# Patient Record
Sex: MALE
[De-identification: names, ages, dates, MRNs, and addresses within clinical notes are randomized per-mention and may not be internally consistent; named-entity substitution may affect disease eponyms.]

---

## 2017-03-15 ENCOUNTER — RX ONLY (OUTPATIENT)
Age: 77
Setting detail: RX ONLY
End: 2017-03-15

## 2017-12-15 ENCOUNTER — TRANSFERRED RECORDS (OUTPATIENT)
Dept: HEALTH INFORMATION MANAGEMENT | Facility: CLINIC | Age: 77
End: 2017-12-15

## 2018-12-21 ENCOUNTER — TRANSFERRED RECORDS (OUTPATIENT)
Dept: HEALTH INFORMATION MANAGEMENT | Facility: CLINIC | Age: 78
End: 2018-12-21

## 2019-08-08 ENCOUNTER — RECORDS - HEALTHEAST (OUTPATIENT)
Dept: ADMINISTRATIVE | Facility: OTHER | Age: 79
End: 2019-08-08

## 2019-08-19 ENCOUNTER — HOSPITAL ENCOUNTER (OUTPATIENT)
Dept: NUCLEAR MEDICINE | Facility: HOSPITAL | Age: 79
Discharge: HOME | End: 2019-08-19
Attending: INTERNAL MEDICINE

## 2019-08-19 DIAGNOSIS — C61 PROSTATE CA (H): ICD-10-CM

## 2019-08-19 RX ORDER — ASPIRIN 81 MG/1
81 TABLET, CHEWABLE ORAL DAILY
Status: SHIPPED | COMMUNITY
Start: 2019-08-19

## 2019-08-19 ASSESSMENT — MIFFLIN-ST. JEOR: SCORE: 1522.32

## 2019-12-19 ENCOUNTER — TRANSFERRED RECORDS (OUTPATIENT)
Dept: HEALTH INFORMATION MANAGEMENT | Facility: CLINIC | Age: 79
End: 2019-12-19

## 2020-07-15 ENCOUNTER — TRANSFERRED RECORDS (OUTPATIENT)
Dept: HEALTH INFORMATION MANAGEMENT | Facility: CLINIC | Age: 80
End: 2020-07-15

## 2020-08-13 ENCOUNTER — RECORDS - HEALTHEAST (OUTPATIENT)
Dept: ADMINISTRATIVE | Facility: OTHER | Age: 80
End: 2020-08-13

## 2020-08-14 ENCOUNTER — RECORDS - HEALTHEAST (OUTPATIENT)
Dept: ADMINISTRATIVE | Facility: OTHER | Age: 80
End: 2020-08-14

## 2020-08-14 ENCOUNTER — AMBULATORY - HEALTHEAST (OUTPATIENT)
Dept: CT IMAGING | Facility: CLINIC | Age: 80
End: 2020-08-14

## 2020-08-14 DIAGNOSIS — Z11.59 ENCOUNTER FOR SCREENING FOR OTHER VIRAL DISEASES: ICD-10-CM

## 2020-08-25 ENCOUNTER — RECORDS - HEALTHEAST (OUTPATIENT)
Dept: LAB | Facility: CLINIC | Age: 80
End: 2020-08-25

## 2020-08-25 LAB
ALBUMIN SERPL-MCNC: 3.9 G/DL (ref 3.5–5)
ALP SERPL-CCNC: 91 U/L (ref 45–120)
ALT SERPL W P-5'-P-CCNC: 13 U/L (ref 0–45)
ANION GAP SERPL CALCULATED.3IONS-SCNC: 10 MMOL/L (ref 5–18)
AST SERPL W P-5'-P-CCNC: 19 U/L (ref 0–40)
BILIRUB SERPL-MCNC: 0.8 MG/DL (ref 0–1)
BUN SERPL-MCNC: 13 MG/DL (ref 8–28)
CALCIUM SERPL-MCNC: 9.8 MG/DL (ref 8.5–10.5)
CHLORIDE BLD-SCNC: 106 MMOL/L (ref 98–107)
CHOLEST SERPL-MCNC: 221 MG/DL
CO2 SERPL-SCNC: 25 MMOL/L (ref 22–31)
CREAT SERPL-MCNC: 0.86 MG/DL (ref 0.7–1.3)
FASTING STATUS PATIENT QL REPORTED: ABNORMAL
GFR SERPL CREATININE-BSD FRML MDRD: >60 ML/MIN/1.73M2
GLUCOSE BLD-MCNC: 96 MG/DL (ref 70–125)
HDLC SERPL-MCNC: 51 MG/DL
LDLC SERPL CALC-MCNC: 141 MG/DL
POTASSIUM BLD-SCNC: 4.4 MMOL/L (ref 3.5–5)
PROT SERPL-MCNC: 6.8 G/DL (ref 6–8)
SODIUM SERPL-SCNC: 141 MMOL/L (ref 136–145)
TRIGL SERPL-MCNC: 147 MG/DL

## 2020-08-28 ENCOUNTER — HOSPITAL ENCOUNTER (OUTPATIENT)
Dept: ULTRASOUND IMAGING | Facility: CLINIC | Age: 80
Discharge: HOME | End: 2020-08-28
Attending: INTERNAL MEDICINE

## 2020-08-28 ENCOUNTER — HOSPITAL ENCOUNTER (OUTPATIENT)
Dept: CT IMAGING | Facility: CLINIC | Age: 80
Discharge: HOME | End: 2020-08-28
Attending: INTERNAL MEDICINE

## 2020-08-28 DIAGNOSIS — C61 PRIMARY MALIGNANT NEOPLASM OF PROSTATE (H): ICD-10-CM

## 2020-08-28 LAB
HGB BLD-MCNC: 13.2 G/DL (ref 14–18)
INR PPP: 0.99 (ref 0.9–1.1)
PLATELET # BLD AUTO: 275 THOU/UL (ref 140–440)

## 2020-08-28 ASSESSMENT — MIFFLIN-ST. JEOR: SCORE: 1522.32

## 2020-09-01 LAB
CAP COMMENT: ABNORMAL
LAB AP CHARGES (HE HISTORICAL CONVERSION): ABNORMAL
LAB AP INITIAL CYTO EVAL (HE HISTORICAL CONVERSION): ABNORMAL
LAB MED GENERAL PATH INTERP (HE HISTORICAL CONVERSION): ABNORMAL
PATH REPORT.COMMENTS IMP SPEC: ABNORMAL
PATH REPORT.COMMENTS IMP SPEC: ABNORMAL
PATH REPORT.FINAL DX SPEC: ABNORMAL
PATH REPORT.MICROSCOPIC SPEC OTHER STN: ABNORMAL
PATH REPORT.RELEVANT HX SPEC: ABNORMAL
SPECIMEN DESCRIPTION: ABNORMAL

## 2021-05-30 ENCOUNTER — RECORDS - HEALTHEAST (OUTPATIENT)
Dept: ADMINISTRATIVE | Facility: CLINIC | Age: 81
End: 2021-05-30

## 2021-06-01 ENCOUNTER — RECORDS - HEALTHEAST (OUTPATIENT)
Dept: ADMINISTRATIVE | Facility: CLINIC | Age: 81
End: 2021-06-01

## 2021-06-03 ENCOUNTER — RECORDS - HEALTHEAST (OUTPATIENT)
Dept: ADMINISTRATIVE | Facility: CLINIC | Age: 81
End: 2021-06-03

## 2021-06-03 VITALS — BODY MASS INDEX: 24.08 KG/M2 | HEIGHT: 71 IN | WEIGHT: 172 LBS

## 2021-06-04 VITALS — HEIGHT: 71 IN | WEIGHT: 172 LBS | BODY MASS INDEX: 24.08 KG/M2

## 2021-06-27 ENCOUNTER — HEALTH MAINTENANCE LETTER (OUTPATIENT)
Age: 81
End: 2021-06-27

## 2021-10-17 ENCOUNTER — HEALTH MAINTENANCE LETTER (OUTPATIENT)
Age: 81
End: 2021-10-17

## 2022-04-15 ENCOUNTER — TRANSFERRED RECORDS (OUTPATIENT)
Dept: HEALTH INFORMATION MANAGEMENT | Facility: CLINIC | Age: 82
End: 2022-04-15

## 2022-07-23 ENCOUNTER — HEALTH MAINTENANCE LETTER (OUTPATIENT)
Age: 82
End: 2022-07-23

## 2022-10-01 ENCOUNTER — HEALTH MAINTENANCE LETTER (OUTPATIENT)
Age: 82
End: 2022-10-01

## 2022-12-22 ENCOUNTER — TRANSFERRED RECORDS (OUTPATIENT)
Dept: HEALTH INFORMATION MANAGEMENT | Facility: CLINIC | Age: 82
End: 2022-12-22

## 2023-04-20 ENCOUNTER — TRANSFERRED RECORDS (OUTPATIENT)
Dept: HEALTH INFORMATION MANAGEMENT | Facility: CLINIC | Age: 83
End: 2023-04-20

## 2023-08-12 ENCOUNTER — HEALTH MAINTENANCE LETTER (OUTPATIENT)
Age: 83
End: 2023-08-12

## 2023-08-23 ENCOUNTER — TRANSFERRED RECORDS (OUTPATIENT)
Dept: HEALTH INFORMATION MANAGEMENT | Facility: CLINIC | Age: 83
End: 2023-08-23

## 2023-08-30 ENCOUNTER — MEDICAL CORRESPONDENCE (OUTPATIENT)
Dept: HEALTH INFORMATION MANAGEMENT | Facility: CLINIC | Age: 83
End: 2023-08-30
Payer: MEDICARE

## 2023-08-30 ENCOUNTER — TRANSFERRED RECORDS (OUTPATIENT)
Dept: HEALTH INFORMATION MANAGEMENT | Facility: CLINIC | Age: 83
End: 2023-08-30
Payer: MEDICARE

## 2023-09-27 ENCOUNTER — TRANSCRIBE ORDERS (OUTPATIENT)
Dept: OTHER | Age: 83
End: 2023-09-27

## 2023-09-27 ENCOUNTER — PATIENT OUTREACH (OUTPATIENT)
Dept: ONCOLOGY | Facility: CLINIC | Age: 83
End: 2023-09-27
Payer: MEDICARE

## 2023-09-27 DIAGNOSIS — C61 PRIMARY MALIGNANT NEOPLASM OF PROSTATE (H): Primary | ICD-10-CM

## 2023-09-27 NOTE — PROGRESS NOTES
New Patient Oncology Nurse Navigator Note     Referring provider:   Luis Locke MD    [facility]  ph. [phone]  fax: [phone]     Referred to (specialty): Medical Oncology    Requested provider (if applicable):   [facility]     Date Referral Received:   09/27/23     Evaluation for :   primary malignant neoplasm of castrate resistant prostate, discuss Pluvicto eligibility     Clinical History (per Nurse review of records provided):    Patient of Dr. Locke, [facility], being referred for discussion of potential lutetium tx or clinical trials due to progression on recent PSMA PET (8/23/23).    Initial diagnosis of prostate cancer 11/6/2009    Records Location (Care Everywhere, Media, etc.):   [facility]      I called patient to discuss referral to oncology.  I reached his wife, Macey, who does his scheduling.  She was expecting this call and knew what it was regarding.  I introduced my role and reviewed what this consult visit will entail/what to expect.  I reviewed the location and gave contact numbers including new patient scheduling and clinic phone numbers.   Macey, has no other questions at this time.    I forwarded on referral with scheduling instructions for the following     PLAN: SCHEDULE: First available @ [facility], with med onc for prostate cancer, NEW, in person.    I transferred call to our new patient scheduling to finalize appointment.    Che Hassan RN, BSN  Oncology New Patient Nurse Navigator   [facility]  [phone]

## 2023-10-04 ENCOUNTER — TRANSFERRED RECORDS (OUTPATIENT)
Dept: HEALTH INFORMATION MANAGEMENT | Facility: CLINIC | Age: 83
End: 2023-10-04
Payer: MEDICARE

## 2023-10-12 NOTE — TELEPHONE ENCOUNTER
RECORDS STATUS - ALL OTHER DIAGNOSIS      Action October 12, 2023 4:07 PM    Action Taken Request is faxed to [facility] for records.    October 17, 2023 2:05 PM:  - Received records from [facility]. Faxed to HIM and emailed to NN. Called [facility] to push images to  PACS.  - Req path slides from [facility].   [facility] Tracking #: [id number]    October 18, 2023 10:01 AM:   Images was not push to PACS. Called and spoke to Melanie at [facility], she'll get those images push.      Imaging Received  October 18, 2023 11:57 AM    Action: Images from [facility] received and resolved to PACS.     RECORDS RECEIVED FROM: [facility]   DATE RECEIVED:    NOTES STATUS DETAILS   OFFICE NOTE from referring provider External: [facility] 10/4/23: Dr. Luis Locke   OFFICE NOTE from radiation oncologist External: [facility] 12/8/20: Dr. Bob Shaver   OPERATIVE REPORT CE- [facility] 12/9/09: Prostatectomy   MEDICATION LIST External: [facility]    LABS  [facility] Tracking #: [id number]   PATHOLOGY REPORTS CE- [facility] (slide req) 12/9/09: [id number]    ANYTHING RELATED TO DIAGNOSIS External: [facility]    GENONOMIC TESTING     TYPE: External: Guardant 360 4/20/23: [id number]  8/9/19: [id number]   IMAGING (NEED IMAGES & REPORT)     CT SCANS (Req img from [facility]) 8/23/23, 12/22/22: PET CT Prostate   PET (Req img from [facility]) 4/15/22, 7/16/20: PET Tumor

## 2023-10-17 NOTE — PROGRESS NOTES
NEW PATIENT SECOND OPINION CONSULTATION    Reason for Visit:  Metastatic CRPC s/p enzalutamide and abiraterone; consideration of 177Lu-PSMA-617.    History of Present Illness:  Dear Dr Luis Locke,    Thank you for referring your patient for a Second Opinion consultation at the [facility].  A brief overview of his oncological history as well as my recommendations follow below.    Mr. Pettit is an 82-year-old man who was diagnosed with prostate cancer in 2009, at the age of 69.  He underwent a radical prostatectomy on 12/9/2009.  Pathology revealed [place] 4+5=9 disease, stage pT3a N0 R1.  He then completed salvage external-beam radiotherapy, from 3/2010 until 5/2010.    Unfortunately, he developed a PSA recurrence in 4/2013, and started ADT at that time.  By 1/2016, he had developed non-metastatic CRPC, and enzalutamide was added.  He subsequently developed enzalutamide resistance in 7/2020.  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  He underwent SBRT targeting the left supraclavicular node, ending on 9/30/2020.    He then continued with systemic therapy using ADT plus enzalutamide, but his PSA continued to rise over the next eighteen months.  In 5/2022, he switched from enzalutamide to abiraterone/prednisone.  His disease progressed further by 8/10/2023, at which time he stopped the abiraterone and prednisone.    He underwent a PSA test on 8/23/2023, showing a value of 77.5 ng/mL.  He also recently underwent a PSMA-PET scan on 8/23/2023.  This showed multiple radiotracer-avid osseous and brisa metastatic lesions.    The patient is being referred for consideration of 177Lu-PSMA-617 radioligand treatment.  He is also potentially open to clinical trial participation, if available.  The patient has not yet received systemic chemotherapy using a taxane agent.    Genomics:  The patient  underwent a [id number] ctDNA test on 2023.  This revealed only a TP53 mutation, without microsatellite instability and without a high tumor mutation burden.    Review of Systems:  The patient reports feeling generally well.  He does not have any cancer-related symptoms at this time.  He has not lost or gained any weight recently.  He does not report any significant urological symptoms.  He does not have any bone pain.  A comprehensive 14-system review of symptoms is otherwise generally within normal limits.  His ECOG score is 1.  His pain score is 1/10.    Past Medical History:  Superficial bladder cancer ()  Hearing impairment  Chronic lower back pain  Diverticulosis  Umbilical hernia  Penile implant  Vasectomy    Medications:  Leuprolide 30 mg q4mo  Zometa 4 mg IV  Aspirin 81 mg  Bicalutamide 50 mg  Gabapentin 300 mg BID  Calcium plus vitamin D  Multivitamin    Allergies:  No known drug allergies.    Social History:  The patient lives in [place], [place] (which is 30 minutes from the [place]).  He is  to his wife, Macey.  He is retired from [employer].  He has never been a smoker.  He does not drink regular alcohol, but does enjoy an occasional beer.    Family History:  His father  of metastatic prostate cancer.  His maternal aunt had breast cancer.    Physical Examination:  Mr. Pettit is an 82-year-old man who appears comfortable at rest.  His vital signs are generally within normal limits.  There is no palpable supraclavicular, cervical, axillary or inguinal lymphadenopathy.  His HEENT examination is unremarkable.  The cardiovascular, respiratory, and gastrointestinal examinations are generally within normal limits.  The neuromuscular examination is grossly intact.  The extremities do not demonstrate pitting edema.  The skin evaluation is unremarkable.      ASSESSMENT AND PLAN:  Mr. Pettit is an 82-year-old man with metastatic CRPC who has previously received enzalutamide and abiraterone (but is  chemotherapy-naïve).  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  His ECOG score is 1.  His pain score is 1/10.    We have spent the first part of our consultation discussing additional systemic treatment options that he would be eligible for.  Such treatments include docetaxel chemotherapy, or the bone-targeting radiopharmaceutical agents radium-223.  Unfortunately, he would not be eligible for 177Lu-PSMA-617 radioligand treatment at this time, since he has not previously received a taxane chemotherapy agent.  In addition, we do not currently have any clinical trials utilizing 177Lu-PSMA-617 in the pre-chemotherapy setting that he would be eligible for.    We then turned our attention to discuss our clinical trial portfolio.  The patient would be potentially eligible for two of our clinical studies.  The first is the MARLO trial, which is a randomized Phase 3 study of docetaxel used alone or combined with radium-223.  I believe that this patient would be a good fit for this particular clinical trial.  The second study is the Phase 1 [id number] trial using a PSMA x CD3 bispecific antibody therapy.  After reviewing both of these clinical trials with the patient, my recommendation would be to pursue the MARLO study.  We will provide him with the consent forms for both trials today.    A total of 80 minutes was spent on this patient on the day of the consultation, of which more than 50% of this time was used for counseling and coordination of care.  The patient was given the opportunity to ask multiple questions today, all of which were answered to his satisfaction.    Reed Johns M.D.

## 2023-10-18 ENCOUNTER — PRE VISIT (OUTPATIENT)
Dept: ONCOLOGY | Facility: CLINIC | Age: 83
End: 2023-10-18
Payer: MEDICARE

## 2023-10-18 ENCOUNTER — ONCOLOGY VISIT (OUTPATIENT)
Dept: ONCOLOGY | Facility: CLINIC | Age: 83
End: 2023-10-18
Attending: INTERNAL MEDICINE
Payer: MEDICARE

## 2023-10-18 VITALS
WEIGHT: 164.9 LBS | SYSTOLIC BLOOD PRESSURE: 146 MMHG | HEART RATE: 66 BPM | RESPIRATION RATE: 20 BRPM | TEMPERATURE: 97.6 F | OXYGEN SATURATION: 99 % | HEIGHT: 71 IN | DIASTOLIC BLOOD PRESSURE: 85 MMHG | BODY MASS INDEX: 23.09 KG/M2

## 2023-10-18 DIAGNOSIS — C61 MALIGNANT NEOPLASM OF PROSTATE (H): Primary | ICD-10-CM

## 2023-10-18 PROCEDURE — G0463 HOSPITAL OUTPT CLINIC VISIT: HCPCS | Performed by: INTERNAL MEDICINE

## 2023-10-18 PROCEDURE — 99205 OFFICE O/P NEW HI 60 MIN: CPT | Performed by: INTERNAL MEDICINE

## 2023-10-18 RX ORDER — GABAPENTIN 300 MG
1 CAPSULE ORAL
COMMUNITY
Start: 2023-09-27 | End: 2023-10-24

## 2023-10-18 RX ORDER — CYCLOBENZAPRINE HCL 10 MG
TABLET ORAL
COMMUNITY
Start: 2023-10-06

## 2023-10-18 RX ORDER — BICALUTAMIDE 50 MG/1
TABLET, FILM COATED ORAL
COMMUNITY
Start: 2023-08-30 | End: 2023-10-24

## 2023-10-18 ASSESSMENT — PAIN SCALES - GENERAL: PAINLEVEL: NO PAIN (0)

## 2023-10-18 NOTE — LETTER
10/18/2023         RE: Alonso Pettit  [address] [facility] [address]        Dear Colleague,    Thank you for referring your patient, Alonso Pettit, to the [facility]. Please see a copy of my visit note below.      NEW PATIENT SECOND OPINION CONSULTATION    Reason for Visit:  Metastatic CRPC s/p enzalutamide and abiraterone; consideration of 177Lu-PSMA-617.    History of Present Illness:  Dear Dr Luis Locke,    Thank you for referring your patient for a Second Opinion consultation at the [facility].  A brief overview of his oncological history as well as my recommendations follow below.    Mr. Pettit is an 82-year-old man who was diagnosed with prostate cancer in 2009, at the age of 69.  He underwent a radical prostatectomy on 12/9/2009.  Pathology revealed [place] 4+5=9 disease, stage pT3a N0 R1.  He then completed salvage external-beam radiotherapy, from 3/2010 until 5/2010.    Unfortunately, he developed a PSA recurrence in 4/2013, and started ADT at that time.  By 1/2016, he had developed non-metastatic CRPC, and enzalutamide was added.  He subsequently developed enzalutamide resistance in 7/2020.  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  He underwent SBRT targeting the left supraclavicular node, ending on 9/30/2020.    He then continued with systemic therapy using ADT plus enzalutamide, but his PSA continued to rise over the next eighteen months.  In 5/2022, he switched from enzalutamide to abiraterone/prednisone.  His disease progressed further by 8/10/2023, at which time he stopped the abiraterone and prednisone.    He underwent a PSA test on 8/23/2023, showing a value of 77.5 ng/mL.  He also recently underwent a PSMA-PET scan on 8/23/2023.  This showed multiple radiotracer-avid osseous and brisa metastatic lesions.    The patient is being  referred for consideration of 177Lu-PSMA-617 radioligand treatment.  He is also potentially open to clinical trial participation, if available.  The patient has not yet received systemic chemotherapy using a taxane agent.    Genomics:  The patient underwent a [id number] ctDNA test on 2023.  This revealed only a TP53 mutation, without microsatellite instability and without a high tumor mutation burden.    Review of Systems:  The patient reports feeling generally well.  He does not have any cancer-related symptoms at this time.  He has not lost or gained any weight recently.  He does not report any significant urological symptoms.  He does not have any bone pain.  A comprehensive 14-system review of symptoms is otherwise generally within normal limits.  His ECOG score is 1.  His pain score is 1/10.    Past Medical History:  Superficial bladder cancer ()  Hearing impairment  Chronic lower back pain  Diverticulosis  Umbilical hernia  Penile implant  Vasectomy    Medications:  Leuprolide 30 mg q4mo  Zometa 4 mg IV  Aspirin 81 mg  Bicalutamide 50 mg  Gabapentin 300 mg BID  Calcium plus vitamin D  Multivitamin    Allergies:  No known drug allergies.    Social History:  The patient lives in [place], [place] (which is 30 minutes from the [place]).  He is  to his wife, Macey.  He is retired from [employer].  He has never been a smoker.  He does not drink regular alcohol, but does enjoy an occasional beer.    Family History:  His father  of metastatic prostate cancer.  His maternal aunt had breast cancer.    Physical Examination:  Mr. Pettit is an 82-year-old man who appears comfortable at rest.  His vital signs are generally within normal limits.  There is no palpable supraclavicular, cervical, axillary or inguinal lymphadenopathy.  His HEENT examination is unremarkable.  The cardiovascular, respiratory, and gastrointestinal examinations are generally within normal limits.  The neuromuscular examination is grossly  intact.  The extremities do not demonstrate pitting edema.  The skin evaluation is unremarkable.      ASSESSMENT AND PLAN:  Mr. Pettit is an 82-year-old man with metastatic CRPC who has previously received enzalutamide and abiraterone (but is chemotherapy-naïve).  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  His ECOG score is 1.  His pain score is 1/10.    We have spent the first part of our consultation discussing additional systemic treatment options that he would be eligible for.  Such treatments include docetaxel chemotherapy, or the bone-targeting radiopharmaceutical agents radium-223.  Unfortunately, he would not be eligible for 177Lu-PSMA-617 radioligand treatment at this time, since he has not previously received a taxane chemotherapy agent.  In addition, we do not currently have any clinical trials utilizing 177Lu-PSMA-617 in the pre-chemotherapy setting that he would be eligible for.    We then turned our attention to discuss our clinical trial portfolio.  The patient would be potentially eligible for two of our clinical studies.  The first is the MARLO trial, which is a randomized Phase 3 study of docetaxel used alone or combined with radium-223.  I believe that this patient would be a good fit for this particular clinical trial.  The second study is the Phase 1 [id number] trial using a PSMA x CD3 bispecific antibody therapy.  After reviewing both of these clinical trials with the patient, my recommendation would be to pursue the MARLO study.  We will provide him with the consent forms for both trials today.    A total of 80 minutes was spent on this patient on the day of the consultation, of which more than 50% of this time was used for counseling and coordination of care.  The patient was given the opportunity to ask multiple questions today, all of which were answered to his satisfaction.    Reed Johns M.D.

## 2023-10-18 NOTE — NURSING NOTE
"Oncology Rooming Note    October 18, 2023 1:32 PM   Alonso Pettit is a 82 year old adult who presents for:    Chief Complaint   Patient presents with    Oncology Clinic Visit     Primary malignant neoplasm of prostate      Initial Vitals: BP (!) 146/85 (BP Location: Right arm, Patient Position: Sitting, Cuff Size: Adult Regular)   Pulse 66   Temp 97.6  F (36.4  C) (Oral)   Resp 20   Ht 1.812 m (5' 11.34\")   Wt 74.8 kg (164 lb 14.4 oz)   SpO2 99%   BMI 22.78 kg/m   Estimated body mass index is 22.78 kg/m  as calculated from the following:    Height as of this encounter: 1.812 m (5' 11.34\").    Weight as of this encounter: 74.8 kg (164 lb 14.4 oz). Body surface area is 1.94 meters squared.  No Pain (0) Comment: Data Unavailable   No LMP recorded.  Allergies reviewed: Yes  Medications reviewed: Yes    Medications: Medication refills not needed today.  Pharmacy name entered into [facility]: [id number] [place] [address]    Clinical concerns: none      Adriana Gallagher              "

## 2023-10-19 ENCOUNTER — PATIENT OUTREACH (OUTPATIENT)
Dept: ONCOLOGY | Facility: CLINIC | Age: 83
End: 2023-10-19
Payer: MEDICARE

## 2023-10-19 ENCOUNTER — DOCUMENTATION ONLY (OUTPATIENT)
Dept: ONCOLOGY | Facility: CLINIC | Age: 83
End: 2023-10-19
Payer: MEDICARE

## 2023-10-19 NOTE — PROGRESS NOTES
[facility]: Cancer Care                                                                                      RNCC received a message from patient's wife inquiring about research and consent forms.  She provided the email they'd like the consent form emailed to.  Forwarded on to the research team.  Attempted to reach out to patient, but left a message for them informing that the information was passed on to the research team.    Genet Hansen, RN, BSN  Oncology RN Care Coordinator  [facility]

## 2023-10-19 NOTE — NURSING NOTE
Emailed patient the consent form for the [id number] Jocelin trial per Dr. Johns' request. Will follow up with patient on Monday, gave contact info ig questions before then. Instructed patient to only review consent and to not sign.     Cammy López RN   Clinical Research Coordinator Nurse-Solid Tumor   Phone: [phone] Pgr: [phone]

## 2023-10-23 ENCOUNTER — DOCUMENTATION ONLY (OUTPATIENT)
Dept: ONCOLOGY | Facility: CLINIC | Age: 83
End: 2023-10-23
Payer: MEDICARE

## 2023-10-24 ENCOUNTER — ALLIED HEALTH/NURSE VISIT (OUTPATIENT)
Dept: ONCOLOGY | Facility: CLINIC | Age: 83
End: 2023-10-24
Payer: MEDICARE

## 2023-10-24 ENCOUNTER — ONCOLOGY VISIT (OUTPATIENT)
Dept: ONCOLOGY | Facility: CLINIC | Age: 83
End: 2023-10-24
Attending: INTERNAL MEDICINE
Payer: MEDICARE

## 2023-10-24 VITALS
RESPIRATION RATE: 16 BRPM | TEMPERATURE: 97.5 F | DIASTOLIC BLOOD PRESSURE: 72 MMHG | BODY MASS INDEX: 22.96 KG/M2 | WEIGHT: 164 LBS | OXYGEN SATURATION: 98 % | SYSTOLIC BLOOD PRESSURE: 132 MMHG | HEIGHT: 71 IN | HEART RATE: 68 BPM

## 2023-10-24 DIAGNOSIS — C61 MALIGNANT NEOPLASM OF PROSTATE (H): Primary | ICD-10-CM

## 2023-10-24 DIAGNOSIS — C79.51 MALIGNANT NEOPLASM METASTATIC TO BONE (H): ICD-10-CM

## 2023-10-24 PROCEDURE — G0463 HOSPITAL OUTPT CLINIC VISIT: HCPCS | Performed by: INTERNAL MEDICINE

## 2023-10-24 PROCEDURE — 99417 PROLNG OP E/M EACH 15 MIN: CPT | Performed by: INTERNAL MEDICINE

## 2023-10-24 PROCEDURE — 99215 OFFICE O/P EST HI 40 MIN: CPT | Performed by: INTERNAL MEDICINE

## 2023-10-24 ASSESSMENT — PAIN SCALES - GENERAL: PAINLEVEL: NO PAIN (0)

## 2023-10-24 NOTE — LETTER
10/24/2023         RE: Alonso Pettit  [address] [facility] [address]        Dear Colleague,    Thank you for referring your patient, Alonso Pettit, to the [facility]. Please see a copy of my visit note below.    [facility]  HEMATOLOGY AND ONCOLOGY    FOLLOW-UP VISIT NOTE    PATIENT NAME: Alonso Pettit MRN # [id number]  DATE OF VISIT: Oct 24, 2023 YOB: 1940    REFERRING PROVIDER: [facility]ita Veras oncology    CANCER TYPE: Prostate adenocarcinoma; Scott 4+5  STAGE: IV (bony metastasis)  MOLECULAR PROFILE: No actionable mutations/MSI or high TMB; TP53 mutation positive    TREATMENT SUMMARY:  -12/9/2009: Radical prostatectomy; pathology revealed Scott 4+5 disease, stage pT3a,N0 positive margins  -Mar-May2010: Salvage external beam radiation therapy  -Apr 2013: Intermittent androgen deprivation therapy with leuprolide for biochemical PSA relapse  -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 Radiation therapy to the left supraclavicular lymph node ending in September 2020.  He was continued on enzalutamide  -May 2022: Switch to abiraterone with prednisone for progressive disease  -Aug 2023: Abiraterone discontinued for progressive disease.  PSMA PET scan with PSMA avid osseous and brisa metastasis    CURRENT INTERVENTIONS:  Leuprolide alone after progressing on abiraterone with prednisone    SUBJECTIVE   Alonso Pettit is being followed for castration resistant metastatic prostate cancer    Alonso is accompanied by his wife at this clinic visit.  He was seen by my colleague-Dr. Cheung last week and recommended participation in clinical trial. He is here to review more with myself.       PAST MEDICAL HISTORY     Past Medical History:   Diagnosis Date     "Prostate cancer (H)          CURRENT OUTPATIENT MEDICATIONS     Current Outpatient Medications   Medication Sig    calcium carbonate (CALCIUM 500 ORAL) [CALCIUM CARBONATE (CALCIUM 500 ORAL)] Take by mouth.    cyclobenzaprine (FLEXERIL) 10 MG tablet 1/2-1 TAB ORALLY UP TO 3 TIMES A DAY AS NEEDED FOR MUSCLE SPASM    aspirin 81 mg chewable tablet [ASPIRIN 81 MG CHEWABLE TABLET] Chew 81 mg daily. (Patient not taking: Reported on 10/18/2023)    leuprolide (LUPRON) 11.25 mg injection [LEUPROLIDE (LUPRON) 11.25 MG INJECTION] Inject 11.25 mg into the shoulder, thigh, or buttocks every 3 (three) months. (Patient not taking: Reported on 10/18/2023)     No current facility-administered medications for this visit.        ALLERGIES    No Known Allergies     REVIEW OF SYSTEMS   As above in the HPI, o/w complete 12-point ROS was negative.     PHYSICAL EXAM   /72   Pulse 68   Temp 97.5  F (36.4  C) (Oral)   Resp 16   Ht 1.812 m (5' 11.34\")   Wt 74.4 kg (164 lb)   SpO2 98%   BMI 22.66 kg/m    GENERAL/CONSTITUTIONAL: No acute distress.  EYES: Pupils are equal, round, and react to light and accommodation. Extraocular movements intact.  No scleral icterus.  ENT/MOUTH: Neck supple. Oropharynx clear, no mucositis.  LYMPH: No anterior cervical, posterior cervical, supraclavicular, axillary or inguinal adenopathy.   RESPIRATORY: Clear to auscultation bilaterally. No crackles or wheezing.   CARDIOVASCULAR: Regular rate and rhythm without murmurs, gallops, or rubs.  GASTROINTESTINAL: No hepatosplenomegaly, masses, or tenderness. The patient has normal bowel sounds. No guarding.  No distention.  : Deferred  MUSCULOSKELETAL: Warm and well-perfused, no cyanosis, clubbing, or edema.  NEUROLOGIC: Cranial nerves II-XII are intact. Alert, oriented, answers questions appropriately. No focal neurologic deficit  INTEGUMENTARY: No rashes or jaundice.  GAIT: Normal       LABORATORY AND IMAGING STUDIES     Recent Labs   Lab Test " "08/25/20  1443      POTASSIUM 4.4   CHLORIDE 106   CO2 25   ANIONGAP 10   BUN 13   CR 0.86   GLC 96   ZABRINA 9.8     No results for input(s): \"MAG\", \"PHOS\" in the last 56926 hours.  Recent Labs   Lab Test 08/28/20  0706   HGB 13.2*        Recent Labs   Lab Test 08/25/20  1443   BILITOTAL 0.8   ALKPHOS 91   ALT 13   AST 19   ALBUMIN 3.9     No results found for: \"TSH\"  No results for input(s): \"CEA\" in the last 01899 hours.  Results for orders placed or performed in visit on 06/03/21   US Penile Duplex Complete    Narrative    See Historical Hospital Medical Record for documentation         ASSESSMENT AND PLAN   -Castration resistant metastatic prostate cancer   Post radical prostatectomy on 12/9/2009; salvage radiation ending May 2010; androgen deprivation therapy since 2013   Post progression on enzalutamide and abiraterone with prednisone  -Nonmuscle invasive bladder cancer diagnosed in 2011 without any recent recurrence  -No significant medical comorbidity  -ECOG performance status of 0    I had a lengthy discussion with Alonso who is accompanied by his wife at this clinic visit.  He has castration resistant metastatic prostate cancer which has progressed on novel antiandrogen therapies including abiraterone with prednisone and enzalutamide.  He had a PSMA PET CT scan which did show extensive metastasis to the lymph nodes and bones.  We reviewed his subsequent treatment options including lutetium 177 (Pluvicto), chemotherapy with docetaxel and MARLO clinical trial.    We primarily focused on docetaxel and radium as Pluvicto is approved only after progression on 1 taxane-based regimen.  He has not received any chemotherapy either in the hormone sensitive for castration resistant setting and is not eligible for Pluvicto as yet.    I reviewed chemotherapy with docetaxel which was studied in the  trial which led to its approval in 2004 for castration resistant metastatic prostate cancer. N Engl J Med " 2004; 351:4611-1486.  In this study docetaxel with prednisone was compared to mitoxantrone with prednisone and showed a significant improvement in median survival from 16.5 months in the control mitoxantrone group to 18.9 months in the docetaxel arm.  Treatment with docetaxel was associated with improvement in quality of life.  When given with prednisone treatment with docetaxel every 3 weeks left distal.  Survival and improved response rates in terms of pain, PSA levels and quality of life as compared to mitoxantrone with prednisone.    I extensively reviewed the side effects from this chemotherapy.  We reviewed the peripheral neuropathy, alopecia, neutropenic fevers, myelosuppression, mucositis, diarrhea, allergies, pedal edema and rash.  Of these, alopecia and nail changes are more of nuisance.  However, cytopenias and in particular neutropenia can be associated with neutropenic fevers which can be serious and life-threatening. He should take every fever seriously because if his counts are low, then he would not have the defenses and he should give us a call immediately.  He should not even wait for the next morning.  He should be seen either in the clinic or in the ER the same day.  We would get basic blood tests including the CBC and blood cultures.      I reviewed radium 223 as part of the MARLO trial along with docetaxel.  [id number] is approved and castration resistant metastatic prostate cancer patients having progressed on chemotherapy or dose was not a chemotherapy candidate.  The current clinical trial will study the tolerability and efficacy of radium when administered alongside docetaxel chemotherapy as compared to single agent docetaxel.  Patient will be randomized to standard of care docetaxel once every 3 weeks or the combination of radium once every 6 weeks along with docetaxel every 3 weeks.  The dose of docetaxel is decreased in the combination arm due to concerns of overlapping toxicity -primarily  cytopenias.       Alpharadin /Ra-223 /Xofigo (Jeferson MADRIGAL., Claudine HENDERSON., [place] SMITH., et al. N Engl J Med 2013; 369:213-223) is a radioisotope that has chemical structure similar to calcium. Administered intravenously it gets deposited at the sites of bony disease. It releases high energy alpha particles that deliver fatal radiation dose over a very short distance. This treatment is similar to previously administered radioactive isotopes like samarium and strontium. In this phase III clinical trial it significantly improved OS in pts with CRPC with bone mets vs placebo (two-sided P = 0.04251; HR = 0.695; 95% CI, 0.552-0.875; median OS 14.0 mo vs 11.2 mo, respectively) and was very well tolerated. This is a significant improvement over previous agents like samarium used for palliation which failed to improve survival. Patients can have nausea, vomiting, fatigue and diarrhea. It is excreted in stools. But since it emits alpha particle which have a pathlength of only 1 mm, it is remarkably safe and cannot cross the skin barrier. Care is needed for management of body fluids, but again any significant exposure to care givers from this route is not expected.     I summarized the above information so that they could understand.  Radium has been shown to decrease pain medication requirement but is unable to control the progression of disease on its own.  It has been shown to improve survival which was the reason for its FDA approval.  Outside of clinical trial it is administered as an injection once every month for up to 6 doses.  However on this study it is administered every 6 weeks to synchronize with the docetaxel chemotherapy infusions.  Overall radium 223 is very well-tolerated and I have not seen any significant side effects in any of my patients other than cytopenias and an occasional patient who has received extensive radiation or chemotherapy.  He will be closely monitored while on clinical trial and will have CBC  drawn and await measured a week prior to the planned injection.  Radium is prepared for an individual patient based on his body weight.      He would be assessed immediately prior to his treatments including radium injection or docetaxel chemotherapy to assess how well he is tolerating therapy and the status of his disease.    We will discontinue his bicalutamide in anticipation of clinical trial participation.  He is not benefiting from the current bicalutamide therapy after progressing on enzalutamide and abiraterone with prednisone.  I have simplified his medication list and removed the medications that he has not been taking currently.    He did consent to clinical trial participation.  I reminded him that it is not a binding contract but there will be an ongoing consent with each administration and every visit.  He would have the option of dropping from the clinical trial if he chooses.  However, there is a chance that he should benefit from the combination of docetaxel and radium 223 if he is randomized to the treatment.  Treatment on the standard of care were docetaxel arm will not have any direct benefit for him but would not have any downside other than the need to travel to the [place] for the infusions.  He will be monitored closely while on clinical trial.  Patients treated on clinical trials tend to do better than those in clinical practice likely due to this close monitoring.    Over 90 min spent on day of visit including review of tests, obtaining/reviewing separately obtained history/physical exam, counseling patient, ordering medications/tests/procedures, communicating with PCP/consultants, and documenting in electronic medical record.    Kg Gamez  Hematologist and Medical Oncologist  [facility]

## 2023-10-24 NOTE — NURSING NOTE
"Oncology Rooming Note    October 24, 2023 4:30 PM   Alonso Pettit is a 82 year old adult who presents for:    No chief complaint on file.    Initial Vitals: /72   Pulse 68   Temp 97.5  F (36.4  C) (Oral)   Resp 16   Ht 1.812 m (5' 11.34\")   Wt 74.4 kg (164 lb)   SpO2 98%   BMI 22.66 kg/m   Estimated body mass index is 22.66 kg/m  as calculated from the following:    Height as of this encounter: 1.812 m (5' 11.34\").    Weight as of this encounter: 74.4 kg (164 lb). Body surface area is 1.94 meters squared.  No Pain (0) Comment: Data Unavailable   No LMP recorded.  Allergies reviewed: Yes  Medications reviewed: Yes    Medications: Medication refills not needed today.  Pharmacy name entered into [facility]: [facility] [id number] [place] [address]    Clinical concerns:        Macey Soliman CMA              "

## 2023-10-24 NOTE — NURSING NOTE
Call patient and spouse, answered questions and explained study in detail. Wanting to come in for consent. Will work with providers scheduled and patient schedule to have patient seen. Notified them that they have to transition care to Dr. Gamez or Dr. Camara due to conflicting on the [id number] study from Dr. Johns, they are agreeable to this.     Cammy López RN   Clinical Research Coordinator Nurse-Solid Tumor   Phone: [phone] Pgr: [phone]

## 2023-10-25 NOTE — PROGRESS NOTES
[facility]  HEMATOLOGY AND ONCOLOGY    FOLLOW-UP VISIT NOTE    PATIENT NAME: Alonso Pettit MRN # [id number]  DATE OF VISIT: Oct 24, 2023 YOB: 1940    REFERRING PROVIDER: [facility]    CANCER TYPE: Prostate adenocarcinoma; [place] 4+5  STAGE: IV (bony metastasis)  MOLECULAR PROFILE: No actionable mutations/MSI or high TMB; TP53 mutation positive    TREATMENT SUMMARY:  -12/9/2009: Radical prostatectomy; pathology revealed [place] 4+5 disease, stage pT3a,N0 positive margins  -Mar-May2010: Salvage external beam radiation therapy  -Apr 2013: Intermittent androgen deprivation therapy with leuprolide for biochemical PSA relapse  -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 Radiation therapy to the left supraclavicular lymph node ending in September 2020.  He was continued on enzalutamide  -May 2022: Switch to abiraterone with prednisone for progressive disease  -Aug 2023: Abiraterone discontinued for progressive disease.  PSMA PET scan with PSMA avid osseous and brisa metastasis    CURRENT INTERVENTIONS:  Leuprolide alone after progressing on abiraterone with prednisone    SUBJECTIVE   Alonso Pettit is being followed for castration resistant metastatic prostate cancer    Alonso is accompanied by his wife at this clinic visit.  He was seen by my colleague-Dr. Cheung last week and recommended participation in clinical trial. He is here to review more with myself.       PAST MEDICAL HISTORY     Past Medical History:   Diagnosis Date    Prostate cancer (H)          CURRENT OUTPATIENT MEDICATIONS     Current Outpatient Medications   Medication Sig    calcium carbonate (CALCIUM 500 ORAL) [CALCIUM CARBONATE (CALCIUM 500 ORAL)] Take by mouth.    cyclobenzaprine (FLEXERIL) 10 MG tablet 1/2-1  "TAB ORALLY UP TO 3 TIMES A DAY AS NEEDED FOR MUSCLE SPASM    aspirin 81 mg chewable tablet [ASPIRIN 81 MG CHEWABLE TABLET] Chew 81 mg daily. (Patient not taking: Reported on 10/18/2023)    leuprolide (LUPRON) 11.25 mg injection [LEUPROLIDE (LUPRON) 11.25 MG INJECTION] Inject 11.25 mg into the shoulder, thigh, or buttocks every 3 (three) months. (Patient not taking: Reported on 10/18/2023)     No current facility-administered medications for this visit.        ALLERGIES    No Known Allergies     REVIEW OF SYSTEMS   As above in the HPI, o/w complete 12-point ROS was negative.     PHYSICAL EXAM   /72   Pulse 68   Temp 97.5  F (36.4  C) (Oral)   Resp 16   Ht 1.812 m (5' 11.34\")   Wt 74.4 kg (164 lb)   SpO2 98%   BMI 22.66 kg/m    GENERAL/CONSTITUTIONAL: No acute distress.  EYES: Pupils are equal, round, and react to light and accommodation. Extraocular movements intact.  No scleral icterus.  ENT/MOUTH: Neck supple. Oropharynx clear, no mucositis.  LYMPH: No anterior cervical, posterior cervical, supraclavicular, axillary or inguinal adenopathy.   RESPIRATORY: Clear to auscultation bilaterally. No crackles or wheezing.   CARDIOVASCULAR: Regular rate and rhythm without murmurs, gallops, or rubs.  GASTROINTESTINAL: No hepatosplenomegaly, masses, or tenderness. The patient has normal bowel sounds. No guarding.  No distention.  : Deferred  MUSCULOSKELETAL: Warm and well-perfused, no cyanosis, clubbing, or edema.  NEUROLOGIC: Cranial nerves II-XII are intact. Alert, oriented, answers questions appropriately. No focal neurologic deficit  INTEGUMENTARY: No rashes or jaundice.  GAIT: Normal       LABORATORY AND IMAGING STUDIES     Recent Labs   Lab Test 08/25/20  1443      POTASSIUM 4.4   CHLORIDE 106   CO2 25   ANIONGAP 10   BUN 13   CR 0.86   GLC 96   ZABRINA 9.8     No results for input(s): \"MAG\", \"PHOS\" in the last 11636 hours.  Recent Labs   Lab Test 08/28/20  0706   HGB 13.2*        Recent Labs " "  Lab Test 08/25/20  1443   BILITOTAL 0.8   ALKPHOS 91   ALT 13   AST 19   ALBUMIN 3.9     No results found for: \"TSH\"  No results for input(s): \"CEA\" in the last 11890 hours.  Results for orders placed or performed in visit on 06/03/21   US Penile Duplex Complete    Narrative    See Historical Hospital Medical Record for documentation         ASSESSMENT AND PLAN   -Castration resistant metastatic prostate cancer   Post radical prostatectomy on 12/9/2009; salvage radiation ending May 2010; androgen deprivation therapy since 2013   Post progression on enzalutamide and abiraterone with prednisone  -Nonmuscle invasive bladder cancer diagnosed in 2011 without any recent recurrence  -No significant medical comorbidity  -ECOG performance status of 0    I had a lengthy discussion with Alonso who is accompanied by his wife at this clinic visit.  He has castration resistant metastatic prostate cancer which has progressed on novel antiandrogen therapies including abiraterone with prednisone and enzalutamide.  He had a PSMA PET CT scan which did show extensive metastasis to the lymph nodes and bones.  We reviewed his subsequent treatment options including lutetium 177 (Pluvicto), chemotherapy with docetaxel and MARLO clinical trial.    We primarily focused on docetaxel and radium as Pluvicto is approved only after progression on 1 taxane-based regimen.  He has not received any chemotherapy either in the hormone sensitive for castration resistant setting and is not eligible for Pluvicto as yet.    I reviewed chemotherapy with docetaxel which was studied in the  trial which led to its approval in 2004 for castration resistant metastatic prostate cancer. N Engl J Med 2004; 351:3439-2386.  In this study docetaxel with prednisone was compared to mitoxantrone with prednisone and showed a significant improvement in median survival from 16.5 months in the control mitoxantrone group to 18.9 months in the docetaxel arm.  Treatment " with docetaxel was associated with improvement in quality of life.  When given with prednisone treatment with docetaxel every 3 weeks left distal.  Survival and improved response rates in terms of pain, PSA levels and quality of life as compared to mitoxantrone with prednisone.    I extensively reviewed the side effects from this chemotherapy.  We reviewed the peripheral neuropathy, alopecia, neutropenic fevers, myelosuppression, mucositis, diarrhea, allergies, pedal edema and rash.  Of these, alopecia and nail changes are more of nuisance.  However, cytopenias and in particular neutropenia can be associated with neutropenic fevers which can be serious and life-threatening. He should take every fever seriously because if his counts are low, then he would not have the defenses and he should give us a call immediately.  He should not even wait for the next morning.  He should be seen either in the clinic or in the ER the same day.  We would get basic blood tests including the CBC and blood cultures.      I reviewed radium 223 as part of the MARLO trial along with docetaxel.  Bvypvl199 is approved and castration resistant metastatic prostate cancer patients having progressed on chemotherapy or dose was not a chemotherapy candidate.  The current clinical trial will study the tolerability and efficacy of radium when administered alongside docetaxel chemotherapy as compared to single agent docetaxel.  Patient will be randomized to standard of care docetaxel once every 3 weeks or the combination of radium once every 6 weeks along with docetaxel every 3 weeks.  The dose of docetaxel is decreased in the combination arm due to concerns of overlapping toxicity -primarily cytopenias.       Alpharadin /Ra-223 /Xofigo (Jeferson MADRIGAL., Claudine HENDERSON., [place] SMITH., et al. N Engl J Med 2013; 369:213-223) is a radioisotope that has chemical structure similar to calcium. Administered intravenously it gets deposited at the sites of bony disease.  It releases high energy alpha particles that deliver fatal radiation dose over a very short distance. This treatment is similar to previously administered radioactive isotopes like samarium and strontium. In this phase III clinical trial it significantly improved OS in pts with CRPC with bone mets vs placebo (two-sided P = 0.43168; HR = 0.695; 95% CI, 0.552-0.875; median OS 14.0 mo vs 11.2 mo, respectively) and was very well tolerated. This is a significant improvement over previous agents like samarium used for palliation which failed to improve survival. Patients can have nausea, vomiting, fatigue and diarrhea. It is excreted in stools. But since it emits alpha particle which have a pathlength of only 1 mm, it is remarkably safe and cannot cross the skin barrier. Care is needed for management of body fluids, but again any significant exposure to care givers from this route is not expected.     I summarized the above information so that they could understand.  Radium has been shown to decrease pain medication requirement but is unable to control the progression of disease on its own.  It has been shown to improve survival which was the reason for its FDA approval.  Outside of clinical trial it is administered as an injection once every month for up to 6 doses.  However on this study it is administered every 6 weeks to synchronize with the docetaxel chemotherapy infusions.  Overall radium 223 is very well-tolerated and I have not seen any significant side effects in any of my patients other than cytopenias and an occasional patient who has received extensive radiation or chemotherapy.  He will be closely monitored while on clinical trial and will have CBC drawn and await measured a week prior to the planned injection.  Radium is prepared for an individual patient based on his body weight.      He would be assessed immediately prior to his treatments including radium injection or docetaxel chemotherapy to assess how  well he is tolerating therapy and the status of his disease.    We will discontinue his bicalutamide in anticipation of clinical trial participation.  He is not benefiting from the current bicalutamide therapy after progressing on enzalutamide and abiraterone with prednisone.  I have simplified his medication list and removed the medications that he has not been taking currently.    He did consent to clinical trial participation.  I reminded him that it is not a binding contract but there will be an ongoing consent with each administration and every visit.  He would have the option of dropping from the clinical trial if he chooses.  However, there is a chance that he should benefit from the combination of docetaxel and radium 223 if he is randomized to the treatment.  Treatment on the standard of care were docetaxel arm will not have any direct benefit for him but would not have any downside other than the need to travel to the [place] for the infusions.  He will be monitored closely while on clinical trial.  Patients treated on clinical trials tend to do better than those in clinical practice likely due to this close monitoring.      Over 90 min spent on day of visit including review of tests, obtaining/reviewing separately obtained history/physical exam, counseling patient, ordering medications/tests/procedures, communicating with PCP/consultants, and documenting in electronic medical record.    Kg Gamez  Hematologist and Medical Oncologist  [facility]

## 2023-10-30 ENCOUNTER — HOSPITAL ENCOUNTER (OUTPATIENT)
Dept: NUCLEAR MEDICINE | Facility: CLINIC | Age: 83
Setting detail: NUCLEAR MEDICINE
Discharge: HOME | End: 2023-10-30
Attending: INTERNAL MEDICINE
Payer: MEDICARE

## 2023-10-30 ENCOUNTER — HOSPITAL ENCOUNTER (OUTPATIENT)
Dept: CT IMAGING | Facility: CLINIC | Age: 83
Discharge: HOME | End: 2023-10-30
Attending: INTERNAL MEDICINE
Payer: MEDICARE

## 2023-10-30 ENCOUNTER — ANCILLARY PROCEDURE (OUTPATIENT)
Dept: RADIOLOGY | Facility: CLINIC | Age: 83
End: 2023-10-30
Attending: INTERNAL MEDICINE
Payer: MEDICARE

## 2023-10-30 ENCOUNTER — LAB (OUTPATIENT)
Dept: LAB | Facility: CLINIC | Age: 83
End: 2023-10-30
Attending: INTERNAL MEDICINE
Payer: MEDICARE

## 2023-10-30 DIAGNOSIS — C61 MALIGNANT NEOPLASM OF PROSTATE (H): ICD-10-CM

## 2023-10-30 LAB
ALBUMIN SERPL BCG-MCNC: 4.3 G/DL (ref 3.5–5.2)
ALP SERPL-CCNC: 72 U/L (ref 35–129)
ALT SERPL W P-5'-P-CCNC: 6 U/L (ref 0–70)
ANION GAP SERPL CALCULATED.3IONS-SCNC: 8 MMOL/L (ref 7–15)
APTT PPP: 30 SECONDS (ref 22–38)
AST SERPL W P-5'-P-CCNC: 15 U/L (ref 0–45)
BASOPHILS # BLD AUTO: 0 10E3/UL (ref 0–0.2)
BASOPHILS NFR BLD AUTO: 1 %
BILIRUB SERPL-MCNC: 0.6 MG/DL
BUN SERPL-MCNC: 18.6 MG/DL (ref 8–23)
CALCIUM SERPL-MCNC: 9.5 MG/DL (ref 8.8–10.2)
CHLORIDE SERPL-SCNC: 107 MMOL/L (ref 98–107)
CREAT SERPL-MCNC: 0.99 MG/DL (ref 0.51–1.17)
DEPRECATED HCO3 PLAS-SCNC: 27 MMOL/L (ref 22–29)
EGFRCR SERPLBLD CKD-EPI 2021: 77 ML/MIN/1.73M2
EOSINOPHIL # BLD AUTO: 0.1 10E3/UL (ref 0–0.7)
EOSINOPHIL NFR BLD AUTO: 2 %
ERYTHROCYTE [DISTWIDTH] IN BLOOD BY AUTOMATED COUNT: 11.6 % (ref 10–15)
GLUCOSE SERPL-MCNC: 94 MG/DL (ref 70–99)
HCT VFR BLD AUTO: 38.1 % (ref 35–53)
HGB BLD-MCNC: 12.6 G/DL (ref 11.7–17.7)
IMM GRANULOCYTES # BLD: 0 10E3/UL
IMM GRANULOCYTES NFR BLD: 0 %
INR PPP: 1.16 (ref 0.85–1.15)
LDH SERPL L TO P-CCNC: 150 U/L (ref 0–250)
LYMPHOCYTES # BLD AUTO: 1.2 10E3/UL (ref 0.8–5.3)
LYMPHOCYTES NFR BLD AUTO: 25 %
MAGNESIUM SERPL-MCNC: 2 MG/DL (ref 1.7–2.3)
MCH RBC QN AUTO: 30.4 PG (ref 26.5–33)
MCHC RBC AUTO-ENTMCNC: 33.1 G/DL (ref 31.5–36.5)
MCV RBC AUTO: 92 FL (ref 78–100)
MONOCYTES # BLD AUTO: 0.4 10E3/UL (ref 0–1.3)
MONOCYTES NFR BLD AUTO: 8 %
NEUTROPHILS # BLD AUTO: 3.1 10E3/UL (ref 1.6–8.3)
NEUTROPHILS NFR BLD AUTO: 64 %
NRBC # BLD AUTO: 0 10E3/UL
NRBC BLD AUTO-RTO: 0 /100
PHOSPHATE SERPL-MCNC: 3.8 MG/DL (ref 2.5–4.5)
PLATELET # BLD AUTO: 213 10E3/UL (ref 150–450)
POTASSIUM SERPL-SCNC: 4.2 MMOL/L (ref 3.4–5.3)
PROT SERPL-MCNC: 6.9 G/DL (ref 6.4–8.3)
PSA SERPL DL<=0.01 NG/ML-MCNC: 153.3 NG/ML
RBC # BLD AUTO: 4.14 10E6/UL (ref 3.8–5.9)
SODIUM SERPL-SCNC: 142 MMOL/L (ref 135–145)
WBC # BLD AUTO: 4.8 10E3/UL (ref 4–11)

## 2023-10-30 PROCEDURE — G1010 CDSM STANSON: HCPCS | Performed by: RADIOLOGY

## 2023-10-30 PROCEDURE — 83615 LACTATE (LD) (LDH) ENZYME: CPT

## 2023-10-30 PROCEDURE — 74177 CT ABD & PELVIS W/CONTRAST: CPT | Mod: MG

## 2023-10-30 PROCEDURE — 71260 CT THORAX DX C+: CPT | Mod: 26 | Performed by: RADIOLOGY

## 2023-10-30 PROCEDURE — 78306 BONE IMAGING WHOLE BODY: CPT | Mod: 26 | Performed by: RADIOLOGY

## 2023-10-30 PROCEDURE — 85025 COMPLETE CBC W/AUTO DIFF WBC: CPT

## 2023-10-30 PROCEDURE — 84403 ASSAY OF TOTAL TESTOSTERONE: CPT

## 2023-10-30 PROCEDURE — 84100 ASSAY OF PHOSPHORUS: CPT

## 2023-10-30 PROCEDURE — 80053 COMPREHEN METABOLIC PANEL: CPT

## 2023-10-30 PROCEDURE — 250N000011 HC RX IP 250 OP 636: Performed by: INTERNAL MEDICINE

## 2023-10-30 PROCEDURE — 343N000001 HC RX 343: Performed by: INTERNAL MEDICINE

## 2023-10-30 PROCEDURE — 85730 THROMBOPLASTIN TIME PARTIAL: CPT

## 2023-10-30 PROCEDURE — 84153 ASSAY OF PSA TOTAL: CPT

## 2023-10-30 PROCEDURE — A9503 TC99M MEDRONATE: HCPCS | Performed by: INTERNAL MEDICINE

## 2023-10-30 PROCEDURE — 78306 BONE IMAGING WHOLE BODY: CPT | Mod: MG

## 2023-10-30 PROCEDURE — 36415 COLL VENOUS BLD VENIPUNCTURE: CPT

## 2023-10-30 PROCEDURE — G1010 CDSM STANSON: HCPCS

## 2023-10-30 PROCEDURE — 74177 CT ABD & PELVIS W/CONTRAST: CPT | Mod: 26 | Performed by: RADIOLOGY

## 2023-10-30 PROCEDURE — 85610 PROTHROMBIN TIME: CPT

## 2023-10-30 PROCEDURE — 83735 ASSAY OF MAGNESIUM: CPT

## 2023-10-30 RX ORDER — TC 99M MEDRONATE 20 MG/10ML
20-30 INJECTION, POWDER, LYOPHILIZED, FOR SOLUTION INTRAVENOUS ONCE
Status: COMPLETED | OUTPATIENT
Start: 2023-10-30 | End: 2023-10-30

## 2023-10-30 RX ORDER — IOPAMIDOL 755 MG/ML
100 INJECTION, SOLUTION INTRAVASCULAR ONCE
Status: COMPLETED | OUTPATIENT
Start: 2023-10-30 | End: 2023-10-30

## 2023-10-30 RX ADMIN — TC 99M MEDRONATE 26.4 MILLICURIE: 20 INJECTION, POWDER, LYOPHILIZED, FOR SOLUTION INTRAVENOUS at 09:01

## 2023-10-30 RX ADMIN — IOPAMIDOL 100 ML: 755 INJECTION, SOLUTION INTRAVENOUS at 08:28

## 2023-10-30 NOTE — NURSING NOTE
Chief Complaint   Patient presents with    Labs Only     Blood drawn off PIV that was already in place.      Patient wanted to keep PIV in place just in case for next appts. Research kit drawn and sent to 1st floor lab.     SAUL Thompson LPN

## 2023-11-01 LAB
ATRIAL RATE - MUSE: 69 BPM
DIASTOLIC BLOOD PRESSURE - MUSE: NORMAL MMHG
INTERPRETATION ECG - MUSE: NORMAL
P AXIS - MUSE: 17 DEGREES
PR INTERVAL - MUSE: 170 MS
QRS DURATION - MUSE: 102 MS
QT - MUSE: 440 MS
QTC - MUSE: 471 MS
R AXIS - MUSE: -1 DEGREES
SYSTOLIC BLOOD PRESSURE - MUSE: NORMAL MMHG
T AXIS - MUSE: 41 DEGREES
TESTOST SERPL-MCNC: 7 NG/DL (ref 8–950)
VENTRICULAR RATE- MUSE: 69 BPM

## 2023-11-02 ENCOUNTER — DOCUMENTATION ONLY (OUTPATIENT)
Dept: ONCOLOGY | Facility: CLINIC | Age: 83
End: 2023-11-02
Payer: MEDICARE

## 2023-11-02 NOTE — NURSING NOTE
Reproductive Evaluation     Subject name: Alonso Pettit I discussed with the patient that in order to participate in this study he must agree to use effective contraception during therapy and continuing 6 months after the last dose of the study drug. Examples of effective contraception were provided, including hormonal contraception, intrauterine devices, and double barrier contraception. He agreed to use effective contraception per the study's requirements.  Cammy López RN  Form 503.03.03 (Version 1)     Effective date: 01JUL2018     Next Review Date: 01JUL2020

## 2023-11-06 DIAGNOSIS — C79.51 MALIGNANT NEOPLASM METASTATIC TO BONE (H): ICD-10-CM

## 2023-11-06 DIAGNOSIS — C61 PROSTATE CANCER (H): Primary | ICD-10-CM

## 2023-11-06 RX ORDER — ALBUTEROL SULFATE 90 UG/1
1-2 AEROSOL, METERED RESPIRATORY (INHALATION)
Status: CANCELLED
Start: 2023-12-05

## 2023-11-06 RX ORDER — DIPHENHYDRAMINE HYDROCHLORIDE 50 MG/ML
50 INJECTION INTRAMUSCULAR; INTRAVENOUS
Status: CANCELLED
Start: 2023-11-14

## 2023-11-06 RX ORDER — ALBUTEROL SULFATE 90 UG/1
1-2 AEROSOL, METERED RESPIRATORY (INHALATION)
Status: CANCELLED
Start: 2023-11-14

## 2023-11-06 RX ORDER — DIPHENHYDRAMINE HYDROCHLORIDE 50 MG/ML
50 INJECTION INTRAMUSCULAR; INTRAVENOUS
Status: CANCELLED
Start: 2023-12-05

## 2023-11-06 RX ORDER — HEPARIN SODIUM (PORCINE) LOCK FLUSH IV SOLN 100 UNIT/ML 100 UNIT/ML
5 SOLUTION INTRAVENOUS
Status: CANCELLED | OUTPATIENT
Start: 2023-11-14

## 2023-11-06 RX ORDER — HEPARIN SODIUM (PORCINE) LOCK FLUSH IV SOLN 100 UNIT/ML 100 UNIT/ML
5 SOLUTION INTRAVENOUS
Status: CANCELLED | OUTPATIENT
Start: 2023-12-05

## 2023-11-06 RX ORDER — MEPERIDINE HYDROCHLORIDE 25 MG/ML
25 INJECTION INTRAMUSCULAR; INTRAVENOUS; SUBCUTANEOUS EVERY 30 MIN PRN
Status: CANCELLED | OUTPATIENT
Start: 2023-11-14

## 2023-11-06 RX ORDER — HEPARIN SODIUM,PORCINE 10 UNIT/ML
5-20 VIAL (ML) INTRAVENOUS DAILY PRN
Status: CANCELLED | OUTPATIENT
Start: 2023-12-05

## 2023-11-06 RX ORDER — ALBUTEROL SULFATE 0.83 MG/ML
2.5 SOLUTION RESPIRATORY (INHALATION)
Status: CANCELLED | OUTPATIENT
Start: 2023-12-05

## 2023-11-06 RX ORDER — ALBUTEROL SULFATE 0.83 MG/ML
2.5 SOLUTION RESPIRATORY (INHALATION)
Status: CANCELLED | OUTPATIENT
Start: 2023-11-14

## 2023-11-06 RX ORDER — MEPERIDINE HYDROCHLORIDE 25 MG/ML
25 INJECTION INTRAMUSCULAR; INTRAVENOUS; SUBCUTANEOUS EVERY 30 MIN PRN
Status: CANCELLED | OUTPATIENT
Start: 2023-12-05

## 2023-11-06 RX ORDER — EPINEPHRINE 1 MG/ML
0.3 INJECTION, SOLUTION, CONCENTRATE INTRAVENOUS EVERY 5 MIN PRN
Status: CANCELLED | OUTPATIENT
Start: 2023-11-14

## 2023-11-06 RX ORDER — LORAZEPAM 2 MG/ML
0.5 INJECTION INTRAMUSCULAR EVERY 4 HOURS PRN
Status: CANCELLED | OUTPATIENT
Start: 2023-12-05

## 2023-11-06 RX ORDER — HEPARIN SODIUM,PORCINE 10 UNIT/ML
5-20 VIAL (ML) INTRAVENOUS DAILY PRN
Status: CANCELLED | OUTPATIENT
Start: 2023-11-14

## 2023-11-06 RX ORDER — LORAZEPAM 2 MG/ML
0.5 INJECTION INTRAMUSCULAR EVERY 4 HOURS PRN
Status: CANCELLED | OUTPATIENT
Start: 2023-11-14

## 2023-11-06 RX ORDER — EPINEPHRINE 1 MG/ML
0.3 INJECTION, SOLUTION, CONCENTRATE INTRAVENOUS EVERY 5 MIN PRN
Status: CANCELLED | OUTPATIENT
Start: 2023-12-05

## 2023-11-08 DIAGNOSIS — C79.51 MALIGNANT NEOPLASM METASTATIC TO BONE (H): ICD-10-CM

## 2023-11-08 DIAGNOSIS — C61 PROSTATE CANCER (H): Primary | ICD-10-CM

## 2023-11-08 RX ORDER — HEPARIN SODIUM,PORCINE 10 UNIT/ML
5-20 VIAL (ML) INTRAVENOUS DAILY PRN
Status: CANCELLED | OUTPATIENT
Start: 2023-11-08

## 2023-11-08 RX ORDER — HEPARIN SODIUM (PORCINE) LOCK FLUSH IV SOLN 100 UNIT/ML 100 UNIT/ML
5 SOLUTION INTRAVENOUS
Status: CANCELLED | OUTPATIENT
Start: 2023-11-08

## 2023-11-08 RX ORDER — EPINEPHRINE 1 MG/ML
0.3 INJECTION, SOLUTION INTRAMUSCULAR; SUBCUTANEOUS EVERY 5 MIN PRN
Status: CANCELLED | OUTPATIENT
Start: 2023-11-08

## 2023-11-08 RX ORDER — ALBUTEROL SULFATE 90 UG/1
1-2 AEROSOL, METERED RESPIRATORY (INHALATION)
Status: CANCELLED
Start: 2023-11-08

## 2023-11-08 RX ORDER — DIPHENHYDRAMINE HYDROCHLORIDE 50 MG/ML
50 INJECTION INTRAMUSCULAR; INTRAVENOUS
Status: CANCELLED
Start: 2023-11-08

## 2023-11-08 RX ORDER — LORAZEPAM 2 MG/ML
0.5 INJECTION INTRAMUSCULAR EVERY 4 HOURS PRN
Status: CANCELLED | OUTPATIENT
Start: 2023-11-08

## 2023-11-08 RX ORDER — METHYLPREDNISOLONE SODIUM SUCCINATE 125 MG/2ML
125 INJECTION, POWDER, LYOPHILIZED, FOR SOLUTION INTRAMUSCULAR; INTRAVENOUS
Status: CANCELLED
Start: 2023-11-08

## 2023-11-08 RX ORDER — ALBUTEROL SULFATE 0.83 MG/ML
2.5 SOLUTION RESPIRATORY (INHALATION)
Status: CANCELLED | OUTPATIENT
Start: 2023-11-08

## 2023-11-08 RX ORDER — MEPERIDINE HYDROCHLORIDE 25 MG/ML
25 INJECTION INTRAMUSCULAR; INTRAVENOUS; SUBCUTANEOUS EVERY 30 MIN PRN
Status: CANCELLED | OUTPATIENT
Start: 2023-11-08

## 2023-11-13 NOTE — PROGRESS NOTES
[facility]  HEMATOLOGY AND ONCOLOGY    FOLLOW-UP VISIT NOTE    PATIENT NAME: Alonso Pettit MRN # [id number]  DATE OF VISIT: Nov 14, 2023 YOB: 1940    REFERRING PROVIDER: [facility]    CANCER TYPE: Prostate adenocarcinoma; [place] 4+5  STAGE: IV (bony metastasis)  MOLECULAR PROFILE: No actionable mutations/MSI or high TMB; TP53 mutation positive    TREATMENT SUMMARY:  -12/9/2009: Radical prostatectomy; pathology revealed [place] 4+5 disease, stage pT3a,N0 positive margins  -Mar-May2010: Salvage external beam radiation therapy  -Apr 2013: Intermittent androgen deprivation therapy with leuprolide for biochemical PSA relapse  -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 Radiation therapy to the left supraclavicular lymph node ending in September 2020.  He was continued on enzalutamide  -May 2022: Switch to abiraterone with prednisone for progressive disease  -Aug 2023: Abiraterone discontinued for progressive disease.  PSMA PET scan with PSMA avid osseous and brisa metastasis    CURRENT INTERVENTIONS:  MARLO Trial randomized to Arm B with Docetaxel/prednisone/Radium-233. Leuprolide every 4 months.     SUBJECTIVE   Alonso Pettit is being followed for castration resistant metastatic prostate cancer. He returns to clinic accompanied by his wife. He is seen today prior to cycle 1 docetaxel/radium-233.  Feeling well today. Has a long history of back spasms which can make it difficult for him to get out of bed that he manages with flexeril prn. He otherwise does not have bone pains.  He endorses a dry cough x 2 years, unchanged. No chest pain or shortness of breath.  No fevers or chills. Endorses occasional hot flashes  Has a history of mild headaches, for example he had a headache this am  that he attributes to frequent awakening throughout the night preparing for our visit today.   Appetite is good. He reports he does not do well with regular water intake. Drinks less than 20 oz of water per day.   He was active this past summer playing softball and pickleball that both ended in September. He is less active now due to the ending of planned sports season. He will take one nap in the afternoon for one hour at a time as needed especially if he exercised earlier in the morning.   Normal bowel movements. No urinary concerns today.   Confirms he has been getting eligard every 4 months with [facility], last in August. Began Zometa in September every 6 months.     ROS: 14 point ROS neg other than the symptoms noted above in the HPI.      PAST MEDICAL HISTORY     Past Medical History:   Diagnosis Date    Prostate cancer (H)          CURRENT OUTPATIENT MEDICATIONS     Current Outpatient Medications   Medication Sig    aspirin 81 mg chewable tablet [ASPIRIN 81 MG CHEWABLE TABLET] Chew 81 mg daily. (Patient not taking: Reported on 10/18/2023)    calcium carbonate (CALCIUM 500 ORAL) [CALCIUM CARBONATE (CALCIUM 500 ORAL)] Take by mouth.    cyclobenzaprine (FLEXERIL) 10 MG tablet 1/2-1 TAB ORALLY UP TO 3 TIMES A DAY AS NEEDED FOR MUSCLE SPASM    leuprolide (LUPRON) 11.25 mg injection [LEUPROLIDE (LUPRON) 11.25 MG INJECTION] Inject 11.25 mg into the shoulder, thigh, or buttocks every 3 (three) months. (Patient not taking: Reported on 10/18/2023)     No current facility-administered medications for this visit.        ALLERGIES    No Known Allergies     REVIEW OF SYSTEMS   As above in the HPI, o/w complete 12-point ROS was negative.     PHYSICAL EXAM   BP (!) 141/72 (BP Location: Right arm, Patient Position: Sitting, Cuff Size: Adult Regular)   Pulse 66   Temp 97.4  F (36.3  C) (Oral)   Resp 16   Wt 74.3 kg (163 lb 12.8 oz)   SpO2 99%   BMI 22.63 kg/m    GENERAL/CONSTITUTIONAL: No acute distress.  EYES: Pupils  are equal, round, and react to light and accommodation. Extraocular movements intact.  No scleral icterus.  ENT/MOUTH: Neck supple. Oropharynx clear, no mucositis.  LYMPH: No anterior cervical, posterior cervical, supraclavicular, axillary or inguinal adenopathy.   RESPIRATORY: Clear to auscultation bilaterally. No crackles or wheezing.   CARDIOVASCULAR: Regular rate and rhythm without murmurs, gallops, or rubs.  GASTROINTESTINAL: No hepatosplenomegaly, masses, or tenderness. The patient has normal bowel sounds. No guarding.  No distention.  : Deferred  MUSCULOSKELETAL: Warm and well-perfused, no cyanosis, clubbing, or edema.  NEUROLOGIC: Cranial nerves II-XII are intact. Alert, oriented, answers questions appropriately. No focal neurologic deficit  INTEGUMENTARY: No rashes or jaundice.  GAIT: Normal    Trace peripheral edema      LABORATORY AND IMAGING STUDIES   Most Recent 3 CBC's:  Recent Labs   Lab Test 11/14/23  0752 10/30/23  1044 08/28/20  0706   WBC 4.6 4.8  --    HGB 13.2* 12.6 13.2*   MCV 88 92  --     213 275   ANEUTAUTO 2.4 3.1  --      Most Recent 3 BMP's:  Recent Labs   Lab Test 11/14/23  0752 10/30/23  1044 08/25/20  1443    142 141   POTASSIUM 3.6 4.2 4.4   CHLORIDE 105 107 106   CO2 26 27 25   BUN 12.3 18.6 13   CR 0.89 0.99 0.86   ANIONGAP 10 8 10   ZABRINA 9.4 9.5 9.8   * 94 96   PROTTOTAL 6.7 6.9 6.8   ALBUMIN 4.2 4.3 3.9    Most Recent 3 LFT's:  Recent Labs   Lab Test 11/14/23  0752 10/30/23  1044 08/25/20  1443   AST 18 15 19   ALT 7 6 13   ALKPHOS 71 72 91   BILITOTAL 0.9 0.6 0.8    Most Recent 2 TSH and T4:No lab results found.  I reviewed the above labs today.       ASSESSMENT AND PLAN   -Castration resistant metastatic prostate cancer   Post radical prostatectomy on 12/9/2009; salvage radiation ending May 2010; androgen deprivation therapy since 2013   Post progression on enzalutamide and abiraterone with prednisone  -Nonmuscle invasive bladder cancer diagnosed in 2011  without any recent recurrence  -No significant medical comorbidity  -ECOG performance status of 0    Docetaxel 6-10 doses. Radium 6 doses.   #Castration resistant metastatic prostate cancer.   - Has progressed on novel antiandrogen therapies including abiraterone with prednisone and enzalutamide.  He had a PSMA PET CT scan which did show extensive metastasis to the lymph nodes and bones. Referred to [facility] for the MARLO trial, randomized to Arm B with docetaxel and Radium. Docetaxel is administered every 3 weeks for 6-10 doses and Radium every 6 weeks for 6 doses.   - Please see Dr. Gamez note from 10/24/23 for further discussion of treatment and side effects.   - Labs reviewed today and overall are WNL with the exception of very mild anemia. PSA is pending today. .30 on 10/30/23. Will proceed with cycle 1 docetaxel and cycle 1 Radium as scheduled today and tomorrow respectfully.   - continue Eligard every 4 months with Dr. Philip at [facility].     #Bone Metastasis   - Zometa every 6 months with local oncologist. Consider moving up frequency to every 6 weeks. Could move to [facility] during the time of trial participation and administer while he is here for ease of appointments.   - rare risk of fractures associated with docetaxel and radium therefore continuing Zometa is recommended.     #Speech difficulties   - reported to research RN after our visit difficulty getting his words out when he went to talk for a few minutes ~2 weeks ago. Suspicious for a TIA. Will perform a brain MRI and CTA.     44 minutes spent on the date of the encounter doing chart review, review of test results, patient visit, documentation, and discussion with family    Patient was seen and evaluated with study nurse, Cammy López.       Valentine Ball PA-C    Addendum   Brain MRI with areas of microhemorrhages and amyloid disposition and likely prior subarachnoid hemorrhage without acute pathology. CT neck with calcifications. Called patient  and wife to discuss there results. They have seen a neurologist in the past at [facility] who they plan to follow up with along with their PCP.     Valentine Ball PA-C

## 2023-11-14 ENCOUNTER — ALLIED HEALTH/NURSE VISIT (OUTPATIENT)
Dept: ONCOLOGY | Facility: CLINIC | Age: 83
End: 2023-11-14

## 2023-11-14 ENCOUNTER — APPOINTMENT (OUTPATIENT)
Dept: LAB | Facility: CLINIC | Age: 83
End: 2023-11-14
Attending: INTERNAL MEDICINE
Payer: MEDICARE

## 2023-11-14 ENCOUNTER — ANCILLARY PROCEDURE (OUTPATIENT)
Dept: CT IMAGING | Facility: CLINIC | Age: 83
End: 2023-11-14
Payer: MEDICARE

## 2023-11-14 ENCOUNTER — ONCOLOGY VISIT (OUTPATIENT)
Dept: ONCOLOGY | Facility: CLINIC | Age: 83
End: 2023-11-14
Attending: INTERNAL MEDICINE
Payer: MEDICARE

## 2023-11-14 VITALS
WEIGHT: 163.8 LBS | BODY MASS INDEX: 22.63 KG/M2 | OXYGEN SATURATION: 99 % | DIASTOLIC BLOOD PRESSURE: 72 MMHG | RESPIRATION RATE: 16 BRPM | HEART RATE: 66 BPM | SYSTOLIC BLOOD PRESSURE: 141 MMHG | TEMPERATURE: 97.4 F

## 2023-11-14 DIAGNOSIS — C61 PROSTATE CANCER (H): ICD-10-CM

## 2023-11-14 DIAGNOSIS — C79.51 MALIGNANT NEOPLASM METASTATIC TO BONE (H): Primary | ICD-10-CM

## 2023-11-14 DIAGNOSIS — R47.89 WORD FINDING DIFFICULTY: ICD-10-CM

## 2023-11-14 LAB
ALBUMIN SERPL BCG-MCNC: 4.2 G/DL (ref 3.5–5.2)
ALP SERPL-CCNC: 71 U/L (ref 40–129)
ALT SERPL W P-5'-P-CCNC: 7 U/L (ref 0–70)
ANION GAP SERPL CALCULATED.3IONS-SCNC: 10 MMOL/L (ref 7–15)
AST SERPL W P-5'-P-CCNC: 18 U/L (ref 0–45)
BASOPHILS # BLD AUTO: 0 10E3/UL (ref 0–0.2)
BASOPHILS NFR BLD AUTO: 1 %
BILIRUB SERPL-MCNC: 0.9 MG/DL
BUN SERPL-MCNC: 12.3 MG/DL (ref 8–23)
CALCIUM SERPL-MCNC: 9.4 MG/DL (ref 8.8–10.2)
CHLORIDE SERPL-SCNC: 105 MMOL/L (ref 98–107)
CREAT SERPL-MCNC: 0.89 MG/DL (ref 0.67–1.17)
DEPRECATED HCO3 PLAS-SCNC: 26 MMOL/L (ref 22–29)
EGFRCR SERPLBLD CKD-EPI 2021: 87 ML/MIN/1.73M2
EOSINOPHIL # BLD AUTO: 0.1 10E3/UL (ref 0–0.7)
EOSINOPHIL NFR BLD AUTO: 2 %
ERYTHROCYTE [DISTWIDTH] IN BLOOD BY AUTOMATED COUNT: 11.8 % (ref 10–15)
GLUCOSE SERPL-MCNC: 134 MG/DL (ref 70–99)
HCT VFR BLD AUTO: 38.1 % (ref 40–53)
HGB BLD-MCNC: 13.2 G/DL (ref 13.3–17.7)
IMM GRANULOCYTES # BLD: 0 10E3/UL
IMM GRANULOCYTES NFR BLD: 0 %
LDH SERPL L TO P-CCNC: 155 U/L (ref 0–250)
LYMPHOCYTES # BLD AUTO: 1.7 10E3/UL (ref 0.8–5.3)
LYMPHOCYTES NFR BLD AUTO: 37 %
MAGNESIUM SERPL-MCNC: 1.8 MG/DL (ref 1.7–2.3)
MCH RBC QN AUTO: 30.6 PG (ref 26.5–33)
MCHC RBC AUTO-ENTMCNC: 34.6 G/DL (ref 31.5–36.5)
MCV RBC AUTO: 88 FL (ref 78–100)
MONOCYTES # BLD AUTO: 0.3 10E3/UL (ref 0–1.3)
MONOCYTES NFR BLD AUTO: 7 %
NEUTROPHILS # BLD AUTO: 2.4 10E3/UL (ref 1.6–8.3)
NEUTROPHILS NFR BLD AUTO: 53 %
NRBC # BLD AUTO: 0 10E3/UL
NRBC BLD AUTO-RTO: 0 /100
PHOSPHATE SERPL-MCNC: 3.1 MG/DL (ref 2.5–4.5)
PLATELET # BLD AUTO: 237 10E3/UL (ref 150–450)
POTASSIUM SERPL-SCNC: 3.6 MMOL/L (ref 3.4–5.3)
PROT SERPL-MCNC: 6.7 G/DL (ref 6.4–8.3)
PSA SERPL DL<=0.01 NG/ML-MCNC: 160.2 NG/ML
RBC # BLD AUTO: 4.31 10E6/UL (ref 4.4–5.9)
SODIUM SERPL-SCNC: 141 MMOL/L (ref 135–145)
WBC # BLD AUTO: 4.6 10E3/UL (ref 4–11)

## 2023-11-14 PROCEDURE — 84153 ASSAY OF PSA TOTAL: CPT | Performed by: INTERNAL MEDICINE

## 2023-11-14 PROCEDURE — 96375 TX/PRO/DX INJ NEW DRUG ADDON: CPT

## 2023-11-14 PROCEDURE — 96413 CHEMO IV INFUSION 1 HR: CPT

## 2023-11-14 PROCEDURE — 250N000011 HC RX IP 250 OP 636: Performed by: INTERNAL MEDICINE

## 2023-11-14 PROCEDURE — 258N000003 HC RX IP 258 OP 636: Performed by: INTERNAL MEDICINE

## 2023-11-14 PROCEDURE — 83735 ASSAY OF MAGNESIUM: CPT | Performed by: INTERNAL MEDICINE

## 2023-11-14 PROCEDURE — 83615 LACTATE (LD) (LDH) ENZYME: CPT | Performed by: INTERNAL MEDICINE

## 2023-11-14 PROCEDURE — G1010 CDSM STANSON: HCPCS | Mod: GC | Performed by: RADIOLOGY

## 2023-11-14 PROCEDURE — 36415 COLL VENOUS BLD VENIPUNCTURE: CPT | Performed by: INTERNAL MEDICINE

## 2023-11-14 PROCEDURE — 85025 COMPLETE CBC W/AUTO DIFF WBC: CPT | Performed by: INTERNAL MEDICINE

## 2023-11-14 PROCEDURE — 99215 OFFICE O/P EST HI 40 MIN: CPT

## 2023-11-14 PROCEDURE — G0463 HOSPITAL OUTPT CLINIC VISIT: HCPCS

## 2023-11-14 PROCEDURE — 99001 SPECIMEN HANDLING PT-LAB: CPT | Performed by: INTERNAL MEDICINE

## 2023-11-14 PROCEDURE — 70496 CT ANGIOGRAPHY HEAD: CPT | Mod: MF | Performed by: RADIOLOGY

## 2023-11-14 PROCEDURE — 70498 CT ANGIOGRAPHY NECK: CPT | Mod: MF | Performed by: RADIOLOGY

## 2023-11-14 PROCEDURE — 80053 COMPREHEN METABOLIC PANEL: CPT | Performed by: INTERNAL MEDICINE

## 2023-11-14 PROCEDURE — 84100 ASSAY OF PHOSPHORUS: CPT | Performed by: INTERNAL MEDICINE

## 2023-11-14 PROCEDURE — 96367 TX/PROPH/DG ADDL SEQ IV INF: CPT

## 2023-11-14 RX ORDER — PROCHLORPERAZINE MALEATE 10 MG
5 TABLET ORAL EVERY 6 HOURS PRN
Qty: 30 TABLET | Refills: 2 | Status: SHIPPED | OUTPATIENT
Start: 2023-11-14

## 2023-11-14 RX ORDER — IOPAMIDOL 755 MG/ML
70 INJECTION, SOLUTION INTRAVASCULAR ONCE
Status: COMPLETED | OUTPATIENT
Start: 2023-11-14 | End: 2023-11-14

## 2023-11-14 RX ORDER — PREDNISONE 5 MG/1
5 TABLET ORAL 2 TIMES DAILY
Qty: 42 TABLET | Refills: 0 | Status: SHIPPED | OUTPATIENT
Start: 2023-11-14 | End: 2023-12-06

## 2023-11-14 RX ORDER — HEPARIN SODIUM,PORCINE 10 UNIT/ML
5-20 VIAL (ML) INTRAVENOUS DAILY PRN
Status: DISCONTINUED | OUTPATIENT
Start: 2023-11-14 | End: 2023-11-14 | Stop reason: HOSPADM

## 2023-11-14 RX ORDER — HEPARIN SODIUM (PORCINE) LOCK FLUSH IV SOLN 100 UNIT/ML 100 UNIT/ML
5 SOLUTION INTRAVENOUS
Status: DISCONTINUED | OUTPATIENT
Start: 2023-11-14 | End: 2023-11-14 | Stop reason: HOSPADM

## 2023-11-14 RX ORDER — DEXAMETHASONE 4 MG/1
8 TABLET ORAL 2 TIMES DAILY WITH MEALS
Qty: 6 TABLET | Refills: 9 | Status: SHIPPED | OUTPATIENT
Start: 2023-11-14 | End: 2024-04-30

## 2023-11-14 RX ADMIN — IOPAMIDOL 70 ML: 755 INJECTION, SOLUTION INTRAVASCULAR at 12:05

## 2023-11-14 RX ADMIN — SODIUM CHLORIDE 250 ML: 9 INJECTION, SOLUTION INTRAVENOUS at 09:36

## 2023-11-14 RX ADMIN — DEXAMETHASONE SODIUM PHOSPHATE: 10 INJECTION, SOLUTION INTRAMUSCULAR; INTRAVENOUS at 09:30

## 2023-11-14 RX ADMIN — DOCETAXEL 116 MG: 20 INJECTION, SOLUTION, CONCENTRATE INTRAVENOUS at 10:05

## 2023-11-14 RX ADMIN — FAMOTIDINE 20 MG: 10 INJECTION, SOLUTION INTRAVENOUS at 09:49

## 2023-11-14 ASSESSMENT — PAIN SCALES - GENERAL: PAINLEVEL: NO PAIN (0)

## 2023-11-14 NOTE — NURSING NOTE
"[id number]: Study Visit Note   Subject name: Alonso Pettit     Visit: C1D1    Did the study visit occur within the appropriate window allowed by the protocol? yes    Since the last study visit, He has been doing well. No changes in medications or symptoms. Patient reports to RN however, that about 2 weeks ago while he was working in [place].  He states that this episode lasted a couple of minutes where he was having a conversation and couldn't get the words he was meaning to say out. He states that he knew it was happening and \"the person's face\" appeared confused as to what he trying to say. Patients wife states that he is slow to respond but nothing like this has ever happened before. Notified Valentine Galvan. Patient to get a brain MRI and follow up with PCP. Instructed patient that if this happens again no matter how long it lasts or if it goes away he must go the emergency room immediately and call 911. Patient and spouse agreeable.     Patient was given Rose-223 instructions for participants- all questions answered to their satisfaction.     Went over the Risk ICF excerpt with patient version [id number]. All questions were answered.     Verbal handover provided to infusion RN; reminded RN to document actual start time of infusion and actual stop time of the infusion. If infusion deviates from protocol directed infusion time, please document rationale in your infusion note.    I have personally interviewed Alonso Pettit and reviewed his medical record for adverse events and concomitant medications and these have been recorded on the corresponding logs in Alonso Pettit's research file.     Alonso Pettit was given the opportunity to ask any trial related questions.  Please see provider progress note for physical exam and other clinical information. Labs were reviewed - any significant lab values were addressed and reviewed.    Cammy López, RN    Nurse teaching given on chemotherapy, side effects " precautions, home instructions, medications and following/reaching out to provider and the patient expresses understanding and acceptance of instructions. Cammy López RN 11/14/2023 10:06 AM  .

## 2023-11-14 NOTE — DISCHARGE INSTRUCTIONS

## 2023-11-14 NOTE — NURSING NOTE
Chief Complaint   Patient presents with    Blood Draw     Labs drawn with PIV start by RN. Pt tolerated well. Vitals taken. Patient checked into next appointment.     Amy Camejo RN

## 2023-11-14 NOTE — PATIENT INSTRUCTIONS
November 2023 Sunday Monday Tuesday Wednesday Thursday Friday Saturday                  1     2     3     4       5     6     7     8     9     10     11       12     13     14    LAB PERIPHERAL   7:15 AM   (15 min.)   [facility]RAW   [facility]    RETURN ACTIVE TREATMENT   7:45 AM   (45 min.)   Valentine Ball PA-C   [facility]    ONC INFUSION 3 HR (180 MIN)   8:30 AM   (180 min.)    ONC INFUSION NURSE   [facility]    CTA  HEAD NECK W   3:10 PM   (20 min.)   [id number]   [facility] [place] 15    NM RESEARCH RADIOTHERAPY   1:00 PM   (60 min.)   UU NM RADIOTHERAPY 5   [facility] 16     17    MR BRAIN WWO   5:00 PM   (45 min.)   [id number]   [facility] [place] 18       19     20     21     22     23     24     25       26     27     28     29     30                           December 2023 Sunday Monday Tuesday Wednesday Thursday Friday Saturday                            1     2       3     4     5     6     7     8     9       10     11     12     13     14     15     16       17     18     19     20     21  Happy Birthday!     22     23       24     25     26     27     28    NM RESEARCH RADIOTHERAPY   1:30 PM   (60 min.)   UU NM RADIOTHERAPY 5   [facility] 29     30       31                                                  Recent Results (from the past 24 hour(s))   Comprehensive metabolic panel    Collection Time: 11/14/23  7:52 AM   Result Value Ref Range    Sodium 141 135 - 145 mmol/L    Potassium 3.6 3.4 - 5.3 mmol/L    Carbon Dioxide (CO2) 26 22 - 29 mmol/L    Anion Gap 10 7 - 15 mmol/L    Urea Nitrogen 12.3 8.0 - 23.0 mg/dL    Creatinine 0.89 0.67 - 1.17 mg/dL    GFR Estimate 87 >60 mL/min/1.73m2    Calcium 9.4 8.8 - 10.2 mg/dL    Chloride 105 98 - 107 mmol/L    Glucose 134 (H) 70 - 99 mg/dL     Alkaline Phosphatase 71 40 - 129 U/L    AST 18 0 - 45 U/L    ALT 7 0 - 70 U/L    Protein Total 6.7 6.4 - 8.3 g/dL    Albumin 4.2 3.5 - 5.2 g/dL    Bilirubin Total 0.9 <=1.2 mg/dL   Magnesium    Collection Time: 11/14/23  7:52 AM   Result Value Ref Range    Magnesium 1.8 1.7 - 2.3 mg/dL   Phosphorus    Collection Time: 11/14/23  7:52 AM   Result Value Ref Range    Phosphorus 3.1 2.5 - 4.5 mg/dL   Lactate Dehydrogenase    Collection Time: 11/14/23  7:52 AM   Result Value Ref Range    Lactate Dehydrogenase 155 0 - 250 U/L   PSA tumor marker    Collection Time: 11/14/23  7:52 AM   Result Value Ref Range    PSA Tumor Marker 160.20 ng/mL   CBC with platelets and differential    Collection Time: 11/14/23  7:52 AM   Result Value Ref Range    WBC Count 4.6 4.0 - 11.0 10e3/uL    RBC Count 4.31 (L) 4.40 - 5.90 10e6/uL    Hemoglobin 13.2 (L) 13.3 - 17.7 g/dL    Hematocrit 38.1 (L) 40.0 - 53.0 %    MCV 88 78 - 100 fL    MCH 30.6 26.5 - 33.0 pg    MCHC 34.6 31.5 - 36.5 g/dL    RDW 11.8 10.0 - 15.0 %    Platelet Count 237 150 - 450 10e3/uL    % Neutrophils 53 %    % Lymphocytes 37 %    % Monocytes 7 %    % Eosinophils 2 %    % Basophils 1 %    % Immature Granulocytes 0 %    NRBCs per 100 WBC 0 <1 /100    Absolute Neutrophils 2.4 1.6 - 8.3 10e3/uL    Absolute Lymphocytes 1.7 0.8 - 5.3 10e3/uL    Absolute Monocytes 0.3 0.0 - 1.3 10e3/uL    Absolute Eosinophils 0.1 0.0 - 0.7 10e3/uL    Absolute Basophils 0.0 0.0 - 0.2 10e3/uL    Absolute Immature Granulocytes 0.0 <=0.4 10e3/uL    Absolute NRBCs 0.0 10e3/uL

## 2023-11-14 NOTE — PROGRESS NOTES
Infusion Nursing Note:  Alonso Pettit presents today for C1D1 Taxotere.  MARLO trial, randomized to Arm B with docetaxel and Radium.   Patient seen by provider today: Yes: JOSE L Rhodes   present during visit today: Not Applicable.    Note: Pt saw provider prior to infusion, ok for treatment.    -ok for treatment today  -CT Head w/Contrast for episode about two weeks ago when he had trouble finding words for about two minutes, concern for TIA  -Brain MRI also to be scheduled   -Morgan scheduled for tomorrow    PT/spouse aware to go to ED if  neurologic symptoms arise again.    PRN Pepcid IV prior to Taxotere    Intravenous Access:  Peripheral IV placed.  LEFT intact for CT.    Treatment Conditions:   Latest Reference Range & Units 11/14/23 07:52   Sodium 135 - 145 mmol/L 141   Potassium 3.4 - 5.3 mmol/L 3.6   Chloride 98 - 107 mmol/L 105   Carbon Dioxide (CO2) 22 - 29 mmol/L 26   Urea Nitrogen 8.0 - 23.0 mg/dL 12.3   Creatinine 0.67 - 1.17 mg/dL 0.89   GFR Estimate >60 mL/min/1.73m2 87   Calcium 8.8 - 10.2 mg/dL 9.4   Anion Gap 7 - 15 mmol/L 10   Magnesium 1.7 - 2.3 mg/dL 1.8   Phosphorus 2.5 - 4.5 mg/dL 3.1   Albumin 3.5 - 5.2 g/dL 4.2   Protein Total 6.4 - 8.3 g/dL 6.7   Alkaline Phosphatase 40 - 129 U/L 71   ALT 0 - 70 U/L 7   AST 0 - 45 U/L 18   Bilirubin Total <=1.2 mg/dL 0.9   Glucose 70 - 99 mg/dL 134 (H)   Lactate Dehydrogenase 0 - 250 U/L 155   WBC 4.0 - 11.0 10e3/uL 4.6   Hemoglobin 13.3 - 17.7 g/dL 13.2 (L)   Hematocrit 40.0 - 53.0 % 38.1 (L)   Platelet Count 150 - 450 10e3/uL 237   RBC Count 4.40 - 5.90 10e6/uL 4.31 (L)   MCV 78 - 100 fL 88   MCH 26.5 - 33.0 pg 30.6   MCHC 31.5 - 36.5 g/dL 34.6   RDW 10.0 - 15.0 % 11.8   % Neutrophils % 53   % Lymphocytes % 37   % Monocytes % 7   % Eosinophils % 2   % Basophils % 1   Absolute Basophils 0.0 - 0.2 10e3/uL 0.0   Absolute Eosinophils 0.0 - 0.7 10e3/uL 0.1   Absolute Immature Granulocytes <=0.4 10e3/uL 0.0   Absolute Lymphocytes 0.8 - 5.3  10e3/uL 1.7   Absolute Monocytes 0.0 - 1.3 10e3/uL 0.3   % Immature Granulocytes % 0   Absolute Neutrophils 1.6 - 8.3 10e3/uL 2.4   Absolute NRBCs 10e3/uL 0.0   NRBCs per 100 WBC <1 /100 0         Post Infusion Assessment:  Patient tolerated infusion without incident.  Blood return noted pre and post infusion.  Site patent and intact, free from redness, edema or discomfort.  No evidence of extravasations.       Discharge Plan:   Prescription refills given for DEX, Prednisone, Compazine.  Discharge instructions reviewed with: Patient and Family.  Patient and/or family verbalized understanding of discharge instructions and all questions answered.  AVS to patient via [facility].   not completed prior to discharge, pt knows to go to CT after infusion, Radium in [facility] 11/15,  and a Brain MRI scheduled for 11/17.  Patient discharged in stable condition accompanied by: self and wife.  Departure Mode: Ambulatory.    Chelsea Go RN

## 2023-11-14 NOTE — Clinical Note
11/14/2023         RE: Alonso Pettit  [address] [facility] [address]        Dear Colleague,    Thank you for referring your patient, Alonso Pettit, to the [facility]. Please see a copy of my visit note below.    [facility]  HEMATOLOGY AND ONCOLOGY    FOLLOW-UP VISIT NOTE    PATIENT NAME: Alonso Pettit MRN # [id number]  DATE OF VISIT: Nov 14, 2023 YOB: 1940    REFERRING PROVIDER: [facility]    CANCER TYPE: Prostate adenocarcinoma; [place] 4+5  STAGE: IV (bony metastasis)  MOLECULAR PROFILE: No actionable mutations/MSI or high TMB; TP53 mutation positive    TREATMENT SUMMARY:  -12/9/2009: Radical prostatectomy; pathology revealed [place] 4+5 disease, stage pT3a,N0 positive margins  -Mar-May2010: Salvage external beam radiation therapy  -Apr 2013: Intermittent androgen deprivation therapy with leuprolide for biochemical PSA relapse  -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 Radiation therapy to the left supraclavicular lymph node ending in September 2020.  He was continued on enzalutamide  -May 2022: Switch to abiraterone with prednisone for progressive disease  -Aug 2023: Abiraterone discontinued for progressive disease.  PSMA PET scan with PSMA avid osseous and brisa metastasis    CURRENT INTERVENTIONS:  Leuprolide alone after progressing on abiraterone with prednisone    SUBJECTIVE   Alonso Pettit is being followed for castration resistant metastatic prostate cancer  - back pains  - dry cough x 2 years.   - occasional mild headaches, awakening frequently   - Appetite   - does not do well drinking  - played softball and pickleball that ended in September. Reading and watching TV.  - on days of increased activity he will take a one nap in the  afternoon x one hour.   - bowel movements normal.  - no urinary changes.     - get eligard x 4 months last in august. Zometa in September x 6 months.  Dr. Gamez recommended every 6 weeks.               Docetaxel:  -fluid retention  - peripheral neuropathy   - alopecia  - nausea, diarrhea, or constipation  - mouth sores  - fatigue    *** risk of fracture at the site of disease.     Radium-223:  Cardiovascular: Peripheral edema (13%)  Gastrointestinal: Diarrhea (25%; grades 3/4: 2%), nausea (36%; grades 3/4: 2%), vomiting (19%; grades 3/4: 2%)  Hematologic & oncologic: Anemia (93%; grades 3/4: 6%), leukopenia (35%; grades 3/4: 3%), lymphocytopenia (72%; grades 3/4: 20%), neutropenia (18%; grades 3/4: 1% to 3%), thrombocytopenia (31%; grades 3/4: 1% to 6%)      Alonso is accompanied by his wife at this clinic visit.  He was seen by my colleague-Dr. Cheung last week and recommended participation in clinical trial. He is here to review more with myself.       PAST MEDICAL HISTORY     Past Medical History:   Diagnosis Date    Prostate cancer (H)          CURRENT OUTPATIENT MEDICATIONS     Current Outpatient Medications   Medication Sig    aspirin 81 mg chewable tablet [ASPIRIN 81 MG CHEWABLE TABLET] Chew 81 mg daily. (Patient not taking: Reported on 10/18/2023)    calcium carbonate (CALCIUM 500 ORAL) [CALCIUM CARBONATE (CALCIUM 500 ORAL)] Take by mouth.    cyclobenzaprine (FLEXERIL) 10 MG tablet 1/2-1 TAB ORALLY UP TO 3 TIMES A DAY AS NEEDED FOR MUSCLE SPASM    leuprolide (LUPRON) 11.25 mg injection [LEUPROLIDE (LUPRON) 11.25 MG INJECTION] Inject 11.25 mg into the shoulder, thigh, or buttocks every 3 (three) months. (Patient not taking: Reported on 10/18/2023)     No current facility-administered medications for this visit.        ALLERGIES    No Known Allergies     REVIEW OF SYSTEMS   As above in the HPI, o/w complete 12-point ROS was negative.     PHYSICAL EXAM   BP (!) 141/72 (BP Location: Right arm, Patient Position:  "Sitting, Cuff Size: Adult Regular)   Pulse 66   Temp 97.4  F (36.3  C) (Oral)   Resp 16   Wt 74.3 kg (163 lb 12.8 oz)   SpO2 99%   BMI 22.63 kg/m    GENERAL/CONSTITUTIONAL: No acute distress.  EYES: Pupils are equal, round, and react to light and accommodation. Extraocular movements intact.  No scleral icterus.  ENT/MOUTH: Neck supple. Oropharynx clear, no mucositis.  LYMPH: No anterior cervical, posterior cervical, supraclavicular, axillary or inguinal adenopathy.   RESPIRATORY: Clear to auscultation bilaterally. No crackles or wheezing.   CARDIOVASCULAR: Regular rate and rhythm without murmurs, gallops, or rubs.  GASTROINTESTINAL: No hepatosplenomegaly, masses, or tenderness. The patient has normal bowel sounds. No guarding.  No distention.  : Deferred  MUSCULOSKELETAL: Warm and well-perfused, no cyanosis, clubbing, or edema.  NEUROLOGIC: Cranial nerves II-XII are intact. Alert, oriented, answers questions appropriately. No focal neurologic deficit  INTEGUMENTARY: No rashes or jaundice.  GAIT: Normal    Trace peripheral edema      LABORATORY AND IMAGING STUDIES     Recent Labs   Lab Test 08/25/20  1443      POTASSIUM 4.4   CHLORIDE 106   CO2 25   ANIONGAP 10   BUN 13   CR 0.86   GLC 96   ZABRINA 9.8     No results for input(s): \"MAG\", \"PHOS\" in the last 42686 hours.  Recent Labs   Lab Test 08/28/20  0706   HGB 13.2*        Recent Labs   Lab Test 08/25/20  1443   BILITOTAL 0.8   ALKPHOS 91   ALT 13   AST 19   ALBUMIN 3.9     No results found for: \"TSH\"  No results for input(s): \"CEA\" in the last 93405 hours.  Results for orders placed or performed in visit on 06/03/21    Penile Duplex Complete    Narrative    See Historical Hospital Medical Record for documentation         ASSESSMENT AND PLAN   -Castration resistant metastatic prostate cancer   Post radical prostatectomy on 12/9/2009; salvage radiation ending May 2010; androgen deprivation therapy since 2013   Post progression on enzalutamide and " abiraterone with prednisone  -Nonmuscle invasive bladder cancer diagnosed in 2011 without any recent recurrence  -No significant medical comorbidity  -ECOG performance status of 0    Docetaxel 6-10 doses. Radium 6 doses.     I had a lengthy discussion with Alonso who is accompanied by his wife at this clinic visit.  He has castration resistant metastatic prostate cancer which has progressed on novel antiandrogen therapies including abiraterone with prednisone and enzalutamide.  He had a PSMA PET CT scan which did show extensive metastasis to the lymph nodes and bones.  We reviewed his subsequent treatment options including lutetium 177 (Pluvicto), chemotherapy with docetaxel and MARLO clinical trial.    We primarily focused on docetaxel and radium as Pluvicto is approved only after progression on 1 taxane-based regimen.  He has not received any chemotherapy either in the hormone sensitive for castration resistant setting and is not eligible for Pluvicto as yet.    I reviewed chemotherapy with docetaxel which was studied in the  trial which led to its approval in 2004 for castration resistant metastatic prostate cancer. N Engl J Med 2004; 351:3916-2865.  In this study docetaxel with prednisone was compared to mitoxantrone with prednisone and showed a significant improvement in median survival from 16.5 months in the control mitoxantrone group to 18.9 months in the docetaxel arm.  Treatment with docetaxel was associated with improvement in quality of life.  When given with prednisone treatment with docetaxel every 3 weeks left distal.  Survival and improved response rates in terms of pain, PSA levels and quality of life as compared to mitoxantrone with prednisone.    I extensively reviewed the side effects from this chemotherapy.  We reviewed the peripheral neuropathy, alopecia, neutropenic fevers, myelosuppression, mucositis, diarrhea, allergies, pedal edema and rash.  Of these, alopecia and nail changes are  more of nuisance.  However, cytopenias and in particular neutropenia can be associated with neutropenic fevers which can be serious and life-threatening. He should take every fever seriously because if his counts are low, then he would not have the defenses and he should give us a call immediately.  He should not even wait for the next morning.  He should be seen either in the clinic or in the ER the same day.  We would get basic blood tests including the CBC and blood cultures.      I reviewed radium 223 as part of the MARLO trial along with docetaxel.  Chetyk512 is approved and castration resistant metastatic prostate cancer patients having progressed on chemotherapy or dose was not a chemotherapy candidate.  The current clinical trial will study the tolerability and efficacy of radium when administered alongside docetaxel chemotherapy as compared to single agent docetaxel.  Patient will be randomized to standard of care docetaxel once every 3 weeks or the combination of radium once every 6 weeks along with docetaxel every 3 weeks.  The dose of docetaxel is decreased in the combination arm due to concerns of overlapping toxicity -primarily cytopenias.       Alpharadin /Ra-223 /Xofigo (Jeferson ADORNO, Claudine COATES, [place] CROUCH, et al. N Engl J Med 2013; 369:213-223) is a radioisotope that has chemical structure similar to calcium. Administered intravenously it gets deposited at the sites of bony disease. It releases high energy alpha particles that deliver fatal radiation dose over a very short distance. This treatment is similar to previously administered radioactive isotopes like samarium and strontium. In this phase III clinical trial it significantly improved OS in pts with CRPC with bone mets vs placebo (two-sided P = 0.74880; HR = 0.695; 95% CI, 0.552-0.875; median OS 14.0 mo vs 11.2 mo, respectively) and was very well tolerated. This is a significant improvement over previous agents like samarium used for palliation  which failed to improve survival. Patients can have nausea, vomiting, fatigue and diarrhea. It is excreted in stools. But since it emits alpha particle which have a pathlength of only 1 mm, it is remarkably safe and cannot cross the skin barrier. Care is needed for management of body fluids, but again any significant exposure to care givers from this route is not expected.     I summarized the above information so that they could understand.  Radium has been shown to decrease pain medication requirement but is unable to control the progression of disease on its own.  It has been shown to improve survival which was the reason for its FDA approval.  Outside of clinical trial it is administered as an injection once every month for up to 6 doses.  However on this study it is administered every 6 weeks to synchronize with the docetaxel chemotherapy infusions.  Overall radium 223 is very well-tolerated and I have not seen any significant side effects in any of my patients other than cytopenias and an occasional patient who has received extensive radiation or chemotherapy.  He will be closely monitored while on clinical trial and will have CBC drawn and await measured a week prior to the planned injection.  Radium is prepared for an individual patient based on his body weight.      He would be assessed immediately prior to his treatments including radium injection or docetaxel chemotherapy to assess how well he is tolerating therapy and the status of his disease.    We will discontinue his bicalutamide in anticipation of clinical trial participation.  He is not benefiting from the current bicalutamide therapy after progressing on enzalutamide and abiraterone with prednisone.  I have simplified his medication list and removed the medications that he has not been taking currently.    He did consent to clinical trial participation.  I reminded him that it is not a binding contract but there will be an ongoing consent with  each administration and every visit.  He would have the option of dropping from the clinical trial if he chooses.  However, there is a chance that he should benefit from the combination of docetaxel and radium 223 if he is randomized to the treatment.  Treatment on the standard of care were docetaxel arm will not have any direct benefit for him but would not have any downside other than the need to travel to the [place] for the infusions.  He will be monitored closely while on clinical trial.  Patients treated on clinical trials tend to do better than those in clinical practice likely due to this close monitoring.      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  STAGE: IV (bony metastasis)  MOLECULAR PROFILE: No actionable mutations/MSI or high TMB; TP53 mutation positive    TREATMENT SUMMARY:  -12/9/2009: Radical prostatectomy; pathology revealed [place] 4+5 disease, stage pT3a,N0 positive margins  -Mar-May2010: Salvage external beam radiation therapy  -Apr 2013: Intermittent androgen deprivation therapy with leuprolide for biochemical PSA relapse  -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 Radiation therapy to the left supraclavicular lymph node ending in September 2020.  He was continued on enzalutamide  -May 2022: Switch to abiraterone with prednisone for progressive disease  -Aug 2023: Abiraterone discontinued for progressive disease.  PSMA PET scan with PSMA avid osseous and brisa metastasis    CURRENT INTERVENTIONS:  MARLO Trial randomized to Arm B with  Docetaxel/prednisone/Radium-233. Leuprolide every 4 months.     SUBJECTIVE   Alonso Pettit is being followed for castration resistant metastatic prostate cancer. He returns to clinic accompanied by his wife. He is seen today prior to cycle 1 docetaxel/radium-233.  Feeling well today. Has a long history of back spasms which can make it difficult for him to get out of bed that he manages with flexeril prn. He otherwise does not have bone pains.  He endorses a dry cough x 2 years, unchanged. No chest pain or shortness of breath.  No fevers or chills. Endorses occasional hot flashes  Has a history of mild headaches, for example he had a headache this am that he attributes to frequent awakening throughout the night preparing for our visit today.   Appetite is good. He reports he does not do well with regular water intake. Drinks less than 20 oz of water per day.   He was active this past summer playing softball and pickleball that both ended in September. He is less active now due to the ending of planned sports season. He will take one nap in the afternoon for one hour at a time as needed especially if he exercised earlier in the morning.   Normal bowel movements. No urinary concerns today.   Confirms he has been getting eligard every 4 months with [facility], last in August. Began Zometa in September every 6 months.     ROS: 14 point ROS neg other than the symptoms noted above in the HPI.      PAST MEDICAL HISTORY     Past Medical History:   Diagnosis Date     Prostate cancer (H)          CURRENT OUTPATIENT MEDICATIONS     Current Outpatient Medications   Medication Sig     aspirin 81 mg chewable tablet [ASPIRIN 81 MG CHEWABLE TABLET] Chew 81 mg daily. (Patient not taking: Reported on 10/18/2023)     calcium carbonate (CALCIUM 500 ORAL) [CALCIUM CARBONATE (CALCIUM 500 ORAL)] Take by mouth.     cyclobenzaprine (FLEXERIL) 10 MG tablet 1/2-1 TAB ORALLY UP TO 3 TIMES A DAY AS NEEDED FOR MUSCLE SPASM     leuprolide  (LUPRON) 11.25 mg injection [LEUPROLIDE (LUPRON) 11.25 MG INJECTION] Inject 11.25 mg into the shoulder, thigh, or buttocks every 3 (three) months. (Patient not taking: Reported on 10/18/2023)     No current facility-administered medications for this visit.        ALLERGIES    No Known Allergies     REVIEW OF SYSTEMS   As above in the HPI, o/w complete 12-point ROS was negative.     PHYSICAL EXAM   BP (!) 141/72 (BP Location: Right arm, Patient Position: Sitting, Cuff Size: Adult Regular)   Pulse 66   Temp 97.4  F (36.3  C) (Oral)   Resp 16   Wt 74.3 kg (163 lb 12.8 oz)   SpO2 99%   BMI 22.63 kg/m    GENERAL/CONSTITUTIONAL: No acute distress.  EYES: Pupils are equal, round, and react to light and accommodation. Extraocular movements intact.  No scleral icterus.  ENT/MOUTH: Neck supple. Oropharynx clear, no mucositis.  LYMPH: No anterior cervical, posterior cervical, supraclavicular, axillary or inguinal adenopathy.   RESPIRATORY: Clear to auscultation bilaterally. No crackles or wheezing.   CARDIOVASCULAR: Regular rate and rhythm without murmurs, gallops, or rubs.  GASTROINTESTINAL: No hepatosplenomegaly, masses, or tenderness. The patient has normal bowel sounds. No guarding.  No distention.  : Deferred  MUSCULOSKELETAL: Warm and well-perfused, no cyanosis, clubbing, or edema.  NEUROLOGIC: Cranial nerves II-XII are intact. Alert, oriented, answers questions appropriately. No focal neurologic deficit  INTEGUMENTARY: No rashes or jaundice.  GAIT: Normal    Trace peripheral edema      LABORATORY AND IMAGING STUDIES   Most Recent 3 CBC's:  Recent Labs   Lab Test 11/14/23  0752 10/30/23  1044 08/28/20  0706   WBC 4.6 4.8  --    HGB 13.2* 12.6 13.2*   MCV 88 92  --     213 275   ANEUTAUTO 2.4 3.1  --      Most Recent 3 BMP's:  Recent Labs   Lab Test 11/14/23  0752 10/30/23  1044 08/25/20  1443    142 141   POTASSIUM 3.6 4.2 4.4   CHLORIDE 105 107 106   CO2 26 27 25   BUN 12.3 18.6 13   CR 0.89 0.99  0.86   ANIONGAP 10 8 10   ZABRINA 9.4 9.5 9.8   * 94 96   PROTTOTAL 6.7 6.9 6.8   ALBUMIN 4.2 4.3 3.9    Most Recent 3 LFT's:  Recent Labs   Lab Test 11/14/23  0752 10/30/23  1044 08/25/20  1443   AST 18 15 19   ALT 7 6 13   ALKPHOS 71 72 91   BILITOTAL 0.9 0.6 0.8    Most Recent 2 TSH and T4:No lab results found.  I reviewed the above labs today.       ASSESSMENT AND PLAN   -Castration resistant metastatic prostate cancer   Post radical prostatectomy on 12/9/2009; salvage radiation ending May 2010; androgen deprivation therapy since 2013   Post progression on enzalutamide and abiraterone with prednisone  -Nonmuscle invasive bladder cancer diagnosed in 2011 without any recent recurrence  -No significant medical comorbidity  -ECOG performance status of 0    Docetaxel 6-10 doses. Radium 6 doses.   #Castration resistant metastatic prostate cancer.   - Has progressed on novel antiandrogen therapies including abiraterone with prednisone and enzalutamide.  He had a PSMA PET CT scan which did show extensive metastasis to the lymph nodes and bones. Referred to [facility] for the MARLO trial, randomized to Arm B with docetaxel and Radium. Docetaxel is administered every 3 weeks for 6-10 doses and Radium every 6 weeks for 6 doses.   - Please see Dr. Johns note from 10/18/23 for further discussion of treatment and side effects.   - Labs reviewed today and overall are WNL with the exception of very mild anemia. PSA is pending today. .30 on 10/30/23.  - continue Eligard every 4 months with Dr. Philip at [facility].     #Bone Metastasis   - Zometa every 6 months with local oncologist. Consider moving up frequency to every 6 weeks. Could move to [facility] during the time of trial participation and administer while he is here for ease of appointments.   - rare risk of fractures associated with docetaxel and radium therefore continuing Zometa is recommended.     44 minutes spent on the date of the encounter doing chart review,  review of test results, patient visit, documentation, and discussion with family    Patient was seen and evaluated with study nurse, Cammy López.       Valentine Ball PA-C          Again, thank you for allowing me to participate in the care of your patient.        Sincerely,        Valentine Ball PA-C

## 2023-11-14 NOTE — NURSING NOTE
"Oncology Rooming Note    November 14, 2023 8:06 AM   Alonso Pettit is a 82 year old male who presents for:    Chief Complaint   Patient presents with    Blood Draw     Labs drawn with PIV start by RN. Pt tolerated well. Vitals taken. Patient checked into next appointment.      Oncology Clinic Visit     Malignant neoplasm metastatic to bone     Initial Vitals: BP (!) 141/72 (BP Location: Right arm, Patient Position: Sitting, Cuff Size: Adult Regular)   Pulse 66   Temp 97.4  F (36.3  C) (Oral)   Resp 16   Wt 74.3 kg (163 lb 12.8 oz)   SpO2 99%   BMI 22.63 kg/m   Estimated body mass index is 22.63 kg/m  as calculated from the following:    Height as of 10/24/23: 1.812 m (5' 11.34\").    Weight as of this encounter: 74.3 kg (163 lb 12.8 oz). Body surface area is 1.93 meters squared.  No Pain (0) Comment: Data Unavailable   No LMP for male patient.  Allergies reviewed: Yes  Medications reviewed: Yes    Medications: Medication refills not needed today.  Pharmacy name entered into [employer]: [id number] [place] [address]    Clinical concerns: none       Fatemeh Pacheco              "

## 2023-11-15 ENCOUNTER — HOSPITAL ENCOUNTER (OUTPATIENT)
Dept: NUCLEAR MEDICINE | Facility: CLINIC | Age: 83
Setting detail: NUCLEAR MEDICINE
Discharge: HOME | End: 2023-11-15
Attending: INTERNAL MEDICINE
Payer: MEDICARE

## 2023-11-15 ENCOUNTER — PATIENT OUTREACH (OUTPATIENT)
Dept: ONCOLOGY | Facility: CLINIC | Age: 83
End: 2023-11-15
Payer: MEDICARE

## 2023-11-15 VITALS
RESPIRATION RATE: 16 BRPM | DIASTOLIC BLOOD PRESSURE: 64 MMHG | SYSTOLIC BLOOD PRESSURE: 142 MMHG | TEMPERATURE: 97 F | OXYGEN SATURATION: 97 % | HEART RATE: 62 BPM

## 2023-11-15 DIAGNOSIS — C61 PROSTATE CANCER (H): ICD-10-CM

## 2023-11-15 DIAGNOSIS — C79.51 MALIGNANT NEOPLASM METASTATIC TO BONE (H): Primary | ICD-10-CM

## 2023-11-15 PROCEDURE — 999N000248 HC STATISTIC IV INSERT WITH US BY RN

## 2023-11-15 RX ORDER — DIPHENHYDRAMINE HYDROCHLORIDE 50 MG/ML
50 INJECTION INTRAMUSCULAR; INTRAVENOUS
Status: CANCELLED
Start: 2023-11-15

## 2023-11-15 RX ORDER — ALBUTEROL SULFATE 0.83 MG/ML
2.5 SOLUTION RESPIRATORY (INHALATION)
Status: CANCELLED | OUTPATIENT
Start: 2023-12-05

## 2023-11-15 RX ORDER — ALBUTEROL SULFATE 0.83 MG/ML
2.5 SOLUTION RESPIRATORY (INHALATION)
Status: DISCONTINUED | OUTPATIENT
Start: 2023-11-15 | End: 2023-11-16 | Stop reason: HOSPADM

## 2023-11-15 RX ORDER — EPINEPHRINE 1 MG/ML
0.3 INJECTION, SOLUTION, CONCENTRATE INTRAVENOUS EVERY 5 MIN PRN
Status: CANCELLED | OUTPATIENT
Start: 2023-12-05

## 2023-11-15 RX ORDER — EPINEPHRINE 1 MG/ML
0.3 INJECTION, SOLUTION, CONCENTRATE INTRAVENOUS EVERY 5 MIN PRN
Status: DISCONTINUED | OUTPATIENT
Start: 2023-11-15 | End: 2023-11-16 | Stop reason: HOSPADM

## 2023-11-15 RX ORDER — HEPARIN SODIUM,PORCINE 10 UNIT/ML
5-20 VIAL (ML) INTRAVENOUS DAILY PRN
Status: CANCELLED | OUTPATIENT
Start: 2023-11-15

## 2023-11-15 RX ORDER — ALBUTEROL SULFATE 0.83 MG/ML
2.5 SOLUTION RESPIRATORY (INHALATION)
Status: CANCELLED | OUTPATIENT
Start: 2023-11-15

## 2023-11-15 RX ORDER — HEPARIN SODIUM,PORCINE 10 UNIT/ML
5-20 VIAL (ML) INTRAVENOUS DAILY PRN
Status: DISCONTINUED | OUTPATIENT
Start: 2023-11-15 | End: 2023-11-16 | Stop reason: HOSPADM

## 2023-11-15 RX ORDER — ALBUTEROL SULFATE 90 UG/1
1-2 AEROSOL, METERED RESPIRATORY (INHALATION)
Status: CANCELLED
Start: 2023-12-05

## 2023-11-15 RX ORDER — HEPARIN SODIUM (PORCINE) LOCK FLUSH IV SOLN 100 UNIT/ML 100 UNIT/ML
5 SOLUTION INTRAVENOUS
Status: DISCONTINUED | OUTPATIENT
Start: 2023-11-15 | End: 2023-11-16 | Stop reason: HOSPADM

## 2023-11-15 RX ORDER — MEPERIDINE HYDROCHLORIDE 25 MG/ML
25 INJECTION INTRAMUSCULAR; INTRAVENOUS; SUBCUTANEOUS EVERY 30 MIN PRN
Status: CANCELLED | OUTPATIENT
Start: 2023-12-05

## 2023-11-15 RX ORDER — DIPHENHYDRAMINE HYDROCHLORIDE 50 MG/ML
50 INJECTION INTRAMUSCULAR; INTRAVENOUS
Status: CANCELLED
Start: 2023-12-05

## 2023-11-15 RX ORDER — ALBUTEROL SULFATE 90 UG/1
1-2 AEROSOL, METERED RESPIRATORY (INHALATION)
Status: DISCONTINUED | OUTPATIENT
Start: 2023-11-15 | End: 2023-11-16 | Stop reason: HOSPADM

## 2023-11-15 RX ORDER — LORAZEPAM 2 MG/ML
0.5 INJECTION INTRAMUSCULAR EVERY 4 HOURS PRN
Status: CANCELLED | OUTPATIENT
Start: 2023-12-05

## 2023-11-15 RX ORDER — ALBUTEROL SULFATE 90 UG/1
1-2 AEROSOL, METERED RESPIRATORY (INHALATION)
Status: CANCELLED
Start: 2023-11-15

## 2023-11-15 RX ORDER — HEPARIN SODIUM (PORCINE) LOCK FLUSH IV SOLN 100 UNIT/ML 100 UNIT/ML
5 SOLUTION INTRAVENOUS
Status: CANCELLED | OUTPATIENT
Start: 2023-12-05

## 2023-11-15 RX ORDER — HEPARIN SODIUM,PORCINE 10 UNIT/ML
5-20 VIAL (ML) INTRAVENOUS DAILY PRN
Status: CANCELLED | OUTPATIENT
Start: 2023-12-05

## 2023-11-15 RX ORDER — HEPARIN SODIUM (PORCINE) LOCK FLUSH IV SOLN 100 UNIT/ML 100 UNIT/ML
5 SOLUTION INTRAVENOUS
Status: CANCELLED | OUTPATIENT
Start: 2023-11-15

## 2023-11-15 RX ORDER — MEPERIDINE HYDROCHLORIDE 25 MG/ML
25 INJECTION INTRAMUSCULAR; INTRAVENOUS; SUBCUTANEOUS EVERY 30 MIN PRN
Status: DISCONTINUED | OUTPATIENT
Start: 2023-11-15 | End: 2023-11-16 | Stop reason: HOSPADM

## 2023-11-15 RX ORDER — METHYLPREDNISOLONE SODIUM SUCCINATE 125 MG/2ML
125 INJECTION, POWDER, LYOPHILIZED, FOR SOLUTION INTRAMUSCULAR; INTRAVENOUS
Status: DISCONTINUED | OUTPATIENT
Start: 2023-11-15 | End: 2023-11-16 | Stop reason: HOSPADM

## 2023-11-15 RX ORDER — MEPERIDINE HYDROCHLORIDE 25 MG/ML
25 INJECTION INTRAMUSCULAR; INTRAVENOUS; SUBCUTANEOUS EVERY 30 MIN PRN
Status: CANCELLED | OUTPATIENT
Start: 2023-11-15

## 2023-11-15 RX ORDER — EPINEPHRINE 1 MG/ML
0.3 INJECTION, SOLUTION, CONCENTRATE INTRAVENOUS EVERY 5 MIN PRN
Status: CANCELLED | OUTPATIENT
Start: 2023-11-15

## 2023-11-15 RX ORDER — DIPHENHYDRAMINE HYDROCHLORIDE 50 MG/ML
50 INJECTION INTRAMUSCULAR; INTRAVENOUS
Status: DISCONTINUED | OUTPATIENT
Start: 2023-11-15 | End: 2023-11-16 | Stop reason: HOSPADM

## 2023-11-15 NOTE — PROGRESS NOTES
[facility] Nursing Note:    Patient presents today for XOFIGO (Ra-223) therapy, dose 1 of  6  Patient seen by provider today: Yes: Dr Brewer   present during visit today: NOT APPLICABLE      Intravenous Access:  Peripheral IV placed     Treatment Conditions:  Labs done on  11/14/2023  Results reviewed, labs Met treatment parameters, ok to proceed with treatment.           Post Infusion Assessment:  Patient tolerated infusion without incident.  PIVs - No evidence of extravasations, access discontinued per protocol.         Discharge Plan:   Patient will return as scheduled for next appointment.   Patient discharged at 2 pm in stable condition accompanied by: His family  Departure Mode: Ambulatory

## 2023-11-15 NOTE — PROGRESS NOTES
[facility]: Cancer Care                                                                                      RNCC LVM for patient regarding CT results.  No details were left regarding results, but requested a return call to discuss.    Spoke with patient's wife Macey regarding the CT results.  Advised her per Valentine Ball's recommendation that the patient should follow up with his PCP for the plaques that were found (cholesterol management).  Also advised that if symptoms persist to be seen in the ED.    Genet Hansen, RN, BSN  Oncology RN Care Coordinator  [facility]

## 2023-11-17 ENCOUNTER — TRANSFERRED RECORDS (OUTPATIENT)
Dept: HEALTH INFORMATION MANAGEMENT | Facility: CLINIC | Age: 83
End: 2023-11-17

## 2023-11-17 ENCOUNTER — MYC MEDICAL ADVICE (OUTPATIENT)
Dept: ONCOLOGY | Facility: CLINIC | Age: 83
End: 2023-11-17

## 2023-11-17 ENCOUNTER — ANCILLARY PROCEDURE (OUTPATIENT)
Dept: MRI IMAGING | Facility: CLINIC | Age: 83
End: 2023-11-17
Payer: MEDICARE

## 2023-11-17 DIAGNOSIS — R47.89 WORD FINDING DIFFICULTY: ICD-10-CM

## 2023-11-17 PROCEDURE — G1010 CDSM STANSON: HCPCS | Performed by: RADIOLOGY

## 2023-11-17 PROCEDURE — 70553 MRI BRAIN STEM W/O & W/DYE: CPT | Mod: ME | Performed by: RADIOLOGY

## 2023-11-17 PROCEDURE — A9585 GADOBUTROL INJECTION: HCPCS | Mod: JZ | Performed by: RADIOLOGY

## 2023-11-17 RX ORDER — GADOBUTROL 604.72 MG/ML
7.5 INJECTION INTRAVENOUS ONCE
Status: COMPLETED | OUTPATIENT
Start: 2023-11-17 | End: 2023-11-17

## 2023-11-17 RX ADMIN — GADOBUTROL 7.5 ML: 604.72 INJECTION INTRAVENOUS at 18:31

## 2023-11-20 DIAGNOSIS — I61.8 SILENT MICRO-HEMORRHAGE OF BRAIN (H): Primary | ICD-10-CM

## 2023-11-20 DIAGNOSIS — R47.89 WORD FINDING DIFFICULTY: ICD-10-CM

## 2023-11-21 ENCOUNTER — PATIENT OUTREACH (OUTPATIENT)
Dept: ONCOLOGY | Facility: CLINIC | Age: 83
End: 2023-11-21
Payer: MEDICARE

## 2023-11-21 NOTE — PROGRESS NOTES
[facility]: Cancer Care                                                                                      Faxed MRI and CTA image reports to [facility] location per Valentine Ball's request.  Confirmed fax was successful.    Genet Hansen, RN, BSN  Oncology RN Care Coordinator  [facility]

## 2023-11-24 ENCOUNTER — PATIENT OUTREACH (OUTPATIENT)
Dept: ONCOLOGY | Facility: CLINIC | Age: 83
End: 2023-11-24
Payer: MEDICARE

## 2023-11-24 NOTE — PROGRESS NOTES
[facility]: Cancer Care                                                                                      Patient's wife called research nurse this morning with complaints of constipation.  Has tried colace, enema, and Miralax with little results.  Patient has been constipated for about 1.5 weeks now.      Per  patient can try Miralax or MOM and plenty of water.      Advised wife Macey of Dr. Gamez's recommendations and to call the triage line over the weekend if still having issues.  Provided her the triage number.    Genet Hansen, RN, BSN  Oncology RN Care Coordinator  [facility]

## 2023-12-04 ENCOUNTER — ONCOLOGY VISIT (OUTPATIENT)
Dept: ONCOLOGY | Facility: CLINIC | Age: 83
End: 2023-12-04
Payer: MEDICARE

## 2023-12-04 ENCOUNTER — ALLIED HEALTH/NURSE VISIT (OUTPATIENT)
Dept: ONCOLOGY | Facility: CLINIC | Age: 83
End: 2023-12-04

## 2023-12-04 ENCOUNTER — APPOINTMENT (OUTPATIENT)
Dept: LAB | Facility: CLINIC | Age: 83
End: 2023-12-04
Attending: INTERNAL MEDICINE
Payer: MEDICARE

## 2023-12-04 VITALS
OXYGEN SATURATION: 98 % | TEMPERATURE: 97.3 F | SYSTOLIC BLOOD PRESSURE: 171 MMHG | RESPIRATION RATE: 16 BRPM | BODY MASS INDEX: 22.52 KG/M2 | HEART RATE: 64 BPM | DIASTOLIC BLOOD PRESSURE: 90 MMHG | WEIGHT: 163 LBS

## 2023-12-04 DIAGNOSIS — Z00.6 EXAMINATION OF PARTICIPANT IN CLINICAL TRIAL: ICD-10-CM

## 2023-12-04 DIAGNOSIS — C61 PROSTATE CANCER (H): Primary | ICD-10-CM

## 2023-12-04 DIAGNOSIS — C79.51 MALIGNANT NEOPLASM METASTATIC TO BONE (H): Primary | ICD-10-CM

## 2023-12-04 DIAGNOSIS — C61 MALIGNANT NEOPLASM OF PROSTATE (H): ICD-10-CM

## 2023-12-04 PROCEDURE — G0463 HOSPITAL OUTPT CLINIC VISIT: HCPCS

## 2023-12-04 PROCEDURE — 99214 OFFICE O/P EST MOD 30 MIN: CPT

## 2023-12-04 RX ORDER — GUAIFENESIN, PSEUDOEPHEDRINE HYDROCHLORIDE 600; 60 MG/1; MG/1
TABLET, EXTENDED RELEASE ORAL
COMMUNITY
Start: 2023-12-02 | End: 2024-07-08

## 2023-12-04 ASSESSMENT — PAIN SCALES - GENERAL: PAINLEVEL: NO PAIN (0)

## 2023-12-04 NOTE — PROGRESS NOTES
[facility]  HEMATOLOGY AND ONCOLOGY    FOLLOW-UP VISIT NOTE    PATIENT NAME: Alonso Pettit MRN # [id number]  DATE OF VISIT: Dec 4, 2023 YOB: 1940    REFERRING PROVIDER: [facility]    CANCER TYPE: Prostate adenocarcinoma; [place] 4+5  STAGE: IV (bony metastasis)  MOLECULAR PROFILE: No actionable mutations/MSI or high TMB; TP53 mutation positive    TREATMENT SUMMARY:  -12/9/2009: Radical prostatectomy; pathology revealed [place] 4+5 disease, stage pT3a,N0 positive margins  -Mar-May2010: Salvage external beam radiation therapy  -Apr 2013: Intermittent androgen deprivation therapy with leuprolide for biochemical PSA relapse  -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 Radiation therapy to the left supraclavicular lymph node ending in September 2020.  He was continued on enzalutamide  -May 2022: Switch to abiraterone with prednisone for progressive disease  -Aug 2023: Abiraterone discontinued for progressive disease.  PSMA PET scan with PSMA avid osseous and brisa metastasis.  -11/14/2023: MARLO trial: cycle 1 docetaxel. 11/15/23 cycle 1 radium per the MARLO trial.        PSA trend:   11/14/23: 160.20- prior to cycle 1 docetaxel and cycle 1 radium per the MARLO trial.     CURRENT INTERVENTIONS:  MARLO Trial randomized to Arm B with Docetaxel/prednisone/Radium-233. Leuprolide every 4 months.     SUBJECTIVE   Alonso Pettit is being followed for castration resistant metastatic prostate cancer. He returns to clinic accompanied by his wife. He is seen today prior to cycle 2 docetaxel.   He reports 2 weeks worth of constipation.   He tried colace, metamucil, fleet enema with a small amount of output, and ducolax with improvement.   He took one tablet of ducolax at bedtime and then had a bowel  movement a few hours later.   His bowels are back to normal now.   Last week he had watery eyes with yellow sinus congestion 5 days ago and taking mucinex D daily since Saturday 12/02/23. His symptoms are resolving. He took mucinex D this AM.   He is scheduled for Eligard on 12/11/2023 at [facility].   Eating well. No nausea or vomiting.   No fevers.   He endorses a dry cough x 2 years, unchanged. No chest pain or shortness of breath.  No changes in headaches.  No urinary changes.     ROS: 14 point ROS neg other than the symptoms noted above in the HPI.      PAST MEDICAL HISTORY     Past Medical History:   Diagnosis Date    Prostate cancer (H)          CURRENT OUTPATIENT MEDICATIONS     Current Outpatient Medications   Medication Sig    aspirin 81 mg chewable tablet [ASPIRIN 81 MG CHEWABLE TABLET] Chew 81 mg daily. (Patient not taking: Reported on 10/18/2023)    calcium carbonate (CALCIUM 500 ORAL) [CALCIUM CARBONATE (CALCIUM 500 ORAL)] Take by mouth.    cyclobenzaprine (FLEXERIL) 10 MG tablet 1/2-1 TAB ORALLY UP TO 3 TIMES A DAY AS NEEDED FOR MUSCLE SPASM    dexAMETHasone (DECADRON) 4 MG tablet Take 2 tablets (8 mg) by mouth 2 times daily (with meals) Start evening of Docetaxel infusion and continue for a total of 3 doses.    leuprolide (LUPRON) 11.25 mg injection [LEUPROLIDE (LUPRON) 11.25 MG INJECTION] Inject 11.25 mg into the shoulder, thigh, or buttocks every 3 (three) months. (Patient not taking: Reported on 10/18/2023)    predniSONE (DELTASONE) 5 MG tablet Take 1 tablet (5 mg) by mouth 2 times daily for 21 days    prochlorperazine (COMPAZINE) 10 MG tablet Take 0.5 tablets (5 mg) by mouth every 6 hours as needed for nausea or vomiting     No current facility-administered medications for this visit.        ALLERGIES    No Known Allergies     REVIEW OF SYSTEMS   As above in the HPI, o/w complete 12-point ROS was negative.     PHYSICAL EXAM   BP (!) 171/90 (BP Location: Right arm, Patient Position: Sitting, Cuff  Size: Adult Regular)   Pulse 64   Temp 97.3  F (36.3  C) (Oral)   Resp 16   Wt 73.9 kg (163 lb)   SpO2 98%   BMI 22.52 kg/m    General: No acute distress  HEENT: Sclera anicteric. Oral mucosa pink and moist.  No mucositis or thrush  Lymph: No lymphadenopathy in neck  Heart: Regular, rate, and rhythm  Lungs: Clear to ascultation bilaterally  Abdomen: Positive bowel sounds. Soft, non-distended, non-tender. No organomegaly or mass.   Extremities: trace extremity edema of the bilateral ankles.   Neuro: Cranial nerves grossly intact  Rash: none  Vascular access: port       LABORATORY AND IMAGING STUDIES   Most Recent 3 CBC's:  Recent Labs   Lab Test 12/04/23  1432 11/14/23  0752 10/30/23  1044   WBC 6.0 4.6 4.8   HGB 12.9* 13.2* 12.6   MCV 93 88 92    237 213   ANEUTAUTO 4.3 2.4 3.1     Most Recent 3 BMP's:  Recent Labs   Lab Test 12/04/23  1432 11/14/23  0752 10/30/23  1044    141 142   POTASSIUM 4.5 3.6 4.2   CHLORIDE 105 105 107   CO2 25 26 27   BUN 14.7 12.3 18.6   CR 0.82 0.89 0.99   ANIONGAP 10 10 8   ZABRINA 9.5 9.4 9.5   * 134* 94   PROTTOTAL 6.8 6.7 6.9   ALBUMIN 4.0 4.2 4.3    Most Recent 3 LFT's:  Recent Labs   Lab Test 12/04/23  1432 11/14/23  0752 10/30/23  1044   AST 17 18 15   ALT <5 7 6   ALKPHOS 64 71 72   BILITOTAL 0.5 0.9 0.6    Most Recent 2 TSH and T4:No lab results found.    Component      Latest Ref Rng 10/30/2023  10:44 AM 11/14/2023  7:52 AM   PSA Tumor Marker      ng/mL 153.30  160.20      I reviewed the above labs today.       ASSESSMENT AND PLAN   -Castration resistant metastatic prostate cancer   Post radical prostatectomy on 12/9/2009; salvage radiation ending May 2010; androgen deprivation therapy since 2013   Post progression on enzalutamide and abiraterone with prednisone  -Nonmuscle invasive bladder cancer diagnosed in 2011 without any recent recurrence  -No significant medical comorbidity  -ECOG performance status of 0     #Castration resistant metastatic prostate  cancer.   - Has progressed on novel antiandrogen therapies including abiraterone with prednisone and enzalutamide.  He had a PSMA PET CT scan which did show extensive metastasis to the lymph nodes and bones. Referred to [facility] for the MARLO trial, randomized to Arm B with docetaxel and Radium. Docetaxel is administered every 3 weeks for 6-10 doses and Radium every 6 weeks for 6 doses. He tolerated docetaxel and radium reasonably well with the exception of significant constipation for nearly 1.5-2 weeks.   - Labs reviewed and are generally stable. He has continued mild anemia. PSA is trending today.   - PSA 11/14/23 prior to cycle 1 docetaxel and cycle 1 radium 160.20. PSA is pending today. Clinically appropriate to proceed with cycle 2 docetaxel and radium as scheduled on 12/06/2023.   - continue Eligard every 4 months with Dr. Philip at [facility]. Next on 12/11/23. He asks if he needs repeat labs prior to this. Encouraged him to call [facility] to confirm but we would be happy to fax our results to them.      #Bone Metastasis   - Zometa every 6 months with local oncologist. Consider moving up frequency to every 6 weeks. Could move to [facility] during the time of trial participation and administer while he is here for ease of appointments. Not specifically reviewed today.   - rare risk of fractures associated with docetaxel and radium therefore continuing Zometa is recommended.     #Constipation   - Discussed scheduling ducolax up to three times day for the next 1-2 weeks. Trial daily miralax as well.   - If no bowel movement have 2-3 days, recommend adding in Milk of Mag. If no bowel movement after 6 hours, repeat.     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 CT neck with calcifications. Called patient and wife to discuss there results. They have seen a neurologist in the past at [facility] who they  plan to follow up with along with their PCP.   - No recurrent episodes reported today, 12/04/2023.     30 minutes spent on the date of the encounter doing chart review, review of test results, patient visit, documentation, and discussion with family     Valentine Ball PA-C

## 2023-12-04 NOTE — NURSING NOTE
Chief Complaint   Patient presents with    Labs Only     Labs drawn via VPT by RN, VS done     Labs (including research labs) collected from venipuncture by RN. Vitals taken. Checked in for appointment(s).

## 2023-12-04 NOTE — NURSING NOTE
[id number]: Study Visit Note   Subject name: Alonso Pettit     Visit: C2D22    Did the study visit occur within the appropriate window allowed by the protocol? yes    Since the last study visit, He has been doing well. Constipation was his biggest struggle after this last cycle of Docetaxel.Tried several medications and was finally able to have a bowel movement after Doculax sodium. Started to lose his hair last Friday. Also has been having rhinorrhea with some yellow like discharge went to Urgent Care and was started on Mucinex D, has been taking since Saturday.     Ongoing Aes:   Constipation (Grade 2)   Hypertension (Grade 3)    Alopcia (grade 1)   Rhinorrhea (grade 2)     I have personally interviewed Alonso Pettit and reviewed his medical record for adverse events and concomitant medications and these have been recorded on the corresponding logs in Alonso Pettit's research file.     Special considerations for today's visit were reviewed with staff administering the study intervention including: None    Alonso Pettit was given the opportunity to ask any trial related questions.  Please see provider progress note for physical exam and other clinical information. Labs were reviewed - any significant lab values were addressed and reviewed.    Cammy López RN

## 2023-12-04 NOTE — NURSING NOTE
"Oncology Rooming Note    December 4, 2023 3:00 PM   Alonso Pettit is a 82 year old male who presents for:    Chief Complaint   Patient presents with    Labs Only     Labs drawn via VPT by RN, JAKY done    Oncology Clinic Visit     Research Prostate CA     Initial Vitals: BP (!) 171/90 (BP Location: Right arm, Patient Position: Sitting, Cuff Size: Adult Regular)   Pulse 64   Temp 97.3  F (36.3  C) (Oral)   Resp 16   Wt 73.9 kg (163 lb)   SpO2 98%   BMI 22.52 kg/m   Estimated body mass index is 22.52 kg/m  as calculated from the following:    Height as of 10/24/23: 1.812 m (5' 11.34\").    Weight as of this encounter: 73.9 kg (163 lb). Body surface area is 1.93 meters squared.  No Pain (0) Comment: Data Unavailable   No LMP for male patient.  Allergies reviewed: Yes  Medications reviewed: Yes    Medications: Medication refills not needed today.  Pharmacy name entered into [facility]: [facility] [id number] [place] [address]    Clinical concerns: none.      Lemuel Luz"

## 2023-12-04 NOTE — Clinical Note
12/4/2023         RE: Alonso Pettit  [address] [facility] [address]        Dear Colleague,    Thank you for referring your patient, Alonso Pettit, to the [facility]. Please see a copy of my visit note below.    [facility]  HEMATOLOGY AND ONCOLOGY    FOLLOW-UP VISIT NOTE    PATIENT NAME: Alonso Pettit MRN # [id number]  DATE OF VISIT: Dec 4, 2023 YOB: 1940    REFERRING PROVIDER: [facility]    CANCER TYPE: Prostate adenocarcinoma; [place] 4+5  STAGE: IV (bony metastasis)  MOLECULAR PROFILE: No actionable mutations/MSI or high TMB; TP53 mutation positive    TREATMENT SUMMARY:  -12/9/2009: Radical prostatectomy; pathology revealed [place] 4+5 disease, stage pT3a,N0 positive margins  -Mar-May2010: Salvage external beam radiation therapy  -Apr 2013: Intermittent androgen deprivation therapy with leuprolide for biochemical PSA relapse  -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 Radiation therapy to the left supraclavicular lymph node ending in September 2020.  He was continued on enzalutamide  -May 2022: Switch to abiraterone with prednisone for progressive disease  -Aug 2023: Abiraterone discontinued for progressive disease.  PSMA PET scan with PSMA avid osseous and brisa metastasis    CURRENT INTERVENTIONS:  MARLO Trial randomized to Arm B with Docetaxel/prednisone/Radium-233. Leuprolide every 4 months.     SUBJECTIVE   Alonso Pettit is being followed for castration resistant metastatic prostate cancer. He returns to clinic accompanied by his wife. He is seen today prior to cycle 1 docetaxel/radium-233.  Feeling well today. Has a long history of back spasms which can make it difficult for him to get out of bed that he manages with flexeril prn. He  otherwise does not have bone pains.  He endorses a dry cough x 2 years, unchanged. No chest pain or shortness of breath.  No fevers or chills. Endorses occasional hot flashes  Has a history of mild headaches, for example he had a headache this am that he attributes to frequent awakening throughout the night preparing for our visit today.   Appetite is good. He reports he does not do well with regular water intake. Drinks less than 20 oz of water per day.   He was active this past summer playing softball and pickleball that both ended in September. He is less active now due to the ending of planned sports season. He will take one nap in the afternoon for one hour at a time as needed especially if he exercised earlier in the morning.   Normal bowel movements. No urinary concerns today.   Confirms he has been getting eligard every 4 months with [facility], last in August. Began Zometa in September every 6 months.     ROS: 14 point ROS neg other than the symptoms noted above in the HPI.      PAST MEDICAL HISTORY     Past Medical History:   Diagnosis Date    Prostate cancer (H)          CURRENT OUTPATIENT MEDICATIONS     Current Outpatient Medications   Medication Sig    aspirin 81 mg chewable tablet [ASPIRIN 81 MG CHEWABLE TABLET] Chew 81 mg daily. (Patient not taking: Reported on 10/18/2023)    calcium carbonate (CALCIUM 500 ORAL) [CALCIUM CARBONATE (CALCIUM 500 ORAL)] Take by mouth.    cyclobenzaprine (FLEXERIL) 10 MG tablet 1/2-1 TAB ORALLY UP TO 3 TIMES A DAY AS NEEDED FOR MUSCLE SPASM    dexAMETHasone (DECADRON) 4 MG tablet Take 2 tablets (8 mg) by mouth 2 times daily (with meals) Start evening of Docetaxel infusion and continue for a total of 3 doses.    leuprolide (LUPRON) 11.25 mg injection [LEUPROLIDE (LUPRON) 11.25 MG INJECTION] Inject 11.25 mg into the shoulder, thigh, or buttocks every 3 (three) months. (Patient not taking: Reported on 10/18/2023)    predniSONE (DELTASONE) 5 MG tablet Take 1 tablet (5 mg)  by mouth 2 times daily for 21 days    prochlorperazine (COMPAZINE) 10 MG tablet Take 0.5 tablets (5 mg) by mouth every 6 hours as needed for nausea or vomiting     No current facility-administered medications for this visit.        ALLERGIES    No Known Allergies     REVIEW OF SYSTEMS   As above in the HPI, o/w complete 12-point ROS was negative.     PHYSICAL EXAM   BP (!) 171/90 (BP Location: Right arm, Patient Position: Sitting, Cuff Size: Adult Regular)   Pulse 64   Temp 97.3  F (36.3  C) (Oral)   Resp 16   Wt 73.9 kg (163 lb)   SpO2 98%   BMI 22.52 kg/m    GENERAL/CONSTITUTIONAL: No acute distress.  EYES: Pupils are equal, round, and react to light and accommodation. Extraocular movements intact.  No scleral icterus.  ENT/MOUTH: Neck supple. Oropharynx clear, no mucositis.  LYMPH: No anterior cervical, posterior cervical, supraclavicular, axillary or inguinal adenopathy.   RESPIRATORY: Clear to auscultation bilaterally. No crackles or wheezing.   CARDIOVASCULAR: Regular rate and rhythm without murmurs, gallops, or rubs.  GASTROINTESTINAL: No hepatosplenomegaly, masses, or tenderness. The patient has normal bowel sounds. No guarding.  No distention.  : Deferred  MUSCULOSKELETAL: Warm and well-perfused, no cyanosis, clubbing, or edema.  NEUROLOGIC: Cranial nerves II-XII are intact. Alert, oriented, answers questions appropriately. No focal neurologic deficit  INTEGUMENTARY: No rashes or jaundice.  GAIT: Normal    Trace peripheral edema      LABORATORY AND IMAGING STUDIES   Most Recent 3 CBC's:  Recent Labs   Lab Test 12/04/23  1432 11/14/23  0752 10/30/23  1044   WBC 6.0 4.6 4.8   HGB 12.9* 13.2* 12.6   MCV 93 88 92    237 213   ANEUTAUTO 4.3 2.4 3.1     Most Recent 3 BMP's:  Recent Labs   Lab Test 11/14/23  0752 10/30/23  1044 08/25/20  1443    142 141   POTASSIUM 3.6 4.2 4.4   CHLORIDE 105 107 106   CO2 26 27 25   BUN 12.3 18.6 13   CR 0.89 0.99 0.86   ANIONGAP 10 8 10   ZABRINA 9.4 9.5 9.8    * 94 96   PROTTOTAL 6.7 6.9 6.8   ALBUMIN 4.2 4.3 3.9    Most Recent 3 LFT's:  Recent Labs   Lab Test 11/14/23  0752 10/30/23  1044 08/25/20  1443   AST 18 15 19   ALT 7 6 13   ALKPHOS 71 72 91   BILITOTAL 0.9 0.6 0.8    Most Recent 2 TSH and T4:No lab results found.  I reviewed the above labs today.       ASSESSMENT AND PLAN   -Castration resistant metastatic prostate cancer   Post radical prostatectomy on 12/9/2009; salvage radiation ending May 2010; androgen deprivation therapy since 2013   Post progression on enzalutamide and abiraterone with prednisone  -Nonmuscle invasive bladder cancer diagnosed in 2011 without any recent recurrence  -No significant medical comorbidity  -ECOG performance status of 0    Docetaxel 6-10 doses. Radium 6 doses.   #Castration resistant metastatic prostate cancer.   - Has progressed on novel antiandrogen therapies including abiraterone with prednisone and enzalutamide.  He had a PSMA PET CT scan which did show extensive metastasis to the lymph nodes and bones. Referred to [facility] for the AMRLO trial, randomized to Arm B with docetaxel and Radium. Docetaxel is administered every 3 weeks for 6-10 doses and Radium every 6 weeks for 6 doses.   - Please see Dr. Gamez note from 10/24/23 for further discussion of treatment and side effects.   - Labs reviewed today and overall are WNL with the exception of very mild anemia. PSA is pending today. .30 on 10/30/23. Will proceed with cycle 1 docetaxel and cycle 1 Radium as scheduled today and tomorrow respectfully.   - continue Eligard every 4 months with Dr. Philip at [facility].     #Bone Metastasis   - Zometa every 6 months with local oncologist. Consider moving up frequency to every 6 weeks. Could move to [facility] during the time of trial participation and administer while he is here for ease of appointments.   - rare risk of fractures associated with docetaxel and radium therefore continuing Zometa is recommended.     #Speech  difficulties   - reported to research RN after our visit difficulty getting his words out when he went to talk for a few minutes ~2 weeks ago. Suspicious for a TIA. Will perform a brain MRI and CTA.     44 minutes spent on the date of the encounter doing chart review, review of test results, patient visit, documentation, and discussion with family    Patient was seen and evaluated with study nurse, Cammy López.       Valentine Ball PA-C    Addendum  Brain MRI with areas of microhemorrhages and amyloid disposition and likely prior subarachnoid hemorrhage without acute pathology. CT neck with calcifications. Called patient and wife to discuss there results. They have seen a neurologist in the past at [facility] who they plan to follow up with along with their PCP.     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  STAGE: IV (bony metastasis)  MOLECULAR PROFILE: No actionable mutations/MSI or high TMB; TP53 mutation positive    TREATMENT SUMMARY:  -12/9/2009: Radical prostatectomy; pathology revealed [place] 4+5 disease, stage pT3a,N0 positive margins  -Mar-May2010: Salvage external beam radiation therapy  -Apr 2013: Intermittent androgen deprivation therapy with leuprolide for biochemical PSA relapse  -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 Radiation therapy to the left supraclavicular lymph node ending in September 2020.  He was continued on enzalutamide  -May 2022: Switch to abiraterone with prednisone for progressive disease  -Aug 2023: Abiraterone  discontinued for progressive disease.  PSMA PET scan with PSMA avid osseous and brisa metastasis    CURRENT INTERVENTIONS:  MARLO Trial randomized to Arm B with Docetaxel/prednisone/Radium-233. Leuprolide every 4 months.     SUBJECTIVE   Alonso Pettit is being followed for castration resistant metastatic prostate cancer. He returns to clinic accompanied by his wife. He is seen today prior to cycle 1 docetaxel/radium-233.  He reports 2 weeks worth of constipation.   He tried colace, metamucil, fleet enema with a small amount of output, and ducolax with improvement.   He took one tablet of ducolax at bedtime and then had a bowel movement a few hours later.   Last week he had watery eyes with yellow sinus congestion 5 days ago and taking mucinex D daily since Saturday 12/02/23.        Feeling well today. Has a long history of back spasms which can make it difficult for him to get out of bed that he manages with flexeril prn. He otherwise does not have bone pains.  He endorses a dry cough x 2 years, unchanged. No chest pain or shortness of breath.  No fevers or chills. Endorses occasional hot flashes  Has a history of mild headaches, for example he had a headache this am that he attributes to frequent awakening throughout the night preparing for our visit today.   Appetite is good. He reports he does not do well with regular water intake. Drinks less than 20 oz of water per day.   He was active this past summer playing softball and pickleball that both ended in September. He is less active now due to the ending of planned sports season. He will take one nap in the afternoon for one hour at a time as needed especially if he exercised earlier in the morning.   Normal bowel movements. No urinary concerns today.   Confirms he has been getting eligard every 4 months with [facility], last in August. Began Zometa in September every 6 months.     ROS: 14 point ROS neg other than the symptoms noted above in the HPI.      PAST  MEDICAL HISTORY     Past Medical History:   Diagnosis Date     Prostate cancer (H)          CURRENT OUTPATIENT MEDICATIONS     Current Outpatient Medications   Medication Sig     aspirin 81 mg chewable tablet [ASPIRIN 81 MG CHEWABLE TABLET] Chew 81 mg daily. (Patient not taking: Reported on 10/18/2023)     calcium carbonate (CALCIUM 500 ORAL) [CALCIUM CARBONATE (CALCIUM 500 ORAL)] Take by mouth.     cyclobenzaprine (FLEXERIL) 10 MG tablet 1/2-1 TAB ORALLY UP TO 3 TIMES A DAY AS NEEDED FOR MUSCLE SPASM     dexAMETHasone (DECADRON) 4 MG tablet Take 2 tablets (8 mg) by mouth 2 times daily (with meals) Start evening of Docetaxel infusion and continue for a total of 3 doses.     leuprolide (LUPRON) 11.25 mg injection [LEUPROLIDE (LUPRON) 11.25 MG INJECTION] Inject 11.25 mg into the shoulder, thigh, or buttocks every 3 (three) months. (Patient not taking: Reported on 10/18/2023)     predniSONE (DELTASONE) 5 MG tablet Take 1 tablet (5 mg) by mouth 2 times daily for 21 days     prochlorperazine (COMPAZINE) 10 MG tablet Take 0.5 tablets (5 mg) by mouth every 6 hours as needed for nausea or vomiting     No current facility-administered medications for this visit.        ALLERGIES    No Known Allergies     REVIEW OF SYSTEMS   As above in the HPI, o/w complete 12-point ROS was negative.     PHYSICAL EXAM   BP (!) 171/90 (BP Location: Right arm, Patient Position: Sitting, Cuff Size: Adult Regular)   Pulse 64   Temp 97.3  F (36.3  C) (Oral)   Resp 16   Wt 73.9 kg (163 lb)   SpO2 98%   BMI 22.52 kg/m    GENERAL/CONSTITUTIONAL: No acute distress.  EYES: Pupils are equal, round, and react to light and accommodation. Extraocular movements intact.  No scleral icterus.  ENT/MOUTH: Neck supple. Oropharynx clear, no mucositis.  LYMPH: No anterior cervical, posterior cervical, supraclavicular, axillary or inguinal adenopathy.   RESPIRATORY: Clear to auscultation bilaterally. No crackles or wheezing.   CARDIOVASCULAR: Regular rate  and rhythm without murmurs, gallops, or rubs.  GASTROINTESTINAL: No hepatosplenomegaly, masses, or tenderness. The patient has normal bowel sounds. No guarding.  No distention.  : Deferred  MUSCULOSKELETAL: Warm and well-perfused, no cyanosis, clubbing, or edema.  NEUROLOGIC: Cranial nerves II-XII are intact. Alert, oriented, answers questions appropriately. No focal neurologic deficit  INTEGUMENTARY: No rashes or jaundice.  GAIT: Normal    Trace peripheral edema      LABORATORY AND IMAGING STUDIES   Most Recent 3 CBC's:  Recent Labs   Lab Test 12/04/23  1432 11/14/23  0752 10/30/23  1044   WBC 6.0 4.6 4.8   HGB 12.9* 13.2* 12.6   MCV 93 88 92    237 213   ANEUTAUTO 4.3 2.4 3.1     Most Recent 3 BMP's:  Recent Labs   Lab Test 11/14/23  0752 10/30/23  1044 08/25/20  1443    142 141   POTASSIUM 3.6 4.2 4.4   CHLORIDE 105 107 106   CO2 26 27 25   BUN 12.3 18.6 13   CR 0.89 0.99 0.86   ANIONGAP 10 8 10   ZABRINA 9.4 9.5 9.8   * 94 96   PROTTOTAL 6.7 6.9 6.8   ALBUMIN 4.2 4.3 3.9    Most Recent 3 LFT's:  Recent Labs   Lab Test 11/14/23  0752 10/30/23  1044 08/25/20  1443   AST 18 15 19   ALT 7 6 13   ALKPHOS 71 72 91   BILITOTAL 0.9 0.6 0.8    Most Recent 2 TSH and T4:No lab results found.  I reviewed the above labs today.       ASSESSMENT AND PLAN   -Castration resistant metastatic prostate cancer   Post radical prostatectomy on 12/9/2009; salvage radiation ending May 2010; androgen deprivation therapy since 2013   Post progression on enzalutamide and abiraterone with prednisone  -Nonmuscle invasive bladder cancer diagnosed in 2011 without any recent recurrence  -No significant medical comorbidity  -ECOG performance status of 0    Docetaxel 6-10 doses. Radium 6 doses.   #Castration resistant metastatic prostate cancer.   - Has progressed on novel antiandrogen therapies including abiraterone with prednisone and enzalutamide.  He had a PSMA PET CT scan which did show extensive metastasis to the lymph  nodes and bones. Referred to [facility] for the MARLO trial, randomized to Arm B with docetaxel and Radium. Docetaxel is administered every 3 weeks for 6-10 doses and Radium every 6 weeks for 6 doses.   - Please see Dr. Gamez note from 10/24/23 for further discussion of treatment and side effects.   - Labs reviewed today and overall are WNL with the exception of very mild anemia. PSA is pending today. .30 on 10/30/23. Will proceed with cycle 1 docetaxel and cycle 1 Radium as scheduled today and tomorrow respectfully.   - continue Eligard every 4 months with Dr. Philip at [facility].     #Bone Metastasis   - Zometa every 6 months with local oncologist. Consider moving up frequency to every 6 weeks. Could move to [facility] during the time of trial participation and administer while he is here for ease of appointments.   - rare risk of fractures associated with docetaxel and radium therefore continuing Zometa is recommended.     #Speech difficulties   - reported to research RN after our visit difficulty getting his words out when he went to talk for a few minutes ~2 weeks ago. Suspicious for a TIA. Will perform a brain MRI and CTA.     44 minutes spent on the date of the encounter doing chart review, review of test results, patient visit, documentation, and discussion with family    Patient was seen and evaluated with study nurse, Cammy López.       Valentine Ball PA-C    Addendum  Brain MRI with areas of microhemorrhages and amyloid disposition and likely prior subarachnoid hemorrhage without acute pathology. CT neck with calcifications. Called patient and wife to discuss there results. They have seen a neurologist in the past at [facility] who they plan to follow up with along with their PCP.     Valentine Ball PA-C          Again, thank you for allowing me to participate in the care of your patient.        Sincerely,        Valentine Ball PA-C

## 2023-12-06 ENCOUNTER — INFUSION THERAPY VISIT (OUTPATIENT)
Dept: ONCOLOGY | Facility: CLINIC | Age: 83
End: 2023-12-06
Attending: INTERNAL MEDICINE
Payer: MEDICARE

## 2023-12-06 VITALS
RESPIRATION RATE: 16 BRPM | TEMPERATURE: 97.7 F | DIASTOLIC BLOOD PRESSURE: 76 MMHG | SYSTOLIC BLOOD PRESSURE: 131 MMHG | OXYGEN SATURATION: 98 % | HEART RATE: 68 BPM

## 2023-12-06 DIAGNOSIS — C61 PROSTATE CANCER (H): ICD-10-CM

## 2023-12-06 DIAGNOSIS — C79.51 MALIGNANT NEOPLASM METASTATIC TO BONE (H): Primary | ICD-10-CM

## 2023-12-06 PROCEDURE — 250N000011 HC RX IP 250 OP 636: Performed by: INTERNAL MEDICINE

## 2023-12-06 PROCEDURE — 258N000003 HC RX IP 258 OP 636: Performed by: INTERNAL MEDICINE

## 2023-12-06 PROCEDURE — 96413 CHEMO IV INFUSION 1 HR: CPT

## 2023-12-06 PROCEDURE — 96375 TX/PRO/DX INJ NEW DRUG ADDON: CPT

## 2023-12-06 RX ORDER — BISACODYL 5 MG/1
5 TABLET, DELAYED RELEASE ORAL 2 TIMES DAILY
COMMUNITY

## 2023-12-06 RX ADMIN — DEXAMETHASONE SODIUM PHOSPHATE: 10 INJECTION, SOLUTION INTRAMUSCULAR; INTRAVENOUS at 08:51

## 2023-12-06 RX ADMIN — SODIUM CHLORIDE 250 ML: 9 INJECTION, SOLUTION INTRAVENOUS at 08:51

## 2023-12-06 RX ADMIN — DOCETAXEL 116 MG: 20 INJECTION, SOLUTION, CONCENTRATE INTRAVENOUS at 09:15

## 2023-12-06 NOTE — PATIENT INSTRUCTIONS
[facility] Triage and after hours / weekends / holidays:  [phone]    Please call the triage or after hours line if you experience a temperature greater than or equal to 100.4, shaking chills, have uncontrolled nausea, vomiting and/or diarrhea, dizziness, shortness of breath, chest pain, bleeding, unexplained bruising, or if you have any other new/concerning symptoms, questions or concerns.      If you are having any concerning symptoms or wish to speak to a provider before your next infusion visit, please call triage to notify them so we can adequately serve you.     If you need a refill on a narcotic prescription or other medication, please call before your infusion appointment.           December 2023 Sunday Monday Tuesday Wednesday Thursday Friday Saturday                            1     2       3     4    LAB PERIPHERAL   2:15 PM   (15 min.)   [facility] LAB DRAW   [facility]    RETURN ACTIVE TREATMENT   2:45 PM   (45 min.)   Valentine Ball PA-C   [facility] 5     6    ONC INFUSION 3 HR (180 MIN)   8:30 AM   (180 min.)   JOSE ALFREDO ONC INFUSION NURSE   [facility] 7     8     9       10     11     12     13     14     15     16       17     18     19     20     21  Happy Birthday!     22     23       24     25     26     27     28    LAB PERIPHERAL   6:45 AM   (15 min.)   [facility] DRAW   [facility]    RETURN ACTIVE TREATMENT   7:00 AM   (45 min.)   Valentine Ball PA-C   [facility]    ONC INFUSION 3 HR (180 MIN)   8:00 AM   (180 min.)   JOSE ALFREDO ONC INFUSION NURSE   [facility]    NM RESEARCH RADIOTHERAPY   1:30 PM   (60 min.)   UU NM RADIOTHERAPY 5   [facility] 29 30 31 January 2024 Sunday Monday Tuesday Wednesday Thursday Friday Saturday        1     2      3     4     5     6       7     8     9     10    NM INJ   9:45 AM   (15 min.)   [id number]   [facility]    CT CHEST/ABDOMEN/PELVIS W  10:30 AM   (20 min.)   [id number]   [facility] 11     12     13       14     15     16    LAB PERIPHERAL  11:00 AM   (15 min.)   [facility] LAB DRAW   [facility]    RETURN CCSL  11:15 AM   (30 min.)   Kg Gamez MD   [facility]    ONC INFUSION 3 HR (180 MIN)   1:00 PM   (180 min.)    ONC INFUSION NURSE   [facility] 17     18     19     20       21     22     23     24     25     26     27       28     29     30     31                                 No results found for this or any previous visit (from the past 24 hour(s)).

## 2023-12-06 NOTE — PROGRESS NOTES
Infusion Nursing Note:  Alonso BYRON Hodan presents today for cycle 2, day 1 docetaxel.    Patient seen by provider today: No   present during visit today: Not Applicable.    Note: Patient had a visit with Valentine Ball on 12/4. Denies any fevers, chills, or other new concerns since that visit.    Per research staff Cammy López, OK to proceed with study treatment today.   Patient taking prednisone and dexamethasone as prescribed  Docetaxel  Start Time: 0915 Stop Time: 1013      Intravenous Access:  Peripheral IV placed.    Treatment Conditions:  Lab Results   Component Value Date    HGB 12.9 (L) 12/04/2023    WBC 6.0 12/04/2023    ANEUTAUTO 4.3 12/04/2023     12/04/2023        Lab Results   Component Value Date     12/04/2023    POTASSIUM 4.5 12/04/2023    MAG 1.9 12/04/2023    CR 0.82 12/04/2023    ZABRINA 9.5 12/04/2023    BILITOTAL 0.5 12/04/2023    ALBUMIN 4.0 12/04/2023    ALT <5 12/04/2023    AST 17 12/04/2023       Results reviewed, labs MET treatment parameters, ok to proceed with treatment.      Post Infusion Assessment:  Patient tolerated infusion without incident.  Blood return noted pre and post infusion.  Site patent and intact, free from redness, edema or discomfort.  No evidence of extravasations.  Access discontinued per protocol.       Discharge Plan:   Prescription refills given for dexamethasone. Patient confirms he has enough prednisone at home and does not need refill today.   Discharge instructions reviewed with: Patient.  Patient and/or family verbalized understanding of discharge instructions and all questions answered.  AVS to patient via [employer].  Patient will return 12/28/23 for next appointment.   Patient discharged in stable condition accompanied by: self and wife.  Departure Mode: Ambulatory.      Shanna Beverly RN

## 2023-12-06 NOTE — NURSING NOTE
Were any SSEs noted since last study visit? No  -the use of EBRT to relieve skeletal symptoms  -the occurrence of new symptomatic pathological bone fractures  (vertebral or non-vertebral)  -the occurrence of spinal cord compression  -a tumor-related orthopedic surgical intervention

## 2023-12-20 ENCOUNTER — ALLIED HEALTH/NURSE VISIT (OUTPATIENT)
Dept: ONCOLOGY | Facility: CLINIC | Age: 83
End: 2023-12-20
Payer: MEDICARE

## 2023-12-20 VITALS — BODY MASS INDEX: 22.51 KG/M2 | WEIGHT: 162.92 LBS

## 2023-12-20 DIAGNOSIS — C61 PROSTATE CANCER (H): Primary | ICD-10-CM

## 2023-12-27 NOTE — NURSING NOTE
[id number]: Study Visit Note   Subject name: Alonso Pettit     Visit: C3D1    Did the study visit occur within the appropriate window allowed by the protocol? yes    Since the last study visit, He has been doing well. Constipation much improved when taking the dulcolax PRN (2-3 tabs/day for 4 days post-chemo and as needed after) after chemotherapy. His sinus congestion has resolved. Back pain the other day where he went to the chiropractor and got an adjustment with some improvement (he has had this pain for 60 + years). Patient also reports the occasional headache, unsure if this started prior to chemotherapy or after.     Patient has not made an appointment with neuro yet, reminded them of the importance of this.     Were any SSEs noted since last study visit? No  -the use of EBRT to relieve skeletal symptoms  -the occurrence of new symptomatic pathological bone fractures  (vertebral or non-vertebral)  -the occurrence of spinal cord compression  -a tumor-related orthopedic surgical intervention    Verbal handover provided to infusion RN; reminded RN to document actual start time of infusion and actual stop time of the infusion. If infusion deviates from protocol directed infusion time, please document rationale in your infusion note.    Verbal handover provided to nuclear medicine RN and nuclear medicine tech. Reminded team to document actual start time of infusion and actual stop time of the infusion. If infusion deviates from protocol directed infusion time, please document rationale in your infusion note.     I have personally interviewed Alonso Pettit and reviewed his medical record for adverse events and concomitant medications and these have been recorded on the corresponding logs in Alonso Pettit's research file.     Alonso Pettit was given the opportunity to ask any trial related questions.  Please see provider progress note for physical exam and other clinical information. Labs were reviewed - any  significant lab values were addressed and reviewed.    Cammy López RN       [id number]: Re-Consent Note     New information has been added to the consent form. This was explained to the patient, and all his questions were answered. The patient verbalized understanding and would like to continue participation in the trial. The patient was provided with a signed copy of the IRB-approved consent form.    Consent Version Date: [email or web address]  Consent obtained by: Cammy López     Date: 28DEC2023    Cammy López RN      Form 503.00.02 (Version 1)     Effective date: 01JUL2018     Next Review Date: 01JUL2020

## 2023-12-27 NOTE — PROGRESS NOTES
[facility]  HEMATOLOGY AND ONCOLOGY    FOLLOW-UP VISIT NOTE    PATIENT NAME: Alonso Pettit MRN # [id number]  DATE OF VISIT: Dec 28, 2023 YOB: 1940    REFERRING PROVIDER: [facility]    CANCER TYPE: Prostate adenocarcinoma; [place] 4+5  STAGE: IV (bony metastasis)  MOLECULAR PROFILE: No actionable mutations/MSI or high TMB; TP53 mutation positive    TREATMENT SUMMARY:  -12/9/2009: Radical prostatectomy; pathology revealed [place] 4+5 disease, stage pT3a,N0 positive margins  -Mar-May2010: Salvage external beam radiation therapy  -Apr 2013: Intermittent androgen deprivation therapy with leuprolide for biochemical PSA relapse  -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 Radiation therapy to the left supraclavicular lymph node ending in September 2020.  He was continued on enzalutamide  -May 2022: Switch to abiraterone with prednisone for progressive disease  -Aug 2023: Abiraterone discontinued for progressive disease.  PSMA PET scan with PSMA avid osseous and brisa metastasis.  -11/14/2023: MARLO trial: cycle 1 docetaxel. 11/15/23 cycle 1 radium per the MARLO trial.        PSA trend:   11/14/23: 160.20- prior to cycle 1 docetaxel and cycle 1 radium per the MARLO trial.   12/07/23: 203.80    CURRENT INTERVENTIONS:  MARLO Trial randomized to Arm B with Docetaxel/prednisone/Radium-233. Leuprolide every 4 months.     SUBJECTIVE   Alonso Pettit is being followed for castration resistant metastatic prostate cancer. He returns to clinic accompanied by his wife. He is seen today prior to cycle 3 docetaxel and cycle 2 Radium-233.   Constipation was not an issue this cycle after scheduling ducolax 2-3 tablets daily for the first 4 days and then prn.   Denies new pains. He had low back pain after  driving in a car for a long time. He went to the chiropractor with improvement. This is unchanged from his previous pains.   He has had a chronic dry cough and feels this is less over the past three weeks.   He has no other pain in his body.   No chest pain, shortness of breath, or cough.   No speech changes.   No urinary changes.   Eating well.   Has occasional mild headaches, no changes in duration, frequency, or intensity.     ROS: 14 point ROS neg other than the symptoms noted above in the HPI.      PAST MEDICAL HISTORY     Past Medical History:   Diagnosis Date    Prostate cancer (H)          CURRENT OUTPATIENT MEDICATIONS     Current Outpatient Medications   Medication Sig    aspirin 81 mg chewable tablet [ASPIRIN 81 MG CHEWABLE TABLET] Chew 81 mg daily. (Patient not taking: Reported on 10/18/2023)    bisacodyl (DULCOLAX) 5 MG EC tablet Take 5 mg by mouth 2 times daily    calcium carbonate (CALCIUM 500 ORAL) [CALCIUM CARBONATE (CALCIUM 500 ORAL)] Take by mouth.    cyclobenzaprine (FLEXERIL) 10 MG tablet 1/2-1 TAB ORALLY UP TO 3 TIMES A DAY AS NEEDED FOR MUSCLE SPASM    dexAMETHasone (DECADRON) 4 MG tablet Take 2 tablets (8 mg) by mouth 2 times daily (with meals) Start evening of Docetaxel infusion and continue for a total of 3 doses.    leuprolide (LUPRON) 11.25 mg injection Inject 11.25 mg into the muscle every 3 months    prochlorperazine (COMPAZINE) 10 MG tablet Take 0.5 tablets (5 mg) by mouth every 6 hours as needed for nausea or vomiting    pseudoePHEDrine-guaiFENesin (MUCINEX D)  MG 12 hr tablet  (Patient not taking: Reported on 12/6/2023)    psyllium (METAMUCIL/KONSYL) Packet Take 1 packet by mouth daily     No current facility-administered medications for this visit.        ALLERGIES    No Known Allergies     REVIEW OF SYSTEMS   As above in the HPI, o/w complete 12-point ROS was negative.     PHYSICAL EXAM   BP (!) 150/69 (BP Location: Right arm, Patient Position: Sitting, Cuff Size: Adult  Regular)   Pulse 74   Temp 97.7  F (36.5  C) (Oral)   Resp 16   Wt 73.6 kg (162 lb 3.2 oz)   SpO2 96%   BMI 22.41 kg/m    General: No acute distress  HEENT: Sclera anicteric. Oral mucosa pink and moist.  No mucositis or thrush  Lymph: No lymphadenopathy in neck  Heart: Regular, rate, and rhythm  Lungs: Clear to ascultation bilaterally  Abdomen: Positive bowel sounds. Soft, non-distended, non-tender. No organomegaly or mass.   Extremities: trace extremity edema of the bilateral ankles.   Neuro: Cranial nerves grossly intact  Rash: none       LABORATORY AND IMAGING STUDIES   Most Recent 3 CBC's:  Recent Labs   Lab Test 12/28/23  0706 12/04/23  1432 11/14/23  0752   WBC 7.4 6.0 4.6   HGB 12.8* 12.9* 13.2*   MCV 92 93 88    249 237   ANEUTAUTO 4.8 4.3 2.4     Most Recent 3 BMP's:  Recent Labs   Lab Test 12/28/23  0706 12/04/23  1432 11/14/23  0752    140 141   POTASSIUM 3.7 4.5 3.6   CHLORIDE 107 105 105   CO2 24 25 26   BUN 17.2 14.7 12.3   CR 0.76 0.82 0.89   ANIONGAP 11 10 10   ZABRINA 8.9 9.5 9.4   * 105* 134*   PROTTOTAL 6.4 6.8 6.7   ALBUMIN 3.9 4.0 4.2    Most Recent 3 LFT's:  Recent Labs   Lab Test 12/28/23  0706 12/04/23  1432 11/14/23  0752   AST 13 17 18   ALT 11 <5 7   ALKPHOS 68 64 71   BILITOTAL 0.6 0.5 0.9    Most Recent 2 TSH and T4:No lab results found.    Component      Latest Ref Rng 10/30/2023  10:44 AM 11/14/2023  7:52 AM 12/4/2023  2:32 PM   PSA Tumor Marker      ng/mL 153.30  160.20  203.80      I reviewed the above labs today.       ASSESSMENT AND PLAN   -Castration resistant metastatic prostate cancer   Post radical prostatectomy on 12/9/2009; salvage radiation ending May 2010; androgen deprivation therapy since 2013   Post progression on enzalutamide and abiraterone with prednisone  -Nonmuscle invasive bladder cancer diagnosed in 2011 without any recent recurrence  -No significant medical comorbidity  -ECOG performance status of 0     #Castration resistant metastatic  prostate cancer.   - Has progressed on novel antiandrogen therapies including abiraterone with prednisone and enzalutamide.  He had a PSMA PET CT scan which did show extensive metastasis to the lymph nodes and bones. Referred to [facility] for the MARLO trial, randomized to Arm B with docetaxel and Radium. Docetaxel is administered every 3 weeks for 6-10 doses and Radium every 6 weeks for 6 doses. He tolerated docetaxel and radium reasonably well with the exception of significant constipation for nearly 1.5-2 weeks after his first cycle. Constipation improved with cycle 2 docetaxel.    - Labs reviewed and are generally stable. He has continued mild anemia. PSA is pending today.   - PSA 11/14/23 prior to cycle 1 docetaxel and cycle 1 radium 160.20. PSA up trending to 203 on 12/04/23 with cycle 2. We will continue to monitor his PSA carefully but it may take 3 months to get an adequate response.Clinically appropriate to proceed with cycle 3 docetaxel with prednisone and cycle 2 radium as scheduled today, 12/28/2023.   - continue Eligard every 4 months with Dr. Philip at [facility]. Last on 12/13/23.     #Bone Metastasis   - Zometa every 6 months with local oncologist. Consider moving up frequency to every 6 weeks. Could move to [facility] during the time of trial participation and administer while he is here for ease of appointments. Not specifically reviewed today.   - rare risk of fractures associated with docetaxel and radium therefore continuing Zometa is recommended.     #Constipation   - Continue scheduling ducolax 2-3 tablets per day for at least 4 days and then prn. Can trial daily miralax as well prn.   - If no bowel movement have 2-3 days, recommend adding in Milk of Mag. If no bowel movement after 6 hours, repeat.     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 CT neck with calcifications. Called patient and  wife to discuss there results. They have seen a neurologist in the past at [facility] who they plan to follow up with along with their PCP.   - No recurrent episodes reported today, 12/28/2023.     Patient was seen and evaluated with study RNCC, Cammy López.      25 minutes spent on the date of the encounter doing chart review, review of test results, patient visit, documentation, and discussion with family     Valentine Ball PA-C

## 2023-12-28 ENCOUNTER — ONCOLOGY VISIT (OUTPATIENT)
Dept: ONCOLOGY | Facility: CLINIC | Age: 83
End: 2023-12-28
Payer: MEDICARE

## 2023-12-28 ENCOUNTER — HOSPITAL ENCOUNTER (OUTPATIENT)
Dept: NUCLEAR MEDICINE | Facility: CLINIC | Age: 83
Setting detail: NUCLEAR MEDICINE
Discharge: HOME | End: 2023-12-28
Attending: INTERNAL MEDICINE | Admitting: INTERNAL MEDICINE
Payer: MEDICARE

## 2023-12-28 ENCOUNTER — LAB (OUTPATIENT)
Dept: LAB | Facility: CLINIC | Age: 83
End: 2023-12-28
Attending: INTERNAL MEDICINE
Payer: MEDICARE

## 2023-12-28 ENCOUNTER — ALLIED HEALTH/NURSE VISIT (OUTPATIENT)
Dept: ONCOLOGY | Facility: CLINIC | Age: 83
End: 2023-12-28

## 2023-12-28 ENCOUNTER — INFUSION THERAPY VISIT (OUTPATIENT)
Dept: ONCOLOGY | Facility: CLINIC | Age: 83
End: 2023-12-28
Attending: INTERNAL MEDICINE
Payer: MEDICARE

## 2023-12-28 VITALS
BODY MASS INDEX: 22.41 KG/M2 | TEMPERATURE: 97.7 F | DIASTOLIC BLOOD PRESSURE: 69 MMHG | HEART RATE: 74 BPM | SYSTOLIC BLOOD PRESSURE: 150 MMHG | WEIGHT: 162.2 LBS | RESPIRATION RATE: 16 BRPM | OXYGEN SATURATION: 96 %

## 2023-12-28 VITALS
OXYGEN SATURATION: 96 % | RESPIRATION RATE: 16 BRPM | SYSTOLIC BLOOD PRESSURE: 133 MMHG | DIASTOLIC BLOOD PRESSURE: 71 MMHG | TEMPERATURE: 97.6 F | HEART RATE: 82 BPM

## 2023-12-28 DIAGNOSIS — C61 PROSTATE CANCER (H): ICD-10-CM

## 2023-12-28 DIAGNOSIS — C61 PROSTATE CANCER (H): Primary | ICD-10-CM

## 2023-12-28 DIAGNOSIS — C79.51 MALIGNANT NEOPLASM METASTATIC TO BONE (H): Primary | ICD-10-CM

## 2023-12-28 DIAGNOSIS — C79.51 MALIGNANT NEOPLASM METASTATIC TO BONE (H): ICD-10-CM

## 2023-12-28 LAB
ALBUMIN SERPL BCG-MCNC: 3.9 G/DL (ref 3.5–5.2)
ALP SERPL-CCNC: 68 U/L (ref 40–150)
ALT SERPL W P-5'-P-CCNC: 11 U/L (ref 0–70)
ANION GAP SERPL CALCULATED.3IONS-SCNC: 11 MMOL/L (ref 7–15)
AST SERPL W P-5'-P-CCNC: 13 U/L (ref 0–45)
BASOPHILS # BLD AUTO: 0.1 10E3/UL (ref 0–0.2)
BASOPHILS NFR BLD AUTO: 1 %
BILIRUB SERPL-MCNC: 0.6 MG/DL
BUN SERPL-MCNC: 17.2 MG/DL (ref 8–23)
CALCIUM SERPL-MCNC: 8.9 MG/DL (ref 8.8–10.2)
CHLORIDE SERPL-SCNC: 107 MMOL/L (ref 98–107)
CREAT SERPL-MCNC: 0.76 MG/DL (ref 0.67–1.17)
DEPRECATED HCO3 PLAS-SCNC: 24 MMOL/L (ref 22–29)
EGFRCR SERPLBLD CKD-EPI 2021: 90 ML/MIN/1.73M2
EOSINOPHIL # BLD AUTO: 0 10E3/UL (ref 0–0.7)
EOSINOPHIL NFR BLD AUTO: 0 %
ERYTHROCYTE [DISTWIDTH] IN BLOOD BY AUTOMATED COUNT: 13.6 % (ref 10–15)
GLUCOSE SERPL-MCNC: 157 MG/DL (ref 70–99)
HCT VFR BLD AUTO: 37.6 % (ref 40–53)
HGB BLD-MCNC: 12.8 G/DL (ref 13.3–17.7)
IMM GRANULOCYTES # BLD: 0.1 10E3/UL
IMM GRANULOCYTES NFR BLD: 1 %
LDH SERPL L TO P-CCNC: 176 U/L (ref 0–250)
LYMPHOCYTES # BLD AUTO: 2 10E3/UL (ref 0.8–5.3)
LYMPHOCYTES NFR BLD AUTO: 26 %
MAGNESIUM SERPL-MCNC: 2 MG/DL (ref 1.7–2.3)
MCH RBC QN AUTO: 31.4 PG (ref 26.5–33)
MCHC RBC AUTO-ENTMCNC: 34 G/DL (ref 31.5–36.5)
MCV RBC AUTO: 92 FL (ref 78–100)
MONOCYTES # BLD AUTO: 0.5 10E3/UL (ref 0–1.3)
MONOCYTES NFR BLD AUTO: 7 %
NEUTROPHILS # BLD AUTO: 4.8 10E3/UL (ref 1.6–8.3)
NEUTROPHILS NFR BLD AUTO: 65 %
NRBC # BLD AUTO: 0 10E3/UL
NRBC BLD AUTO-RTO: 0 /100
PHOSPHATE SERPL-MCNC: 3.8 MG/DL (ref 2.5–4.5)
PLATELET # BLD AUTO: 227 10E3/UL (ref 150–450)
POTASSIUM SERPL-SCNC: 3.7 MMOL/L (ref 3.4–5.3)
PROT SERPL-MCNC: 6.4 G/DL (ref 6.4–8.3)
PSA SERPL DL<=0.01 NG/ML-MCNC: 196.3 NG/ML
RBC # BLD AUTO: 4.07 10E6/UL (ref 4.4–5.9)
SODIUM SERPL-SCNC: 142 MMOL/L (ref 135–145)
WBC # BLD AUTO: 7.4 10E3/UL (ref 4–11)

## 2023-12-28 PROCEDURE — 250N000011 HC RX IP 250 OP 636

## 2023-12-28 PROCEDURE — 96413 CHEMO IV INFUSION 1 HR: CPT

## 2023-12-28 PROCEDURE — 36415 COLL VENOUS BLD VENIPUNCTURE: CPT | Performed by: INTERNAL MEDICINE

## 2023-12-28 PROCEDURE — 83615 LACTATE (LD) (LDH) ENZYME: CPT | Performed by: INTERNAL MEDICINE

## 2023-12-28 PROCEDURE — 96375 TX/PRO/DX INJ NEW DRUG ADDON: CPT

## 2023-12-28 PROCEDURE — G0463 HOSPITAL OUTPT CLINIC VISIT: HCPCS | Mod: 25

## 2023-12-28 PROCEDURE — 84100 ASSAY OF PHOSPHORUS: CPT | Performed by: INTERNAL MEDICINE

## 2023-12-28 PROCEDURE — 80053 COMPREHEN METABOLIC PANEL: CPT | Performed by: INTERNAL MEDICINE

## 2023-12-28 PROCEDURE — 258N000003 HC RX IP 258 OP 636

## 2023-12-28 PROCEDURE — 99215 OFFICE O/P EST HI 40 MIN: CPT

## 2023-12-28 PROCEDURE — 85025 COMPLETE CBC W/AUTO DIFF WBC: CPT | Performed by: INTERNAL MEDICINE

## 2023-12-28 PROCEDURE — 84153 ASSAY OF PSA TOTAL: CPT | Performed by: INTERNAL MEDICINE

## 2023-12-28 PROCEDURE — 83735 ASSAY OF MAGNESIUM: CPT | Performed by: INTERNAL MEDICINE

## 2023-12-28 RX ORDER — EPINEPHRINE 1 MG/ML
0.3 INJECTION, SOLUTION, CONCENTRATE INTRAVENOUS EVERY 5 MIN PRN
Status: DISCONTINUED | OUTPATIENT
Start: 2023-12-28 | End: 2023-12-29 | Stop reason: HOSPADM

## 2023-12-28 RX ORDER — HEPARIN SODIUM,PORCINE 10 UNIT/ML
5-20 VIAL (ML) INTRAVENOUS DAILY PRN
Status: CANCELLED | OUTPATIENT
Start: 2023-12-28

## 2023-12-28 RX ORDER — ALBUTEROL SULFATE 90 UG/1
1-2 AEROSOL, METERED RESPIRATORY (INHALATION)
Status: CANCELLED
Start: 2024-01-16

## 2023-12-28 RX ORDER — PREDNISONE 5 MG/1
5 TABLET ORAL 2 TIMES DAILY
Qty: 42 TABLET | Refills: 0 | Status: SHIPPED | OUTPATIENT
Start: 2023-12-28 | End: 2024-01-18

## 2023-12-28 RX ORDER — ALBUTEROL SULFATE 90 UG/1
1-2 AEROSOL, METERED RESPIRATORY (INHALATION)
Status: CANCELLED
Start: 2023-12-28

## 2023-12-28 RX ORDER — DIPHENHYDRAMINE HYDROCHLORIDE 50 MG/ML
50 INJECTION INTRAMUSCULAR; INTRAVENOUS
Status: CANCELLED
Start: 2023-12-28

## 2023-12-28 RX ORDER — DIPHENHYDRAMINE HYDROCHLORIDE 50 MG/ML
50 INJECTION INTRAMUSCULAR; INTRAVENOUS
Status: CANCELLED
Start: 2024-01-16

## 2023-12-28 RX ORDER — HEPARIN SODIUM,PORCINE 10 UNIT/ML
5-20 VIAL (ML) INTRAVENOUS DAILY PRN
Status: CANCELLED | OUTPATIENT
Start: 2024-01-16

## 2023-12-28 RX ORDER — ALBUTEROL SULFATE 0.83 MG/ML
2.5 SOLUTION RESPIRATORY (INHALATION)
Status: CANCELLED | OUTPATIENT
Start: 2023-12-28

## 2023-12-28 RX ORDER — EPINEPHRINE 1 MG/ML
0.3 INJECTION, SOLUTION, CONCENTRATE INTRAVENOUS EVERY 5 MIN PRN
Status: CANCELLED | OUTPATIENT
Start: 2024-01-16

## 2023-12-28 RX ORDER — LORAZEPAM 2 MG/ML
0.5 INJECTION INTRAMUSCULAR EVERY 4 HOURS PRN
Status: CANCELLED | OUTPATIENT
Start: 2024-01-16

## 2023-12-28 RX ORDER — METHYLPREDNISOLONE SODIUM SUCCINATE 125 MG/2ML
125 INJECTION, POWDER, LYOPHILIZED, FOR SOLUTION INTRAMUSCULAR; INTRAVENOUS
Status: DISCONTINUED | OUTPATIENT
Start: 2023-12-28 | End: 2023-12-29 | Stop reason: HOSPADM

## 2023-12-28 RX ORDER — HEPARIN SODIUM (PORCINE) LOCK FLUSH IV SOLN 100 UNIT/ML 100 UNIT/ML
5 SOLUTION INTRAVENOUS
Status: DISCONTINUED | OUTPATIENT
Start: 2023-12-28 | End: 2023-12-29 | Stop reason: HOSPADM

## 2023-12-28 RX ORDER — MEPERIDINE HYDROCHLORIDE 25 MG/ML
25 INJECTION INTRAMUSCULAR; INTRAVENOUS; SUBCUTANEOUS EVERY 30 MIN PRN
Status: CANCELLED | OUTPATIENT
Start: 2024-01-16

## 2023-12-28 RX ORDER — MEPERIDINE HYDROCHLORIDE 25 MG/ML
25 INJECTION INTRAMUSCULAR; INTRAVENOUS; SUBCUTANEOUS EVERY 30 MIN PRN
Status: DISCONTINUED | OUTPATIENT
Start: 2023-12-28 | End: 2023-12-29 | Stop reason: HOSPADM

## 2023-12-28 RX ORDER — ALBUTEROL SULFATE 0.83 MG/ML
2.5 SOLUTION RESPIRATORY (INHALATION)
Status: CANCELLED | OUTPATIENT
Start: 2024-01-16

## 2023-12-28 RX ORDER — HEPARIN SODIUM,PORCINE 10 UNIT/ML
5-20 VIAL (ML) INTRAVENOUS DAILY PRN
Status: DISCONTINUED | OUTPATIENT
Start: 2023-12-28 | End: 2023-12-29 | Stop reason: HOSPADM

## 2023-12-28 RX ORDER — LORAZEPAM 2 MG/ML
0.5 INJECTION INTRAMUSCULAR EVERY 4 HOURS PRN
Status: DISCONTINUED | OUTPATIENT
Start: 2023-12-28 | End: 2023-12-29 | Stop reason: HOSPADM

## 2023-12-28 RX ORDER — ALBUTEROL SULFATE 90 UG/1
1-2 AEROSOL, METERED RESPIRATORY (INHALATION)
Status: DISCONTINUED | OUTPATIENT
Start: 2023-12-28 | End: 2023-12-29 | Stop reason: HOSPADM

## 2023-12-28 RX ORDER — DIPHENHYDRAMINE HYDROCHLORIDE 50 MG/ML
50 INJECTION INTRAMUSCULAR; INTRAVENOUS
Status: DISCONTINUED | OUTPATIENT
Start: 2023-12-28 | End: 2023-12-29 | Stop reason: HOSPADM

## 2023-12-28 RX ORDER — METHYLPREDNISOLONE SODIUM SUCCINATE 125 MG/2ML
125 INJECTION, POWDER, LYOPHILIZED, FOR SOLUTION INTRAMUSCULAR; INTRAVENOUS
Status: CANCELLED
Start: 2023-12-28

## 2023-12-28 RX ORDER — MEPERIDINE HYDROCHLORIDE 25 MG/ML
25 INJECTION INTRAMUSCULAR; INTRAVENOUS; SUBCUTANEOUS EVERY 30 MIN PRN
Status: CANCELLED | OUTPATIENT
Start: 2023-12-28

## 2023-12-28 RX ORDER — ALBUTEROL SULFATE 0.83 MG/ML
2.5 SOLUTION RESPIRATORY (INHALATION)
Status: DISCONTINUED | OUTPATIENT
Start: 2023-12-28 | End: 2023-12-29 | Stop reason: HOSPADM

## 2023-12-28 RX ORDER — HEPARIN SODIUM (PORCINE) LOCK FLUSH IV SOLN 100 UNIT/ML 100 UNIT/ML
5 SOLUTION INTRAVENOUS
Status: CANCELLED | OUTPATIENT
Start: 2023-12-28

## 2023-12-28 RX ORDER — EPINEPHRINE 1 MG/ML
0.3 INJECTION, SOLUTION INTRAMUSCULAR; SUBCUTANEOUS EVERY 5 MIN PRN
Status: CANCELLED | OUTPATIENT
Start: 2023-12-28

## 2023-12-28 RX ORDER — LORAZEPAM 2 MG/ML
0.5 INJECTION INTRAMUSCULAR EVERY 4 HOURS PRN
Status: CANCELLED | OUTPATIENT
Start: 2023-12-28

## 2023-12-28 RX ORDER — HEPARIN SODIUM (PORCINE) LOCK FLUSH IV SOLN 100 UNIT/ML 100 UNIT/ML
5 SOLUTION INTRAVENOUS
Status: CANCELLED | OUTPATIENT
Start: 2024-01-16

## 2023-12-28 RX ADMIN — DOCETAXEL 116 MG: 20 INJECTION, SOLUTION, CONCENTRATE INTRAVENOUS at 09:07

## 2023-12-28 RX ADMIN — SODIUM CHLORIDE 250 ML: 9 INJECTION, SOLUTION INTRAVENOUS at 08:23

## 2023-12-28 RX ADMIN — DEXAMETHASONE SODIUM PHOSPHATE: 10 INJECTION, SOLUTION INTRAMUSCULAR; INTRAVENOUS at 08:26

## 2023-12-28 ASSESSMENT — PAIN SCALES - GENERAL: PAINLEVEL: NO PAIN (0)

## 2023-12-28 NOTE — NURSING NOTE
Chief Complaint   Patient presents with    Oncology Clinic Visit     Malignant neoplasm metastatic to bone; Prostate cancer    Blood Draw     Labs drawn via PIV placed by VAT in lab.     Labs drawn from PIV placed by VAT. No research kit available for collection. Line flushed with saline. Vitals taken. Pt checked in for appointment(s).     Ann Marie Luna RN

## 2023-12-28 NOTE — NURSING NOTE
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     Initial Vitals: BP (!) 150/69 (BP Location: Right arm, Patient Position: Sitting, Cuff Size: Adult Regular)   Pulse 74   Temp 97.7  F (36.5  C) (Oral)   Resp 16   Wt 73.6 kg (162 lb 3.2 oz)   SpO2 96%   BMI 22.41 kg/m   Estimated body mass index is 22.41 kg/m  as calculated from the following:    Height as of 10/24/23: 1.812 m (5' 11.34\").    Weight as of this encounter: 73.6 kg (162 lb 3.2 oz). Body surface area is 1.92 meters squared.  No Pain (0) Comment: Data Unavailable   No LMP for male patient.  Allergies reviewed: Yes  Medications reviewed: Yes    Medications: Medication refills not needed today.  Pharmacy name entered into [facility] [employer]: [id number] [place] [address]    Frailty Screening:   Is the patient here for a new oncology consult visit in cancer care? 2. No      Clinical concerns: none       Fatemeh Pacheco              "

## 2023-12-28 NOTE — PROGRESS NOTES
Infusion Nursing Note:  Alonso Pettit presents today for cycle 3 day 1 docetaxel.    Patient seen by provider today: Yes: Valentine Ball PA-C   present during visit today: Not Applicable.    Note:   Patient has no questions or concerns after his appointment with Valentine Ball.    Per Cammy, research RN, ok to proceed today. Please leave treatment plan day open for radium at [facility].    Docetaxel start time: 0907  Stop time: 1008    Intravenous Access:  Peripheral IV placed in lab.    Treatment Conditions:  Lab Results   Component Value Date    HGB 12.8 (L) 12/28/2023    WBC 7.4 12/28/2023    ANEUTAUTO 4.8 12/28/2023     12/28/2023        Lab Results   Component Value Date     12/28/2023    POTASSIUM 3.7 12/28/2023    MAG 2.0 12/28/2023    CR 0.76 12/28/2023    ZABRINA 8.9 12/28/2023    BILITOTAL 0.6 12/28/2023    ALBUMIN 3.9 12/28/2023    ALT 11 12/28/2023    AST 13 12/28/2023     Results reviewed, labs MET treatment parameters, ok to proceed with treatment.      Post Infusion Assessment:  Patient tolerated infusion without incident.  Blood return noted pre and post infusion.  Site patent and intact, free from redness, edema or discomfort.  No evidence of extravasations.  Access discontinued per protocol.       Discharge Plan:   Prescription refills given for prednisone, dexamethasone.  Discharge instructions reviewed with: Patient and Family.  Patient and/or family verbalized understanding of discharge instructions and all questions answered.  AVS to patient via [employer].  Patient will return 1/16/24 for next appointment.   Patient discharged in stable condition accompanied by: wife.  Departure Mode: Ambulatory.      Joanna Chang RN

## 2023-12-28 NOTE — PROGRESS NOTES
[facility] Nursing Note:    Patient presents today for XOFIGO (Ra-223) therapy, dose 2 of  4  Patient seen by provider today: No   present during visit today: NOT APPLICABLE      Intravenous Access:  Peripheral IV placed     Treatment Conditions:  Labs done on  12/28/23  Results reviewed, labs Met treatment parameters, ok to proceed with treatment.           Post Infusion Assessment:  Patient tolerated infusion without incident.  PIVs - No evidence of extravasations, access discontinued per protocol.         Discharge Plan:   Patient will return as scheduled for next appointment.   Patient discharged at 230 pm in stable condition accompanied by: His wife  Departure Mode: Ambulatory

## 2023-12-28 NOTE — LETTER
12/28/2023         RE: Alonso Pettit  [address] [place] [facility] [address]        Dear Colleague,    Thank you for referring your patient, Alonso Pettit, to the [facility]. Please see a copy of my visit note below.    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: IV (bony metastasis)  MOLECULAR PROFILE: No actionable mutations/MSI or high TMB; TP53 mutation positive    TREATMENT SUMMARY:  -12/9/2009: Radical prostatectomy; pathology revealed [place] 4+5 disease, stage pT3a,N0 positive margins  -Mar-May2010: Salvage external beam radiation therapy  -Apr 2013: Intermittent androgen deprivation therapy with leuprolide for biochemical PSA relapse  -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 Radiation therapy to the left supraclavicular lymph node ending in September 2020.  He was continued on enzalutamide  -May 2022: Switch to abiraterone with prednisone for progressive disease  -Aug 2023: Abiraterone discontinued for progressive disease.  PSMA PET scan with PSMA avid osseous and brisa metastasis.  -11/14/2023: MARLO trial: cycle 1 docetaxel. 11/15/23 cycle 1 radium per the MARLO trial.        PSA trend:   11/14/23: 160.20- prior to cycle 1 docetaxel and cycle 1 radium per the MARLO trial.   12/07/23: 203.80    CURRENT INTERVENTIONS:  MARLO Trial randomized to Arm B with Docetaxel/prednisone/Radium-233. Leuprolide every 4 months.     SUBJECTIVE   Alonso Pettit is being followed for castration resistant metastatic prostate cancer. He returns to clinic accompanied  by his wife. He is seen today prior to cycle 3 docetaxel and cycle 2 Radium-233.   Constipation was not an issue this cycle after scheduling ducolax 2-3 tablets daily for the first 4 days and then prn.   Denies new pains. 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 He went to the chiropractor with improvement. This is unchanged from his previous pains.   He has had a chronic dry cough and feels this is less over the past three weeks.   He has no other pain in his body.   No chest pain, shortness of breath, or cough.   No speech changes.   No urinary changes.   Eating well.   Has occasional mild headaches, no changes in duration, frequency, or intensity.     ROS: 14 point ROS neg other than the symptoms noted above in the HPI.      PAST MEDICAL HISTORY     Past Medical History:   Diagnosis Date    Prostate cancer (H)          CURRENT OUTPATIENT MEDICATIONS     Current Outpatient Medications   Medication Sig    aspirin 81 mg chewable tablet [ASPIRIN 81 MG CHEWABLE TABLET] Chew 81 mg daily. (Patient not taking: Reported on 10/18/2023)    bisacodyl (DULCOLAX) 5 MG EC tablet Take 5 mg by mouth 2 times daily    calcium carbonate (CALCIUM 500 ORAL) [CALCIUM CARBONATE (CALCIUM 500 ORAL)] Take by mouth.    cyclobenzaprine (FLEXERIL) 10 MG tablet 1/2-1 TAB ORALLY UP TO 3 TIMES A DAY AS NEEDED FOR MUSCLE SPASM    dexAMETHasone (DECADRON) 4 MG tablet Take 2 tablets (8 mg) by mouth 2 times daily (with meals) Start evening of Docetaxel infusion and continue for a total of 3 doses.    leuprolide (LUPRON) 11.25 mg injection Inject 11.25 mg into the muscle every 3 months    prochlorperazine (COMPAZINE) 10 MG tablet Take 0.5 tablets (5 mg) by mouth every 6 hours as needed for nausea or vomiting    pseudoePHEDrine-guaiFENesin (MUCINEX D)  MG 12 hr tablet  (Patient not taking: Reported on 12/6/2023)    psyllium (METAMUCIL/KONSYL) Packet Take 1 packet by mouth daily     No current facility-administered medications  for this visit.        ALLERGIES    No Known Allergies     REVIEW OF SYSTEMS   As above in the HPI, o/w complete 12-point ROS was negative.     PHYSICAL EXAM   BP (!) 150/69 (BP Location: Right arm, Patient Position: Sitting, Cuff Size: Adult Regular)   Pulse 74   Temp 97.7  F (36.5  C) (Oral)   Resp 16   Wt 73.6 kg (162 lb 3.2 oz)   SpO2 96%   BMI 22.41 kg/m    General: No acute distress  HEENT: Sclera anicteric. Oral mucosa pink and moist.  No mucositis or thrush  Lymph: No lymphadenopathy in neck  Heart: Regular, rate, and rhythm  Lungs: Clear to ascultation bilaterally  Abdomen: Positive bowel sounds. Soft, non-distended, non-tender. No organomegaly or mass.   Extremities: trace extremity edema of the bilateral ankles.   Neuro: Cranial nerves grossly intact  Rash: none       LABORATORY AND IMAGING STUDIES   Most Recent 3 CBC's:  Recent Labs   Lab Test 12/28/23  0706 12/04/23  1432 11/14/23  0752   WBC 7.4 6.0 4.6   HGB 12.8* 12.9* 13.2*   MCV 92 93 88    249 237   ANEUTAUTO 4.8 4.3 2.4     Most Recent 3 BMP's:  Recent Labs   Lab Test 12/28/23  0706 12/04/23  1432 11/14/23  0752    140 141   POTASSIUM 3.7 4.5 3.6   CHLORIDE 107 105 105   CO2 24 25 26   BUN 17.2 14.7 12.3   CR 0.76 0.82 0.89   ANIONGAP 11 10 10   ZABRINA 8.9 9.5 9.4   * 105* 134*   PROTTOTAL 6.4 6.8 6.7   ALBUMIN 3.9 4.0 4.2    Most Recent 3 LFT's:  Recent Labs   Lab Test 12/28/23  0706 12/04/23  1432 11/14/23  0752   AST 13 17 18   ALT 11 <5 7   ALKPHOS 68 64 71   BILITOTAL 0.6 0.5 0.9    Most Recent 2 TSH and T4:No lab results found.    Component      Latest Ref Rng 10/30/2023  10:44 AM 11/14/2023  7:52 AM 12/4/2023  2:32 PM   PSA Tumor Marker      ng/mL 153.30  160.20  203.80      I reviewed the above labs today.       ASSESSMENT AND PLAN   -Castration resistant metastatic prostate cancer   Post radical prostatectomy on 12/9/2009; salvage radiation ending May 2010; androgen deprivation therapy since 2013   Post progression  on enzalutamide and abiraterone with prednisone  -Nonmuscle invasive bladder cancer diagnosed in 2011 without any recent recurrence  -No significant medical comorbidity  -ECOG performance status of 0     #Castration resistant metastatic prostate cancer.   - Has progressed on novel antiandrogen therapies including abiraterone with prednisone and enzalutamide.  He had a PSMA PET CT scan which did show extensive metastasis to the lymph nodes and bones. Referred to [facility] for the MARLO trial, randomized to Arm B with docetaxel and Radium. Docetaxel is administered every 3 weeks for 6-10 doses and Radium every 6 weeks for 6 doses. He tolerated docetaxel and radium reasonably well with the exception of significant constipation for nearly 1.5-2 weeks after his first cycle. Constipation improved with cycle 2 docetaxel.    - Labs reviewed and are generally stable. He has continued mild anemia. PSA is pending today.   - PSA 11/14/23 prior to cycle 1 docetaxel and cycle 1 radium 160.20. PSA up trending to 203 on 12/04/23 with cycle 2. We will continue to monitor his PSA carefully but it may take 3 months to get an adequate response.Clinically appropriate to proceed with cycle 3 docetaxel with prednisone and cycle 2 radium as scheduled today, 12/28/2023.   - continue Eligard every 4 months with Dr. Philip at [facility]. Last on 12/13/23.     #Bone Metastasis   - Zometa every 6 months with local oncologist. Consider moving up frequency to every 6 weeks. Could move to [facility] during the time of trial participation and administer while he is here for ease of appointments. Not specifically reviewed today.   - rare risk of fractures associated with docetaxel and radium therefore continuing Zometa is recommended.     #Constipation   - Continue scheduling ducolax 2-3 tablets per day for at least 4 days and then prn. Can trial daily miralax as well prn.   - If no bowel movement have 2-3 days, recommend adding in Milk of Mag. If no bowel  movement after 6 hours, repeat.     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 CT neck with calcifications. Called patient and wife to discuss there results. They have seen a neurologist in the past at [facility] who they plan to follow up with along with their PCP.   - No recurrent episodes reported today, 12/28/2023.     25 minutes spent on the date of the encounter doing chart review, review of test results, patient visit, documentation, and discussion with family     Valentine Ball PA-C

## 2023-12-28 NOTE — PATIENT INSTRUCTIONS
[facility] Triage and after hours / weekends / holidays:  [phone]    Please call the triage or after hours line if you experience a temperature greater than or equal to 100.5, shaking chills, have uncontrolled nausea, vomiting and/or diarrhea, dizziness, shortness of breath, chest pain, bleeding, unexplained bruising, or if you have any other new/concerning symptoms, questions or concerns.      If you are having any concerning symptoms or wish to speak to a provider before your next infusion visit, please call your care coordinator or triage to notify them so we can adequately serve you.     If you need a refill on a narcotic prescription or other medication, please call before your infusion appointment.

## 2024-01-10 ENCOUNTER — HOSPITAL ENCOUNTER (OUTPATIENT)
Dept: CT IMAGING | Facility: CLINIC | Age: 84
Discharge: HOME | End: 2024-01-10
Attending: INTERNAL MEDICINE | Admitting: INTERNAL MEDICINE
Payer: MEDICARE

## 2024-01-10 ENCOUNTER — HOSPITAL ENCOUNTER (OUTPATIENT)
Dept: NUCLEAR MEDICINE | Facility: CLINIC | Age: 84
Setting detail: NUCLEAR MEDICINE
Discharge: HOME | End: 2024-01-10
Attending: INTERNAL MEDICINE
Payer: MEDICARE

## 2024-01-10 DIAGNOSIS — C79.51 MALIGNANT NEOPLASM METASTATIC TO BONE (H): ICD-10-CM

## 2024-01-10 PROCEDURE — 71260 CT THORAX DX C+: CPT | Mod: MG

## 2024-01-10 PROCEDURE — 78306 BONE IMAGING WHOLE BODY: CPT | Mod: MG

## 2024-01-10 PROCEDURE — 78306 BONE IMAGING WHOLE BODY: CPT | Mod: 26 | Performed by: RADIOLOGY

## 2024-01-10 PROCEDURE — G1010 CDSM STANSON: HCPCS | Performed by: RADIOLOGY

## 2024-01-10 PROCEDURE — 71260 CT THORAX DX C+: CPT | Mod: 26 | Performed by: RADIOLOGY

## 2024-01-10 PROCEDURE — 343N000001 HC RX 343: Performed by: INTERNAL MEDICINE

## 2024-01-10 PROCEDURE — A9503 TC99M MEDRONATE: HCPCS | Performed by: INTERNAL MEDICINE

## 2024-01-10 PROCEDURE — 74177 CT ABD & PELVIS W/CONTRAST: CPT | Mod: 26 | Performed by: RADIOLOGY

## 2024-01-10 PROCEDURE — 250N000011 HC RX IP 250 OP 636: Performed by: INTERNAL MEDICINE

## 2024-01-10 RX ORDER — TC 99M MEDRONATE 20 MG/10ML
20-30 INJECTION, POWDER, LYOPHILIZED, FOR SOLUTION INTRAVENOUS ONCE
Status: COMPLETED | OUTPATIENT
Start: 2024-01-10 | End: 2024-01-10

## 2024-01-10 RX ORDER — IOPAMIDOL 755 MG/ML
100 INJECTION, SOLUTION INTRAVASCULAR ONCE
Status: COMPLETED | OUTPATIENT
Start: 2024-01-10 | End: 2024-01-10

## 2024-01-10 RX ADMIN — IOPAMIDOL 100 ML: 755 INJECTION, SOLUTION INTRAVENOUS at 10:45

## 2024-01-10 RX ADMIN — TC 99M MEDRONATE 24.7 MILLICURIE: 20 INJECTION, POWDER, LYOPHILIZED, FOR SOLUTION INTRAVENOUS at 10:30

## 2024-01-16 ENCOUNTER — APPOINTMENT (OUTPATIENT)
Dept: LAB | Facility: CLINIC | Age: 84
End: 2024-01-16
Attending: INTERNAL MEDICINE
Payer: MEDICARE

## 2024-01-16 ENCOUNTER — INFUSION THERAPY VISIT (OUTPATIENT)
Dept: ONCOLOGY | Facility: CLINIC | Age: 84
End: 2024-01-16
Attending: INTERNAL MEDICINE
Payer: MEDICARE

## 2024-01-16 ENCOUNTER — ALLIED HEALTH/NURSE VISIT (OUTPATIENT)
Dept: ONCOLOGY | Facility: CLINIC | Age: 84
End: 2024-01-16

## 2024-01-16 VITALS
RESPIRATION RATE: 16 BRPM | WEIGHT: 166 LBS | BODY MASS INDEX: 22.93 KG/M2 | TEMPERATURE: 98 F | HEART RATE: 75 BPM | SYSTOLIC BLOOD PRESSURE: 138 MMHG | DIASTOLIC BLOOD PRESSURE: 75 MMHG | OXYGEN SATURATION: 98 %

## 2024-01-16 DIAGNOSIS — C79.51 MALIGNANT NEOPLASM METASTATIC TO BONE (H): ICD-10-CM

## 2024-01-16 DIAGNOSIS — C61 PROSTATE CANCER (H): ICD-10-CM

## 2024-01-16 DIAGNOSIS — C61 PROSTATE CANCER (H): Primary | ICD-10-CM

## 2024-01-16 DIAGNOSIS — C79.51 MALIGNANT NEOPLASM METASTATIC TO BONE (H): Primary | ICD-10-CM

## 2024-01-16 LAB
ALBUMIN SERPL BCG-MCNC: 3.9 G/DL (ref 3.5–5.2)
ALP SERPL-CCNC: 70 U/L (ref 40–150)
ALT SERPL W P-5'-P-CCNC: 11 U/L (ref 0–70)
ANION GAP SERPL CALCULATED.3IONS-SCNC: 9 MMOL/L (ref 7–15)
AST SERPL W P-5'-P-CCNC: 14 U/L (ref 0–45)
BASOPHILS # BLD AUTO: 0 10E3/UL (ref 0–0.2)
BASOPHILS NFR BLD AUTO: 1 %
BILIRUB SERPL-MCNC: 0.5 MG/DL
BUN SERPL-MCNC: 16.5 MG/DL (ref 8–23)
CALCIUM SERPL-MCNC: 9.3 MG/DL (ref 8.8–10.2)
CHLORIDE SERPL-SCNC: 105 MMOL/L (ref 98–107)
CREAT SERPL-MCNC: 0.74 MG/DL (ref 0.67–1.17)
DEPRECATED HCO3 PLAS-SCNC: 28 MMOL/L (ref 22–29)
EGFRCR SERPLBLD CKD-EPI 2021: >90 ML/MIN/1.73M2
EOSINOPHIL # BLD AUTO: 0 10E3/UL (ref 0–0.7)
EOSINOPHIL NFR BLD AUTO: 0 %
ERYTHROCYTE [DISTWIDTH] IN BLOOD BY AUTOMATED COUNT: 13.8 % (ref 10–15)
GLUCOSE SERPL-MCNC: 174 MG/DL (ref 70–99)
HCT VFR BLD AUTO: 37.3 % (ref 40–53)
HGB BLD-MCNC: 12.2 G/DL (ref 13.3–17.7)
IMM GRANULOCYTES # BLD: 0.1 10E3/UL
IMM GRANULOCYTES NFR BLD: 1 %
LDH SERPL L TO P-CCNC: 185 U/L (ref 0–250)
LYMPHOCYTES # BLD AUTO: 0.7 10E3/UL (ref 0.8–5.3)
LYMPHOCYTES NFR BLD AUTO: 10 %
MAGNESIUM SERPL-MCNC: 1.8 MG/DL (ref 1.7–2.3)
MCH RBC QN AUTO: 30.9 PG (ref 26.5–33)
MCHC RBC AUTO-ENTMCNC: 32.7 G/DL (ref 31.5–36.5)
MCV RBC AUTO: 94 FL (ref 78–100)
MONOCYTES # BLD AUTO: 0.5 10E3/UL (ref 0–1.3)
MONOCYTES NFR BLD AUTO: 7 %
NEUTROPHILS # BLD AUTO: 5.7 10E3/UL (ref 1.6–8.3)
NEUTROPHILS NFR BLD AUTO: 81 %
NRBC # BLD AUTO: 0 10E3/UL
NRBC BLD AUTO-RTO: 0 /100
PHOSPHATE SERPL-MCNC: 3.2 MG/DL (ref 2.5–4.5)
PLATELET # BLD AUTO: 237 10E3/UL (ref 150–450)
POTASSIUM SERPL-SCNC: 4 MMOL/L (ref 3.4–5.3)
PROT SERPL-MCNC: 6.5 G/DL (ref 6.4–8.3)
PSA SERPL DL<=0.01 NG/ML-MCNC: 200.2 NG/ML
RBC # BLD AUTO: 3.95 10E6/UL (ref 4.4–5.9)
SODIUM SERPL-SCNC: 142 MMOL/L (ref 135–145)
WBC # BLD AUTO: 7 10E3/UL (ref 4–11)

## 2024-01-16 PROCEDURE — 83735 ASSAY OF MAGNESIUM: CPT | Performed by: INTERNAL MEDICINE

## 2024-01-16 PROCEDURE — 85025 COMPLETE CBC W/AUTO DIFF WBC: CPT | Performed by: INTERNAL MEDICINE

## 2024-01-16 PROCEDURE — G0463 HOSPITAL OUTPT CLINIC VISIT: HCPCS | Performed by: INTERNAL MEDICINE

## 2024-01-16 PROCEDURE — 96413 CHEMO IV INFUSION 1 HR: CPT

## 2024-01-16 PROCEDURE — 258N000003 HC RX IP 258 OP 636: Performed by: INTERNAL MEDICINE

## 2024-01-16 PROCEDURE — 83615 LACTATE (LD) (LDH) ENZYME: CPT | Performed by: INTERNAL MEDICINE

## 2024-01-16 PROCEDURE — 80053 COMPREHEN METABOLIC PANEL: CPT | Performed by: INTERNAL MEDICINE

## 2024-01-16 PROCEDURE — 99001 SPECIMEN HANDLING PT-LAB: CPT | Performed by: INTERNAL MEDICINE

## 2024-01-16 PROCEDURE — 250N000011 HC RX IP 250 OP 636: Performed by: INTERNAL MEDICINE

## 2024-01-16 PROCEDURE — 84153 ASSAY OF PSA TOTAL: CPT | Performed by: INTERNAL MEDICINE

## 2024-01-16 PROCEDURE — 36415 COLL VENOUS BLD VENIPUNCTURE: CPT | Performed by: INTERNAL MEDICINE

## 2024-01-16 PROCEDURE — 99215 OFFICE O/P EST HI 40 MIN: CPT | Performed by: INTERNAL MEDICINE

## 2024-01-16 PROCEDURE — 84100 ASSAY OF PHOSPHORUS: CPT | Performed by: INTERNAL MEDICINE

## 2024-01-16 PROCEDURE — 96367 TX/PROPH/DG ADDL SEQ IV INF: CPT

## 2024-01-16 RX ORDER — PREDNISONE 5 MG/1
5 TABLET ORAL 2 TIMES DAILY
Qty: 42 TABLET | Refills: 0 | Status: SHIPPED | OUTPATIENT
Start: 2024-01-16 | End: 2024-02-06

## 2024-01-16 RX ADMIN — DOCETAXEL 116 MG: 20 INJECTION, SOLUTION, CONCENTRATE INTRAVENOUS at 13:04

## 2024-01-16 RX ADMIN — SODIUM CHLORIDE 250 ML: 9 INJECTION, SOLUTION INTRAVENOUS at 12:41

## 2024-01-16 RX ADMIN — DEXAMETHASONE SODIUM PHOSPHATE: 10 INJECTION, SOLUTION INTRAMUSCULAR; INTRAVENOUS at 12:43

## 2024-01-16 ASSESSMENT — PAIN SCALES - GENERAL: PAINLEVEL: NO PAIN (0)

## 2024-01-16 NOTE — NURSING NOTE
[id number]: Study Visit Note   Subject name: Alonso Pettit     Visit: C4D64    Did the study visit occur within the appropriate window allowed by the protocol? yes    Since the last study visit, He has been doing well.   New insomnia for the last few weeks, wakes in the middle of the night and has trouble getting back to sleep due to rumination.  Using melatonin and flexeril, which helps.  No pain flare since radium treatment.  New lymphopenia grade 2.   Scans reviewed, stable.      Were any SSEs noted since last study visit? NO  -the use of EBRT to relieve skeletal symptoms  -the occurrence of new symptomatic pathological bone fractures  (vertebral or non-vertebral)  -the occurrence of spinal cord compression  -a tumor-related orthopedic surgical intervention    Verbal handover provided to infusion RN; reminded RN to document actual start time of infusion and actual stop time of the infusion. If infusion deviates from protocol directed infusion time, please document rationale in your infusion note.    I have personally interviewed Alonso Pettit and reviewed his medical record for adverse events and concomitant medications and these have been recorded on the corresponding logs in Alonso Pettit's research file.     Alonos Pettit was given the opportunity to ask any trial related questions.  Please see provider progress note for physical exam and other clinical information. Labs were reviewed - any significant lab values were addressed and reviewed.    Albertina Mckeon RN

## 2024-01-16 NOTE — PROGRESS NOTES
[facility]  HEMATOLOGY AND ONCOLOGY    FOLLOW-UP VISIT NOTE    PATIENT NAME: Alonso Pettit MRN # [id number]  DATE OF VISIT: Jan 16, 2024 YOB: 1940    REFERRING PROVIDER: [facility]    CANCER TYPE: Prostate adenocarcinoma; [place] 4+5  STAGE: IV (bony metastasis)  MOLECULAR PROFILE: No actionable mutations/MSI or high TMB; TP53 mutation positive    TREATMENT SUMMARY:  -12/9/2009: Radical prostatectomy; pathology revealed [place] 4+5 disease, stage pT3a,N0 positive margins  -Mar-May2010: Salvage external beam radiation therapy  -Apr 2013: Intermittent androgen deprivation therapy with leuprolide for biochemical PSA relapse  -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 Radiation therapy to the left supraclavicular lymph node ending in September 2020.  He was continued on enzalutamide  -May 2022: Switch to abiraterone with prednisone for progressive disease  -Aug 2023: Abiraterone discontinued for progressive disease.  PSMA PET scan with PSMA avid osseous and brisa metastasis.  -11/14/2023: MARLO trial: cycle 1 docetaxel. 11/15/23 cycle 1 radium per the MARLO trial.      Chemotherapy 11/14/2023  10:05 AM 11/15/2023  1:39 PM 12/6/2023  9:15 AM 12/28/2023  9:07 AM   Day, Cycle Day 1, Cycle 1  Day 1, Cycle 1  Day 1, Cycle 2  Day 1, Cycle 3    DOCEtaxel (TAXOTERE) IV 60 mg/m2   60 mg/m2  60 mg/m2    radium Ra-223 Dichloride IV  1.473 microcurie/kg      .2 ng/ml  203 ng/ml 196 ng/ml     CURRENT INTERVENTIONS:  MARLO Trial randomized to Arm B with Docetaxel/prednisone/Radium-233. Leuprolide every 4 months.     SUBJECTIVE   Alonso Pettit is being followed for castration resistant metastatic prostate cancer. He returns to clinic accompanied by his wife. He is seen today prior to cycle 3  docetaxel and cycle 2 Radium-233.   Constipation was not an issue this cycle after scheduling ducolax 2-3 tablets daily for the first 4 days and then prn.   He has noticed insomnia. He has been using melatonin 3 mg and flexeril as needed at night.   Denies new pains. 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 He went to the chiropractor with improvement. This is unchanged from his previous pains. He takes flexeril at night as needed.   He has had a chronic dry cough and feels this is less over the past three weeks.   He has no other pain in his body.   No chest pain, shortness of breath, or cough.   No speech changes.   No urinary changes.   Eating well.   Has occasional mild headaches, no changes in duration, frequency, or intensity.     ROS: 14 point ROS neg other than the symptoms noted above in the HPI.      PAST MEDICAL HISTORY     Past Medical History:   Diagnosis Date    Prostate cancer (H)          CURRENT OUTPATIENT MEDICATIONS     Current Outpatient Medications   Medication Sig    bisacodyl (DULCOLAX) 5 MG EC tablet Take 5 mg by mouth 2 times daily    cyclobenzaprine (FLEXERIL) 10 MG tablet 1/2-1 TAB ORALLY UP TO 3 TIMES A DAY AS NEEDED FOR MUSCLE SPASM    dexAMETHasone (DECADRON) 4 MG tablet Take 2 tablets (8 mg) by mouth 2 times daily (with meals) Start evening of Docetaxel infusion and continue for a total of 3 doses.    leuprolide (LUPRON) 11.25 mg injection Inject 11.25 mg into the muscle every 3 months    predniSONE (DELTASONE) 5 MG tablet Take 1 tablet (5 mg) by mouth 2 times daily for 21 days    psyllium (METAMUCIL/KONSYL) Packet Take 1 packet by mouth daily    aspirin 81 mg chewable tablet [ASPIRIN 81 MG CHEWABLE TABLET] Chew 81 mg daily. (Patient not taking: Reported on 10/18/2023)    calcium carbonate (CALCIUM 500 ORAL) [CALCIUM CARBONATE (CALCIUM 500 ORAL)] Take by mouth. (Patient not taking: Reported on 1/16/2024)    prochlorperazine (COMPAZINE) 10 MG tablet Take 0.5 tablets  "(5 mg) by mouth every 6 hours as needed for nausea or vomiting (Patient not taking: Reported on 1/16/2024)    pseudoePHEDrine-guaiFENesin (MUCINEX D)  MG 12 hr tablet  (Patient not taking: Reported on 12/6/2023)     No current facility-administered medications for this visit.        ALLERGIES    No Known Allergies     REVIEW OF SYSTEMS   As above in the HPI, o/w complete 12-point ROS was negative.     PHYSICAL EXAM   /75   Pulse 75   Temp 98  F (36.7  C) (Oral)   Resp 16   Wt 75.3 kg (166 lb)   SpO2 98%   BMI 22.93 kg/m    General: No acute distress  HEENT: Sclera anicteric. Oral mucosa pink and moist.  No mucositis or thrush  Lymph: No lymphadenopathy in neck  Heart: Regular, rate, and rhythm  Lungs: Clear to ascultation bilaterally  Abdomen: Positive bowel sounds. Soft, non-distended, non-tender. No organomegaly or mass.   Extremities: trace extremity edema of the bilateral ankles.   Neuro: Cranial nerves grossly intact  Rash: none       LABORATORY AND IMAGING STUDIES     Recent Labs   Lab Test 12/28/23  0706 12/04/23  1432 11/14/23  0752 10/30/23  1044 08/25/20  1443    140 141 142 141   POTASSIUM 3.7 4.5 3.6 4.2 4.4   CHLORIDE 107 105 105 107 106   CO2 24 25 26 27 25   ANIONGAP 11 10 10 8 10   BUN 17.2 14.7 12.3 18.6 13   CR 0.76 0.82 0.89 0.99 0.86   * 105* 134* 94 96   ZABRINA 8.9 9.5 9.4 9.5 9.8     Recent Labs   Lab Test 12/28/23  0706 12/04/23  1432 11/14/23  0752 10/30/23  1044   MAG 2.0 1.9 1.8 2.0   PHOS 3.8 4.0 3.1 3.8     Recent Labs   Lab Test 01/16/24  1122 12/28/23  0706 12/04/23  1432 11/14/23  0752 10/30/23  1044   WBC 7.0 7.4 6.0 4.6 4.8   HGB 12.2* 12.8* 12.9* 13.2* 12.6    227 249 237 213   MCV 94 92 93 88 92   NEUTROPHIL 81 65 70 53 64     Recent Labs   Lab Test 12/28/23  0706 12/04/23  1432 11/14/23  0752   BILITOTAL 0.6 0.5 0.9   ALKPHOS 68 64 71   ALT 11 <5 7   AST 13 17 18   ALBUMIN 3.9 4.0 4.2    175 155     No results found for: \"TSH\"  No results " "for input(s): \"CEA\" in the last 14182 hours.  Results for orders placed or performed during the hospital encounter of 01/10/24   CT Chest/Abdomen/Pelvis w Contrast    Narrative    EXAM: CT CHEST/ABDOMEN/PELVIS W CONTRAST  LOCATION: [facility]  DATE: 1/10/2024    INDICATION:  Malignant neoplasm metastatic to bone (H)  COMPARISON: CT chest, abdomen and pelvis performed on 10/30/2023  TECHNIQUE: CT scan of the chest, abdomen, and pelvis was performed following injection of IV contrast. Multiplanar reformats were obtained. Dose reduction techniques were used.   CONTRAST: iopamidol (ISOVUE 370) solution 100 mL    FINDINGS:   LUNGS AND PLEURA: No pleural effusion or pneumothorax is seen. Stable appearance of multiple pulmonary nodules measuring up to 4 mm in the right upper lobe (series 6, image 19). Several new patchy groundglass nodular opacities and groundglass nodules are   present in the lungs bilaterally measuring up to 1.7 cm in the left upper lobe (series 6, image 29).    MEDIASTINUM/AXILLAE: No lymphadenopathy. Subcentimeter left supraclavicular and mediastinal lymph nodes appear unchanged. No thoracic aortic aneurysms. Mild vascular calcifications are seen along the thoracic aorta. Bilateral gynecomastia is present.    CORONARY ARTERY CALCIFICATION: None.    HEPATOBILIARY: Stable 9 mm hyperenhancing lesion along the left hepatic lobe (series 3, image 129). Other subcentimeter hypoattenuating lesions in the liver are too small to characterize but appear unchanged. Gallbladder is unremarkable.    PANCREAS: No significant mass, duct dilatation, or inflammatory change.    SPLEEN: Normal size.    ADRENAL GLANDS: No significant nodules.    KIDNEYS/BLADDER: No hydronephrosis. Stable appearance of presumed cysts along the mid pole of the right kidney. Diffuse wall thickening along the urinary bladder appears unchanged.    BOWEL: No obstruction or inflammatory change. Mild " wall thickening is seen along the ascending and proximal transverse colon.    LYMPH NODES: No lymphadenopathy. Stable subcentimeter retroperitoneal lymph nodes measuring up to 6 mm along the left para-aortic space (series 3, image 194).    VASCULATURE: Mild vascular calcifications seen in the abdominal aorta and iliac branches. Stable small fat-containing umbilical hernia.    PELVIC ORGANS: Postsurgical changes are present status post prostatectomy. Penile prosthesis remains in place.    MUSCULOSKELETAL: Multiple sclerotic lesions throughout the axial and appendicular skeleton appear unchanged.      Impression    IMPRESSION:  1.  No significant change in appearance of multifocal osseous metastases.  2.  Stable subcentimeter lymph nodes in the chest and abdomen.  3.  Multiple new groundglass nodular opacities and groundglass nodules are seen in the lungs. Findings may relate to atypical multifocal infection. Metastatic disease would be atypical and is considered less likely. Suggest attention on follow-up exam.     Narrative & Impression   EXAMINATION: NM BONE SCAN WHOLE BODY  Whole-body bone scan, 1/10/2024 1:33 PM      HISTORY: Malignant neoplasm metastatic to bone (H)      ADDITIONAL INFORMATION: none     COMPARISON: Same day CT chest abdomen and pelvis, Nuclear medicine  bone scan 10/30/2023, 8/19/2019, MRI brain 11/17/2023, CT head  11/14/2023     TECHNIQUE: The patient received 24.7 mCi of Tc-99m MDP intravenously.  Whole body bone images were obtained at 3 hours.     FINDINGS:   Whole body planar images demonstrate multifocal abnormal radiotracer  uptake, most prominent along the bilateral posterolateral ribs, the  anterior C7 vertebral body, posterior elements of multilevel thoracic  vertebral bodies, and the left scapular angle.  No new areas of focal abnormal radiotracer uptake compared to prior.     Physiologic uptake is noted throughout the remaining skeletal  structures and kidneys. Radiotracer  accumulation is noted within the  urinary bladder with additional focal contamination in the region of  the groin likely related to urination.                                                                      IMPRESSION:   1.  Redemonstrated multifocal osseous metastatic disease, not  significantly changed compared to 10/30/2023.  2.  No clear scintigraphic evidence of new metastatic disease.     I have personally reviewed the examination and initial interpretation  and I agree with the findings.     ROMÁN GOOD MD         SYSTEM ID:  [id number]            ASSESSMENT AND PLAN   -Castration resistant metastatic prostate cancer   Post radical prostatectomy on 12/9/2009; salvage radiation ending May 2010; androgen deprivation therapy since 2013   Post progression on enzalutamide and abiraterone with prednisone  -Nonmuscle invasive bladder cancer diagnosed in 2011 without any recent recurrence  -No significant medical comorbidity  -ECOG performance status of 0     #Castration resistant metastatic prostate cancer.   - Has progressed on novel antiandrogen therapies including abiraterone with prednisone and enzalutamide.  He had a PSMA PET CT scan which did show extensive metastasis to the lymph nodes and bones. Referred to [facility] for the MARLO trial, randomized to Arm B with docetaxel and Radium. Docetaxel is administered every 3 weeks for 6-10 doses and Radium every 6 weeks for 6 doses. He tolerated docetaxel and radium reasonably well with the exception of significant constipation for nearly 1.5-2 weeks after his first cycle. Constipation improved with cycle 2 docetaxel.    I have reviewed all of the labs done prior to this clinic visit.  Labs are all completely normal including electrolytes, renal function, hepatic panel, complete blood count and differential except for mild normocytic anemia.    I have reviewed actual images from his restaging scans. He has stable disease on CT chest, abdomen and pelvis and bone scans.  His disease primarily limited to the bones. These do not tend to show radiographic response on CT scan. The bone scan over time can show decreased uptake but remains stable for now. His PSA has been relatively stable on therapy. We would have to give it more time for a clear response.   - continue Eligard every 4 months with Dr. Philip at [facility].  He is scheduled for next dose in March.       #Bone Metastasis   - Zometa every 6 months with local oncologist. Consider moving up frequency to every 6 weeks. Could move to [facility] during the time of trial participation and administer while he is here for ease of appointments. Not specifically reviewed today.   - rare risk of fractures associated with docetaxel and radium therefore continuing Zometa is recommended.     #Constipation   - Continue scheduling ducolax 2-3 tablets per day for at least 4 days and then prn. Can trial daily miralax as well prn.   - If no bowel movement have 2-3 days, recommend adding in Milk of Mag. If no bowel movement after 6 hours, repeat.     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 CT neck with calcifications. Called patient and wife to discuss there results. They have seen a neurologist in the past at [facility] who they plan to follow up with along with their PCP.   - No recurrent episodes reported today, 12/28/2023.     # Insomnia:  He has been using melatonin 3 mg at bedtime as needed.   He has been using flexeril for his back pain which helps him with his sleep too.     Patient was seen and evaluated with study Albertina PURI.       40 minutes spent on the date of the encounter doing chart review, history and exam, documentation and further activities as noted above

## 2024-01-16 NOTE — NURSING NOTE
"Oncology Rooming Note    January 16, 2024 11:46 AM   Alonso Pettit is a 83 year old male who presents for:    Chief Complaint   Patient presents with    Oncology Clinic Visit     Prostate Ca     Initial Vitals: /75   Pulse 75   Temp 98  F (36.7  C) (Oral)   Resp 16   Wt 75.3 kg (166 lb)   SpO2 98%   BMI 22.93 kg/m   Estimated body mass index is 22.93 kg/m  as calculated from the following:    Height as of 10/24/23: 1.812 m (5' 11.34\").    Weight as of this encounter: 75.3 kg (166 lb). Body surface area is 1.95 meters squared.  No Pain (0) Comment: Data Unavailable   No LMP for male patient.  Allergies reviewed: Yes  Medications reviewed: Yes    Medications: Medication refills not needed today.  Pharmacy name entered into [facility]: [id number] [place] [address]    Frailty Screening:   Is the patient here for a new oncology consult visit in cancer care? 2. No      Clinical concerns:        Macey Soliman CMA              "

## 2024-01-16 NOTE — PATIENT INSTRUCTIONS
[facility] Triage and after hours / weekends / holidays:  [phone]    Please call the triage or after hours line if you experience a temperature greater than or equal to 100.4, shaking chills, have uncontrolled nausea, vomiting and/or diarrhea, dizziness, shortness of breath, chest pain, bleeding, unexplained bruising, or if you have any other new/concerning symptoms, questions or concerns.      If you are having any concerning symptoms or wish to speak to a provider before your next infusion visit, please call triage to notify them so we can adequately serve you.     If you need a refill on a narcotic prescription or other medication, please call before your infusion appointment.

## 2024-01-16 NOTE — PROGRESS NOTES
Infusion Nursing Note:  Alonso Pettit presents today for Cycle 4 Day 1 Docetaxel (Taxotere).    Patient spoke with provider today: Yes: Dr. Gamez    Treatment Conditions:  Component      Latest Ref Rng 1/16/2024  11:22 AM   WBC      4.0 - 11.0 10e3/uL 7.0    RBC Count      4.40 - 5.90 10e6/uL 3.95 (L)    Hemoglobin      13.3 - 17.7 g/dL 12.2 (L)    Hematocrit      40.0 - 53.0 % 37.3 (L)    MCV      78 - 100 fL 94    MCH      26.5 - 33.0 pg 30.9    MCHC      31.5 - 36.5 g/dL 32.7    RDW      10.0 - 15.0 % 13.8    Platelet Count      150 - 450 10e3/uL 237    % Neutrophils      % 81    % Lymphocytes      % 10    % Monocytes      % 7    % Eosinophils      % 0    % Basophils      % 1    % Immature Granulocytes      % 1    NRBCs per 100 WBC      <1 /100 0    Absolute Neutrophils      1.6 - 8.3 10e3/uL 5.7    Absolute Lymphocytes      0.8 - 5.3 10e3/uL 0.7 (L)    Absolute Monocytes      0.0 - 1.3 10e3/uL 0.5    Absolute Eosinophils      0.0 - 0.7 10e3/uL 0.0    Absolute Basophils      0.0 - 0.2 10e3/uL 0.0    Absolute Immature Granulocytes      <=0.4 10e3/uL 0.1    Absolute NRBCs      10e3/uL 0.0    Sodium      135 - 145 mmol/L 142    Potassium      3.4 - 5.3 mmol/L 4.0    Carbon Dioxide (CO2)      22 - 29 mmol/L 28    Anion Gap      7 - 15 mmol/L 9    Urea Nitrogen      8.0 - 23.0 mg/dL 16.5    Creatinine      0.67 - 1.17 mg/dL 0.74    GFR Estimate      >60 mL/min/1.73m2 >90    Calcium      8.8 - 10.2 mg/dL 9.3    Chloride      98 - 107 mmol/L 105    Glucose      70 - 99 mg/dL 174 (H)    Alkaline Phosphatase      40 - 150 U/L 70    AST      0 - 45 U/L 14    ALT      0 - 70 U/L 11    Protein Total      6.4 - 8.3 g/dL 6.5    Albumin      3.5 - 5.2 g/dL 3.9    Bilirubin Total      <=1.2 mg/dL 0.5    Magnesium      1.7 - 2.3 mg/dL 1.8    Phosphorus      2.5 - 4.5 mg/dL 3.2    Lactate Dehydrogenase      0 - 250 U/L 185       Legend:  (L) Low  (H) High      Note: No concerns during infusion visit. Accompanied by wife.   Met with  Dr. Gamez today.    Intravenous Access:  Peripheral IV placed.    Post Infusion Assessment:  Patient tolerated infusion without incident.  Blood return noted pre and post infusion.  Access discontinued per protocol.    Discharge Plan:   Prescription refills given for Decadron and Prednisone.  Discharge instructions reviewed with: Patient.  Patient and/or family verbalized understanding of discharge instructions and all questions answered.  Copy of AVS reviewed with patient and/or family.  Patient will return 2/6/24 for next appointment.  Patient discharged in stable condition accompanied by: wife.  Departure Mode: Ambulatory.    Manju Jensen RN

## 2024-01-16 NOTE — LETTER
1/16/2024         RE: Alonso Pettit  [address] [facility] [address]        Dear Colleague,    Thank you for referring your patient, Alonso Pettit, to the [facility]. Please see a copy of my visit note below.    [facility]  HEMATOLOGY AND ONCOLOGY    FOLLOW-UP VISIT NOTE    PATIENT NAME: Alonso Pettit MRN # [id number]  DATE OF VISIT: Jan 16, 2024 YOB: 1940    REFERRING PROVIDER: [facility] oncology    CANCER TYPE: Prostate adenocarcinoma; Scott 4+5  STAGE: IV (bony metastasis)  MOLECULAR PROFILE: No actionable mutations/MSI or high TMB; TP53 mutation positive    TREATMENT SUMMARY:  -12/9/2009: Radical prostatectomy; pathology revealed Kelliher 4+5 disease, stage pT3a,N0 positive margins  -Mar-May2010: Salvage external beam radiation therapy  -Apr 2013: Intermittent androgen deprivation therapy with leuprolide for biochemical PSA relapse  -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 Radiation therapy to the left supraclavicular lymph node ending in September 2020.  He was continued on enzalutamide  -May 2022: Switch to abiraterone with prednisone for progressive disease  -Aug 2023: Abiraterone discontinued for progressive disease.  PSMA PET scan with PSMA avid osseous and brisa metastasis.  -11/14/2023: MARLO trial: cycle 1 docetaxel. 11/15/23 cycle 1 radium per the MARLO trial.      Chemotherapy 11/14/2023  10:05 AM 11/15/2023  1:39 PM 12/6/2023  9:15 AM 12/28/2023  9:07 AM   Day, Cycle Day 1, Cycle 1  Day 1, Cycle 1  Day 1, Cycle 2  Day 1, Cycle 3    DOCEtaxel (TAXOTERE) IV 60 mg/m2   60 mg/m2  60 mg/m2    radium Ra-223 Dichloride IV  1.473 microcurie/kg      .2 ng/ml  203 ng/ml 196 ng/ml     CURRENT INTERVENTIONS:  MARLO Trial randomized to Arm B  with Docetaxel/prednisone/Radium-233. Leuprolide every 4 months.     SUBJECTIVE   Alonso Pettit is being followed for castration resistant metastatic prostate cancer. He returns to clinic accompanied by his wife. He is seen today prior to cycle 3 docetaxel and cycle 2 Radium-233.   Constipation was not an issue this cycle after scheduling ducolax 2-3 tablets daily for the first 4 days and then prn.   He has noticed insomnia. He has been using melatonin 3 mg and flexeril as needed at night.   Denies new pains. 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 He went to the chiropractor with improvement. This is unchanged from his previous pains. He takes flexeril at night as needed.   He has had a chronic dry cough and feels this is less over the past three weeks.   He has no other pain in his body.   No chest pain, shortness of breath, or cough.   No speech changes.   No urinary changes.   Eating well.   Has occasional mild headaches, no changes in duration, frequency, or intensity.     ROS: 14 point ROS neg other than the symptoms noted above in the HPI.      PAST MEDICAL HISTORY     Past Medical History:   Diagnosis Date    Prostate cancer (H)          CURRENT OUTPATIENT MEDICATIONS     Current Outpatient Medications   Medication Sig    bisacodyl (DULCOLAX) 5 MG EC tablet Take 5 mg by mouth 2 times daily    cyclobenzaprine (FLEXERIL) 10 MG tablet 1/2-1 TAB ORALLY UP TO 3 TIMES A DAY AS NEEDED FOR MUSCLE SPASM    dexAMETHasone (DECADRON) 4 MG tablet Take 2 tablets (8 mg) by mouth 2 times daily (with meals) Start evening of Docetaxel infusion and continue for a total of 3 doses.    leuprolide (LUPRON) 11.25 mg injection Inject 11.25 mg into the muscle every 3 months    predniSONE (DELTASONE) 5 MG tablet Take 1 tablet (5 mg) by mouth 2 times daily for 21 days    psyllium (METAMUCIL/KONSYL) Packet Take 1 packet by mouth daily    aspirin 81 mg chewable tablet [ASPIRIN 81 MG CHEWABLE TABLET] Chew 81 mg  daily. (Patient not taking: Reported on 10/18/2023)    calcium carbonate (CALCIUM 500 ORAL) [CALCIUM CARBONATE (CALCIUM 500 ORAL)] Take by mouth. (Patient not taking: Reported on 1/16/2024)    prochlorperazine (COMPAZINE) 10 MG tablet Take 0.5 tablets (5 mg) by mouth every 6 hours as needed for nausea or vomiting (Patient not taking: Reported on 1/16/2024)    pseudoePHEDrine-guaiFENesin (MUCINEX D)  MG 12 hr tablet  (Patient not taking: Reported on 12/6/2023)     No current facility-administered medications for this visit.        ALLERGIES    No Known Allergies     REVIEW OF SYSTEMS   As above in the HPI, o/w complete 12-point ROS was negative.     PHYSICAL EXAM   /75   Pulse 75   Temp 98  F (36.7  C) (Oral)   Resp 16   Wt 75.3 kg (166 lb)   SpO2 98%   BMI 22.93 kg/m    General: No acute distress  HEENT: Sclera anicteric. Oral mucosa pink and moist.  No mucositis or thrush  Lymph: No lymphadenopathy in neck  Heart: Regular, rate, and rhythm  Lungs: Clear to ascultation bilaterally  Abdomen: Positive bowel sounds. Soft, non-distended, non-tender. No organomegaly or mass.   Extremities: trace extremity edema of the bilateral ankles.   Neuro: Cranial nerves grossly intact  Rash: none       LABORATORY AND IMAGING STUDIES     Recent Labs   Lab Test 12/28/23  0706 12/04/23  1432 11/14/23  0752 10/30/23  1044 08/25/20  1443    140 141 142 141   POTASSIUM 3.7 4.5 3.6 4.2 4.4   CHLORIDE 107 105 105 107 106   CO2 24 25 26 27 25   ANIONGAP 11 10 10 8 10   BUN 17.2 14.7 12.3 18.6 13   CR 0.76 0.82 0.89 0.99 0.86   * 105* 134* 94 96   ZABRINA 8.9 9.5 9.4 9.5 9.8     Recent Labs   Lab Test 12/28/23  0706 12/04/23  1432 11/14/23  0752 10/30/23  1044   MAG 2.0 1.9 1.8 2.0   PHOS 3.8 4.0 3.1 3.8     Recent Labs   Lab Test 01/16/24  1122 12/28/23  0706 12/04/23  1432 11/14/23  0752 10/30/23  1044   WBC 7.0 7.4 6.0 4.6 4.8   HGB 12.2* 12.8* 12.9* 13.2* 12.6    227 249 237 213   MCV 94 92 93 88 92  "  NEUTROPHIL 81 65 70 53 64     Recent Labs   Lab Test 12/28/23  0706 12/04/23  1432 11/14/23  0752   BILITOTAL 0.6 0.5 0.9   ALKPHOS 68 64 71   ALT 11 <5 7   AST 13 17 18   ALBUMIN 3.9 4.0 4.2    175 155     No results found for: \"TSH\"  No results for input(s): \"CEA\" in the last 56755 hours.  Results for orders placed or performed during the hospital encounter of 01/10/24   CT Chest/Abdomen/Pelvis w Contrast    Narrative    EXAM: CT CHEST/ABDOMEN/PELVIS W CONTRAST  LOCATION: [facility]  DATE: 1/10/2024    INDICATION:  Malignant neoplasm metastatic to bone (H)  COMPARISON: CT chest, abdomen and pelvis performed on 10/30/2023  TECHNIQUE: CT scan of the chest, abdomen, and pelvis was performed following injection of IV contrast. Multiplanar reformats were obtained. Dose reduction techniques were used.   CONTRAST: iopamidol (ISOVUE 370) solution 100 mL    FINDINGS:   LUNGS AND PLEURA: No pleural effusion or pneumothorax is seen. Stable appearance of multiple pulmonary nodules measuring up to 4 mm in the right upper lobe (series 6, image 19). Several new patchy groundglass nodular opacities and groundglass nodules are   present in the lungs bilaterally measuring up to 1.7 cm in the left upper lobe (series 6, image 29).    MEDIASTINUM/AXILLAE: No lymphadenopathy. Subcentimeter left supraclavicular and mediastinal lymph nodes appear unchanged. No thoracic aortic aneurysms. Mild vascular calcifications are seen along the thoracic aorta. Bilateral gynecomastia is present.    CORONARY ARTERY CALCIFICATION: None.    HEPATOBILIARY: Stable 9 mm hyperenhancing lesion along the left hepatic lobe (series 3, image 129). Other subcentimeter hypoattenuating lesions in the liver are too small to characterize but appear unchanged. Gallbladder is unremarkable.    PANCREAS: No significant mass, duct dilatation, or inflammatory change.    SPLEEN: Normal size.    ADRENAL GLANDS: No " significant nodules.    KIDNEYS/BLADDER: No hydronephrosis. Stable appearance of presumed cysts along the mid pole of the right kidney. Diffuse wall thickening along the urinary bladder appears unchanged.    BOWEL: No obstruction or inflammatory change. Mild wall thickening is seen along the ascending and proximal transverse colon.    LYMPH NODES: No lymphadenopathy. Stable subcentimeter retroperitoneal lymph nodes measuring up to 6 mm along the left para-aortic space (series 3, image 194).    VASCULATURE: Mild vascular calcifications seen in the abdominal aorta and iliac branches. Stable small fat-containing umbilical hernia.    PELVIC ORGANS: Postsurgical changes are present status post prostatectomy. Penile prosthesis remains in place.    MUSCULOSKELETAL: Multiple sclerotic lesions throughout the axial and appendicular skeleton appear unchanged.      Impression    IMPRESSION:  1.  No significant change in appearance of multifocal osseous metastases.  2.  Stable subcentimeter lymph nodes in the chest and abdomen.  3.  Multiple new groundglass nodular opacities and groundglass nodules are seen in the lungs. Findings may relate to atypical multifocal infection. Metastatic disease would be atypical and is considered less likely. Suggest attention on follow-up exam.     Narrative & Impression   EXAMINATION: NM BONE SCAN WHOLE BODY  Whole-body bone scan, 1/10/2024 1:33 PM      HISTORY: Malignant neoplasm metastatic to bone (H)      ADDITIONAL INFORMATION: none     COMPARISON: Same day CT chest abdomen and pelvis, Nuclear medicine  bone scan 10/30/2023, 8/19/2019, MRI brain 11/17/2023, CT head  11/14/2023     TECHNIQUE: The patient received 24.7 mCi of Tc-99m MDP intravenously.  Whole body bone images were obtained at 3 hours.     FINDINGS:   Whole body planar images demonstrate multifocal abnormal radiotracer  uptake, most prominent along the bilateral posterolateral ribs, the  anterior C7 vertebral body, posterior  elements of multilevel thoracic  vertebral bodies, and the left scapular angle.  No new areas of focal abnormal radiotracer uptake compared to prior.     Physiologic uptake is noted throughout the remaining skeletal  structures and kidneys. Radiotracer accumulation is noted within the  urinary bladder with additional focal contamination in the region of  the groin likely related to urination.                                                                      IMPRESSION:   1.  Redemonstrated multifocal osseous metastatic disease, not  significantly changed compared to 10/30/2023.  2.  No clear scintigraphic evidence of new metastatic disease.     I have personally reviewed the examination and initial interpretation  and I agree with the findings.     ROMÁN GOOD MD         SYSTEM ID:  [id number]            ASSESSMENT AND PLAN   -Castration resistant metastatic prostate cancer   Post radical prostatectomy on 12/9/2009; salvage radiation ending May 2010; androgen deprivation therapy since 2013   Post progression on enzalutamide and abiraterone with prednisone  -Nonmuscle invasive bladder cancer diagnosed in 2011 without any recent recurrence  -No significant medical comorbidity  -ECOG performance status of 0     #Castration resistant metastatic prostate cancer.   - Has progressed on novel antiandrogen therapies including abiraterone with prednisone and enzalutamide.  He had a PSMA PET CT scan which did show extensive metastasis to the lymph nodes and bones. Referred to [facility] for the MARLO trial, randomized to Arm B with docetaxel and Radium. Docetaxel is administered every 3 weeks for 6-10 doses and Radium every 6 weeks for 6 doses. He tolerated docetaxel and radium reasonably well with the exception of significant constipation for nearly 1.5-2 weeks after his first cycle. Constipation improved with cycle 2 docetaxel.    I have reviewed all of the labs done prior to this clinic visit.  Labs are all completely normal  including electrolytes, renal function, hepatic panel, complete blood count and differential except for mild normocytic anemia.    I have reviewed actual images from his restaging scans. He has stable disease on CT chest, abdomen and pelvis and bone scans. His disease primarily limited to the bones. These do not tend to show radiographic response on CT scan. The bone scan over time can show decreased uptake but remains stable for now. His PSA has been relatively stable on therapy. We would have to give it more time for a clear response.   - continue Eligard every 4 months with Dr. Philip at [facility].  He is scheduled for next dose in March.       #Bone Metastasis   - Zometa every 6 months with local oncologist. Consider moving up frequency to every 6 weeks. Could move to [facility] during the time of trial participation and administer while he is here for ease of appointments. Not specifically reviewed today.   - rare risk of fractures associated with docetaxel and radium therefore continuing Zometa is recommended.     #Constipation   - Continue scheduling ducolax 2-3 tablets per day for at least 4 days and then prn. Can trial daily miralax as well prn.   - If no bowel movement have 2-3 days, recommend adding in Milk of Mag. If no bowel movement after 6 hours, repeat.     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 CT neck with calcifications. Called patient and wife to discuss there results. They have seen a neurologist in the past at [facility] who they plan to follow up with along with their PCP.   - No recurrent episodes reported today, 12/28/2023.     # Insomnia:  He has been using melatonin 3 mg at bedtime as needed.   He has been using flexeril for his back pain which helps him with his sleep too.     Patient was seen and evaluated with study RNAlbertina ROJAS.       40 minutes spent on the date of  the encounter doing chart review, history and exam, documentation and further activities as noted above         Kg Gamez MD

## 2024-01-16 NOTE — NURSING NOTE
Chief Complaint   Patient presents with    Oncology Clinic Visit     Prostate Ca    Blood Draw     Labs drawn from PIV placed by RN. Line flushed with saline. Vitals taken. Pt checked in for appointment(s).      Labs drawn from PIV placed by RN. Line flushed with saline. Vitals taken. Pt checked in for appointment(s).    Marine Porras RN

## 2024-01-27 ENCOUNTER — NURSE TRIAGE (OUTPATIENT)
Dept: NURSING | Facility: CLINIC | Age: 84
End: 2024-01-27
Payer: MEDICARE

## 2024-01-27 NOTE — TELEPHONE ENCOUNTER
"  Nurse Triage SBAR    Is this a 2nd Level Triage? NO    Situation: Fever, weak immune system.    Background: Received call from patient's wife. Patient gave verbal consent to communicate with wife. She reports that patient began to have a fever yesterday. He had recent chemotherapy treatment on 1/23 for Prostate cancer. She is also concerned about a CT scan that he had done on 1/10, which states \"indings may relate to atypical multifocal infection\" and \"suggest attention on follow-up exam\". She states nobody ever followed with that.     Assessment: Reports temp 100.2. She gave him Ibuprofen and it brought the fever down to 97.7. Reports he had shaking chills during the night and was sweating profusely. He also has been weaker and fell yesterday. They are unsure of he hit his head. Denies any bruising or injuries. Reports he has been SOB when going up stairs. Reports chest congestion and runny nose, and \"minimal cough\". Denies any sore throat, vomiting or diarrhea.     Protocol Recommended Disposition:   See HCP Within 4 Hours (Or PCP Triage), Go to ED Now    Recommendation: Recommendation is to go to ED now. Reviewed care advice and call back instructions. Patient plans to follow advice.          Does the patient meet one of the following criteria for ADS visit consideration? 16+ years old, with an [facility] PCP     TIP  Providers, please consider if this condition is appropriate for management at one of our Acute and Diagnostic Services sites.     If patient is a good candidate, please use dotphrase <dot>triageresponse and select Refer to ADS to document.        Reason for Disposition   [1] Neutropenia known or suspected (e.g., recent cancer chemotherapy) AND [2] signs or symptoms of suspected infection are present   [1] Fever > 100.0 F (37.8 C) AND [2] diabetes mellitus or weak immune system (e.g., HIV positive, cancer chemo, splenectomy, organ transplant, chronic steroids)    Additional Information   Negative: Shock " suspected (e.g., cold/pale/clammy skin, too weak to stand, low BP, rapid pulse)   Negative: Difficult to awaken or acting confused (e.g., disoriented, slurred speech)   Negative: Bluish (or gray) lips or face now   Negative: New-onset rash with many purple (or blood-colored) spots or dots   Negative: Sounds like a life-threatening emergency to the triager   Negative: Other symptom is present, see that guideline (e.g., symptoms of cough, runny nose, sore throat, earache, abdominal pain, diarrhea, vomiting)   Negative: Fever > 103 F (39.4 C)   Negative: [1] Neutropenia known or suspected (e.g., recent cancer chemotherapy) AND [2] fever > 100.4 F (38.0 C)   Negative: SEVERE difficulty breathing (e.g., struggling for each breath, speaks in single words)   Negative: Sounds like a life-threatening emergency to the triager   Negative: [1] Difficulty breathing AND [2] not from stuffy nose (e.g., not relieved by cleaning out the nose)   Negative: Runny nose is caused by pollen or other allergies   Negative: Cough is main symptom   Negative: Severe sore throat   Negative: Fever > 104 F (40 C)   Negative: Patient sounds very sick or weak to the triager   Negative: [1] Fever > 101 F (38.3 C) AND [2] age > 60 years   Negative: [1] Fever > 100.0 F (37.8 C) AND [2] bedridden (e.g., CVA, chronic illness, recovering from surgery)    Protocols used: Cancer - Fever-A-AH, Common Cold-A-AH

## 2024-01-29 NOTE — TELEPHONE ENCOUNTER
Oncology Nurse Triage - Reporting Symptoms  Situation:   Follow up from [facility] on 1/27/24 regarding fever. Pt was advised to go to ED. [facility] for spouse, Macey, requesting call back to triage at [phone] option 5, option 2, for follow up, as there are no records in [facility] that pt went to ED.     Background:   Treating Provider:  Dr. Gamez    Date of last office visit: 1/16/24 w/ Dr. Gamez    Recent treatments: Yes: 1/16/23 Cycle 4 Day 1 Docetaxel (Taxotere).       Assessment  Onset of symptoms: 1/26/24   Temp 1/27 was 100.2- took IBU and resolved.   Fall reported from 1/26, unsure if hit head.   SOB when going upstairs, chest congestion, runny nose, minimal cough.    Recommendations:

## 2024-01-29 NOTE — TELEPHONE ENCOUNTER
Pt's wife Macey is calling back after receiving call from Triage.    Macey reports that they called in this weekend when pt started having a low grade temp on Friday. Had fever of 100.2 on Saturday, chills, lightheadedness and sweating.     They were advised to go to the ED but they did not go to the ED because they knew they would be sitting there for hours.   Pt slept a lot for a couple of days.    Today is back to normal.  No fever, chills, lightheadedness or sweating.  Back to eating normally.  Never takes good amounts of fluid, taking about 16-24 ounces per day.  He has never taken fluids well.  No pain with urination  No bowel problems.  No dizziness, lightheadedness or diaphoresis anymore.  No chest pain, sob, tachycardia.    Next chemo is next week: 2/6/24, docetaxol and apt with JOSE L Dailey    Recommendations:   Pt should continue to push fluids; can try gatorade or pedialyte.  Call back if fever >100.4, chills, lightheadedness, dysuria, n/v, other s/sx infection.  Informed Macey that this information would be sent to the Care Team and if they had further suggestions, then we would call her back with those.  Macey voiced understanding of all of the above information.    Routed to JOSE L Dailey and Care Team.

## 2024-02-02 ENCOUNTER — LAB (OUTPATIENT)
Dept: LAB | Facility: CLINIC | Age: 84
End: 2024-02-02
Attending: INTERNAL MEDICINE
Payer: MEDICARE

## 2024-02-02 VITALS
WEIGHT: 161.9 LBS | DIASTOLIC BLOOD PRESSURE: 69 MMHG | HEART RATE: 71 BPM | SYSTOLIC BLOOD PRESSURE: 123 MMHG | OXYGEN SATURATION: 97 % | BODY MASS INDEX: 22.37 KG/M2

## 2024-02-02 DIAGNOSIS — C79.51 MALIGNANT NEOPLASM METASTATIC TO BONE (H): Primary | ICD-10-CM

## 2024-02-02 DIAGNOSIS — C61 PROSTATE CANCER (H): ICD-10-CM

## 2024-02-02 LAB
ALBUMIN SERPL BCG-MCNC: 3.5 G/DL (ref 3.5–5.2)
ALP SERPL-CCNC: 61 U/L (ref 40–150)
ALT SERPL W P-5'-P-CCNC: 9 U/L (ref 0–70)
ANION GAP SERPL CALCULATED.3IONS-SCNC: 11 MMOL/L (ref 7–15)
AST SERPL W P-5'-P-CCNC: 16 U/L (ref 0–45)
BASOPHILS # BLD AUTO: ABNORMAL 10*3/UL
BASOPHILS # BLD MANUAL: 0 10E3/UL (ref 0–0.2)
BASOPHILS NFR BLD AUTO: ABNORMAL %
BASOPHILS NFR BLD MANUAL: 0 %
BILIRUB SERPL-MCNC: 0.4 MG/DL
BUN SERPL-MCNC: 22 MG/DL (ref 8–23)
CALCIUM SERPL-MCNC: 9.2 MG/DL (ref 8.8–10.2)
CHLORIDE SERPL-SCNC: 104 MMOL/L (ref 98–107)
CREAT SERPL-MCNC: 0.8 MG/DL (ref 0.67–1.17)
DEPRECATED HCO3 PLAS-SCNC: 24 MMOL/L (ref 22–29)
EGFRCR SERPLBLD CKD-EPI 2021: 89 ML/MIN/1.73M2
EOSINOPHIL # BLD AUTO: ABNORMAL 10*3/UL
EOSINOPHIL # BLD MANUAL: 0.1 10E3/UL (ref 0–0.7)
EOSINOPHIL NFR BLD AUTO: ABNORMAL %
EOSINOPHIL NFR BLD MANUAL: 1 %
ERYTHROCYTE [DISTWIDTH] IN BLOOD BY AUTOMATED COUNT: 13.5 % (ref 10–15)
GLUCOSE SERPL-MCNC: 176 MG/DL (ref 70–99)
HCT VFR BLD AUTO: 33.9 % (ref 40–53)
HGB BLD-MCNC: 11.4 G/DL (ref 13.3–17.7)
IMM GRANULOCYTES # BLD: ABNORMAL 10*3/UL
IMM GRANULOCYTES NFR BLD: ABNORMAL %
LDH SERPL L TO P-CCNC: 251 U/L (ref 0–250)
LYMPHOCYTES # BLD AUTO: ABNORMAL 10*3/UL
LYMPHOCYTES # BLD MANUAL: 1 10E3/UL (ref 0.8–5.3)
LYMPHOCYTES NFR BLD AUTO: ABNORMAL %
LYMPHOCYTES NFR BLD MANUAL: 17 %
MAGNESIUM SERPL-MCNC: 1.8 MG/DL (ref 1.7–2.3)
MCH RBC QN AUTO: 31 PG (ref 26.5–33)
MCHC RBC AUTO-ENTMCNC: 33.6 G/DL (ref 31.5–36.5)
MCV RBC AUTO: 92 FL (ref 78–100)
METAMYELOCYTES # BLD MANUAL: 0.1 10E3/UL
METAMYELOCYTES NFR BLD MANUAL: 2 %
MONOCYTES # BLD AUTO: ABNORMAL 10*3/UL
MONOCYTES # BLD MANUAL: 0.2 10E3/UL (ref 0–1.3)
MONOCYTES NFR BLD AUTO: ABNORMAL %
MONOCYTES NFR BLD MANUAL: 3 %
MYELOCYTES # BLD MANUAL: 0.1 10E3/UL
MYELOCYTES NFR BLD MANUAL: 1 %
NEUTROPHILS # BLD AUTO: ABNORMAL 10*3/UL
NEUTROPHILS # BLD MANUAL: 4.6 10E3/UL (ref 1.6–8.3)
NEUTROPHILS NFR BLD AUTO: ABNORMAL %
NEUTROPHILS NFR BLD MANUAL: 76 %
NRBC # BLD AUTO: 0 10E3/UL
NRBC # BLD AUTO: 0.1 10E3/UL
NRBC BLD AUTO-RTO: 0 /100
NRBC BLD MANUAL-RTO: 1 %
PHOSPHATE SERPL-MCNC: 3.3 MG/DL (ref 2.5–4.5)
PLAT MORPH BLD: ABNORMAL
PLATELET # BLD AUTO: 340 10E3/UL (ref 150–450)
POTASSIUM SERPL-SCNC: 4.2 MMOL/L (ref 3.4–5.3)
PROT SERPL-MCNC: 6.2 G/DL (ref 6.4–8.3)
PSA SERPL DL<=0.01 NG/ML-MCNC: 203.1 NG/ML
RBC # BLD AUTO: 3.68 10E6/UL (ref 4.4–5.9)
RBC MORPH BLD: ABNORMAL
SODIUM SERPL-SCNC: 139 MMOL/L (ref 135–145)
WBC # BLD AUTO: 6.1 10E3/UL (ref 4–11)

## 2024-02-02 PROCEDURE — 85027 COMPLETE CBC AUTOMATED: CPT | Performed by: INTERNAL MEDICINE

## 2024-02-02 PROCEDURE — 85007 BL SMEAR W/DIFF WBC COUNT: CPT | Performed by: INTERNAL MEDICINE

## 2024-02-02 PROCEDURE — 84100 ASSAY OF PHOSPHORUS: CPT | Performed by: INTERNAL MEDICINE

## 2024-02-02 PROCEDURE — 82040 ASSAY OF SERUM ALBUMIN: CPT | Performed by: INTERNAL MEDICINE

## 2024-02-02 PROCEDURE — 83615 LACTATE (LD) (LDH) ENZYME: CPT | Performed by: INTERNAL MEDICINE

## 2024-02-02 PROCEDURE — 99001 SPECIMEN HANDLING PT-LAB: CPT | Performed by: INTERNAL MEDICINE

## 2024-02-02 PROCEDURE — 36415 COLL VENOUS BLD VENIPUNCTURE: CPT | Performed by: INTERNAL MEDICINE

## 2024-02-02 PROCEDURE — 84153 ASSAY OF PSA TOTAL: CPT | Performed by: INTERNAL MEDICINE

## 2024-02-02 PROCEDURE — 83735 ASSAY OF MAGNESIUM: CPT | Performed by: INTERNAL MEDICINE

## 2024-02-02 RX ORDER — EPINEPHRINE 1 MG/ML
0.3 INJECTION, SOLUTION, CONCENTRATE INTRAVENOUS EVERY 5 MIN PRN
Status: CANCELLED | OUTPATIENT
Start: 2024-02-06

## 2024-02-02 RX ORDER — DIPHENHYDRAMINE HYDROCHLORIDE 50 MG/ML
50 INJECTION INTRAMUSCULAR; INTRAVENOUS
Status: CANCELLED
Start: 2024-02-06

## 2024-02-02 RX ORDER — MEPERIDINE HYDROCHLORIDE 25 MG/ML
25 INJECTION INTRAMUSCULAR; INTRAVENOUS; SUBCUTANEOUS EVERY 30 MIN PRN
Status: CANCELLED | OUTPATIENT
Start: 2024-02-27

## 2024-02-02 RX ORDER — EPINEPHRINE 1 MG/ML
0.3 INJECTION, SOLUTION, CONCENTRATE INTRAVENOUS EVERY 5 MIN PRN
Status: CANCELLED | OUTPATIENT
Start: 2024-02-27

## 2024-02-02 RX ORDER — HEPARIN SODIUM (PORCINE) LOCK FLUSH IV SOLN 100 UNIT/ML 100 UNIT/ML
5 SOLUTION INTRAVENOUS
Status: CANCELLED | OUTPATIENT
Start: 2024-02-27

## 2024-02-02 RX ORDER — ALBUTEROL SULFATE 90 UG/1
1-2 AEROSOL, METERED RESPIRATORY (INHALATION)
Status: CANCELLED
Start: 2024-02-06

## 2024-02-02 RX ORDER — ALBUTEROL SULFATE 0.83 MG/ML
2.5 SOLUTION RESPIRATORY (INHALATION)
Status: CANCELLED | OUTPATIENT
Start: 2024-02-27

## 2024-02-02 RX ORDER — HEPARIN SODIUM,PORCINE 10 UNIT/ML
5-20 VIAL (ML) INTRAVENOUS DAILY PRN
Status: CANCELLED | OUTPATIENT
Start: 2024-02-06

## 2024-02-02 RX ORDER — HEPARIN SODIUM,PORCINE 10 UNIT/ML
5-20 VIAL (ML) INTRAVENOUS DAILY PRN
Status: CANCELLED | OUTPATIENT
Start: 2024-02-27

## 2024-02-02 RX ORDER — HEPARIN SODIUM (PORCINE) LOCK FLUSH IV SOLN 100 UNIT/ML 100 UNIT/ML
5 SOLUTION INTRAVENOUS
Status: CANCELLED | OUTPATIENT
Start: 2024-02-06

## 2024-02-02 RX ORDER — ALBUTEROL SULFATE 0.83 MG/ML
2.5 SOLUTION RESPIRATORY (INHALATION)
Status: CANCELLED | OUTPATIENT
Start: 2024-02-06

## 2024-02-02 RX ORDER — LORAZEPAM 2 MG/ML
0.5 INJECTION INTRAMUSCULAR EVERY 4 HOURS PRN
Status: CANCELLED | OUTPATIENT
Start: 2024-02-06

## 2024-02-02 RX ORDER — MEPERIDINE HYDROCHLORIDE 25 MG/ML
25 INJECTION INTRAMUSCULAR; INTRAVENOUS; SUBCUTANEOUS EVERY 30 MIN PRN
Status: CANCELLED | OUTPATIENT
Start: 2024-02-06

## 2024-02-02 RX ORDER — LORAZEPAM 2 MG/ML
0.5 INJECTION INTRAMUSCULAR EVERY 4 HOURS PRN
Status: CANCELLED | OUTPATIENT
Start: 2024-02-27

## 2024-02-02 RX ORDER — DIPHENHYDRAMINE HYDROCHLORIDE 50 MG/ML
50 INJECTION INTRAMUSCULAR; INTRAVENOUS
Status: CANCELLED
Start: 2024-02-27

## 2024-02-02 RX ORDER — ALBUTEROL SULFATE 90 UG/1
1-2 AEROSOL, METERED RESPIRATORY (INHALATION)
Status: CANCELLED
Start: 2024-02-27

## 2024-02-02 NOTE — NURSING NOTE
Chief Complaint   Patient presents with    Labs Only     Venipuncture, vitals checked     Phoebe Rangel RN on 2/2/2024 at 8:44 AM

## 2024-02-06 ENCOUNTER — APPOINTMENT (OUTPATIENT)
Dept: LAB | Facility: CLINIC | Age: 84
End: 2024-02-06
Attending: INTERNAL MEDICINE
Payer: MEDICARE

## 2024-02-06 ENCOUNTER — INFUSION THERAPY VISIT (OUTPATIENT)
Dept: ONCOLOGY | Facility: CLINIC | Age: 84
End: 2024-02-06
Attending: INTERNAL MEDICINE
Payer: MEDICARE

## 2024-02-06 ENCOUNTER — ANCILLARY PROCEDURE (OUTPATIENT)
Dept: GENERAL RADIOLOGY | Facility: CLINIC | Age: 84
End: 2024-02-06
Payer: MEDICARE

## 2024-02-06 ENCOUNTER — ONCOLOGY VISIT (OUTPATIENT)
Dept: ONCOLOGY | Facility: CLINIC | Age: 84
End: 2024-02-06
Payer: MEDICARE

## 2024-02-06 ENCOUNTER — ALLIED HEALTH/NURSE VISIT (OUTPATIENT)
Dept: ONCOLOGY | Facility: CLINIC | Age: 84
End: 2024-02-06

## 2024-02-06 VITALS
BODY MASS INDEX: 21.97 KG/M2 | SYSTOLIC BLOOD PRESSURE: 106 MMHG | RESPIRATION RATE: 18 BRPM | WEIGHT: 159 LBS | DIASTOLIC BLOOD PRESSURE: 67 MMHG | HEART RATE: 86 BPM | OXYGEN SATURATION: 98 % | TEMPERATURE: 97.1 F

## 2024-02-06 VITALS
TEMPERATURE: 98.1 F | DIASTOLIC BLOOD PRESSURE: 70 MMHG | HEART RATE: 81 BPM | BODY MASS INDEX: 22.14 KG/M2 | SYSTOLIC BLOOD PRESSURE: 104 MMHG | HEIGHT: 71 IN | OXYGEN SATURATION: 98 % | RESPIRATION RATE: 16 BRPM

## 2024-02-06 DIAGNOSIS — C79.51 MALIGNANT NEOPLASM METASTATIC TO BONE (H): Primary | ICD-10-CM

## 2024-02-06 DIAGNOSIS — R05.9 COUGH, UNSPECIFIED TYPE: ICD-10-CM

## 2024-02-06 DIAGNOSIS — C61 PROSTATE CANCER (H): Primary | ICD-10-CM

## 2024-02-06 DIAGNOSIS — C61 PROSTATE CANCER (H): ICD-10-CM

## 2024-02-06 DIAGNOSIS — Z00.6 EXAMINATION OF PARTICIPANT IN CLINICAL TRIAL: ICD-10-CM

## 2024-02-06 LAB
ALBUMIN SERPL BCG-MCNC: 3.8 G/DL (ref 3.5–5.2)
ALP SERPL-CCNC: 58 U/L (ref 40–150)
ALT SERPL W P-5'-P-CCNC: 14 U/L (ref 0–70)
ANION GAP SERPL CALCULATED.3IONS-SCNC: 9 MMOL/L (ref 7–15)
AST SERPL W P-5'-P-CCNC: 18 U/L (ref 0–45)
BASOPHILS # BLD AUTO: 0.1 10E3/UL (ref 0–0.2)
BASOPHILS NFR BLD AUTO: 1 %
BILIRUB SERPL-MCNC: 0.6 MG/DL
BUN SERPL-MCNC: 18.6 MG/DL (ref 8–23)
CALCIUM SERPL-MCNC: 9.5 MG/DL (ref 8.8–10.2)
CHLORIDE SERPL-SCNC: 105 MMOL/L (ref 98–107)
CREAT SERPL-MCNC: 0.87 MG/DL (ref 0.67–1.17)
DEPRECATED HCO3 PLAS-SCNC: 28 MMOL/L (ref 22–29)
EGFRCR SERPLBLD CKD-EPI 2021: 87 ML/MIN/1.73M2
EOSINOPHIL # BLD AUTO: 0 10E3/UL (ref 0–0.7)
EOSINOPHIL NFR BLD AUTO: 0 %
ERYTHROCYTE [DISTWIDTH] IN BLOOD BY AUTOMATED COUNT: 14.3 % (ref 10–15)
GLUCOSE SERPL-MCNC: 120 MG/DL (ref 70–99)
HCT VFR BLD AUTO: 35.8 % (ref 40–53)
HGB BLD-MCNC: 11.9 G/DL (ref 13.3–17.7)
IMM GRANULOCYTES # BLD: 0.2 10E3/UL
IMM GRANULOCYTES NFR BLD: 3 %
LYMPHOCYTES # BLD AUTO: 1.6 10E3/UL (ref 0.8–5.3)
LYMPHOCYTES NFR BLD AUTO: 22 %
MCH RBC QN AUTO: 31.1 PG (ref 26.5–33)
MCHC RBC AUTO-ENTMCNC: 33.2 G/DL (ref 31.5–36.5)
MCV RBC AUTO: 94 FL (ref 78–100)
MONOCYTES # BLD AUTO: 0.7 10E3/UL (ref 0–1.3)
MONOCYTES NFR BLD AUTO: 10 %
NEUTROPHILS # BLD AUTO: 4.4 10E3/UL (ref 1.6–8.3)
NEUTROPHILS NFR BLD AUTO: 64 %
NRBC # BLD AUTO: 0 10E3/UL
NRBC BLD AUTO-RTO: 0 /100
PLATELET # BLD AUTO: 375 10E3/UL (ref 150–450)
POTASSIUM SERPL-SCNC: 3.6 MMOL/L (ref 3.4–5.3)
PROT SERPL-MCNC: 6.6 G/DL (ref 6.4–8.3)
RBC # BLD AUTO: 3.83 10E6/UL (ref 4.4–5.9)
SODIUM SERPL-SCNC: 142 MMOL/L (ref 135–145)
WBC # BLD AUTO: 7 10E3/UL (ref 4–11)

## 2024-02-06 PROCEDURE — 85025 COMPLETE CBC W/AUTO DIFF WBC: CPT

## 2024-02-06 PROCEDURE — 82040 ASSAY OF SERUM ALBUMIN: CPT

## 2024-02-06 PROCEDURE — 96367 TX/PROPH/DG ADDL SEQ IV INF: CPT

## 2024-02-06 PROCEDURE — 96413 CHEMO IV INFUSION 1 HR: CPT

## 2024-02-06 PROCEDURE — 36415 COLL VENOUS BLD VENIPUNCTURE: CPT

## 2024-02-06 PROCEDURE — 250N000011 HC RX IP 250 OP 636: Performed by: INTERNAL MEDICINE

## 2024-02-06 PROCEDURE — G0463 HOSPITAL OUTPT CLINIC VISIT: HCPCS | Mod: 25

## 2024-02-06 PROCEDURE — 71046 X-RAY EXAM CHEST 2 VIEWS: CPT | Mod: GC | Performed by: RADIOLOGY

## 2024-02-06 PROCEDURE — 258N000003 HC RX IP 258 OP 636

## 2024-02-06 PROCEDURE — 250N000011 HC RX IP 250 OP 636

## 2024-02-06 PROCEDURE — 258N000003 HC RX IP 258 OP 636: Performed by: INTERNAL MEDICINE

## 2024-02-06 PROCEDURE — 99215 OFFICE O/P EST HI 40 MIN: CPT

## 2024-02-06 RX ORDER — PREDNISONE 5 MG/1
5 TABLET ORAL 2 TIMES DAILY
Qty: 42 TABLET | Refills: 0 | Status: SHIPPED | OUTPATIENT
Start: 2024-02-06 | End: 2024-02-27

## 2024-02-06 RX ADMIN — DEXAMETHASONE SODIUM PHOSPHATE: 10 INJECTION, SOLUTION INTRAMUSCULAR; INTRAVENOUS at 12:25

## 2024-02-06 RX ADMIN — SODIUM CHLORIDE 250 ML: 9 INJECTION, SOLUTION INTRAVENOUS at 12:25

## 2024-02-06 RX ADMIN — DOCETAXEL 116 MG: 20 INJECTION, SOLUTION, CONCENTRATE INTRAVENOUS at 13:17

## 2024-02-06 ASSESSMENT — PAIN SCALES - GENERAL: PAINLEVEL: MILD PAIN (3)

## 2024-02-06 NOTE — NURSING NOTE
Chief Complaint   Patient presents with    Blood Draw     Labs drawn with PIV start by vascular. Pt tolerated well.          Amy Camejo RN

## 2024-02-06 NOTE — PROGRESS NOTES
Infusion Nursing Note:  Alonso Pettit presents today for Day 1 Cycle 5 Taxotere.    Patient seen by provider today: Yes: Valentine DOMINGO   present during visit today: Not Applicable.    Note: Patient presents to infusion feeling ok. Pt denies new acute discomfort and states no acute complaints or concerns not addressed by SWEETIE today.    TORB. 1151. 2/6/24. Valentine DOMINGO. Leonid Stanton RN. Discussed with Dr. Gamez-proceed with treatment today.    Intravenous Access:  Peripheral IV placed.    Treatment Conditions:  Lab Results   Component Value Date    HGB 11.9 (L) 02/06/2024    WBC 7.0 02/06/2024    ANEU 4.6 02/02/2024    ANEUTAUTO 4.4 02/06/2024     02/06/2024        Lab Results   Component Value Date     02/06/2024    POTASSIUM 3.6 02/06/2024    MAG 1.8 02/02/2024    CR 0.87 02/06/2024    ZABRINA 9.5 02/06/2024    BILITOTAL 0.6 02/06/2024    ALBUMIN 3.8 02/06/2024    ALT 14 02/06/2024    AST 18 02/06/2024     Chest xray (-)  Results reviewed, labs MET treatment parameters, ok to proceed with treatment.      Post Infusion Assessment:  Patient tolerated infusion without incident.  Blood return noted pre and post infusion.  Site patent and intact, free from redness, edema or discomfort.  No evidence of extravasations.  Access discontinued per protocol.       Discharge Plan:   Prescription refills given for Prednisone, Dex.  Discharge instructions reviewed with: Patient.  Patient and/or family verbalized understanding of discharge instructions and all questions answered.  Copy of AVS reviewed with patient and/or family.  Patient will return 2/7 for his study injection and 2/27 for next chemo appointment.  Patient discharged in stable condition accompanied by: Wife  Departure Mode: Ambulatory.      Leonid Stanton RN

## 2024-02-06 NOTE — Clinical Note
2/6/2024         RE: Alonso Pettit  [address] [facility] [address]        Dear Colleague,    Thank you for referring your patient, Alonso Pettit, to the [facility]. Please see a copy of my visit note below.    [facility]  HEMATOLOGY AND ONCOLOGY    FOLLOW-UP VISIT NOTE    PATIENT NAME: Alonso Pettit MRN # [id number]  DATE OF VISIT: Feb 6, 2024 YOB: 1940    REFERRING PROVIDER: [facility]    CANCER TYPE: Prostate adenocarcinoma; [place] 4+5  STAGE: IV (bony metastasis)  MOLECULAR PROFILE: No actionable mutations/MSI or high TMB; TP53 mutation positive    TREATMENT SUMMARY:  -12/9/2009: Radical prostatectomy; pathology revealed [place] 4+5 disease, stage pT3a,N0 positive margins  -Mar-May2010: Salvage external beam radiation therapy  -Apr 2013: Intermittent androgen deprivation therapy with leuprolide for biochemical PSA relapse  -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 Radiation therapy to the left supraclavicular lymph node ending in September 2020.  He was continued on enzalutamide  -May 2022: Switch to abiraterone with prednisone for progressive disease  -Aug 2023: Abiraterone discontinued for progressive disease.  PSMA PET scan with PSMA avid osseous and brisa metastasis.  -11/14/2023: MARLO trial: cycle 1 docetaxel. 11/15/23 cycle 1 radium per the MARLO trial.      Chemotherapy 11/14/2023  10:05 AM 11/15/2023  1:39 PM 12/6/2023  9:15 AM 12/28/2023  9:07 AM   Day, Cycle Day 1, Cycle 1  Day 1, Cycle 1  Day 1, Cycle 2  Day 1, Cycle 3    DOCEtaxel (TAXOTERE) IV 60 mg/m2   60 mg/m2  60 mg/m2    radium Ra-223 Dichloride IV  1.473 microcurie/kg      .2 ng/ml  203 ng/ml 196 ng/ml     CURRENT INTERVENTIONS:  MARLO Trial randomized to Arm B  with Docetaxel/prednisone/Radium-233. Leuprolide every 4 months.     SUBJECTIVE   Alonso Pettit is being followed for castration resistant metastatic prostate cancer. He returns to clinic accompanied by his wife, Macey. He is seen today prior to cycle 5 docetaxel and cycle 3 Radium-233.     He reports that he is doing okay today.  About 2 weeks ago he had to episodes where he ended up on the floor within 48 hours.  The first episode was when he went to get up to go to the bathroom in the middle of the night.  Macey woke up and found him on the floor.  48 hours later on 1/26/24, Macey went to run errands came home and found him back on the floor in the room.  He required a call to the neighbor to help get him up to his bed.  Macey took his temperature and it was 99F. She took his temperature again on 1/27/24 and it was around 100.2 F. They called triage who recommended he go to the ER. They did not go. He has night sweats for 2 days around this same time.   He endorses left sided rib pain, Alonso is unsure if he injured it during his ?falls.   No stroke like symptoms per patient and macey.  He does seem to be slowing down with chemotherapy and walking and sleeping more. He found going [place] at [place] more difficult.   No sinus congestion. He does have headaches.   He is taking tylenol prn for pains.   No urinary changes.   No diarrhea. He has not had as much constipation. ducolax 2-3 tablets daily for the first 2 days.   No nausea or vomiting.   Macey thinks he is dehydrated, he is only drinking ~16-24 oz of water per day. Macey states this is a long standing buchanan to get Alonso to drink more.    No appetite changes. Eating well.   Endorses a cough, that feels different than his waxing and waning chronic dry cough.   Bowels are improved, less constipation with the last cycle. He is taking 2-3 tablets of ducolax x 2 days after his docetaxel infusion and then prn.     ROS: 14 point ROS neg other than the  symptoms noted above in the HPI.      PAST MEDICAL HISTORY     Past Medical History:   Diagnosis Date    Prostate cancer (H)          CURRENT OUTPATIENT MEDICATIONS     Current Outpatient Medications   Medication Sig    aspirin 81 mg chewable tablet [ASPIRIN 81 MG CHEWABLE TABLET] Chew 81 mg daily. (Patient not taking: Reported on 10/18/2023)    bisacodyl (DULCOLAX) 5 MG EC tablet Take 5 mg by mouth 2 times daily    calcium carbonate (CALCIUM 500 ORAL) [CALCIUM CARBONATE (CALCIUM 500 ORAL)] Take by mouth. (Patient not taking: Reported on 1/16/2024)    cyclobenzaprine (FLEXERIL) 10 MG tablet 1/2-1 TAB ORALLY UP TO 3 TIMES A DAY AS NEEDED FOR MUSCLE SPASM    dexAMETHasone (DECADRON) 4 MG tablet Take 2 tablets (8 mg) by mouth 2 times daily (with meals) Start evening of Docetaxel infusion and continue for a total of 3 doses.    leuprolide (LUPRON) 11.25 mg injection Inject 11.25 mg into the muscle every 3 months    predniSONE (DELTASONE) 5 MG tablet Take 1 tablet (5 mg) by mouth 2 times daily for 21 days    prochlorperazine (COMPAZINE) 10 MG tablet Take 0.5 tablets (5 mg) by mouth every 6 hours as needed for nausea or vomiting (Patient not taking: Reported on 1/16/2024)    pseudoePHEDrine-guaiFENesin (MUCINEX D)  MG 12 hr tablet  (Patient not taking: Reported on 12/6/2023)    psyllium (METAMUCIL/KONSYL) Packet Take 1 packet by mouth daily     No current facility-administered medications for this visit.        ALLERGIES    No Known Allergies     REVIEW OF SYSTEMS   As above in the HPI, o/w complete 12-point ROS was negative.     PHYSICAL EXAM   /67   Pulse 86   Temp 97.1  F (36.2  C)   Resp 18   Wt 72.1 kg (159 lb)   SpO2 98%   BMI 21.97 kg/m    General: No acute distress  HEENT: Sclera anicteric. Oral mucosa pink and moist.  No mucositis or thrush  Lymph: No lymphadenopathy in neck  Heart: Regular, rate, and rhythm  Lungs: Clear to ascultation bilaterally  Abdomen: Positive bowel sounds. Soft,  "non-distended, non-tender. No organomegaly or mass.   Extremities: trace extremity edema of the bilateral ankles.   Neuro: Cranial nerves grossly intact  Rash: none       LABORATORY AND IMAGING STUDIES     Recent Labs   Lab Test 12/28/23  0706 12/04/23  1432 11/14/23  0752 10/30/23  1044 08/25/20  1443    140 141 142 141   POTASSIUM 3.7 4.5 3.6 4.2 4.4   CHLORIDE 107 105 105 107 106   CO2 24 25 26 27 25   ANIONGAP 11 10 10 8 10   BUN 17.2 14.7 12.3 18.6 13   CR 0.76 0.82 0.89 0.99 0.86   * 105* 134* 94 96   ZABRINA 8.9 9.5 9.4 9.5 9.8     Recent Labs   Lab Test 12/28/23  0706 12/04/23  1432 11/14/23  0752 10/30/23  1044   MAG 2.0 1.9 1.8 2.0   PHOS 3.8 4.0 3.1 3.8     Recent Labs   Lab Test 01/16/24  1122 12/28/23  0706 12/04/23  1432 11/14/23  0752 10/30/23  1044   WBC 7.0 7.4 6.0 4.6 4.8   HGB 12.2* 12.8* 12.9* 13.2* 12.6    227 249 237 213   MCV 94 92 93 88 92   NEUTROPHIL 81 65 70 53 64     Recent Labs   Lab Test 12/28/23  0706 12/04/23  1432 11/14/23  0752   BILITOTAL 0.6 0.5 0.9   ALKPHOS 68 64 71   ALT 11 <5 7   AST 13 17 18   ALBUMIN 3.9 4.0 4.2    175 155     No results found for: \"TSH\"  No results for input(s): \"CEA\" in the last [id number] hours.  Results for orders placed or performed during the hospital encounter of 01/10/24   CT Chest/Abdomen/Pelvis w Contrast    Narrative    EXAM: CT CHEST/ABDOMEN/PELVIS W CONTRAST  LOCATION: [facility]  DATE: 1/10/2024    INDICATION:  Malignant neoplasm metastatic to bone (H)  COMPARISON: CT chest, abdomen and pelvis performed on 10/30/2023  TECHNIQUE: CT scan of the chest, abdomen, and pelvis was performed following injection of IV contrast. Multiplanar reformats were obtained. Dose reduction techniques were used.   CONTRAST: iopamidol (ISOVUE 370) solution 100 mL    FINDINGS:   LUNGS AND PLEURA: No pleural effusion or pneumothorax is seen. Stable appearance of multiple pulmonary nodules measuring up to " 4 mm in the right upper lobe (series 6, image 19). Several new patchy groundglass nodular opacities and groundglass nodules are   present in the lungs bilaterally measuring up to 1.7 cm in the left upper lobe (series 6, image 29).    MEDIASTINUM/AXILLAE: No lymphadenopathy. Subcentimeter left supraclavicular and mediastinal lymph nodes appear unchanged. No thoracic aortic aneurysms. Mild vascular calcifications are seen along the thoracic aorta. Bilateral gynecomastia is present.    CORONARY ARTERY CALCIFICATION: None.    HEPATOBILIARY: Stable 9 mm hyperenhancing lesion along the left hepatic lobe (series 3, image 129). Other subcentimeter hypoattenuating lesions in the liver are too small to characterize but appear unchanged. Gallbladder is unremarkable.    PANCREAS: No significant mass, duct dilatation, or inflammatory change.    SPLEEN: Normal size.    ADRENAL GLANDS: No significant nodules.    KIDNEYS/BLADDER: No hydronephrosis. Stable appearance of presumed cysts along the mid pole of the right kidney. Diffuse wall thickening along the urinary bladder appears unchanged.    BOWEL: No obstruction or inflammatory change. Mild wall thickening is seen along the ascending and proximal transverse colon.    LYMPH NODES: No lymphadenopathy. Stable subcentimeter retroperitoneal lymph nodes measuring up to 6 mm along the left para-aortic space (series 3, image 194).    VASCULATURE: Mild vascular calcifications seen in the abdominal aorta and iliac branches. Stable small fat-containing umbilical hernia.    PELVIC ORGANS: Postsurgical changes are present status post prostatectomy. Penile prosthesis remains in place.    MUSCULOSKELETAL: Multiple sclerotic lesions throughout the axial and appendicular skeleton appear unchanged.      Impression    IMPRESSION:  1.  No significant change in appearance of multifocal osseous metastases.  2.  Stable subcentimeter lymph nodes in the chest and abdomen.  3.  Multiple new groundglass  nodular opacities and groundglass nodules are seen in the lungs. Findings may relate to atypical multifocal infection. Metastatic disease would be atypical and is considered less likely. Suggest attention on follow-up exam.     Narrative & Impression   EXAMINATION: NM BONE SCAN WHOLE BODY  Whole-body bone scan, 1/10/2024 1:33 PM      HISTORY: Malignant neoplasm metastatic to bone (H)      ADDITIONAL INFORMATION: none     COMPARISON: Same day CT chest abdomen and pelvis, Nuclear medicine  bone scan 10/30/2023, 8/19/2019, MRI brain 11/17/2023, CT head  11/14/2023     TECHNIQUE: The patient received 24.7 mCi of Tc-99m MDP intravenously.  Whole body bone images were obtained at 3 hours.     FINDINGS:   Whole body planar images demonstrate multifocal abnormal radiotracer  uptake, most prominent along the bilateral posterolateral ribs, the  anterior C7 vertebral body, posterior elements of multilevel thoracic  vertebral bodies, and the left scapular angle.  No new areas of focal abnormal radiotracer uptake compared to prior.     Physiologic uptake is noted throughout the remaining skeletal  structures and kidneys. Radiotracer accumulation is noted within the  urinary bladder with additional focal contamination in the region of  the groin likely related to urination.                                                                      IMPRESSION:   1.  Redemonstrated multifocal osseous metastatic disease, not  significantly changed compared to 10/30/2023.  2.  No clear scintigraphic evidence of new metastatic disease.     I have personally reviewed the examination and initial interpretation  and I agree with the findings.     ROMÁN GOOD MD         SYSTEM ID:  [id number]            ASSESSMENT AND PLAN   -Castration resistant metastatic prostate cancer   Post radical prostatectomy on 12/9/2009; salvage radiation ending May 2010; androgen deprivation therapy since 2013   Post progression on enzalutamide and abiraterone with  prednisone  -Nonmuscle invasive bladder cancer diagnosed in 2011 without any recent recurrence  -No significant medical comorbidity  -ECOG performance status of 0     #Castration resistant metastatic prostate cancer.   - Has progressed on novel antiandrogen therapies including abiraterone with prednisone and enzalutamide.  He had a PSMA PET CT scan which did show extensive metastasis to the lymph nodes and bones. Referred to [facility] for the MARLO trial, randomized to Arm B with docetaxel and Radium. Docetaxel is administered every 3 weeks for 6-10 doses and Radium every 6 weeks for 6 doses. He tolerated docetaxel and radium reasonably well with the exception of significant constipation for nearly 1.5-2 weeks after his first cycle. Constipation improved with cycle 2 docetaxel.    I have reviewed all of the labs done prior to this clinic visit.  Labs are all completely normal including electrolytes, renal function, hepatic panel, complete blood count and differential except for mild normocytic anemia.    I have reviewed actual images from his restaging scans. He has stable disease on CT chest, abdomen and pelvis and bone scans. His disease primarily limited to the bones. These do not tend to show radiographic response on CT scan. The bone scan over time can show decreased uptake but remains stable for now. His PSA has been relatively stable on therapy. We would have to give it more time for a clear response.   - continue Eligard every 4 months with Dr. Philip at [facility].  He is scheduled for next dose in March.     #Bone Metastasis   - Zometa every 6 months with local oncologist. Consider moving up frequency to every 6 weeks. Could move to [facility] during the time of trial participation and administer while he is here for ease of appointments. Not specifically reviewed today.   - rare risk of fractures associated with docetaxel and radium therefore continuing Zometa is recommended.     #Constipation   - Continue  scheduling ducolax 2-3 tablets per day for at least 4 days and then prn. Can trial daily miralax as well prn.   - If no bowel movement have 2-3 days, recommend adding in Milk of Mag. If no bowel movement after 6 hours, repeat.     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 CT neck with calcifications. Called patient and wife to discuss there results. They have seen a neurologist in the past at [facility] who they plan to follow up with along with their PCP.   - No recurrent episodes reported today, 12/28/2023.     # Insomnia:  He has been using melatonin 3 mg at bedtime as needed.   He has been using flexeril for his back pain which helps him with his sleep too.         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  STAGE: IV (bony metastasis)  MOLECULAR PROFILE: No actionable mutations/MSI or high TMB; TP53 mutation positive    TREATMENT SUMMARY:  -12/9/2009: Radical prostatectomy; pathology revealed [place] 4+5 disease, stage pT3a,N0 positive margins  -Mar-May2010: Salvage external beam radiation therapy  -Apr 2013: Intermittent androgen deprivation therapy with leuprolide for biochemical PSA relapse  -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 Radiation therapy to the left supraclavicular lymph node ending in September 2020.  He was continued on enzalutamide  -May 2022: Switch to abiraterone with prednisone for progressive disease  -Aug 2023: Abiraterone discontinued for  progressive disease.  PSMA PET scan with PSMA avid osseous and brisa metastasis.  -11/14/2023: MARLO trial: cycle 1 docetaxel. 11/15/23 cycle 1 radium per the MARLO trial.      Chemotherapy 11/14/2023  10:05 AM 11/15/2023  1:39 PM 12/6/2023  9:15 AM 12/28/2023  9:07 AM   Day, Cycle Day 1, Cycle 1  Day 1, Cycle 1  Day 1, Cycle 2  Day 1, Cycle 3    DOCEtaxel (TAXOTERE) IV 60 mg/m2   60 mg/m2  60 mg/m2    radium Ra-223 Dichloride IV  1.473 microcurie/kg      .2 ng/ml  203 ng/ml 196 ng/ml     CURRENT INTERVENTIONS:  MARLO Trial randomized to Arm B with Docetaxel/prednisone/Radium-233. Leuprolide every 4 months.     SUBJECTIVE   Alonso Pettit is being followed for castration resistant metastatic prostate cancer. He returns to clinic accompanied by his wife, Ian. He is seen today prior to cycle 5 docetaxel and cycle 3 Radium-233.     He reports that he is doing okay today.  About 2 weeks ago he had to episodes where he ended up on the floor within 48 hours.  The first episode was when he went to get up to go to the bathroom in the middle of the night.  Ian woke up and found him on the floor.  48 hours later on 1/26/24, Ian went to run errands came home and found him back on the floor in the room.  He required a call to the neighbor to help get him up to his bed.  Ian took his temperature and it was 99F. She took his temperature again on 1/27/24 and it was around 100.2 F. They called triage who recommended he go to the ER. They did not go. He has night sweats for 2 days around this same time.   He endorses left sided rib pain, Alonso is unsure if he injured it during his ?falls. Pain increases with a deep breath.   No stroke like symptoms per patient and ian.  He does seem to be slowing down with chemotherapy and walking and sleeping more. He found going [place] the steps at [place] more difficult.   No sinus congestion. He does have headaches.   He is taking tylenol prn for pains.   No urinary changes.   No  diarrhea. He has not had as much constipation. ducolax 2-3 tablets daily for the first 2 days.   No nausea or vomiting.   Macey thinks he is dehydrated, he is only drinking ~16-24 oz of water per day. Macey states this is a long standing buchanan to get Alonso to drink more.    No appetite changes. Eating well.   Endorses a cough, that feels different than his waxing and waning chronic dry cough.   Bowels are improved, less constipation with the last cycle. He is taking 2-3 tablets of ducolax x 2 days after his docetaxel infusion and then prn.     ROS: 14 point ROS neg other than the symptoms noted above in the HPI.      PAST MEDICAL HISTORY     Past Medical History:   Diagnosis Date     Prostate cancer (H)          CURRENT OUTPATIENT MEDICATIONS     Current Outpatient Medications   Medication Sig     aspirin 81 mg chewable tablet [ASPIRIN 81 MG CHEWABLE TABLET] Chew 81 mg daily. (Patient not taking: Reported on 10/18/2023)     bisacodyl (DULCOLAX) 5 MG EC tablet Take 5 mg by mouth 2 times daily     calcium carbonate (CALCIUM 500 ORAL) [CALCIUM CARBONATE (CALCIUM 500 ORAL)] Take by mouth. (Patient not taking: Reported on 1/16/2024)     cyclobenzaprine (FLEXERIL) 10 MG tablet 1/2-1 TAB ORALLY UP TO 3 TIMES A DAY AS NEEDED FOR MUSCLE SPASM     dexAMETHasone (DECADRON) 4 MG tablet Take 2 tablets (8 mg) by mouth 2 times daily (with meals) Start evening of Docetaxel infusion and continue for a total of 3 doses.     leuprolide (LUPRON) 11.25 mg injection Inject 11.25 mg into the muscle every 3 months     predniSONE (DELTASONE) 5 MG tablet Take 1 tablet (5 mg) by mouth 2 times daily for 21 days     prochlorperazine (COMPAZINE) 10 MG tablet Take 0.5 tablets (5 mg) by mouth every 6 hours as needed for nausea or vomiting (Patient not taking: Reported on 1/16/2024)     pseudoePHEDrine-guaiFENesin (MUCINEX D)  MG 12 hr tablet  (Patient not taking: Reported on 12/6/2023)     psyllium (METAMUCIL/KONSYL) Packet Take 1 packet by  mouth daily     No current facility-administered medications for this visit.        ALLERGIES    No Known Allergies     REVIEW OF SYSTEMS   As above in the HPI, o/w complete 12-point ROS was negative.     PHYSICAL EXAM   /67   Pulse 86   Temp 97.1  F (36.2  C)   Resp 18   Wt 72.1 kg (159 lb)   SpO2 98%   BMI 21.97 kg/m    General: No acute distress  HEENT: Sclera anicteric. Oral mucosa pink and moist.  No mucositis or thrush  Lymph: No lymphadenopathy in neck  Heart: Regular, rate, and rhythm  Lungs: Clear to ascultation bilaterally  Abdomen: Positive bowel sounds. Soft, non-distended, non-tender. No organomegaly or mass.   MSK: tenderness to palpation along the anterior,inferior left sided ribs.   Neuro: Cranial nerves grossly intact  Rash: none       LABORATORY AND IMAGING STUDIES   Most Recent 3 CBC's:  Recent Labs   Lab Test 02/06/24  1019 02/02/24  0843 01/16/24  1122 12/28/23  0706   WBC 7.0 6.1 7.0 7.4   HGB 11.9* 11.4* 12.2* 12.8*   MCV 94 92 94 92    340 237 227   ANEUTAUTO 4.4  --  5.7 4.8     Most Recent 3 BMP's:  Recent Labs   Lab Test 02/06/24  1019 02/02/24  0843 01/16/24  1122    139 142   POTASSIUM 3.6 4.2 4.0   CHLORIDE 105 104 105   CO2 28 24 28   BUN 18.6 22.0 16.5   CR 0.87 0.80 0.74   ANIONGAP 9 11 9   ZABRINA 9.5 9.2 9.3   * 176* 174*   PROTTOTAL 6.6 6.2* 6.5   ALBUMIN 3.8 3.5 3.9    Most Recent 3 LFT's:  Recent Labs   Lab Test 02/06/24  1019 02/02/24  0843 01/16/24  1122   AST 18 16 14   ALT 14 9 11   ALKPHOS 58 61 70   BILITOTAL 0.6 0.4 0.5    Most Recent 2 TSH and T4:No lab results found.  Component      Latest Ref Rng 11/14/2023  7:52 AM 12/4/2023  2:32 PM 12/28/2023  7:06 AM 1/16/2024  11:22 AM 2/2/2024  8:43 AM   PSA Tumor Marker      ng/mL 160.20  203.80  196.30  200.20  203.10      Chest XR 2/6/24:  Findings:  PA and lateral views of the upright chest.. Trachea is midline.  Cardiomediastinal silhouette is within normal limits. No focal  airspace opacity. No  pneumothorax or pleural effusion. The visualized  upper abdomen is unremarkable. No acute osseous abnormalities.                                                                      Impression: No acute airspace disease.    I reviewed the above labs and imaging today.       ASSESSMENT AND PLAN   -Castration resistant metastatic prostate cancer   Post radical prostatectomy on 12/9/2009; salvage radiation ending May 2010; androgen deprivation therapy since 2013   Post progression on enzalutamide and abiraterone with prednisone  -Nonmuscle invasive bladder cancer diagnosed in 2011 without any recent recurrence  -No significant medical comorbidity  -ECOG performance status of 0     #Castration resistant metastatic prostate cancer.   - Has progressed on novel antiandrogen therapies including abiraterone with prednisone and enzalutamide.  He had a PSMA PET CT scan which did show extensive metastasis to the lymph nodes and bones. Referred to [facility] for the MARLO trial, randomized to Arm B with docetaxel and Radium. Docetaxel is administered every 3 weeks for 6-10 doses and Radium every 6 weeks for 6 doses.   - He tolerated docetaxel and radium reasonably well with the exception of significant constipation for nearly 1.5-2 weeks after his first cycle. Constipation has improved since cycle 2 docetaxel. Overall he is feeling more fatigued. Despite this, he is willing to continue.   - Labs reviewed and appropriate to continue, he has stable anemia. PSA is remained stable ~200 since starting the MARLO trial. He has not had a dramatic PSA response but none the less with stable PSA it is reasonable to continue.  - Proceed with cycle 3 Radium 2/7/24.     - continue Eligard every 4 months with Dr. Philip at [facility].  He is scheduled for next dose in March.     #Bone Metastasis   - Zometa every 6 months with local oncologist. Consider moving up frequency to every 6 weeks. Could move to [facility] during the time of trial participation  and administer while he is here for ease of appointments. Not specifically reviewed today.   - rare risk of fractures associated with docetaxel and radium therefore continuing Zometa is recommended.     #Left sided rib pain   - Chest XR 2/6/24 negative. No acute airspace abnormalities or fractures. Suspect pain is musculoskeletal in nature after calls 2 weeks ago.     #Constipation   - Continue scheduling ducolax 2-3 tablets per day for at least 2-4 days and then prn.   - If no bowel movement have 2-3 days, recommend adding in Milk of Mag. If no bowel movement after 6 hours, repeat.     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 CT neck with calcifications. Called patient and wife to discuss there results. They have seen a neurologist in the past at [facility] who they plan to follow up with along with their PCP.   - No recurrent episodes reported today, 12/28/2023.     # Falls  - Two episodes of laying on the ground. Unclear source or fall given poor historian. Patient did have low grade temperature during this period thus illness pay have played a role but unclear. He also has poor water intake thus dehydration may be contributing to weakness vs. Chemotherapy. Continue to monitor closely for recurrence.     Discussed plan with Dr. Gamez who agree's with proceeding with taxotere and docetaxel 60 mg/m2. Monitor closely for recurrent falls.     Patient was seen and evaluated with study RNCC, Cammy López.      57 minutes spent on the date of the encounter doing chart review, review of test results, patient visit, and documentation     Valentine Ball PA-C          Again, thank you for allowing me to participate in the care of your patient.        Sincerely,        Valentine Ball PA-C

## 2024-02-06 NOTE — NURSING NOTE
[id number]: Study Visit Note   Subject name: Alonso Pettit     Visit: C5D85    Did the study visit occur within the appropriate window allowed by the protocol? yes    Since the last study visit, He has been doing OK. Had two falls one on 1/24 and 1/25 (see constantino goldsmith note for more detail) since then his left side of his ribcage has been hurting especially when he takes a deep breath in, patient to go for Chest Xray to assess for infection/fracture. Repeating labs due to symptoms since the last cycle. Constipation has resolved. Thinks that he is getting a headache more often over the past 2-3 weeks. Dehydration also added to AE log (grade 1) patient is not drinking much BP is soft. Had some dyspnea on exertion 2 weeks ago as well.     Were any SSEs noted since last study visit? Patient is going for an xray now to assess for fracture. No Fracture on imaging, no SSE.   -the use of EBRT to relieve skeletal symptoms  -the occurrence of new symptomatic pathological bone fractures  (vertebral or non-vertebral)  -the occurrence of spinal cord compression  -a tumor-related orthopedic surgical intervention    Verbal handover provided to infusion RN; reminded RN to document actual start time of infusion and actual stop time of the infusion. If infusion deviates from protocol directed infusion time, please document rationale in your infusion note.    I have personally interviewed Alonso Pettit and reviewed his medical record for adverse events and concomitant medications and these have been recorded on the corresponding logs in Alonso Pettit's research file.     Alonso Pettit was given the opportunity to ask any trial related questions.  Please see provider progress note for physical exam and other clinical information. Labs were reviewed - any significant lab values were addressed and reviewed.    Cammy López RN

## 2024-02-06 NOTE — NURSING NOTE
"Oncology Rooming Note    February 6, 2024 8:51 AM   Alonso Pettit is a 83 year old male who presents for:    Chief Complaint   Patient presents with    Oncology Clinic Visit     Prostate cancer      Initial Vitals: BP 98/61   Pulse 86   Resp 18   Wt 72.1 kg (159 lb)   SpO2 98%   BMI 21.97 kg/m   Estimated body mass index is 21.97 kg/m  as calculated from the following:    Height as of 10/24/23: 1.812 m (5' 11.34\").    Weight as of this encounter: 72.1 kg (159 lb). Body surface area is 1.91 meters squared.  Mild Pain (3) Comment: Data Unavailable   No LMP for male patient.  Allergies reviewed: Yes  Medications reviewed: No. Pt denied to review medications today.     Medications: Medication refills not needed today.  Pharmacy name entered into [employer]: [id number] [place] [address]    Frailty Screening:   Is the patient here for a new oncology consult visit in cancer care? 2. No      Clinical concerns: no other complaints       Justin Wood"

## 2024-02-06 NOTE — PATIENT INSTRUCTIONS
[facility] Triage and after hours / weekends / holidays:  [phone]    Please call the triage or after hours line if you experience a temperature greater than or equal to 100.4, shaking chills, have uncontrolled nausea, vomiting and/or diarrhea, dizziness, shortness of breath, chest pain, bleeding, unexplained bruising, or if you have any other new/concerning symptoms, questions or concerns.      If you are having any concerning symptoms or wish to speak to a provider before your next infusion visit, please call triage to notify them so we can adequately serve you.     If you need a refill on a narcotic prescription or other medication, please call before your infusion appointment.                 February 2024 Sunday Monday Tuesday Wednesday Thursday Friday Saturday                       1     2    LAB PERIPHERAL   8:30 AM   (15 min.)   [facility] LAB DRAW   [facility] 3       4     5     6    RETURN CCSL   8:45 AM   (45 min.)   Valentine Ball PA-C   [facility]    XR CHEST 2 VIEWS   9:25 AM   (20 min.)   [id number]   [facility] [place]    LAB PERIPHERAL   9:45 AM   (15 min.)   [facility] LAB DRAW   [facility]    ONC INFUSION 3 HR (180 MIN)  10:00 AM   (180 min.)    ONC INFUSION NURSE   [facility] 7    NM RESEARCH RADIOTHERAPY  12:45 PM   (60 min.)   UU NM RADIOTHERAPY 5   [facility] 8     9     10       11     12     13     14     15     16     17       18     19     20     21     22     23     24       25     26     27    LAB PERIPHERAL   8:30 AM   (15 min.)   [facility] DRAW   [facility]    RETURN CCSL   8:45 AM   (45 min.)   Valentine Ball PA-C   [facility]    ONC INFUSION 3 HR (180 MIN)  10:00 AM   (180 min.)    ONC INFUSION NURSE   [facility]  28     29 March 2024 Sunday Monday Tuesday Wednesday Thursday Friday Saturday                            1     2       3     4     5     6     7     8     9       10     11     12     13     14     15    NM INJ   8:45 AM   (15 min.)   [id number]   [facility]    CT CHEST/ABDOMEN/PELVIS W   8:50 AM   (20 min.)   [id number]   [facility]    LAB PERIPHERAL  10:30 AM   (15 min.)    [facility] LAB DRAW   [facility] 16       17     18     19    RETURN CCSL   8:45 AM   (30 min.)   Kg Gamez MD   [facility]    ONC INFUSION 3 HR (180 MIN)  10:00 AM   (180 min.)   UC ONC INFUSION NURSE   [facility] 20    NM RESEARCH RADIOTHERAPY  12:45 PM   (60 min.)   UU NM RADIOTHERAPY 5   [facility] 21     22     23       24     25     26     27     28     29     30       31                                                   Lab Results:  Recent Results (from the past 12 hour(s))   Comprehensive metabolic panel    Collection Time: 02/06/24 10:19 AM   Result Value Ref Range    Sodium 142 135 - 145 mmol/L    Potassium 3.6 3.4 - 5.3 mmol/L    Carbon Dioxide (CO2) 28 22 - 29 mmol/L    Anion Gap 9 7 - 15 mmol/L    Urea Nitrogen 18.6 8.0 - 23.0 mg/dL    Creatinine 0.87 0.67 - 1.17 mg/dL    GFR Estimate 87 >60 mL/min/1.73m2    Calcium 9.5 8.8 - 10.2 mg/dL    Chloride 105 98 - 107 mmol/L    Glucose 120 (H) 70 - 99 mg/dL    Alkaline Phosphatase 58 40 - 150 U/L    AST 18 0 - 45 U/L    ALT 14 0 - 70 U/L    Protein Total 6.6 6.4 - 8.3 g/dL    Albumin 3.8 3.5 - 5.2 g/dL    Bilirubin Total 0.6 <=1.2 mg/dL   CBC with platelets and differential    Collection Time: 02/06/24 10:19 AM   Result Value Ref Range    WBC Count 7.0 4.0 - 11.0 10e3/uL    RBC Count 3.83 (L) 4.40 - 5.90 10e6/uL    Hemoglobin 11.9 (L) 13.3 - 17.7 g/dL    Hematocrit 35.8 (L) 40.0 - 53.0 %    MCV 94 78 - 100 fL     MCH 31.1 26.5 - 33.0 pg    MCHC 33.2 31.5 - 36.5 g/dL    RDW 14.3 10.0 - 15.0 %    Platelet Count 375 150 - 450 10e3/uL    % Neutrophils 64 %    % Lymphocytes 22 %    % Monocytes 10 %    % Eosinophils 0 %    % Basophils 1 %    % Immature Granulocytes 3 %    NRBCs per 100 WBC 0 <1 /100    Absolute Neutrophils 4.4 1.6 - 8.3 10e3/uL    Absolute Lymphocytes 1.6 0.8 - 5.3 10e3/uL    Absolute Monocytes 0.7 0.0 - 1.3 10e3/uL    Absolute Eosinophils 0.0 0.0 - 0.7 10e3/uL    Absolute Basophils 0.1 0.0 - 0.2 10e3/uL    Absolute Immature Granulocytes 0.2 <=0.4 10e3/uL    Absolute NRBCs 0.0 10e3/uL

## 2024-02-06 NOTE — PROGRESS NOTES
[facility]  HEMATOLOGY AND ONCOLOGY    FOLLOW-UP VISIT NOTE    PATIENT NAME: Alonso Pettit MRN # [id number]  DATE OF VISIT: Feb 6, 2024 YOB: 1940    REFERRING PROVIDER: [facility]    CANCER TYPE: Prostate adenocarcinoma; [place] 4+5  STAGE: IV (bony metastasis)  MOLECULAR PROFILE: No actionable mutations/MSI or high TMB; TP53 mutation positive    TREATMENT SUMMARY:  -12/9/2009: Radical prostatectomy; pathology revealed [place] 4+5 disease, stage pT3a,N0 positive margins  -Mar-May2010: Salvage external beam radiation therapy  -Apr 2013: Intermittent androgen deprivation therapy with leuprolide for biochemical PSA relapse  -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 Radiation therapy to the left supraclavicular lymph node ending in September 2020.  He was continued on enzalutamide  -May 2022: Switch to abiraterone with prednisone for progressive disease  -Aug 2023: Abiraterone discontinued for progressive disease.  PSMA PET scan with PSMA avid osseous and brisa metastasis.  -11/14/2023: MARLO trial: cycle 1 docetaxel. 11/15/23 cycle 1 radium per the MARLO trial.      Chemotherapy 11/14/2023  10:05 AM 11/15/2023  1:39 PM 12/6/2023  9:15 AM 12/28/2023  9:07 AM   Day, Cycle Day 1, Cycle 1  Day 1, Cycle 1  Day 1, Cycle 2  Day 1, Cycle 3    DOCEtaxel (TAXOTERE) IV 60 mg/m2   60 mg/m2  60 mg/m2    radium Ra-223 Dichloride IV  1.473 microcurie/kg      .2 ng/ml  203 ng/ml 196 ng/ml     CURRENT INTERVENTIONS:  MARLO Trial randomized to Arm B with Docetaxel/prednisone/Radium-233. Leuprolide every 4 months.     SUBJECTIVE   Alonso Pettit is being followed for castration resistant metastatic prostate cancer. He returns to clinic accompanied by his wife, Macey. He is seen today prior to  cycle 5 docetaxel and cycle 3 Radium-233.     He reports that he is doing okay today.  About 2 weeks ago he had to episodes where he ended up on the floor within 48 hours.  The first episode was when he went to get up to go to the bathroom in the middle of the night.  Macey woke up and found him on the floor.  48 hours later on 1/26/24, Macey went to run errands came home and found him back on the floor in the room.  He required a call to the neighbor to help get him up to his bed.  Macey took his temperature and it was 99F. She took his temperature again on 1/27/24 and it was around 100.2 F. They called triage who recommended he go to the ER. They did not go. He has night sweats for 2 days around this same time.   He endorses left sided rib pain, Alonso is unsure if he injured it during his ?falls. Pain increases with a deep breath.   No stroke like symptoms per patient and macey.  He does seem to be slowing down with chemotherapy and walking and sleeping more. He found going [place] at [place] more difficult.   No sinus congestion. He does have headaches.   He is taking tylenol prn for pains.   No urinary changes.   No diarrhea. He has not had as much constipation. ducolax 2-3 tablets daily for the first 2 days.   No nausea or vomiting.   Macey thinks he is dehydrated, he is only drinking ~16-24 oz of water per day. Macey states this is a long standing buchanan to get Alonso to drink more.    No appetite changes. Eating well.   Endorses a cough, that feels different than his waxing and waning chronic dry cough.   Bowels are improved, less constipation with the last cycle. He is taking 2-3 tablets of ducolax x 2 days after his docetaxel infusion and then prn.     ROS: 14 point ROS neg other than the symptoms noted above in the HPI.      PAST MEDICAL HISTORY     Past Medical History:   Diagnosis Date    Prostate cancer (H)          CURRENT OUTPATIENT MEDICATIONS     Current Outpatient Medications   Medication  Sig    aspirin 81 mg chewable tablet [ASPIRIN 81 MG CHEWABLE TABLET] Chew 81 mg daily. (Patient not taking: Reported on 10/18/2023)    bisacodyl (DULCOLAX) 5 MG EC tablet Take 5 mg by mouth 2 times daily    calcium carbonate (CALCIUM 500 ORAL) [CALCIUM CARBONATE (CALCIUM 500 ORAL)] Take by mouth. (Patient not taking: Reported on 1/16/2024)    cyclobenzaprine (FLEXERIL) 10 MG tablet 1/2-1 TAB ORALLY UP TO 3 TIMES A DAY AS NEEDED FOR MUSCLE SPASM    dexAMETHasone (DECADRON) 4 MG tablet Take 2 tablets (8 mg) by mouth 2 times daily (with meals) Start evening of Docetaxel infusion and continue for a total of 3 doses.    leuprolide (LUPRON) 11.25 mg injection Inject 11.25 mg into the muscle every 3 months    predniSONE (DELTASONE) 5 MG tablet Take 1 tablet (5 mg) by mouth 2 times daily for 21 days    prochlorperazine (COMPAZINE) 10 MG tablet Take 0.5 tablets (5 mg) by mouth every 6 hours as needed for nausea or vomiting (Patient not taking: Reported on 1/16/2024)    pseudoePHEDrine-guaiFENesin (MUCINEX D)  MG 12 hr tablet  (Patient not taking: Reported on 12/6/2023)    psyllium (METAMUCIL/KONSYL) Packet Take 1 packet by mouth daily     No current facility-administered medications for this visit.        ALLERGIES    No Known Allergies     REVIEW OF SYSTEMS   As above in the HPI, o/w complete 12-point ROS was negative.     PHYSICAL EXAM   /67   Pulse 86   Temp 97.1  F (36.2  C)   Resp 18   Wt 72.1 kg (159 lb)   SpO2 98%   BMI 21.97 kg/m    General: No acute distress  HEENT: Sclera anicteric. Oral mucosa pink and moist.  No mucositis or thrush  Lymph: No lymphadenopathy in neck  Heart: Regular, rate, and rhythm  Lungs: Clear to ascultation bilaterally  Abdomen: Positive bowel sounds. Soft, non-distended, non-tender. No organomegaly or mass.   MSK: tenderness to palpation along the anterior,inferior left sided ribs.   Neuro: Cranial nerves grossly intact  Rash: none       LABORATORY AND IMAGING STUDIES    Most Recent 3 CBC's:  Recent Labs   Lab Test 02/06/24  1019 02/02/24  0843 01/16/24  1122 12/28/23  0706   WBC 7.0 6.1 7.0 7.4   HGB 11.9* 11.4* 12.2* 12.8*   MCV 94 92 94 92    340 237 227   ANEUTAUTO 4.4  --  5.7 4.8     Most Recent 3 BMP's:  Recent Labs   Lab Test 02/06/24  1019 02/02/24  0843 01/16/24  1122    139 142   POTASSIUM 3.6 4.2 4.0   CHLORIDE 105 104 105   CO2 28 24 28   BUN 18.6 22.0 16.5   CR 0.87 0.80 0.74   ANIONGAP 9 11 9   ZABRINA 9.5 9.2 9.3   * 176* 174*   PROTTOTAL 6.6 6.2* 6.5   ALBUMIN 3.8 3.5 3.9    Most Recent 3 LFT's:  Recent Labs   Lab Test 02/06/24  1019 02/02/24  0843 01/16/24  1122   AST 18 16 14   ALT 14 9 11   ALKPHOS 58 61 70   BILITOTAL 0.6 0.4 0.5    Most Recent 2 TSH and T4:No lab results found.  Component      Latest Ref Rng 11/14/2023  7:52 AM 12/4/2023  2:32 PM 12/28/2023  7:06 AM 1/16/2024  11:22 AM 2/2/2024  8:43 AM   PSA Tumor Marker      ng/mL 160.20  203.80  196.30  200.20  203.10      Chest XR 2/6/24:  Findings:  PA and lateral views of the upright chest.. Trachea is midline.  Cardiomediastinal silhouette is within normal limits. No focal  airspace opacity. No pneumothorax or pleural effusion. The visualized  upper abdomen is unremarkable. No acute osseous abnormalities.                                                                      Impression: No acute airspace disease.    I reviewed the above labs and imaging today.       ASSESSMENT AND PLAN   -Castration resistant metastatic prostate cancer   Post radical prostatectomy on 12/9/2009; salvage radiation ending May 2010; androgen deprivation therapy since 2013   Post progression on enzalutamide and abiraterone with prednisone  -Nonmuscle invasive bladder cancer diagnosed in 2011 without any recent recurrence  -No significant medical comorbidity  -ECOG performance status of 0     #Castration resistant metastatic prostate cancer.   - Has progressed on novel antiandrogen therapies including  abiraterone with prednisone and enzalutamide.  He had a PSMA PET CT scan which did show extensive metastasis to the lymph nodes and bones. Referred to [facility] for the MARLO trial, randomized to Arm B with docetaxel and Radium. Docetaxel is administered every 3 weeks for 6-10 doses and Radium every 6 weeks for 6 doses.   - He tolerated docetaxel and radium reasonably well with the exception of significant constipation for nearly 1.5-2 weeks after his first cycle. Constipation has improved since cycle 2 docetaxel. Overall he is feeling more fatigued. Despite this, he is willing to continue.   - Labs reviewed and appropriate to continue, he has stable anemia. PSA is remained stable ~200 since starting the MARLO trial. He has not had a dramatic PSA response but none the less with stable PSA it is reasonable to continue.  - Proceed with cycle 3 Radium 2/7/24.     - continue Eligard every 4 months with Dr. Philip at [facility].  He is scheduled for next dose in March.     #Bone Metastasis   - Zometa every 6 months with local oncologist. Consider moving up frequency to every 6 weeks. Could move to [facility] during the time of trial participation and administer while he is here for ease of appointments. Not specifically reviewed today.   - rare risk of fractures associated with docetaxel and radium therefore continuing Zometa is recommended.     #Left sided rib pain   - Chest XR 2/6/24 negative. No acute airspace abnormalities or fractures. Suspect pain is musculoskeletal in nature after calls 2 weeks ago.     #Constipation   - Continue scheduling ducolax 2-3 tablets per day for at least 2-4 days and then prn.   - If no bowel movement have 2-3 days, recommend adding in Milk of Mag. If no bowel movement after 6 hours, repeat.     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 CT neck with calcifications. Called patient and  wife to discuss there results. They have seen a neurologist in the past at [facility] who they plan to follow up with along with their PCP.   - No recurrent episodes reported today, 12/28/2023.     # Falls  - Two episodes of laying on the ground. Unclear source or fall given poor historian. Patient did have low grade temperature during this period thus illness pay have played a role but unclear. He also has poor water intake thus dehydration may be contributing to weakness vs. Chemotherapy. Continue to monitor closely for recurrence.     Discussed plan with Dr. Gamez who agree's with proceeding with taxotere and docetaxel 60 mg/m2. Monitor closely for recurrent falls.     Patient was seen and evaluated with study RNCC, Cammy López.      57 minutes spent on the date of the encounter doing chart review, review of test results, patient visit, and documentation     Valentine Ball PA-C

## 2024-02-07 ENCOUNTER — HOSPITAL ENCOUNTER (OUTPATIENT)
Dept: NUCLEAR MEDICINE | Facility: CLINIC | Age: 84
Setting detail: NUCLEAR MEDICINE
Discharge: HOME | End: 2024-02-07
Attending: INTERNAL MEDICINE
Payer: MEDICARE

## 2024-02-07 ENCOUNTER — ALLIED HEALTH/NURSE VISIT (OUTPATIENT)
Dept: ONCOLOGY | Facility: CLINIC | Age: 84
End: 2024-02-07
Payer: MEDICARE

## 2024-02-07 VITALS
SYSTOLIC BLOOD PRESSURE: 151 MMHG | HEART RATE: 79 BPM | DIASTOLIC BLOOD PRESSURE: 81 MMHG | RESPIRATION RATE: 14 BRPM | TEMPERATURE: 95.8 F | OXYGEN SATURATION: 99 %

## 2024-02-07 DIAGNOSIS — C61 PROSTATE CANCER (H): Primary | ICD-10-CM

## 2024-02-07 DIAGNOSIS — C79.51 MALIGNANT NEOPLASM METASTATIC TO BONE (H): ICD-10-CM

## 2024-02-07 PROCEDURE — 999N000128 HC STATISTIC PERIPHERAL IV START W/O US GUIDANCE

## 2024-02-07 NOTE — PROGRESS NOTES
[facility] Nursing Note:    Patient presents today for XOFIGO (Ra-223) therapy (MARLO Study), dose 3 of  6  Patient seen by provider today: Yes: Dr Torrez   present during visit today: NOT APPLICABLE      Intravenous Access:  Peripheral IV placed     Treatment Conditions:  Labs done on  2/6/24  Results reviewed, labs Met treatment parameters, ok to proceed with treatment.           Post Infusion Assessment:  Patient tolerated infusion without incident.  PIVs - No evidence of extravasations, access discontinued per protocol.         Discharge Plan:   Patient will return as scheduled for next appointment.   Patient discharged at 1400 pm in stable condition accompanied by: wife  Departure Mode: Ambulatory

## 2024-02-19 DIAGNOSIS — C79.51 MALIGNANT NEOPLASM METASTATIC TO BONE (H): Primary | ICD-10-CM

## 2024-02-26 NOTE — PROGRESS NOTES
[facility]  HEMATOLOGY AND ONCOLOGY    FOLLOW-UP VISIT NOTE    PATIENT NAME: Alonso Pettit MRN # [id number]  DATE OF VISIT: Feb 27, 2024 YOB: 1940    REFERRING PROVIDER: [facility]    CANCER TYPE: Prostate adenocarcinoma; Lanagan 4+5  STAGE: IV (bony metastasis)  MOLECULAR PROFILE: No actionable mutations/MSI or high TMB; TP53 mutation positive    TREATMENT SUMMARY:  -12/9/2009: Radical prostatectomy; pathology revealed Scott 4+5 disease, stage pT3a,N0 positive margins  -Mar-May2010: Salvage external beam radiation therapy  -Apr 2013: Intermittent androgen deprivation therapy with leuprolide for biochemical PSA relapse  -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 Radiation therapy to the left supraclavicular lymph node ending in September 2020.  He was continued on enzalutamide  -May 2022: Switch to abiraterone with prednisone for progressive disease  -Aug 2023: Abiraterone discontinued for progressive disease.  PSMA PET scan with PSMA avid osseous and brisa metastasis.  -11/14/2023: MARLO trial: cycle 1 docetaxel. 11/15/23 cycle 1 radium per the MARLO trial.      Chemotherapy 11/14/2023  10:05 AM 11/15/2023  1:39 PM 12/6/2023  9:15 AM 12/28/2023  9:07 AM   Day, Cycle Day 1, Cycle 1  Day 1, Cycle 1  Day 1, Cycle 2  Day 1, Cycle 3    DOCEtaxel (TAXOTERE) IV 60 mg/m2   60 mg/m2  60 mg/m2    radium Ra-223 Dichloride IV  1.473 microcurie/kg      .2 ng/ml  203 ng/ml 196 ng/ml     CURRENT INTERVENTIONS:  MARLO Trial randomized to Arm B with Docetaxel/prednisone/Radium-233. Leuprolide every 4 months.     SUBJECTIVE   Alonso Pettit is being followed for castration resistant metastatic prostate cancer. He returns to clinic accompanied by his wife, Macey. He is seen today prior to  cycle 6 docetaxel.    Alonso returns to clinic today accompanied by Macey.  He reports that he is feeling well today.    Alonso thinks his energy is about the same.  Macey feels like he is sleeping about an extra 1 hour overnight.  On average sleeping from 10 PM to 10 AM.    He denies any falls over the last 3 weeks.  Previous left rib pain is improving.  No nausea or vomiting.    They have a regimen that seems to be working very well for his constipation.    He feels he has loss muscle mass in his legs as going up and down stairs multiple times over the weekend was more challenging than it has been int he past.   No recurrent fevers with the last cycle.   No nausea or vomiting.   No urinary changes.   No chest pain, shortness of breath, or new cough. He does have a chronic cough that is unchanged to slightly better.  No nausea or vomiting.   He reports a stressful morning and he thought that they placed his IV without getting actual labs off the line. He was surprised to hear labs were obtained. He denies any confusion episodes over the last three weeks. Macey agree's there has been no confusion.   No headaches or vision changes.     ROS: 14 point ROS neg other than the symptoms noted above in the HPI.      PAST MEDICAL HISTORY     Past Medical History:   Diagnosis Date    Prostate cancer (H)          CURRENT OUTPATIENT MEDICATIONS     Current Outpatient Medications   Medication Sig    aspirin 81 mg chewable tablet [ASPIRIN 81 MG CHEWABLE TABLET] Chew 81 mg daily. (Patient not taking: Reported on 10/18/2023)    bisacodyl (DULCOLAX) 5 MG EC tablet Take 5 mg by mouth 2 times daily    calcium carbonate (CALCIUM 500 ORAL) [CALCIUM CARBONATE (CALCIUM 500 ORAL)] Take by mouth. (Patient not taking: Reported on 1/16/2024)    cyclobenzaprine (FLEXERIL) 10 MG tablet 1/2-1 TAB ORALLY UP TO 3 TIMES A DAY AS NEEDED FOR MUSCLE SPASM    dexAMETHasone (DECADRON) 4 MG tablet Take 2 tablets (8 mg) by mouth 2 times daily (with meals)  Start evening of Docetaxel infusion and continue for a total of 3 doses.    leuprolide (LUPRON) 11.25 mg injection Inject 11.25 mg into the muscle every 3 months    predniSONE (DELTASONE) 5 MG tablet Take 1 tablet (5 mg) by mouth 2 times daily for 21 days    prochlorperazine (COMPAZINE) 10 MG tablet Take 0.5 tablets (5 mg) by mouth every 6 hours as needed for nausea or vomiting (Patient not taking: Reported on 1/16/2024)    pseudoePHEDrine-guaiFENesin (MUCINEX D)  MG 12 hr tablet  (Patient not taking: Reported on 12/6/2023)    psyllium (METAMUCIL/KONSYL) Packet Take 1 packet by mouth daily     No current facility-administered medications for this visit.        ALLERGIES    No Known Allergies     REVIEW OF SYSTEMS   As above in the HPI, o/w complete 12-point ROS was negative.     PHYSICAL EXAM   BP (!) 158/75 (BP Location: Right arm, Patient Position: Sitting, Cuff Size: Adult Regular)   Pulse 74   Temp 97.7  F (36.5  C) (Oral)   Resp 18   Wt 73.3 kg (161 lb 8 oz)   SpO2 97%   BMI 22.48 kg/m    General: No acute distress  HEENT: Sclera anicteric. Oral mucosa pink and moist.  No mucositis or thrush  Lymph: No lymphadenopathy in neck  Heart: Regular, rate, and rhythm  Lungs: Clear to ascultation bilaterally  Abdomen: Positive bowel sounds. Soft, non-distended, non-tender. No organomegaly or mass.   MSK: no peripheral edema bilaterally.   Neuro: Cranial nerves grossly intact. Oriented to person, place, time, and date.   Rash: none       LABORATORY AND IMAGING STUDIES   Most Recent 3 CBC's:  Recent Labs   Lab Test 02/27/24  0838 02/06/24  1019 02/02/24  0843 01/16/24  1122   WBC 7.0 7.0 6.1 7.0   HGB 11.8* 11.9* 11.4* 12.2*   MCV 97 94 92 94    375 340 237   ANEUTAUTO 5.3 4.4  --  5.7     Most Recent 3 BMP's:  Recent Labs   Lab Test 02/06/24  1019 02/02/24  0843 01/16/24  1122    139 142   POTASSIUM 3.6 4.2 4.0   CHLORIDE 105 104 105   CO2 28 24 28   BUN 18.6 22.0 16.5   CR 0.87 0.80 0.74    ANIONGAP 9 11 9   ZABRINA 9.5 9.2 9.3   * 176* 174*   PROTTOTAL 6.6 6.2* 6.5   ALBUMIN 3.8 3.5 3.9    Most Recent 3 LFT's:  Recent Labs   Lab Test 02/06/24  1019 02/02/24  0843 01/16/24  1122   AST 18 16 14   ALT 14 9 11   ALKPHOS 58 61 70   BILITOTAL 0.6 0.4 0.5    Most Recent 2 TSH and T4:No lab results found.    Component      Latest Ref Rng 11/14/2023  7:52 AM 12/4/2023  2:32 PM 12/28/2023  7:06 AM 1/16/2024  11:22 AM 2/2/2024  8:43 AM   PSA Tumor Marker      ng/mL 160.20  203.80  196.30  200.20  203.10      I reviewed the above labs today.       ASSESSMENT AND PLAN   -Castration resistant metastatic prostate cancer   Post radical prostatectomy on 12/9/2009; salvage radiation ending May 2010; androgen deprivation therapy since 2013   Post progression on enzalutamide and abiraterone with prednisone  -Nonmuscle invasive bladder cancer diagnosed in 2011 without any recent recurrence  -No significant medical comorbidity  -ECOG performance status of 0     #Castration resistant metastatic prostate cancer.   - Has progressed on novel antiandrogen therapies including abiraterone with prednisone and enzalutamide.  He had a PSMA PET CT scan which did show extensive metastasis to the lymph nodes and bones. Referred to [facility] for the MARLO trial, randomized to Arm B with docetaxel and Radium. Docetaxel is administered every 3 weeks for 6-10 doses and Radium every 6 weeks for 6 doses.   - He tolerated docetaxel and radium reasonably well with the exception of significant constipation for nearly 1.5-2 weeks after his first cycle. Constipation has improved since cycle 2 docetaxel with a regular bowel regimen. He has noticed mild worsening of fatigue with each subsequent dose.   - CBC reviewed and demonstrates stable anemia. PSA is remained stable ~200 since starting the MARLO trial. He has not had a dramatic PSA response but none the less with stable PSA it is reasonable to continue. CMP and PSA is pending today. We will fax  his lab results to Dr. Philip's team for his upcoming visit.   - Received cycle 3 Radium 2/7/24. Next due 3/20/24.     - continue Eligard every 4 months with Dr. Philip at [facility].  He is scheduled for next dose in March 6th 2024.     #Bone Metastasis   - Zometa every 6 months with local oncologist. Next scheduled on March 6th 2024. Consider moving up frequency to every 6 weeks. Could move to [facility] during the time of trial participation and administer while he is here for ease of appointments. Not specifically reviewed today.   - rare risk of fractures associated with docetaxel and radium therefore continuing Zometa is recommended.     #Left sided rib pain   - Chest XR 2/6/24 negative. No acute airspace abnormalities or fractures. Suspect pain is musculoskeletal in nature after calls 2 weeks ago.     #Constipation   - Continue scheduling ducolax 2-3 tablets per day for at least 2-4 days and then prn.   - If no bowel movement have 2-3 days, recommend adding in Milk of Mag. If no bowel movement after 6 hours, repeat.     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 CT neck with calcifications. Called patient and wife to discuss there results. They have seen a neurologist in the past at [facility] who they plan to follow up with along with their PCP.   - No recurrent episodes reported today, 12/28/2023.     # Falls  - Two episodes of laying on the ground after cycle 4 docetaxel. Unclear source or fall given poor historian. Patient did have low grade temperature during this period thus illness pay have played a role but unclear. He also has poor water intake thus dehydration may be contributing to weakness vs. Chemotherapy. Continue to monitor closely for recurrence. Did not recur with cycle 5.     #Cancer fatigue   #Deconditioning   - discussed with Alonso that regular exercise would be of benefit.  Discussed cancer rehab together as well today but Alonso is currently not interested.     Patient was seen and evaluated with study RNCC, Cammy López.      39 minutes spent on the date of the encounter doing chart review, review of test results, patient visit, and documentation     Valentine Ball PA-C    Addendum   CMP stable. Will proceed with cycle 6 docetaxel today 2/27/24.

## 2024-02-27 ENCOUNTER — APPOINTMENT (OUTPATIENT)
Dept: LAB | Facility: CLINIC | Age: 84
End: 2024-02-27
Attending: INTERNAL MEDICINE
Payer: MEDICARE

## 2024-02-27 ENCOUNTER — INFUSION THERAPY VISIT (OUTPATIENT)
Dept: ONCOLOGY | Facility: CLINIC | Age: 84
End: 2024-02-27
Attending: INTERNAL MEDICINE
Payer: MEDICARE

## 2024-02-27 ENCOUNTER — ONCOLOGY VISIT (OUTPATIENT)
Dept: ONCOLOGY | Facility: CLINIC | Age: 84
End: 2024-02-27
Payer: MEDICARE

## 2024-02-27 ENCOUNTER — ALLIED HEALTH/NURSE VISIT (OUTPATIENT)
Dept: ONCOLOGY | Facility: CLINIC | Age: 84
End: 2024-02-27

## 2024-02-27 VITALS
HEART RATE: 74 BPM | OXYGEN SATURATION: 97 % | WEIGHT: 161.5 LBS | SYSTOLIC BLOOD PRESSURE: 158 MMHG | DIASTOLIC BLOOD PRESSURE: 75 MMHG | RESPIRATION RATE: 18 BRPM | BODY MASS INDEX: 22.48 KG/M2 | TEMPERATURE: 97.7 F

## 2024-02-27 DIAGNOSIS — C61 PROSTATE CANCER (H): ICD-10-CM

## 2024-02-27 DIAGNOSIS — C79.51 MALIGNANT NEOPLASM METASTATIC TO BONE (H): Primary | ICD-10-CM

## 2024-02-27 LAB
ALBUMIN SERPL BCG-MCNC: 4.2 G/DL (ref 3.5–5.2)
ALP SERPL-CCNC: 66 U/L (ref 40–150)
ALT SERPL W P-5'-P-CCNC: 17 U/L (ref 0–70)
ANION GAP SERPL CALCULATED.3IONS-SCNC: 12 MMOL/L (ref 7–15)
AST SERPL W P-5'-P-CCNC: 18 U/L (ref 0–45)
BASOPHILS # BLD AUTO: 0 10E3/UL (ref 0–0.2)
BASOPHILS NFR BLD AUTO: 0 %
BILIRUB SERPL-MCNC: 0.8 MG/DL
BUN SERPL-MCNC: 15.3 MG/DL (ref 8–23)
CALCIUM SERPL-MCNC: 9.5 MG/DL (ref 8.8–10.2)
CHLORIDE SERPL-SCNC: 106 MMOL/L (ref 98–107)
CREAT SERPL-MCNC: 0.76 MG/DL (ref 0.67–1.17)
DEPRECATED HCO3 PLAS-SCNC: 24 MMOL/L (ref 22–29)
EGFRCR SERPLBLD CKD-EPI 2021: 90 ML/MIN/1.73M2
EOSINOPHIL # BLD AUTO: 0 10E3/UL (ref 0–0.7)
EOSINOPHIL NFR BLD AUTO: 0 %
ERYTHROCYTE [DISTWIDTH] IN BLOOD BY AUTOMATED COUNT: 15.5 % (ref 10–15)
GLUCOSE SERPL-MCNC: 104 MG/DL (ref 70–99)
HCT VFR BLD AUTO: 36.5 % (ref 40–53)
HGB BLD-MCNC: 11.8 G/DL (ref 13.3–17.7)
IMM GRANULOCYTES # BLD: 0.1 10E3/UL
IMM GRANULOCYTES NFR BLD: 1 %
LDH SERPL L TO P-CCNC: 235 U/L (ref 0–250)
LYMPHOCYTES # BLD AUTO: 1 10E3/UL (ref 0.8–5.3)
LYMPHOCYTES NFR BLD AUTO: 14 %
MAGNESIUM SERPL-MCNC: 1.7 MG/DL (ref 1.7–2.3)
MCH RBC QN AUTO: 31.2 PG (ref 26.5–33)
MCHC RBC AUTO-ENTMCNC: 32.3 G/DL (ref 31.5–36.5)
MCV RBC AUTO: 97 FL (ref 78–100)
MONOCYTES # BLD AUTO: 0.6 10E3/UL (ref 0–1.3)
MONOCYTES NFR BLD AUTO: 9 %
NEUTROPHILS # BLD AUTO: 5.3 10E3/UL (ref 1.6–8.3)
NEUTROPHILS NFR BLD AUTO: 76 %
NRBC # BLD AUTO: 0 10E3/UL
NRBC BLD AUTO-RTO: 0 /100
PHOSPHATE SERPL-MCNC: 3.4 MG/DL (ref 2.5–4.5)
PLATELET # BLD AUTO: 261 10E3/UL (ref 150–450)
POTASSIUM SERPL-SCNC: 3.8 MMOL/L (ref 3.4–5.3)
PROT SERPL-MCNC: 6.7 G/DL (ref 6.4–8.3)
PSA SERPL DL<=0.01 NG/ML-MCNC: 196 NG/ML
RBC # BLD AUTO: 3.78 10E6/UL (ref 4.4–5.9)
SODIUM SERPL-SCNC: 142 MMOL/L (ref 135–145)
WBC # BLD AUTO: 7 10E3/UL (ref 4–11)

## 2024-02-27 PROCEDURE — 83735 ASSAY OF MAGNESIUM: CPT | Performed by: INTERNAL MEDICINE

## 2024-02-27 PROCEDURE — 84153 ASSAY OF PSA TOTAL: CPT | Performed by: INTERNAL MEDICINE

## 2024-02-27 PROCEDURE — 258N000003 HC RX IP 258 OP 636: Performed by: INTERNAL MEDICINE

## 2024-02-27 PROCEDURE — 84100 ASSAY OF PHOSPHORUS: CPT | Performed by: INTERNAL MEDICINE

## 2024-02-27 PROCEDURE — 36415 COLL VENOUS BLD VENIPUNCTURE: CPT | Performed by: INTERNAL MEDICINE

## 2024-02-27 PROCEDURE — 96367 TX/PROPH/DG ADDL SEQ IV INF: CPT

## 2024-02-27 PROCEDURE — G0463 HOSPITAL OUTPT CLINIC VISIT: HCPCS | Mod: 25

## 2024-02-27 PROCEDURE — 99215 OFFICE O/P EST HI 40 MIN: CPT

## 2024-02-27 PROCEDURE — 85004 AUTOMATED DIFF WBC COUNT: CPT | Performed by: INTERNAL MEDICINE

## 2024-02-27 PROCEDURE — 83615 LACTATE (LD) (LDH) ENZYME: CPT | Performed by: INTERNAL MEDICINE

## 2024-02-27 PROCEDURE — 80053 COMPREHEN METABOLIC PANEL: CPT | Performed by: INTERNAL MEDICINE

## 2024-02-27 PROCEDURE — 250N000011 HC RX IP 250 OP 636: Performed by: INTERNAL MEDICINE

## 2024-02-27 PROCEDURE — 96413 CHEMO IV INFUSION 1 HR: CPT

## 2024-02-27 RX ORDER — PREDNISONE 5 MG/1
5 TABLET ORAL 2 TIMES DAILY
Qty: 42 TABLET | Refills: 0 | Status: SHIPPED | OUTPATIENT
Start: 2024-02-27 | End: 2024-03-19

## 2024-02-27 RX ADMIN — SODIUM CHLORIDE 250 ML: 9 INJECTION, SOLUTION INTRAVENOUS at 11:09

## 2024-02-27 RX ADMIN — DOCETAXEL 116 MG: 20 INJECTION, SOLUTION, CONCENTRATE INTRAVENOUS at 11:57

## 2024-02-27 RX ADMIN — DEXAMETHASONE SODIUM PHOSPHATE: 10 INJECTION, SOLUTION INTRAMUSCULAR; INTRAVENOUS at 11:22

## 2024-02-27 ASSESSMENT — PAIN SCALES - GENERAL: PAINLEVEL: NO PAIN (0)

## 2024-02-27 NOTE — PROGRESS NOTES
Infusion Nursing Note:  Alonso Pettit presents today for Cycle 6 Day 1 Taxotere.    Patient spoke with provider today: Yes: Valentine DOMINGO    Treatment Conditions:  Component      Latest Ref Rng 2/27/2024  8:38 AM   WBC      4.0 - 11.0 10e3/uL 7.0    RBC Count      4.40 - 5.90 10e6/uL 3.78 (L)    Hemoglobin      13.3 - 17.7 g/dL 11.8 (L)    Hematocrit      40.0 - 53.0 % 36.5 (L)    MCV      78 - 100 fL 97    MCH      26.5 - 33.0 pg 31.2    MCHC      31.5 - 36.5 g/dL 32.3    RDW      10.0 - 15.0 % 15.5 (H)    Platelet Count      150 - 450 10e3/uL 261    % Neutrophils      % 76    % Lymphocytes      % 14    % Monocytes      % 9    % Eosinophils      % 0    % Basophils      % 0    % Immature Granulocytes      % 1    NRBCs per 100 WBC      <1 /100 0    Absolute Neutrophils      1.6 - 8.3 10e3/uL 5.3    Absolute Lymphocytes      0.8 - 5.3 10e3/uL 1.0    Absolute Monocytes      0.0 - 1.3 10e3/uL 0.6    Absolute Eosinophils      0.0 - 0.7 10e3/uL 0.0    Absolute Basophils      0.0 - 0.2 10e3/uL 0.0    Absolute Immature Granulocytes      <=0.4 10e3/uL 0.1    Absolute NRBCs      10e3/uL 0.0    Sodium      135 - 145 mmol/L 142    Potassium      3.4 - 5.3 mmol/L 3.8    Carbon Dioxide (CO2)      22 - 29 mmol/L 24    Anion Gap      7 - 15 mmol/L 12    Urea Nitrogen      8.0 - 23.0 mg/dL 15.3    Creatinine      0.67 - 1.17 mg/dL 0.76    GFR Estimate      >60 mL/min/1.73m2 90    Calcium      8.8 - 10.2 mg/dL 9.5    Chloride      98 - 107 mmol/L 106    Glucose      70 - 99 mg/dL 104 (H)    Alkaline Phosphatase      40 - 150 U/L 66    AST      0 - 45 U/L 18    ALT      0 - 70 U/L 17    Protein Total      6.4 - 8.3 g/dL 6.7    Albumin      3.5 - 5.2 g/dL 4.2    Bilirubin Total      <=1.2 mg/dL 0.8    Magnesium      1.7 - 2.3 mg/dL 1.7    Phosphorus      2.5 - 4.5 mg/dL 3.4    Lactate Dehydrogenase      0 - 250 U/L 235           Note: Taxotere start time:  1157   Taxotere stop time: 1259.    No concerns during infusion visit.    Pt  stated he had prednisone at home, but will need a decadron refill today. Request sent to pharmacy and delivered prior to discharge       Intravenous Access:  Peripheral IV placed.    Post Infusion Assessment:  Patient tolerated infusion without incident.  Blood return noted pre and post infusion.  Access discontinued per protocol.    Discharge Plan:   Prescription refills given for decadron.  Patient declined prescription refills.  Discharge instructions reviewed with: Patient.  Patient and/or family verbalized understanding of discharge instructions and all questions answered.  Copy of AVS reviewed with patient and/or family.  Patient will return 3/19/24 for next appointment.  Patient discharged in stable condition accompanied by: wife.  Departure Mode: Ambulatory.    Manju Jensen RN

## 2024-02-27 NOTE — NURSING NOTE
"Oncology Rooming Note    February 27, 2024 8:52 AM   Alonso Pettit is a 83 year old male who presents for:    Chief Complaint   Patient presents with    Blood Draw     Labs drawn with PIV start by RN. Pt tolerated well. Vitals taken. Patient checked into next appointment.      Oncology Clinic Visit     Prostate CA     Initial Vitals: BP (!) 158/75 (BP Location: Right arm, Patient Position: Sitting, Cuff Size: Adult Regular)   Pulse 74   Temp 97.7  F (36.5  C) (Oral)   Resp 18   Wt 73.3 kg (161 lb 8 oz)   SpO2 97%   BMI 22.48 kg/m   Estimated body mass index is 22.48 kg/m  as calculated from the following:    Height as of 2/6/24: 1.805 m (5' 11.06\").    Weight as of this encounter: 73.3 kg (161 lb 8 oz). Body surface area is 1.92 meters squared.  No Pain (0) Comment: Data Unavailable   No LMP for male patient.  Allergies reviewed: Yes  Medications reviewed: Yes    Medications: Medication refills not needed today.  Pharmacy name entered into [facility]: [id number] [place] [address]    Frailty Screening:   Is the patient here for a new oncology consult visit in cancer care? 2. No      Clinical concerns:        Amy Conte              "

## 2024-02-27 NOTE — PATIENT INSTRUCTIONS
[facility] Triage and after hours / weekends / holidays:  [phone]    Please call the triage or after hours line if you experience a temperature greater than or equal to 100.4, shaking chills, have uncontrolled nausea, vomiting and/or diarrhea, dizziness, shortness of breath, chest pain, bleeding, unexplained bruising, or if you have any other new/concerning symptoms, questions or concerns.      If you are having any concerning symptoms or wish to speak to a provider before your next infusion visit, please call triage to notify them so we can adequately serve you.     If you need a refill on a narcotic prescription or other medication, please call before your infusion appointment.

## 2024-02-27 NOTE — NURSING NOTE
[id number]: Study Visit Note   Subject name: Alonso Pettit     Visit: Cycle 6 Day 106    Did the study visit occur within the appropriate window allowed by the protocol? yes    Since the last study visit, He has been doing well. Constipation resolved. Still drinking the same amount of water, wife and Valentine encouraging patient to go to the gym/walk more to keep up his strength. No N/V, fevers. Rib pain in the left side still present but has been improving and is not as bad as before. Dyspnea on exertion continues. Lower extremity muscle weakness has been apparent since his falls, which we encouraged him to exercise and walk for, patient declined offer of cancer rehab.     No other new concerns.      Were any SSEs noted since last study visit? No   -the use of EBRT to relieve skeletal symptoms  -the occurrence of new symptomatic pathological bone fractures  (vertebral or non-vertebral)  -the occurrence of spinal cord compression  -a tumor-related orthopedic surgical intervention    Verbal handover provided to infusion RN; reminded RN to document actual start time of infusion and actual stop time of the infusion. If infusion deviates from protocol directed infusion time, please document rationale in your infusion note.    I have personally interviewed Alonso Pettit and reviewed his medical record for adverse events and concomitant medications and these have been recorded on the corresponding logs in Alonso Pettit's research file.     Alonso Pettit was given the opportunity to ask any trial related questions.  Please see provider progress note for physical exam and other clinical information. Labs were reviewed - any significant lab values were addressed and reviewed.    Cammy López RN   Clinical Research Coordinator Nurse-Solid Tumor   Phone: [phone] Pgr: [phone]

## 2024-02-27 NOTE — LETTER
2/27/2024         RE: Alonso Pettit  [address] [facility] [address]        Dear Colleague,    Thank you for referring your patient, Alonso Pettit, to the [facility]. Please see a copy of my visit note below.    [facility]  HEMATOLOGY AND ONCOLOGY    FOLLOW-UP VISIT NOTE    PATIENT NAME: Alonso Pettit MRN # [id number]  DATE OF VISIT: Feb 27, 2024 YOB: 1940    REFERRING PROVIDER: [facility] onc[facility]    CANCER TYPE: Prostate adenocarcinoma; [place] 4+5  STAGE: IV (bony metastasis)  MOLECULAR PROFILE: No actionable mutations/MSI or high TMB; TP53 mutation positive    TREATMENT SUMMARY:  -12/9/2009: Radical prostatectomy; pathology revealed [place] 4+5 disease, stage pT3a,N0 positive margins  -Mar-May2010: Salvage external beam radiation therapy  -Apr 2013: Intermittent androgen deprivation therapy with leuprolide for biochemical PSA relapse  -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 Radiation therapy to the left supraclavicular lymph node ending in September 2020.  He was continued on enzalutamide  -May 2022: Switch to abiraterone with prednisone for progressive disease  -Aug 2023: Abiraterone discontinued for progressive disease.  PSMA PET scan with PSMA avid osseous and brisa metastasis.  -11/14/2023: MARLO trial: cycle 1 docetaxel. 11/15/23 cycle 1 radium per the MARLO trial.      Chemotherapy 11/14/2023  10:05 AM 11/15/2023  1:39 PM 12/6/2023  9:15 AM 12/28/2023  9:07 AM   Day, Cycle Day 1, Cycle 1  Day 1, Cycle 1  Day 1, Cycle 2  Day 1, Cycle 3    DOCEtaxel (TAXOTERE) IV 60 mg/m2   60 mg/m2  60 mg/m2    radium Ra-223 Dichloride IV  1.473 microcurie/kg      .2 ng/ml  203 ng/ml 196 ng/ml     CURRENT INTERVENTIONS:  MARLO Trial randomized to Arm B  with Docetaxel/prednisone/Radium-233. Leuprolide every 4 months.     SUBJECTIVE   Alonso Pettit is being followed for castration resistant metastatic prostate cancer. He returns to clinic accompanied by his wife, Macey. He is seen today prior to cycle 6 docetaxel.    Alonso returns to clinic today accompanied by Macey.  He reports that he is feeling well today.    Alonso thinks his energy is about the same.  Macey feels like he is sleeping about an extra 1 hour overnight.  On average sleeping from 10 PM to 10 AM.    He denies any falls over the last 3 weeks.  Previous left rib pain is improving.  No nausea or vomiting.    They have a regimen that seems to be working very well for his constipation.    He feels he has loss muscle mass in his legs as going up and down stairs multiple times over the weekend was more challenging than it has been int he past.   No recurrent fevers with the last cycle.   No nausea or vomiting.   No urinary changes.   No chest pain, shortness of breath, or new cough. He does have a chronic cough that is unchanged to slightly better.  No nausea or vomiting.   He reports a stressful morning and he thought that they placed his IV without getting actual labs off the line. He was surprised to hear labs were obtained. He denies any confusion episodes over the last three weeks. Macey agree's there has been no confusion.   No headaches or vision changes.     ROS: 14 point ROS neg other than the symptoms noted above in the HPI.      PAST MEDICAL HISTORY     Past Medical History:   Diagnosis Date    Prostate cancer (H)          CURRENT OUTPATIENT MEDICATIONS     Current Outpatient Medications   Medication Sig    aspirin 81 mg chewable tablet [ASPIRIN 81 MG CHEWABLE TABLET] Chew 81 mg daily. (Patient not taking: Reported on 10/18/2023)    bisacodyl (DULCOLAX) 5 MG EC tablet Take 5 mg by mouth 2 times daily    calcium carbonate (CALCIUM 500 ORAL) [CALCIUM CARBONATE (CALCIUM 500 ORAL)] Take by mouth.  (Patient not taking: Reported on 1/16/2024)    cyclobenzaprine (FLEXERIL) 10 MG tablet 1/2-1 TAB ORALLY UP TO 3 TIMES A DAY AS NEEDED FOR MUSCLE SPASM    dexAMETHasone (DECADRON) 4 MG tablet Take 2 tablets (8 mg) by mouth 2 times daily (with meals) Start evening of Docetaxel infusion and continue for a total of 3 doses.    leuprolide (LUPRON) 11.25 mg injection Inject 11.25 mg into the muscle every 3 months    predniSONE (DELTASONE) 5 MG tablet Take 1 tablet (5 mg) by mouth 2 times daily for 21 days    prochlorperazine (COMPAZINE) 10 MG tablet Take 0.5 tablets (5 mg) by mouth every 6 hours as needed for nausea or vomiting (Patient not taking: Reported on 1/16/2024)    pseudoePHEDrine-guaiFENesin (MUCINEX D)  MG 12 hr tablet  (Patient not taking: Reported on 12/6/2023)    psyllium (METAMUCIL/KONSYL) Packet Take 1 packet by mouth daily     No current facility-administered medications for this visit.        ALLERGIES    No Known Allergies     REVIEW OF SYSTEMS   As above in the HPI, o/w complete 12-point ROS was negative.     PHYSICAL EXAM   BP (!) 158/75 (BP Location: Right arm, Patient Position: Sitting, Cuff Size: Adult Regular)   Pulse 74   Temp 97.7  F (36.5  C) (Oral)   Resp 18   Wt 73.3 kg (161 lb 8 oz)   SpO2 97%   BMI 22.48 kg/m    General: No acute distress  HEENT: Sclera anicteric. Oral mucosa pink and moist.  No mucositis or thrush  Lymph: No lymphadenopathy in neck  Heart: Regular, rate, and rhythm  Lungs: Clear to ascultation bilaterally  Abdomen: Positive bowel sounds. Soft, non-distended, non-tender. No organomegaly or mass.   MSK: no peripheral edema bilaterally.   Neuro: Cranial nerves grossly intact. Oriented to person, place, time, and date.   Rash: none       LABORATORY AND IMAGING STUDIES   Most Recent 3 CBC's:  Recent Labs   Lab Test 02/27/24  0838 02/06/24  1019 02/02/24  0843 01/16/24  1122   WBC 7.0 7.0 6.1 7.0   HGB 11.8* 11.9* 11.4* 12.2*   MCV 97 94 92 94    375 284 970    ANEUTAUTO 5.3 4.4  --  5.7     Most Recent 3 BMP's:  Recent Labs   Lab Test 02/06/24  1019 02/02/24  0843 01/16/24  1122    139 142   POTASSIUM 3.6 4.2 4.0   CHLORIDE 105 104 105   CO2 28 24 28   BUN 18.6 22.0 16.5   CR 0.87 0.80 0.74   ANIONGAP 9 11 9   ZABRINA 9.5 9.2 9.3   * 176* 174*   PROTTOTAL 6.6 6.2* 6.5   ALBUMIN 3.8 3.5 3.9    Most Recent 3 LFT's:  Recent Labs   Lab Test 02/06/24  1019 02/02/24  0843 01/16/24  1122   AST 18 16 14   ALT 14 9 11   ALKPHOS 58 61 70   BILITOTAL 0.6 0.4 0.5    Most Recent 2 TSH and T4:No lab results found.    Component      Latest Ref Rng 11/14/2023  7:52 AM 12/4/2023  2:32 PM 12/28/2023  7:06 AM 1/16/2024  11:22 AM 2/2/2024  8:43 AM   PSA Tumor Marker      ng/mL 160.20  203.80  196.30  200.20  203.10      I reviewed the above labs today.       ASSESSMENT AND PLAN   -Castration resistant metastatic prostate cancer   Post radical prostatectomy on 12/9/2009; salvage radiation ending May 2010; androgen deprivation therapy since 2013   Post progression on enzalutamide and abiraterone with prednisone  -Nonmuscle invasive bladder cancer diagnosed in 2011 without any recent recurrence  -No significant medical comorbidity  -ECOG performance status of 0     #Castration resistant metastatic prostate cancer.   - Has progressed on novel antiandrogen therapies including abiraterone with prednisone and enzalutamide.  He had a PSMA PET CT scan which did show extensive metastasis to the lymph nodes and bones. Referred to [facility] for the MARLO trial, randomized to Arm B with docetaxel and Radium. Docetaxel is administered every 3 weeks for 6-10 doses and Radium every 6 weeks for 6 doses.   - He tolerated docetaxel and radium reasonably well with the exception of significant constipation for nearly 1.5-2 weeks after his first cycle. Constipation has improved since cycle 2 docetaxel with a regular bowel regimen. He has noticed mild worsening of fatigue with each subsequent dose.   - CBC  reviewed and demonstrates stable anemia. PSA is remained stable ~200 since starting the MARLO trial. He has not had a dramatic PSA response but none the less with stable PSA it is reasonable to continue. CMP and PSA is pending today. We will fax his lab results to Dr. Philip's team for his upcoming visit.   - Received cycle 3 Radium 2/7/24. Next due 3/20/24.     - continue Eligard every 4 months with Dr. Philip at [facility].  He is scheduled for next dose in March 6th 2024.     #Bone Metastasis   - Zometa every 6 months with local oncologist. Next scheduled on March 6th 2024. Consider moving up frequency to every 6 weeks. Could move to [facility] during the time of trial participation and administer while he is here for ease of appointments. Not specifically reviewed today.   - rare risk of fractures associated with docetaxel and radium therefore continuing Zometa is recommended.     #Left sided rib pain   - Chest XR 2/6/24 negative. No acute airspace abnormalities or fractures. Suspect pain is musculoskeletal in nature after calls 2 weeks ago.     #Constipation   - Continue scheduling ducolax 2-3 tablets per day for at least 2-4 days and then prn.   - If no bowel movement have 2-3 days, recommend adding in Milk of Mag. If no bowel movement after 6 hours, repeat.     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 CT neck with calcifications. Called patient and wife to discuss there results. They have seen a neurologist in the past at [facility] who they plan to follow up with along with their PCP.   - No recurrent episodes reported today, 12/28/2023.     # Falls  - Two episodes of laying on the ground after cycle 4 docetaxel. Unclear source or fall given poor historian. Patient did have low grade temperature during this period thus illness pay have played a role but unclear. He also has poor water intake  thus dehydration may be contributing to weakness vs. Chemotherapy. Continue to monitor closely for recurrence. Did not recur with cycle 5.     #Cancer fatigue   #Deconditioning   - discussed with Alonso that regular exercise would be of benefit. Discussed cancer rehab together as well today but Alonso is currently not interested.     Patient was seen and evaluated with study RNCC, Cammy López.      39 minutes spent on the date of the encounter doing chart review, review of test results, patient visit, and documentation     Valentine Ball PA-C    Addendum  CMP stable. Will proceed with cycle 6 docetaxel today 2/27/24.

## 2024-02-28 ENCOUNTER — PATIENT OUTREACH (OUTPATIENT)
Dept: ONCOLOGY | Facility: CLINIC | Age: 84
End: 2024-02-28
Payer: MEDICARE

## 2024-02-28 NOTE — PROGRESS NOTES
[facility]: Cancer Care Short Note                                                                                        Lab results faxed to Dr. Philip's office per Valentine Ball's request. Fax confirmation received.      Genet Hansen RN, BSN Care Coordinator  [facility]

## 2024-03-15 ENCOUNTER — LAB (OUTPATIENT)
Dept: LAB | Facility: CLINIC | Age: 84
End: 2024-03-15
Attending: INTERNAL MEDICINE
Payer: MEDICARE

## 2024-03-15 ENCOUNTER — HOSPITAL ENCOUNTER (OUTPATIENT)
Dept: NUCLEAR MEDICINE | Facility: CLINIC | Age: 84
Setting detail: NUCLEAR MEDICINE
Discharge: HOME | End: 2024-03-15
Attending: INTERNAL MEDICINE
Payer: MEDICARE

## 2024-03-15 ENCOUNTER — HOSPITAL ENCOUNTER (OUTPATIENT)
Dept: CT IMAGING | Facility: CLINIC | Age: 84
Discharge: HOME | End: 2024-03-15
Attending: INTERNAL MEDICINE
Payer: MEDICARE

## 2024-03-15 ENCOUNTER — ANCILLARY PROCEDURE (OUTPATIENT)
Dept: RADIOLOGY | Facility: CLINIC | Age: 84
End: 2024-03-15
Attending: INTERNAL MEDICINE
Payer: MEDICARE

## 2024-03-15 VITALS — WEIGHT: 164.3 LBS | BODY MASS INDEX: 22.87 KG/M2

## 2024-03-15 DIAGNOSIS — C79.51 MALIGNANT NEOPLASM METASTATIC TO BONE (H): Primary | ICD-10-CM

## 2024-03-15 DIAGNOSIS — C61 PROSTATE CANCER (H): ICD-10-CM

## 2024-03-15 DIAGNOSIS — C79.51 MALIGNANT NEOPLASM METASTATIC TO BONE (H): ICD-10-CM

## 2024-03-15 LAB
ALBUMIN SERPL BCG-MCNC: 4 G/DL (ref 3.5–5.2)
ALP SERPL-CCNC: 69 U/L (ref 40–150)
ALT SERPL W P-5'-P-CCNC: 13 U/L (ref 0–70)
ANION GAP SERPL CALCULATED.3IONS-SCNC: 12 MMOL/L (ref 7–15)
AST SERPL W P-5'-P-CCNC: 16 U/L (ref 0–45)
BASOPHILS # BLD AUTO: 0.1 10E3/UL (ref 0–0.2)
BASOPHILS NFR BLD AUTO: 1 %
BILIRUB SERPL-MCNC: 0.6 MG/DL
BUN SERPL-MCNC: 11.7 MG/DL (ref 8–23)
CALCIUM SERPL-MCNC: 9 MG/DL (ref 8.8–10.2)
CHLORIDE SERPL-SCNC: 103 MMOL/L (ref 98–107)
CREAT SERPL-MCNC: 0.79 MG/DL (ref 0.67–1.17)
DEPRECATED HCO3 PLAS-SCNC: 25 MMOL/L (ref 22–29)
EGFRCR SERPLBLD CKD-EPI 2021: 89 ML/MIN/1.73M2
EOSINOPHIL # BLD AUTO: 0 10E3/UL (ref 0–0.7)
EOSINOPHIL NFR BLD AUTO: 0 %
ERYTHROCYTE [DISTWIDTH] IN BLOOD BY AUTOMATED COUNT: 15.1 % (ref 10–15)
GLUCOSE SERPL-MCNC: 94 MG/DL (ref 70–99)
HCT VFR BLD AUTO: 36.9 % (ref 40–53)
HGB BLD-MCNC: 11.9 G/DL (ref 13.3–17.7)
IMM GRANULOCYTES # BLD: 0.2 10E3/UL
IMM GRANULOCYTES NFR BLD: 2 %
LDH SERPL L TO P-CCNC: 209 U/L (ref 0–250)
LYMPHOCYTES # BLD AUTO: 1.5 10E3/UL (ref 0.8–5.3)
LYMPHOCYTES NFR BLD AUTO: 20 %
MAGNESIUM SERPL-MCNC: 1.9 MG/DL (ref 1.7–2.3)
MCH RBC QN AUTO: 31.1 PG (ref 26.5–33)
MCHC RBC AUTO-ENTMCNC: 32.2 G/DL (ref 31.5–36.5)
MCV RBC AUTO: 96 FL (ref 78–100)
MONOCYTES # BLD AUTO: 0.7 10E3/UL (ref 0–1.3)
MONOCYTES NFR BLD AUTO: 9 %
NEUTROPHILS # BLD AUTO: 5.3 10E3/UL (ref 1.6–8.3)
NEUTROPHILS NFR BLD AUTO: 68 %
NRBC # BLD AUTO: 0 10E3/UL
NRBC BLD AUTO-RTO: 0 /100
PHOSPHATE SERPL-MCNC: 2.8 MG/DL (ref 2.5–4.5)
PLATELET # BLD AUTO: 277 10E3/UL (ref 150–450)
POTASSIUM SERPL-SCNC: 3.6 MMOL/L (ref 3.4–5.3)
PROT SERPL-MCNC: 6.7 G/DL (ref 6.4–8.3)
PSA SERPL DL<=0.01 NG/ML-MCNC: 203.9 NG/ML
RBC # BLD AUTO: 3.83 10E6/UL (ref 4.4–5.9)
SODIUM SERPL-SCNC: 140 MMOL/L (ref 135–145)
WBC # BLD AUTO: 7.7 10E3/UL (ref 4–11)

## 2024-03-15 PROCEDURE — 99001 SPECIMEN HANDLING PT-LAB: CPT | Performed by: INTERNAL MEDICINE

## 2024-03-15 PROCEDURE — 74177 CT ABD & PELVIS W/CONTRAST: CPT | Mod: 26 | Performed by: RADIOLOGY

## 2024-03-15 PROCEDURE — 84153 ASSAY OF PSA TOTAL: CPT | Performed by: INTERNAL MEDICINE

## 2024-03-15 PROCEDURE — 71260 CT THORAX DX C+: CPT

## 2024-03-15 PROCEDURE — A9503 TC99M MEDRONATE: HCPCS | Performed by: INTERNAL MEDICINE

## 2024-03-15 PROCEDURE — 80053 COMPREHEN METABOLIC PANEL: CPT | Performed by: INTERNAL MEDICINE

## 2024-03-15 PROCEDURE — 84100 ASSAY OF PHOSPHORUS: CPT | Performed by: INTERNAL MEDICINE

## 2024-03-15 PROCEDURE — 85025 COMPLETE CBC W/AUTO DIFF WBC: CPT | Performed by: INTERNAL MEDICINE

## 2024-03-15 PROCEDURE — 83615 LACTATE (LD) (LDH) ENZYME: CPT | Performed by: INTERNAL MEDICINE

## 2024-03-15 PROCEDURE — 343N000001 HC RX 343: Performed by: INTERNAL MEDICINE

## 2024-03-15 PROCEDURE — 78306 BONE IMAGING WHOLE BODY: CPT | Mod: 26 | Performed by: RADIOLOGY

## 2024-03-15 PROCEDURE — 71260 CT THORAX DX C+: CPT | Mod: 26 | Performed by: RADIOLOGY

## 2024-03-15 PROCEDURE — 78306 BONE IMAGING WHOLE BODY: CPT

## 2024-03-15 PROCEDURE — 250N000011 HC RX IP 250 OP 636: Performed by: INTERNAL MEDICINE

## 2024-03-15 PROCEDURE — 83735 ASSAY OF MAGNESIUM: CPT | Performed by: INTERNAL MEDICINE

## 2024-03-15 PROCEDURE — 36415 COLL VENOUS BLD VENIPUNCTURE: CPT | Performed by: INTERNAL MEDICINE

## 2024-03-15 RX ORDER — TC 99M MEDRONATE 20 MG/10ML
20-30 INJECTION, POWDER, LYOPHILIZED, FOR SOLUTION INTRAVENOUS ONCE
Status: COMPLETED | OUTPATIENT
Start: 2024-03-15 | End: 2024-03-15

## 2024-03-15 RX ORDER — IOPAMIDOL 755 MG/ML
100 INJECTION, SOLUTION INTRAVASCULAR ONCE
Status: COMPLETED | OUTPATIENT
Start: 2024-03-15 | End: 2024-03-15

## 2024-03-15 RX ADMIN — IOPAMIDOL 100 ML: 755 INJECTION, SOLUTION INTRAVENOUS at 09:21

## 2024-03-15 RX ADMIN — TC 99M MEDRONATE 26.7 MILLICURIE: 20 INJECTION, POWDER, LYOPHILIZED, FOR SOLUTION INTRAVENOUS at 09:10

## 2024-03-15 NOTE — NURSING NOTE
Chief Complaint   Patient presents with    Blood Draw     Labs drawn via  by RN in lab.      Labs collected from venipuncture by RN. 1 research kit collected.     Shanna Leblanc RN

## 2024-03-19 ENCOUNTER — LAB (OUTPATIENT)
Dept: LAB | Facility: CLINIC | Age: 84
End: 2024-03-19
Attending: INTERNAL MEDICINE
Payer: MEDICARE

## 2024-03-19 ENCOUNTER — ALLIED HEALTH/NURSE VISIT (OUTPATIENT)
Dept: ONCOLOGY | Facility: CLINIC | Age: 84
End: 2024-03-19

## 2024-03-19 ENCOUNTER — INFUSION THERAPY VISIT (OUTPATIENT)
Dept: ONCOLOGY | Facility: CLINIC | Age: 84
End: 2024-03-19
Attending: INTERNAL MEDICINE
Payer: MEDICARE

## 2024-03-19 ENCOUNTER — ONCOLOGY VISIT (OUTPATIENT)
Dept: ONCOLOGY | Facility: CLINIC | Age: 84
End: 2024-03-19
Payer: MEDICARE

## 2024-03-19 VITALS
OXYGEN SATURATION: 97 % | HEART RATE: 81 BPM | BODY MASS INDEX: 22.51 KG/M2 | DIASTOLIC BLOOD PRESSURE: 77 MMHG | TEMPERATURE: 98 F | SYSTOLIC BLOOD PRESSURE: 132 MMHG | RESPIRATION RATE: 16 BRPM | WEIGHT: 161.7 LBS

## 2024-03-19 DIAGNOSIS — C79.51 MALIGNANT NEOPLASM METASTATIC TO BONE (H): Primary | ICD-10-CM

## 2024-03-19 DIAGNOSIS — C61 PROSTATE CANCER (H): ICD-10-CM

## 2024-03-19 LAB
ALBUMIN SERPL BCG-MCNC: 4 G/DL (ref 3.5–5.2)
ALP SERPL-CCNC: 68 U/L (ref 40–150)
ALT SERPL W P-5'-P-CCNC: 13 U/L (ref 0–70)
ANION GAP SERPL CALCULATED.3IONS-SCNC: 12 MMOL/L (ref 7–15)
AST SERPL W P-5'-P-CCNC: 15 U/L (ref 0–45)
BASOPHILS # BLD AUTO: 0 10E3/UL (ref 0–0.2)
BASOPHILS NFR BLD AUTO: 1 %
BILIRUB SERPL-MCNC: 0.5 MG/DL
BUN SERPL-MCNC: 17.9 MG/DL (ref 8–23)
CALCIUM SERPL-MCNC: 8.9 MG/DL (ref 8.8–10.2)
CHLORIDE SERPL-SCNC: 107 MMOL/L (ref 98–107)
CREAT SERPL-MCNC: 0.8 MG/DL (ref 0.67–1.17)
DEPRECATED HCO3 PLAS-SCNC: 22 MMOL/L (ref 22–29)
EGFRCR SERPLBLD CKD-EPI 2021: 89 ML/MIN/1.73M2
EOSINOPHIL # BLD AUTO: 0 10E3/UL (ref 0–0.7)
EOSINOPHIL NFR BLD AUTO: 0 %
ERYTHROCYTE [DISTWIDTH] IN BLOOD BY AUTOMATED COUNT: 15.2 % (ref 10–15)
GLUCOSE SERPL-MCNC: 171 MG/DL (ref 70–99)
HCT VFR BLD AUTO: 35.7 % (ref 40–53)
HGB BLD-MCNC: 11.5 G/DL (ref 13.3–17.7)
IMM GRANULOCYTES # BLD: 0.1 10E3/UL
IMM GRANULOCYTES NFR BLD: 1 %
LYMPHOCYTES # BLD AUTO: 0.9 10E3/UL (ref 0.8–5.3)
LYMPHOCYTES NFR BLD AUTO: 14 %
MCH RBC QN AUTO: 31.5 PG (ref 26.5–33)
MCHC RBC AUTO-ENTMCNC: 32.2 G/DL (ref 31.5–36.5)
MCV RBC AUTO: 98 FL (ref 78–100)
MONOCYTES # BLD AUTO: 0.5 10E3/UL (ref 0–1.3)
MONOCYTES NFR BLD AUTO: 8 %
NEUTROPHILS # BLD AUTO: 4.7 10E3/UL (ref 1.6–8.3)
NEUTROPHILS NFR BLD AUTO: 76 %
NRBC # BLD AUTO: 0 10E3/UL
NRBC BLD AUTO-RTO: 0 /100
PLATELET # BLD AUTO: 282 10E3/UL (ref 150–450)
POTASSIUM SERPL-SCNC: 4 MMOL/L (ref 3.4–5.3)
PROT SERPL-MCNC: 6.4 G/DL (ref 6.4–8.3)
RBC # BLD AUTO: 3.65 10E6/UL (ref 4.4–5.9)
SODIUM SERPL-SCNC: 141 MMOL/L (ref 135–145)
WBC # BLD AUTO: 6.2 10E3/UL (ref 4–11)

## 2024-03-19 PROCEDURE — 250N000011 HC RX IP 250 OP 636

## 2024-03-19 PROCEDURE — 80053 COMPREHEN METABOLIC PANEL: CPT

## 2024-03-19 PROCEDURE — 258N000003 HC RX IP 258 OP 636

## 2024-03-19 PROCEDURE — 99215 OFFICE O/P EST HI 40 MIN: CPT

## 2024-03-19 PROCEDURE — 85025 COMPLETE CBC W/AUTO DIFF WBC: CPT

## 2024-03-19 PROCEDURE — 36415 COLL VENOUS BLD VENIPUNCTURE: CPT

## 2024-03-19 PROCEDURE — 96375 TX/PRO/DX INJ NEW DRUG ADDON: CPT

## 2024-03-19 PROCEDURE — 96413 CHEMO IV INFUSION 1 HR: CPT

## 2024-03-19 PROCEDURE — G0463 HOSPITAL OUTPT CLINIC VISIT: HCPCS | Mod: 25

## 2024-03-19 RX ORDER — PREDNISONE 5 MG/1
5 TABLET ORAL 2 TIMES DAILY
Qty: 42 TABLET | Refills: 0 | Status: SHIPPED | OUTPATIENT
Start: 2024-03-19 | End: 2024-04-09

## 2024-03-19 RX ORDER — HEPARIN SODIUM (PORCINE) LOCK FLUSH IV SOLN 100 UNIT/ML 100 UNIT/ML
5 SOLUTION INTRAVENOUS
Status: CANCELLED | OUTPATIENT
Start: 2024-03-19

## 2024-03-19 RX ORDER — MEPERIDINE HYDROCHLORIDE 25 MG/ML
25 INJECTION INTRAMUSCULAR; INTRAVENOUS; SUBCUTANEOUS EVERY 30 MIN PRN
Status: CANCELLED | OUTPATIENT
Start: 2024-03-19

## 2024-03-19 RX ORDER — EPINEPHRINE 1 MG/ML
0.3 INJECTION, SOLUTION INTRAMUSCULAR; SUBCUTANEOUS EVERY 5 MIN PRN
Status: CANCELLED | OUTPATIENT
Start: 2024-03-19

## 2024-03-19 RX ORDER — ALBUTEROL SULFATE 90 UG/1
1-2 AEROSOL, METERED RESPIRATORY (INHALATION)
Status: CANCELLED
Start: 2024-03-19

## 2024-03-19 RX ORDER — HEPARIN SODIUM,PORCINE 10 UNIT/ML
5-20 VIAL (ML) INTRAVENOUS DAILY PRN
Status: CANCELLED | OUTPATIENT
Start: 2024-03-19

## 2024-03-19 RX ORDER — DIPHENHYDRAMINE HYDROCHLORIDE 50 MG/ML
50 INJECTION INTRAMUSCULAR; INTRAVENOUS
Status: CANCELLED
Start: 2024-03-19

## 2024-03-19 RX ORDER — ALBUTEROL SULFATE 0.83 MG/ML
2.5 SOLUTION RESPIRATORY (INHALATION)
Status: CANCELLED | OUTPATIENT
Start: 2024-03-19

## 2024-03-19 RX ORDER — METHYLPREDNISOLONE SODIUM SUCCINATE 125 MG/2ML
125 INJECTION, POWDER, LYOPHILIZED, FOR SOLUTION INTRAMUSCULAR; INTRAVENOUS
Status: CANCELLED
Start: 2024-03-19

## 2024-03-19 RX ORDER — LORAZEPAM 2 MG/ML
0.5 INJECTION INTRAMUSCULAR EVERY 4 HOURS PRN
Status: CANCELLED | OUTPATIENT
Start: 2024-03-19

## 2024-03-19 RX ADMIN — DOCETAXEL 116 MG: 20 INJECTION, SOLUTION, CONCENTRATE INTRAVENOUS at 08:54

## 2024-03-19 RX ADMIN — DEXAMETHASONE SODIUM PHOSPHATE: 10 INJECTION, SOLUTION INTRAMUSCULAR; INTRAVENOUS at 08:31

## 2024-03-19 RX ADMIN — SODIUM CHLORIDE 250 ML: 9 INJECTION, SOLUTION INTRAVENOUS at 08:31

## 2024-03-19 ASSESSMENT — PAIN SCALES - GENERAL: PAINLEVEL: NO PAIN (0)

## 2024-03-19 NOTE — NURSING NOTE
[id number]: Study Visit Note   Subject name: Alonso Pettit     Visit: Cycle 7 Day 127    Did the study visit occur within the appropriate window allowed by the protocol? yes    Since the last study visit, He has been doing well.He has been going for walks with his dog and Macey recently, sometimes even twice/day. His left sided musculoskeletal pain has improved and he is not noticing he has to lay on a certain side while sleeping. Muscle weakness in his legs continues, however is stable and has not gotten any worse. Fatigue also is continuing but not worsening.      Were any SSEs noted since last study visit? no  -the use of EBRT to relieve skeletal symptoms  -the occurrence of new symptomatic pathological bone fractures  (vertebral or non-vertebral)  -the occurrence of spinal cord compression  -a tumor-related orthopedic surgical intervention    Was patient given radiation therapy precautions? Yes    Verbal handover provided to infusion RN; reminded RN to document actual start time of infusion and actual stop time of the infusion. If infusion deviates from protocol directed infusion time, please document rationale in your infusion note.    I have personally interviewed Alonso Pettit and reviewed his medical record for adverse events and concomitant medications and these have been recorded on the corresponding logs in Alonso Pettit's research file.     Alonso Pettit was given the opportunity to ask any trial related questions.  Please see provider progress note for physical exam and other clinical information. Labs were reviewed - any significant lab values were addressed and reviewed.    Cammy López RN

## 2024-03-19 NOTE — PATIENT INSTRUCTIONS
Contact Numbers    [facility] Main Line/[facility]: [phone]    Call with chills and/or temperature greater than or equal to 100.5, uncontrolled nausea/vomiting, diarrhea, constipation, dizziness, shortness of breath, chest pain, bleeding, unexplained bruising, or any new/concerning symptoms, questions/concerns.     If you are having any concerning symptoms or wish to speak to a provider before your next infusion visit, please call your care coordinator or triage to notify them so we can adequately serve you.       After Hours: [phone]    If after hours, weekends, or holidays, call main hospital  and ask for Oncology doctor on call.      March 2024 Sunday Monday Tuesday Wednesday Thursday Friday Saturday                            1     2       3     4     5     6     7     8     9       10     11     12     13     14     15    NM INJ   8:45 AM   (15 min.)   [id number]   [facility]    CT CHEST/ABDOMEN/PELVIS W   8:50 AM   (20 min.)   [id number]   [facility]    LAB PERIPHERAL  10:30 AM   (15 min.)    [facility] DRAW   [facility] 16       17     18     19    LAB PERIPHERAL   6:30 AM   (15 min.)   [facility]   [facility]    RETURN CCSL   6:45 AM   (45 min.)   Valentine Ball PA-C   [facility]    ONC INFUSION 3 HR (180 MIN)  10:00 AM   (180 min.)    ONC INFUSION NURSE   [facility] 20    NM RESEARCH RADIOTHERAPY  12:45 PM   (60 min.)   UU NM RADIOTHERAPY 5   [facility] 21     22     23       24     25     26     27     28     29     30       31                                               April 2024 Sunday Monday Tuesday Wednesday Thursday Friday Saturday        1     2     3     4     5     6       7     8     9    LAB PERIPHERAL   7:30 AM   (15 min.)   [facility]   [facility]    RETURN  CCSL   7:45 AM   (45 min.)   Valentine Ball PA-C   [facility]    ONC INFUSION 3 HR (180 MIN)   8:30 AM   (180 min.)   [facility] ONC INFUSION NURSE   [facility] 10     11     12     13       14     15     16     17    NM INJ   9:45 AM   (15 min.)   [id number]   [facility]    CT CHEST/ABDOMEN/PELVIS W   1:30 PM   (20 min.)   UUCT1   [facility] 18     19     20       21     22     23     24     25     26     27       28     29     30    LAB PERIPHERAL   9:45 AM   (15 min.)    [facility] DRAW   [facility]    RETURN CCSL   9:45 AM   (30 min.)   Kg Gamez MD   [facility]    ONC INFUSION 3 HR (180 MIN)  11:00 AM   (180 min.)   [facility] ONC INFUSION NURSE   [facility]                                     Lab Results:  Recent Results (from the past 12 hour(s))   Comprehensive metabolic panel    Collection Time: 03/19/24  6:50 AM   Result Value Ref Range    Sodium 141 135 - 145 mmol/L    Potassium 4.0 3.4 - 5.3 mmol/L    Carbon Dioxide (CO2) 22 22 - 29 mmol/L    Anion Gap 12 7 - 15 mmol/L    Urea Nitrogen 17.9 8.0 - 23.0 mg/dL    Creatinine 0.80 0.67 - 1.17 mg/dL    GFR Estimate 89 >60 mL/min/1.73m2    Calcium 8.9 8.8 - 10.2 mg/dL    Chloride 107 98 - 107 mmol/L    Glucose 171 (H) 70 - 99 mg/dL    Alkaline Phosphatase 68 40 - 150 U/L    AST 15 0 - 45 U/L    ALT 13 0 - 70 U/L    Protein Total 6.4 6.4 - 8.3 g/dL    Albumin 4.0 3.5 - 5.2 g/dL    Bilirubin Total 0.5 <=1.2 mg/dL   CBC with platelets and differential    Collection Time: 03/19/24  6:50 AM   Result Value Ref Range    WBC Count 6.2 4.0 - 11.0 10e3/uL    RBC Count 3.65 (L) 4.40 - 5.90 10e6/uL    Hemoglobin 11.5 (L) 13.3 - 17.7 g/dL    Hematocrit 35.7 (L) 40.0 - 53.0 %    MCV 98 78 - 100 fL    MCH 31.5 26.5 - 33.0 pg    MCHC 32.2 31.5 - 36.5 g/dL    RDW 15.2 (H) 10.0 - 15.0 %    Platelet  Count 282 150 - 450 10e3/uL    % Neutrophils 76 %    % Lymphocytes 14 %    % Monocytes 8 %    % Eosinophils 0 %    % Basophils 1 %    % Immature Granulocytes 1 %    NRBCs per 100 WBC 0 <1 /100    Absolute Neutrophils 4.7 1.6 - 8.3 10e3/uL    Absolute Lymphocytes 0.9 0.8 - 5.3 10e3/uL    Absolute Monocytes 0.5 0.0 - 1.3 10e3/uL    Absolute Eosinophils 0.0 0.0 - 0.7 10e3/uL    Absolute Basophils 0.0 0.0 - 0.2 10e3/uL    Absolute Immature Granulocytes 0.1 <=0.4 10e3/uL    Absolute NRBCs 0.0 10e3/uL

## 2024-03-19 NOTE — LETTER
3/19/2024         RE: Alonso Pettit  [address] [facility] [address]        Dear Colleague,    Thank you for referring your patient, Alonso Pettit, to the [facility]. Please see a copy of my visit note below.    [facility]  HEMATOLOGY AND ONCOLOGY    FOLLOW-UP VISIT NOTE    PATIENT NAME: Alonso Pettit MRN # [id number]  DATE OF VISIT: Mar 19, 2024 YOB: 1940    REFERRING PROVIDER: [facility]    CANCER TYPE: Prostate adenocarcinoma; [place] 4+5  STAGE: IV (bony metastasis)  MOLECULAR PROFILE: No actionable mutations/MSI or high TMB; TP53 mutation positive    TREATMENT SUMMARY:  -12/9/2009: Radical prostatectomy; pathology revealed [place] 4+5 disease, stage pT3a,N0 positive margins  -Mar-May2010: Salvage external beam radiation therapy  -Apr 2013: Intermittent androgen deprivation therapy with leuprolide for biochemical PSA relapse  -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 Radiation therapy to the left supraclavicular lymph node ending in September 2020.  He was continued on enzalutamide  -May 2022: Switch to abiraterone with prednisone for progressive disease  -Aug 2023: Abiraterone discontinued for progressive disease.  PSMA PET scan with PSMA avid osseous and brisa metastasis.  -11/14/2023: MARLO trial: cycle 1 docetaxel. 11/15/23 cycle 1 radium per the MARLO trial.      Chemotherapy 11/14/2023  10:05 AM 11/15/2023  1:39 PM 12/6/2023  9:15 AM 12/28/2023  9:07 AM 1/16/2024 2/6/2024 2/7/2024   Day, Cycle Day 1, Cycle 1  Day 1, Cycle 1  Day 1, Cycle 2  Day 1, Cycle 3  Day 1, Cycle 4 Day 1, Cycle 5    DOCEtaxel (TAXOTERE) IV 60 mg/m2   60 mg/m2  60 mg/m2  60 mg/m2 60 mg/m2    radium Ra-223 Dichloride IV  1.473 microcurie/kg      1.5 microcurie/kg  "  .2 ng/ml  203 ng/ml 196 ng/ml        CURRENT INTERVENTIONS:  MARLO Trial randomized to Arm B with Docetaxel/prednisone/Radium-233. Leuprolide every 4 months locally.     SUBJECTIVE   Alonso Pettit is being followed for castration resistant metastatic prostate cancer. He returns to clinic accompanied by his wife, Macey. He is seen today prior to cycle 7 docetaxel.    Alonso returns to clinic today accompanied by his wife Macey.  He reports that he is feeling well today.    Energy is stable today.  No falls.  No nausea or vomiting.   No urinary changes.   No headaches or vision changes.   No chest pain, shortness of breath, or new cough.   No bone pain.  No neuropathy.  Appetite is \"normal\". .    They have a regimen that seems to be working very well for his constipation.    He has been walking the dog with Macey.   No recurrent fevers with the last cycle.       ROS: 14 point ROS neg other than the symptoms noted above in the HPI.      PAST MEDICAL HISTORY     Past Medical History:   Diagnosis Date    Prostate cancer (H)          CURRENT OUTPATIENT MEDICATIONS     Current Outpatient Medications   Medication Sig    aspirin 81 mg chewable tablet [ASPIRIN 81 MG CHEWABLE TABLET] Chew 81 mg daily. (Patient not taking: Reported on 10/18/2023)    bisacodyl (DULCOLAX) 5 MG EC tablet Take 5 mg by mouth 2 times daily    calcium carbonate (CALCIUM 500 ORAL) [CALCIUM CARBONATE (CALCIUM 500 ORAL)] Take by mouth. (Patient not taking: Reported on 1/16/2024)    cyclobenzaprine (FLEXERIL) 10 MG tablet 1/2-1 TAB ORALLY UP TO 3 TIMES A DAY AS NEEDED FOR MUSCLE SPASM    dexAMETHasone (DECADRON) 4 MG tablet Take 2 tablets (8 mg) by mouth 2 times daily (with meals) Start evening of Docetaxel infusion and continue for a total of 3 doses.    leuprolide (LUPRON) 11.25 mg injection Inject 11.25 mg into the muscle every 3 months    predniSONE (DELTASONE) 5 MG tablet Take 1 tablet (5 mg) by mouth 2 times daily for 21 days    " prochlorperazine (COMPAZINE) 10 MG tablet Take 0.5 tablets (5 mg) by mouth every 6 hours as needed for nausea or vomiting (Patient not taking: Reported on 1/16/2024)    pseudoePHEDrine-guaiFENesin (MUCINEX D)  MG 12 hr tablet  (Patient not taking: Reported on 12/6/2023)    psyllium (METAMUCIL/KONSYL) Packet Take 1 packet by mouth daily     No current facility-administered medications for this visit.        ALLERGIES    No Known Allergies     REVIEW OF SYSTEMS   As above in the HPI, o/w complete 12-point ROS was negative.     PHYSICAL EXAM   /77   Pulse 81   Temp 98  F (36.7  C) (Oral)   Resp 16   Wt 73.3 kg (161 lb 11.2 oz)   SpO2 97%   BMI 22.51 kg/m    General: No acute distress  HEENT: Sclera anicteric. Oral mucosa pink and moist.  No mucositis or thrush  Lymph: No lymphadenopathy in neck  Heart: Regular, rate, and rhythm  Lungs: Clear to ascultation bilaterally  Abdomen: Positive bowel sounds. Soft, non-distended, non-tender. No organomegaly or mass.   MSK: no peripheral edema bilaterally.   Neuro: Cranial nerves grossly intact. Oriented to person, place, time, and date.   Rash: none       LABORATORY AND IMAGING STUDIES   Most Recent 3 CBC's:  Recent Labs   Lab Test 03/19/24  0650 03/15/24  1026 02/27/24  0838   WBC 6.2 7.7 7.0   HGB 11.5* 11.9* 11.8*   MCV 98 96 97    277 261   ANEUTAUTO 4.7 5.3 5.3     Most Recent 3 BMP's:  Recent Labs   Lab Test 03/19/24  0650 03/15/24  1026 02/27/24  0838    140 142   POTASSIUM 4.0 3.6 3.8   CHLORIDE 107 103 106   CO2 22 25 24   BUN 17.9 11.7 15.3   CR 0.80 0.79 0.76   ANIONGAP 12 12 12   ZABRINA 8.9 9.0 9.5   * 94 104*   PROTTOTAL 6.4 6.7 6.7   ALBUMIN 4.0 4.0 4.2    Most Recent 3 LFT's:  Recent Labs   Lab Test 03/19/24  0650 03/15/24  1026 02/27/24  0838   AST 15 16 18   ALT 13 13 17   ALKPHOS 68 69 66   BILITOTAL 0.5 0.6 0.8    Most Recent 2 TSH and T4:No lab results found.    Component      Latest Ref Rng 11/14/2023  7:52 AM  12/4/2023  2:32 PM 12/28/2023  7:06 AM 1/16/2024  11:22 AM 2/2/2024  8:43 AM   PSA Tumor Marker      ng/mL 160.20  203.80  196.30  200.20  203.10      I reviewed the above labs today.       ASSESSMENT AND PLAN   -Castration resistant metastatic prostate cancer   Post radical prostatectomy on 12/9/2009; salvage radiation ending May 2010; androgen deprivation therapy since 2013   Post progression on enzalutamide and abiraterone with prednisone  -Nonmuscle invasive bladder cancer diagnosed in 2011 without any recent recurrence  -No significant medical comorbidity  -ECOG performance status of 0     #Castration resistant metastatic prostate cancer.   - Has progressed on novel antiandrogen therapies including abiraterone with prednisone and enzalutamide.  He had a PSMA PET CT scan which did show extensive metastasis to the lymph nodes and bones. Referred to [facility] for the MARLO trial, randomized to Arm B with docetaxel and Radium. Docetaxel is administered every 3 weeks for 6-10 doses and Radium every 6 weeks for 6 doses.   - He tolerated docetaxel and radium reasonably well with the exception of significant constipation for nearly 1.5-2 weeks after his first cycle. Constipation has improved since cycle 2 docetaxel with a regular bowel regimen. He has noticed mild worsening of fatigue with each subsequent dose. Fatigue is stable as of 3/19/24. He has started walking and encouraged him to continue.   - CBC reviewed and demonstrates stable anemia. PSA is remained stable ~200 since starting the MARLO trial. He has not had a dramatic PSA response but none the less with stable PSA it is reasonable to continue. PSA 3/15/2024 203.90. 03/15/2024 Bone Scan and CT CAP with stable bone and lymph node metastases.   - Labs and clinically appropriate to proceed with cycle 7 docetaxel today 3/19/24 and cycle 4 radium on 3/20/24.     - continue Eligard every 4 months with Dr. Philip at [facility].  He is due in April 2024.     #Bone  Metastasis   - Zometa every 6 months with local oncologist. Next scheduled on March 6th 2024. Consider moving up frequency to every 6 weeks. Could move to [facility] during the time of trial participation and administer while he is here for ease of appointments. Not specifically reviewed today.   - rare risk of fractures associated with docetaxel and radium therefore continuing Zometa is recommended.     #Left sided rib pain   - Chest XR 2/6/24 negative. No acute airspace abnormalities or fractures.     #Constipation   - Continue scheduling ducolax 2-3 tablets per day for at least 2-4 days and then prn.   - If no bowel movement have 2-3 days, recommend adding in Milk of Mag. If no bowel movement after 6 hours, repeat.     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 CT neck with calcifications. Called patient and wife to discuss there results. They have seen a neurologist in the past at [facility] who they plan to follow up with along with their PCP.   - No recurrent episodes reported today, 12/28/2023.     # Falls  - Two episodes of laying on the ground after cycle 4 docetaxel. Unclear source or fall given poor historian. Patient did have low grade temperature during this period thus illness pay have played a role but unclear. He also has poor water intake thus dehydration may be contributing to weakness vs. Chemotherapy. Continue to monitor closely for recurrence. Did not recur with cycle 5 and cycle 6.     #Cancer fatigue   #Deconditioning   - discussed with Alonso that regular exercise would be of benefit. He has started walking as above. Previously discussed cancer rehab Alonso is currently not interested.     Patient was seen and evaluated with study RNCC, Cammy López.      30 minutes spent on the date of the encounter doing chart review, review of test results, interpretation of tests, patient visit,  and documentation     Valentine Ball PA-C

## 2024-03-19 NOTE — NURSING NOTE
Chief Complaint   Patient presents with    Blood Draw     Labs drawn via PIV by RN in lab.  VS taken       Labs drawn from PIV placed by RN. Line flushed with saline. Vitals taken. Pt checked in for appointment(s).    Morelia Canas RN

## 2024-03-19 NOTE — PROGRESS NOTES
Infusion Nursing Note:  Alonso Pettit presents today for Cycle 7, day 1 Taxotere.    Patient seen by provider today: Yes: Valentine Ball NP    Note: Patient presents to clinic today feeling well with no questions.  Pt did not request or require any intervention for pain today.    Taxotere start time: 0854  Taxotere stop time: 0954    Intravenous Access:  Peripheral IV placed.    Treatment Conditions:  Lab Results   Component Value Date    HGB 11.5 (L) 03/19/2024    WBC 6.2 03/19/2024    ANEU 4.6 02/02/2024    ANEUTAUTO 4.7 03/19/2024     03/19/2024        Lab Results   Component Value Date     03/19/2024    POTASSIUM 4.0 03/19/2024    MAG 1.9 03/15/2024    CR 0.80 03/19/2024    ZABRINA 8.9 03/19/2024    BILITOTAL 0.5 03/19/2024    ALBUMIN 4.0 03/19/2024    ALT 13 03/19/2024    AST 15 03/19/2024     Results reviewed, labs MET treatment parameters, ok to proceed with treatment.    Post Infusion Assessment:  Patient tolerated infusion without incident.  Blood return noted pre and post infusion.  Site patent and intact, free from redness, edema or discomfort.  No evidence of extravasations.  Access discontinued per protocol.    Discharge Plan:   Prescription refills given for Prednisone and Dexamethasone.  Discharge instructions reviewed with: Patient.  Patient and/or family verbalized understanding of discharge instructions and all questions answered.  AVS to patient via [employer]  Patient will return 4/9/2024 for next appointment.   Patient discharged in stable condition accompanied by: self.  Departure Mode: Ambulatory.    Wendy Cullen RN

## 2024-03-19 NOTE — PROGRESS NOTES
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)  MOLECULAR PROFILE: No actionable mutations/MSI or high TMB; TP53 mutation positive    TREATMENT SUMMARY:  -12/9/2009: Radical prostatectomy; pathology revealed [place] 4+5 disease, stage pT3a,N0 positive margins  -Mar-May2010: Salvage external beam radiation therapy  -Apr 2013: Intermittent androgen deprivation therapy with leuprolide for biochemical PSA relapse  -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 Radiation therapy to the left supraclavicular lymph node ending in September 2020.  He was continued on enzalutamide  -May 2022: Switch to abiraterone with prednisone for progressive disease  -Aug 2023: Abiraterone discontinued for progressive disease.  PSMA PET scan with PSMA avid osseous and brisa metastasis.  -11/14/2023: MARLO trial: cycle 1 docetaxel. 11/15/23 cycle 1 radium per the MARLO trial.      Chemotherapy 11/14/2023  10:05 AM 11/15/2023  1:39 PM 12/6/2023  9:15 AM 12/28/2023  9:07 AM 1/16/2024 2/6/2024 2/7/2024   Day, Cycle Day 1, Cycle 1  Day 1, Cycle 1  Day 1, Cycle 2  Day 1, Cycle 3  Day 1, Cycle 4 Day 1, Cycle 5    DOCEtaxel (TAXOTERE) IV 60 mg/m2   60 mg/m2  60 mg/m2  60 mg/m2 60 mg/m2    radium Ra-223 Dichloride IV  1.473 microcurie/kg      1.5 microcurie/kg   .2 ng/ml  203 ng/ml 196 ng/ml        CURRENT INTERVENTIONS:  MARLO Trial randomized to Arm B with Docetaxel/prednisone/Radium-233. Leuprolide every 4 months locally.     SUBJECTIVE   Alonso Pettit is being followed for castration resistant  "metastatic prostate cancer. He returns to clinic accompanied by his wife, Macey. He is seen today prior to cycle 7 docetaxel.    Alonso returns to clinic today accompanied by his wife Macey.  He reports that he is feeling well today.    Energy is stable today.  No falls.  No nausea or vomiting.   No urinary changes.   No headaches or vision changes.   No chest pain, shortness of breath, or new cough.   No bone pain.  No neuropathy.  Appetite is \"normal\". .    They have a regimen that seems to be working very well for his constipation.    He has been walking the dog with Macey.   No recurrent fevers with the last cycle.       ROS: 14 point ROS neg other than the symptoms noted above in the HPI.      PAST MEDICAL HISTORY     Past Medical History:   Diagnosis Date    Prostate cancer (H)          CURRENT OUTPATIENT MEDICATIONS     Current Outpatient Medications   Medication Sig    aspirin 81 mg chewable tablet [ASPIRIN 81 MG CHEWABLE TABLET] Chew 81 mg daily. (Patient not taking: Reported on 10/18/2023)    bisacodyl (DULCOLAX) 5 MG EC tablet Take 5 mg by mouth 2 times daily    calcium carbonate (CALCIUM 500 ORAL) [CALCIUM CARBONATE (CALCIUM 500 ORAL)] Take by mouth. (Patient not taking: Reported on 1/16/2024)    cyclobenzaprine (FLEXERIL) 10 MG tablet 1/2-1 TAB ORALLY UP TO 3 TIMES A DAY AS NEEDED FOR MUSCLE SPASM    dexAMETHasone (DECADRON) 4 MG tablet Take 2 tablets (8 mg) by mouth 2 times daily (with meals) Start evening of Docetaxel infusion and continue for a total of 3 doses.    leuprolide (LUPRON) 11.25 mg injection Inject 11.25 mg into the muscle every 3 months    predniSONE (DELTASONE) 5 MG tablet Take 1 tablet (5 mg) by mouth 2 times daily for 21 days    prochlorperazine (COMPAZINE) 10 MG tablet Take 0.5 tablets (5 mg) by mouth every 6 hours as needed for nausea or vomiting (Patient not taking: Reported on 1/16/2024)    pseudoePHEDrine-guaiFENesin (MUCINEX D)  MG 12 hr tablet  (Patient not taking: " Reported on 12/6/2023)    psyllium (METAMUCIL/KONSYL) Packet Take 1 packet by mouth daily     No current facility-administered medications for this visit.        ALLERGIES    No Known Allergies     REVIEW OF SYSTEMS   As above in the HPI, o/w complete 12-point ROS was negative.     PHYSICAL EXAM   /77   Pulse 81   Temp 98  F (36.7  C) (Oral)   Resp 16   Wt 73.3 kg (161 lb 11.2 oz)   SpO2 97%   BMI 22.51 kg/m    General: No acute distress  HEENT: Sclera anicteric. Oral mucosa pink and moist.  No mucositis or thrush  Lymph: No lymphadenopathy in neck  Heart: Regular, rate, and rhythm  Lungs: Clear to ascultation bilaterally  Abdomen: Positive bowel sounds. Soft, non-distended, non-tender. No organomegaly or mass.   MSK: no peripheral edema bilaterally.   Neuro: Cranial nerves grossly intact. Oriented to person, place, time, and date.   Rash: none       LABORATORY AND IMAGING STUDIES   Most Recent 3 CBC's:  Recent Labs   Lab Test 03/19/24  0650 03/15/24  1026 02/27/24  0838   WBC 6.2 7.7 7.0   HGB 11.5* 11.9* 11.8*   MCV 98 96 97    277 261   ANEUTAUTO 4.7 5.3 5.3     Most Recent 3 BMP's:  Recent Labs   Lab Test 03/19/24  0650 03/15/24  1026 02/27/24  0838    140 142   POTASSIUM 4.0 3.6 3.8   CHLORIDE 107 103 106   CO2 22 25 24   BUN 17.9 11.7 15.3   CR 0.80 0.79 0.76   ANIONGAP 12 12 12   ZABRINA 8.9 9.0 9.5   * 94 104*   PROTTOTAL 6.4 6.7 6.7   ALBUMIN 4.0 4.0 4.2    Most Recent 3 LFT's:  Recent Labs   Lab Test 03/19/24  0650 03/15/24  1026 02/27/24  0838   AST 15 16 18   ALT 13 13 17   ALKPHOS 68 69 66   BILITOTAL 0.5 0.6 0.8    Most Recent 2 TSH and T4:No lab results found.    Component      Latest Ref Rng 11/14/2023  7:52 AM 12/4/2023  2:32 PM 12/28/2023  7:06 AM 1/16/2024  11:22 AM 2/2/2024  8:43 AM   PSA Tumor Marker      ng/mL 160.20  203.80  196.30  200.20  203.10      I reviewed the above labs today.       ASSESSMENT AND PLAN   -Castration resistant metastatic prostate  cancer   Post radical prostatectomy on 12/9/2009; salvage radiation ending May 2010; androgen deprivation therapy since 2013   Post progression on enzalutamide and abiraterone with prednisone  -Nonmuscle invasive bladder cancer diagnosed in 2011 without any recent recurrence  -No significant medical comorbidity  -ECOG performance status of 0     #Castration resistant metastatic prostate cancer.   - Has progressed on novel antiandrogen therapies including abiraterone with prednisone and enzalutamide.  He had a PSMA PET CT scan which did show extensive metastasis to the lymph nodes and bones. Referred to [facility] for the MARLO trial, randomized to Arm B with docetaxel and Radium. Docetaxel is administered every 3 weeks for 6-10 doses and Radium every 6 weeks for 6 doses.   - He tolerated docetaxel and radium reasonably well with the exception of significant constipation for nearly 1.5-2 weeks after his first cycle. Constipation has improved since cycle 2 docetaxel with a regular bowel regimen. He has noticed mild worsening of fatigue with each subsequent dose. Fatigue is stable as of 3/19/24. He has started walking and encouraged him to continue.   - CBC reviewed and demonstrates stable anemia. PSA is remained stable ~200 since starting the MARLO trial. He has not had a dramatic PSA response but none the less with stable PSA it is reasonable to continue. PSA 3/15/2024 203.90. 03/15/2024 Bone Scan and CT CAP with stable bone and lymph node metastases.   - Labs and clinically appropriate to proceed with cycle 7 docetaxel today 3/19/24 and cycle 4 radium on 3/20/24.     - continue Eligard every 4 months with Dr. Philip at [facility].  He is due in April 2024.     #Bone Metastasis   - Zometa every 6 months with local oncologist. Next scheduled on March 6th 2024. Consider moving up frequency to every 6 weeks. Could move to [facility] during the time of trial participation and administer while he is here for ease of appointments. Not  specifically reviewed today.   - rare risk of fractures associated with docetaxel and radium therefore continuing Zometa is recommended.     #Left sided rib pain   - Chest XR 2/6/24 negative. No acute airspace abnormalities or fractures.     #Constipation   - Continue scheduling ducolax 2-3 tablets per day for at least 2-4 days and then prn.   - If no bowel movement have 2-3 days, recommend adding in Milk of Mag. If no bowel movement after 6 hours, repeat.     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 CT neck with calcifications. Called patient and wife to discuss there results. They have seen a neurologist in the past at [facility] who they plan to follow up with along with their PCP.   - No recurrent episodes reported today, 12/28/2023.     # Falls  - Two episodes of laying on the ground after cycle 4 docetaxel. Unclear source or fall given poor historian. Patient did have low grade temperature during this period thus illness pay have played a role but unclear. He also has poor water intake thus dehydration may be contributing to weakness vs. Chemotherapy. Continue to monitor closely for recurrence. Did not recur with cycle 5 and cycle 6.     #Cancer fatigue   #Deconditioning   - discussed with Alonso that regular exercise would be of benefit. He has started walking as above. Previously discussed cancer rehab Alonso is currently not interested.     Patient was seen and evaluated with study RNCC, Cammy López.      30 minutes spent on the date of the encounter doing chart review, review of test results, interpretation of tests, patient visit, and documentation     Valentine Ball PA-C

## 2024-03-19 NOTE — NURSING NOTE
"Oncology Rooming Note    March 19, 2024 6:58 AM   Alonso Pettit is a 83 year old male who presents for:    Chief Complaint   Patient presents with    Blood Draw     Labs drawn via PIV by RN in lab.  VS taken    Oncology Clinic Visit     Malignant neoplasm metastatic to bone; Research prostate cancer     Initial Vitals: /77   Pulse 81   Temp 98  F (36.7  C) (Oral)   Resp 16   Wt 73.3 kg (161 lb 11.2 oz)   SpO2 97%   BMI 22.51 kg/m   Estimated body mass index is 22.51 kg/m  as calculated from the following:    Height as of 2/6/24: 1.805 m (5' 11.06\").    Weight as of this encounter: 73.3 kg (161 lb 11.2 oz). Body surface area is 1.92 meters squared.  No Pain (0) Comment: Data Unavailable   No LMP for male patient.  Allergies reviewed: Yes  Medications reviewed: Yes    Medications: Medication refills not needed today.  Pharmacy name entered into [facility]: [facility] [id number] [place] [address]    Frailty Screening:   Is the patient here for a new oncology consult visit in cancer care? 2. No      Clinical concerns: none       Fatemeh Pacheco              "

## 2024-03-20 ENCOUNTER — HOSPITAL ENCOUNTER (OUTPATIENT)
Dept: NUCLEAR MEDICINE | Facility: CLINIC | Age: 84
Setting detail: NUCLEAR MEDICINE
Discharge: HOME | End: 2024-03-20
Attending: INTERNAL MEDICINE
Payer: MEDICARE

## 2024-03-20 VITALS
SYSTOLIC BLOOD PRESSURE: 142 MMHG | RESPIRATION RATE: 16 BRPM | HEART RATE: 70 BPM | DIASTOLIC BLOOD PRESSURE: 65 MMHG | TEMPERATURE: 97.8 F | OXYGEN SATURATION: 98 %

## 2024-03-20 DIAGNOSIS — C61 PROSTATE CANCER (H): Primary | ICD-10-CM

## 2024-03-20 DIAGNOSIS — C79.51 MALIGNANT NEOPLASM METASTATIC TO BONE (H): ICD-10-CM

## 2024-03-20 PROCEDURE — 999N000248 HC STATISTIC IV INSERT WITH US BY RN

## 2024-03-20 RX ORDER — ALBUTEROL SULFATE 90 UG/1
1-2 AEROSOL, METERED RESPIRATORY (INHALATION)
Status: DISCONTINUED | OUTPATIENT
Start: 2024-03-20 | End: 2024-03-21 | Stop reason: HOSPADM

## 2024-03-20 RX ORDER — ALBUTEROL SULFATE 0.83 MG/ML
2.5 SOLUTION RESPIRATORY (INHALATION)
Status: DISCONTINUED | OUTPATIENT
Start: 2024-03-20 | End: 2024-03-21 | Stop reason: HOSPADM

## 2024-03-20 RX ORDER — MEPERIDINE HYDROCHLORIDE 25 MG/ML
25 INJECTION INTRAMUSCULAR; INTRAVENOUS; SUBCUTANEOUS EVERY 30 MIN PRN
Status: DISCONTINUED | OUTPATIENT
Start: 2024-03-20 | End: 2024-03-21 | Stop reason: HOSPADM

## 2024-03-20 RX ORDER — LORAZEPAM 2 MG/ML
0.5 INJECTION INTRAMUSCULAR EVERY 4 HOURS PRN
Status: DISCONTINUED | OUTPATIENT
Start: 2024-03-20 | End: 2024-03-21 | Stop reason: HOSPADM

## 2024-03-20 RX ORDER — METHYLPREDNISOLONE SODIUM SUCCINATE 125 MG/2ML
125 INJECTION, POWDER, LYOPHILIZED, FOR SOLUTION INTRAMUSCULAR; INTRAVENOUS
Status: DISCONTINUED | OUTPATIENT
Start: 2024-03-20 | End: 2024-03-21 | Stop reason: HOSPADM

## 2024-03-20 RX ORDER — DIPHENHYDRAMINE HYDROCHLORIDE 50 MG/ML
50 INJECTION INTRAMUSCULAR; INTRAVENOUS
Status: DISCONTINUED | OUTPATIENT
Start: 2024-03-20 | End: 2024-03-21 | Stop reason: HOSPADM

## 2024-03-20 RX ORDER — EPINEPHRINE 1 MG/ML
0.3 INJECTION, SOLUTION, CONCENTRATE INTRAVENOUS EVERY 5 MIN PRN
Status: DISCONTINUED | OUTPATIENT
Start: 2024-03-20 | End: 2024-03-21 | Stop reason: HOSPADM

## 2024-03-20 NOTE — PROGRESS NOTES
[facility] Nursing Note:    Patient presents today for XOFIGO (Ra-223) therapy, dose 4 of  6  Patient seen by provider today: Yes: Dr Jean Baptiste   present during visit today: NOT APPLICABLE      Intravenous Access:  Peripheral IV placed     Treatment Conditions:  Labs done on  3/15/24  Results reviewed, labs Met treatment parameters, ok to proceed with treatment.          Post Infusion Assessment:  Patient tolerated infusion without incident.  PIVs - No evidence of extravasations, access discontinued per protocol.         Discharge Plan:   Patient will return as scheduled for next appointment.   Patient discharged at 2:00 pm in stable condition accompanied by: spouse  Departure Mode: Ambulatory

## 2024-04-08 NOTE — PROGRESS NOTES
[facility]  HEMATOLOGY AND ONCOLOGY    FOLLOW-UP VISIT NOTE    PATIENT NAME: Alonso Pettit MRN # [id number]  DATE OF VISIT: Apr 9, 2024 YOB: 1940    REFERRING PROVIDER: [facility]    CANCER TYPE: Prostate adenocarcinoma; Harrison 4+5  STAGE: IV (bony metastasis)  MOLECULAR PROFILE: No actionable mutations/MSI or high TMB; TP53 mutation positive    TREATMENT SUMMARY:  -12/9/2009: Radical prostatectomy; pathology revealed Harrison 4+5 disease, stage pT3a,N0 positive margins  -Mar-May2010: Salvage external beam radiation therapy  -Apr 2013: Intermittent androgen deprivation therapy with leuprolide for biochemical PSA relapse  -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 Radiation therapy to the left supraclavicular lymph node ending in September 2020.  He was continued on enzalutamide  -May 2022: Switch to abiraterone with prednisone for progressive disease  -Aug 2023: Abiraterone discontinued for progressive disease.  PSMA PET scan with PSMA avid osseous and brias metastasis.  -11/14/2023: MARLO trial: cycle 1 docetaxel. 11/15/23 cycle 1 radium per the MARLO trial.      Chemotherapy 11/14/2023  10:05 AM 11/15/2023  1:39 PM 12/6/2023  9:15 AM 12/28/2023  9:07 AM 1/16/2024 2/6/2024 2/7/2024   Day, Cycle Day 1, Cycle 1  Day 1, Cycle 1  Day 1, Cycle 2  Day 1, Cycle 3  Day 1, Cycle 4 Day 1, Cycle 5    DOCEtaxel (TAXOTERE) IV 60 mg/m2   60 mg/m2  60 mg/m2  60 mg/m2 60 mg/m2    radium Ra-223 Dichloride IV  1.473 microcurie/kg      1.5 microcurie/kg   .2 ng/ml  203 ng/ml 196 ng/ml        CURRENT INTERVENTIONS:  MARLO Trial randomized to Arm B with Docetaxel/prednisone/Radium-233. Leuprolide every 4 months locally.     SUBJECTIVE   Alonso Pettit is being followed for castration resistant  "metastatic prostate cancer. He returns to clinic accompanied by his wife, Macey. He is seen today prior to cycle 8 docetaxel.    Alonso returns to clinic today accompanied by his wife Macey.  He reports that he is feeling well today. Overall feels chemotherapy is going well. Macey agree's.   Energy is stable today. He was more physical this past weekend and this made him tired. He feels he has had some deconditioning as he was winded carrying boxes up the steps. He otherwise has no shortness of breath at rest or with walking on a flat surface.   He isn't walking now because the dog is gone. He usually gets ready to play [employer] this time of year.   He has occasional headaches for which he sparingly takes tylenol.   Appetite is \"good\".     No falls.  No nausea or vomiting.   No urinary changes.   No headaches or vision changes.   No chest pain, shortness of breath, or new cough.   No bone pain.  No neuropathy.  They have a regimen that seems to be working very well for his constipation.    No fevers or chills.       ROS: 14 point ROS neg other than the symptoms noted above in the HPI.      PAST MEDICAL HISTORY     Past Medical History:   Diagnosis Date    Prostate cancer (H)          CURRENT OUTPATIENT MEDICATIONS     Current Outpatient Medications   Medication Sig Dispense Refill    aspirin 81 mg chewable tablet [ASPIRIN 81 MG CHEWABLE TABLET] Chew 81 mg daily. (Patient not taking: Reported on 10/18/2023)      bisacodyl (DULCOLAX) 5 MG EC tablet Take 5 mg by mouth 2 times daily      calcium carbonate (CALCIUM 500 ORAL) [CALCIUM CARBONATE (CALCIUM 500 ORAL)] Take by mouth. (Patient not taking: Reported on 1/16/2024)      cyclobenzaprine (FLEXERIL) 10 MG tablet 1/2-1 TAB ORALLY UP TO 3 TIMES A DAY AS NEEDED FOR MUSCLE SPASM      dexAMETHasone (DECADRON) 4 MG tablet Take 2 tablets (8 mg) by mouth 2 times daily (with meals) Start evening of Docetaxel infusion and continue for a total of 3 doses. 6 tablet 9    " leuprolide (LUPRON) 11.25 mg injection Inject 11.25 mg into the muscle every 3 months      predniSONE (DELTASONE) 5 MG tablet Take 1 tablet (5 mg) by mouth 2 times daily for 21 days 42 tablet 0    prochlorperazine (COMPAZINE) 10 MG tablet Take 0.5 tablets (5 mg) by mouth every 6 hours as needed for nausea or vomiting (Patient not taking: Reported on 1/16/2024) 30 tablet 2    pseudoePHEDrine-guaiFENesin (MUCINEX D)  MG 12 hr tablet  (Patient not taking: Reported on 12/6/2023)      psyllium (METAMUCIL/KONSYL) Packet Take 1 packet by mouth daily       No current facility-administered medications for this visit.        ALLERGIES    No Known Allergies     REVIEW OF SYSTEMS   As above in the HPI, o/w complete 12-point ROS was negative.     PHYSICAL EXAM   /62 (BP Location: Right arm, Patient Position: Sitting, Cuff Size: Adult Regular)   Pulse 71   Temp 98.1  F (36.7  C) (Oral)   Resp 16   Wt 75.8 kg (167 lb 3.2 oz)   SpO2 97%   BMI 23.28 kg/m    General: No acute distress  HEENT: Sclera anicteric. Oral mucosa pink and moist.  No mucositis or thrush  Lymph: No lymphadenopathy in neck  Heart: Regular, rate, and rhythm  Lungs: Clear to ascultation bilaterally  Abdomen: Positive bowel sounds. Soft, non-distended, non-tender. No organomegaly or mass.   MSK: no peripheral edema bilaterally.   Neuro: Cranial nerves grossly intact. Oriented to person, place, time, and date.   Rash: none     LABORATORY AND IMAGING STUDIES   Most Recent 3 CBC's:  Recent Labs   Lab Test 04/09/24  0752 03/19/24  0650 03/15/24  1026   WBC 4.5 6.2 7.7   HGB 11.2* 11.5* 11.9*   MCV 98 98 96    282 277   ANEUTAUTO 2.7 4.7 5.3     Most Recent 3 BMP's:  Recent Labs   Lab Test 03/19/24  0650 03/15/24  1026 02/27/24  0838    140 142   POTASSIUM 4.0 3.6 3.8   CHLORIDE 107 103 106   CO2 22 25 24   BUN 17.9 11.7 15.3   CR 0.80 0.79 0.76   ANIONGAP 12 12 12   ZABRINA 8.9 9.0 9.5   * 94 104*   PROTTOTAL 6.4 6.7 6.7   ALBUMIN 4.0  4.0 4.2    Most Recent 3 LFT's:  Recent Labs   Lab Test 03/19/24  0650 03/15/24  1026 02/27/24  0838   AST 15 16 18   ALT 13 13 17   ALKPHOS 68 69 66   BILITOTAL 0.5 0.6 0.8    Most Recent 2 TSH and T4:No lab results found.    Component      Latest Ref Rng 11/14/2023  7:52 AM 12/4/2023  2:32 PM 12/28/2023  7:06 AM 1/16/2024  11:22 AM 2/2/2024  8:43 AM 2/27/2024  8:38 AM   PSA Tumor Marker      ng/mL 160.20  203.80  196.30  200.20  203.10  196.00      Component      Latest Ref Rng 3/15/2024  10:26 AM   PSA Tumor Marker      ng/mL 203.90      I reviewed the above labs today.     ASSESSMENT AND PLAN   -Castration resistant metastatic prostate cancer   Post radical prostatectomy on 12/9/2009; salvage radiation ending May 2010; androgen deprivation therapy since 2013   Post progression on enzalutamide and abiraterone with prednisone  -Nonmuscle invasive bladder cancer diagnosed in 2011 without any recent recurrence  -No significant medical comorbidity  -ECOG performance status of 0     #Castration resistant metastatic prostate cancer.   - Has progressed on novel antiandrogen therapies including abiraterone with prednisone and enzalutamide.  He had a PSMA PET CT scan which did show extensive metastasis to the lymph nodes and bones. Referred to [facility] for the MARLO trial, randomized to Arm B with docetaxel and Radium. Docetaxel is administered every 3 weeks for 6-10 doses and Radium every 6 weeks for 6 doses.   - He tolerated docetaxel and radium reasonably well with the exception of significant constipation for nearly 1.5-2 weeks after his first cycle. Constipation has improved since cycle 2 docetaxel with a regular bowel regimen. He has noticed mild worsening of fatigue with each subsequent dose. Fatigue is stable as of 4/09/24. He had started walking while there were watching their sons dog but hasn't been as active now. I have encouraged him to continue.   - CBC reviewed and demonstrates stable anemia. PSA is remained  stable ~200 since starting the MARLO trial. He has not had a dramatic PSA response but none the less with stable PSA it is reasonable to continue. PSA 3/15/2024 203.90. CMP is WNL. 03/15/2024 Bone Scan and CT CAP with stable bone and lymph node metastases.   - Labs and clinically appropriate to proceed with cycle 8 docetaxel today 4/9/24. He has had 4 cycles of radium, last 3/20/24. His next radium will be with the next cycle of docetaxel.      - continue Eligard every 4 months with Dr. Philip at [facility].  He is due in April 2024.     #Bone Metastasis   - Zometa every 6 months with local oncologist. Next scheduled on March 6th 2024. Consider moving up frequency to every 6 weeks. Could move to [facility] during the time of trial participation and administer while he is here for ease of appointments. Not specifically reviewed today.   - rare risk of fractures associated with docetaxel and radium therefore continuing Zometa is recommended.     #Left sided rib pain   - Chest XR 2/6/24 negative. No acute airspace abnormalities or fractures.     #Constipation   - Continue scheduling ducolax 2-3 tablets per day for at least 2-4 days and then prn.   - If no bowel movement have 2-3 days, recommend adding in Milk of Mag. If no bowel movement after 6 hours, repeat.     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 CT neck with calcifications. Called patient and wife to discuss there results. They have seen a neurologist in the past at [facility] who they plan to follow up with along with their PCP.   - No recurrent episodes reported.     # Falls  - Two episodes of laying on the ground after cycle 4 docetaxel. Unclear source or fall given poor historian. Patient did have low grade temperature during this period thus illness pay have played a role but unclear. He also has poor water intake thus dehydration may be  contributing to weakness vs. Chemotherapy. Continue to monitor closely for recurrence. Did not recur with cycle 5 and cycle 6.     #Cancer fatigue   #Deconditioning   - discussed with Alonso that regular exercise would be of benefit. He has started walking but has stopped now that their son's dog is no longer living with them. Previously discussed cancer rehab Alonso is currently not interested.     Patient was seen and evaluated with study RNCC, Cammy López.      24 minutes spent on the date of the encounter doing chart review, review of test results, interpretation of tests, patient visit, and documentation     Valentine Ball PA-C

## 2024-04-09 ENCOUNTER — ONCOLOGY VISIT (OUTPATIENT)
Dept: ONCOLOGY | Facility: CLINIC | Age: 84
End: 2024-04-09
Attending: INTERNAL MEDICINE
Payer: MEDICARE

## 2024-04-09 ENCOUNTER — APPOINTMENT (OUTPATIENT)
Dept: LAB | Facility: CLINIC | Age: 84
End: 2024-04-09
Attending: INTERNAL MEDICINE
Payer: MEDICARE

## 2024-04-09 ENCOUNTER — ALLIED HEALTH/NURSE VISIT (OUTPATIENT)
Dept: ONCOLOGY | Facility: CLINIC | Age: 84
End: 2024-04-09

## 2024-04-09 VITALS
HEART RATE: 71 BPM | WEIGHT: 167.2 LBS | TEMPERATURE: 98.1 F | BODY MASS INDEX: 23.28 KG/M2 | SYSTOLIC BLOOD PRESSURE: 117 MMHG | OXYGEN SATURATION: 97 % | DIASTOLIC BLOOD PRESSURE: 62 MMHG | RESPIRATION RATE: 16 BRPM

## 2024-04-09 DIAGNOSIS — C79.51 MALIGNANT NEOPLASM METASTATIC TO BONE (H): Primary | ICD-10-CM

## 2024-04-09 DIAGNOSIS — C61 PROSTATE CANCER (H): ICD-10-CM

## 2024-04-09 LAB
ALBUMIN SERPL BCG-MCNC: 3.8 G/DL (ref 3.5–5.2)
ALP SERPL-CCNC: 55 U/L (ref 40–150)
ALT SERPL W P-5'-P-CCNC: 13 U/L (ref 0–70)
ANION GAP SERPL CALCULATED.3IONS-SCNC: 11 MMOL/L (ref 7–15)
AST SERPL W P-5'-P-CCNC: 15 U/L (ref 0–45)
BASOPHILS # BLD AUTO: 0 10E3/UL (ref 0–0.2)
BASOPHILS NFR BLD AUTO: 1 %
BILIRUB SERPL-MCNC: 0.5 MG/DL
BUN SERPL-MCNC: 15.2 MG/DL (ref 8–23)
CALCIUM SERPL-MCNC: 9 MG/DL (ref 8.8–10.2)
CHLORIDE SERPL-SCNC: 108 MMOL/L (ref 98–107)
CREAT SERPL-MCNC: 0.8 MG/DL (ref 0.67–1.17)
DEPRECATED HCO3 PLAS-SCNC: 26 MMOL/L (ref 22–29)
EGFRCR SERPLBLD CKD-EPI 2021: 89 ML/MIN/1.73M2
EOSINOPHIL # BLD AUTO: 0 10E3/UL (ref 0–0.7)
EOSINOPHIL NFR BLD AUTO: 1 %
ERYTHROCYTE [DISTWIDTH] IN BLOOD BY AUTOMATED COUNT: 14.8 % (ref 10–15)
GLUCOSE SERPL-MCNC: 142 MG/DL (ref 70–99)
HCT VFR BLD AUTO: 35 % (ref 40–53)
HGB BLD-MCNC: 11.2 G/DL (ref 13.3–17.7)
IMM GRANULOCYTES # BLD: 0 10E3/UL
IMM GRANULOCYTES NFR BLD: 1 %
LDH SERPL L TO P-CCNC: 181 U/L (ref 0–250)
LYMPHOCYTES # BLD AUTO: 1.3 10E3/UL (ref 0.8–5.3)
LYMPHOCYTES NFR BLD AUTO: 29 %
MAGNESIUM SERPL-MCNC: 1.8 MG/DL (ref 1.7–2.3)
MCH RBC QN AUTO: 31.3 PG (ref 26.5–33)
MCHC RBC AUTO-ENTMCNC: 32 G/DL (ref 31.5–36.5)
MCV RBC AUTO: 98 FL (ref 78–100)
MONOCYTES # BLD AUTO: 0.4 10E3/UL (ref 0–1.3)
MONOCYTES NFR BLD AUTO: 8 %
NEUTROPHILS # BLD AUTO: 2.7 10E3/UL (ref 1.6–8.3)
NEUTROPHILS NFR BLD AUTO: 60 %
NRBC # BLD AUTO: 0 10E3/UL
NRBC BLD AUTO-RTO: 0 /100
PHOSPHATE SERPL-MCNC: 3.1 MG/DL (ref 2.5–4.5)
PLATELET # BLD AUTO: 227 10E3/UL (ref 150–450)
POTASSIUM SERPL-SCNC: 3.4 MMOL/L (ref 3.4–5.3)
PROT SERPL-MCNC: 6.4 G/DL (ref 6.4–8.3)
PSA SERPL DL<=0.01 NG/ML-MCNC: 149.1 NG/ML
RBC # BLD AUTO: 3.58 10E6/UL (ref 4.4–5.9)
SODIUM SERPL-SCNC: 145 MMOL/L (ref 135–145)
WBC # BLD AUTO: 4.5 10E3/UL (ref 4–11)

## 2024-04-09 PROCEDURE — 96413 CHEMO IV INFUSION 1 HR: CPT

## 2024-04-09 PROCEDURE — 999N000127 HC STATISTIC PERIPHERAL IV START W US GUIDANCE

## 2024-04-09 PROCEDURE — 258N000003 HC RX IP 258 OP 636

## 2024-04-09 PROCEDURE — 84153 ASSAY OF PSA TOTAL: CPT

## 2024-04-09 PROCEDURE — 80053 COMPREHEN METABOLIC PANEL: CPT

## 2024-04-09 PROCEDURE — 99214 OFFICE O/P EST MOD 30 MIN: CPT

## 2024-04-09 PROCEDURE — 83615 LACTATE (LD) (LDH) ENZYME: CPT

## 2024-04-09 PROCEDURE — 84100 ASSAY OF PHOSPHORUS: CPT

## 2024-04-09 PROCEDURE — 83735 ASSAY OF MAGNESIUM: CPT

## 2024-04-09 PROCEDURE — 36415 COLL VENOUS BLD VENIPUNCTURE: CPT

## 2024-04-09 PROCEDURE — G0463 HOSPITAL OUTPT CLINIC VISIT: HCPCS | Mod: 25

## 2024-04-09 PROCEDURE — 250N000011 HC RX IP 250 OP 636

## 2024-04-09 PROCEDURE — 96367 TX/PROPH/DG ADDL SEQ IV INF: CPT

## 2024-04-09 PROCEDURE — 85025 COMPLETE CBC W/AUTO DIFF WBC: CPT

## 2024-04-09 RX ORDER — DIPHENHYDRAMINE HYDROCHLORIDE 50 MG/ML
50 INJECTION INTRAMUSCULAR; INTRAVENOUS
Status: CANCELLED
Start: 2024-04-09

## 2024-04-09 RX ORDER — MEPERIDINE HYDROCHLORIDE 25 MG/ML
25 INJECTION INTRAMUSCULAR; INTRAVENOUS; SUBCUTANEOUS EVERY 30 MIN PRN
Status: CANCELLED | OUTPATIENT
Start: 2024-04-09

## 2024-04-09 RX ORDER — ALBUTEROL SULFATE 90 UG/1
1-2 AEROSOL, METERED RESPIRATORY (INHALATION)
Status: CANCELLED
Start: 2024-04-09

## 2024-04-09 RX ORDER — METHYLPREDNISOLONE SODIUM SUCCINATE 125 MG/2ML
125 INJECTION, POWDER, LYOPHILIZED, FOR SOLUTION INTRAMUSCULAR; INTRAVENOUS
Status: CANCELLED
Start: 2024-04-09

## 2024-04-09 RX ORDER — HEPARIN SODIUM (PORCINE) LOCK FLUSH IV SOLN 100 UNIT/ML 100 UNIT/ML
5 SOLUTION INTRAVENOUS
Status: CANCELLED | OUTPATIENT
Start: 2024-04-09

## 2024-04-09 RX ORDER — PREDNISONE 5 MG/1
5 TABLET ORAL 2 TIMES DAILY
Qty: 42 TABLET | Refills: 0 | Status: SHIPPED | OUTPATIENT
Start: 2024-04-09 | End: 2024-04-30

## 2024-04-09 RX ORDER — EPINEPHRINE 1 MG/ML
0.3 INJECTION, SOLUTION INTRAMUSCULAR; SUBCUTANEOUS EVERY 5 MIN PRN
Status: CANCELLED | OUTPATIENT
Start: 2024-04-09

## 2024-04-09 RX ORDER — HEPARIN SODIUM,PORCINE 10 UNIT/ML
5-20 VIAL (ML) INTRAVENOUS DAILY PRN
Status: CANCELLED | OUTPATIENT
Start: 2024-04-09

## 2024-04-09 RX ORDER — ALBUTEROL SULFATE 0.83 MG/ML
2.5 SOLUTION RESPIRATORY (INHALATION)
Status: CANCELLED | OUTPATIENT
Start: 2024-04-09

## 2024-04-09 RX ORDER — LORAZEPAM 2 MG/ML
0.5 INJECTION INTRAMUSCULAR EVERY 4 HOURS PRN
Status: CANCELLED | OUTPATIENT
Start: 2024-04-09

## 2024-04-09 RX ADMIN — DEXAMETHASONE SODIUM PHOSPHATE: 10 INJECTION, SOLUTION INTRAMUSCULAR; INTRAVENOUS at 08:59

## 2024-04-09 RX ADMIN — SODIUM CHLORIDE 250 ML: 9 INJECTION, SOLUTION INTRAVENOUS at 08:59

## 2024-04-09 RX ADMIN — DOCETAXEL 116 MG: 20 INJECTION, SOLUTION, CONCENTRATE INTRAVENOUS at 09:49

## 2024-04-09 ASSESSMENT — PAIN SCALES - GENERAL: PAINLEVEL: NO PAIN (0)

## 2024-04-09 NOTE — Clinical Note
4/9/2024         RE: Alonso Pettit  [address] [facility] [address]        Dear Colleague,    Thank you for referring your patient, Alonso Pettit, to the [facility]. Please see a copy of my visit note below.    [facility]  HEMATOLOGY AND ONCOLOGY    FOLLOW-UP VISIT NOTE    PATIENT NAME: Alonso Pettit MRN # [id number]  DATE OF VISIT: Apr 9, 2024 YOB: 1940    REFERRING PROVIDER: [facility]    CANCER TYPE: Prostate adenocarcinoma; [place] 4+5  STAGE: IV (bony metastasis)  MOLECULAR PROFILE: No actionable mutations/MSI or high TMB; TP53 mutation positive    TREATMENT SUMMARY:  -12/9/2009: Radical prostatectomy; pathology revealed [place] 4+5 disease, stage pT3a,N0 positive margins  -Mar-May2010: Salvage external beam radiation therapy  -Apr 2013: Intermittent androgen deprivation therapy with leuprolide for biochemical PSA relapse  -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 Radiation therapy to the left supraclavicular lymph node ending in September 2020.  He was continued on enzalutamide  -May 2022: Switch to abiraterone with prednisone for progressive disease  -Aug 2023: Abiraterone discontinued for progressive disease.  PSMA PET scan with PSMA avid osseous and brisa metastasis.  -11/14/2023: MARLO trial: cycle 1 docetaxel. 11/15/23 cycle 1 radium per the MARLO trial.      Chemotherapy 11/14/2023  10:05 AM 11/15/2023  1:39 PM 12/6/2023  9:15 AM 12/28/2023  9:07 AM 1/16/2024 2/6/2024 2/7/2024   Day, Cycle Day 1, Cycle 1  Day 1, Cycle 1  Day 1, Cycle 2  Day 1, Cycle 3  Day 1, Cycle 4 Day 1, Cycle 5    DOCEtaxel (TAXOTERE) IV 60 mg/m2   60 mg/m2  60 mg/m2  60 mg/m2 60 mg/m2    radium Ra-223 Dichloride IV  1.473 microcurie/kg      1.5 microcurie/kg  "  .2 ng/ml  203 ng/ml 196 ng/ml        CURRENT INTERVENTIONS:  MARLO Trial randomized to Arm B with Docetaxel/prednisone/Radium-233. Leuprolide every 4 months locally.     SUBJECTIVE   Alonso Pettit is being followed for castration resistant metastatic prostate cancer. He returns to clinic accompanied by his wife, Macey. He is seen today prior to cycle 8 docetaxel.    Alonso returns to clinic today accompanied by his wife Macey.  He reports that he is feeling well today.    Energy is stable today. He was physical this past weekend and this made him tired.   He isn't walking now because the dog is gone. He does get winded with steps.  He has occasional headaches for which he sparingly takes tylenol.   Appetite is \"good\".     No falls.  No nausea or vomiting.   No urinary changes.   No headaches or vision changes.   No chest pain, shortness of breath, or new cough.   No bone pain.  No neuropathy.  They have a regimen that seems to be working very well for his constipation.    No recurrent fevers with the last cycle.       ROS: 14 point ROS neg other than the symptoms noted above in the HPI.      PAST MEDICAL HISTORY     Past Medical History:   Diagnosis Date    Prostate cancer (H)          CURRENT OUTPATIENT MEDICATIONS     Current Outpatient Medications   Medication Sig Dispense Refill    aspirin 81 mg chewable tablet [ASPIRIN 81 MG CHEWABLE TABLET] Chew 81 mg daily. (Patient not taking: Reported on 10/18/2023)      bisacodyl (DULCOLAX) 5 MG EC tablet Take 5 mg by mouth 2 times daily      calcium carbonate (CALCIUM 500 ORAL) [CALCIUM CARBONATE (CALCIUM 500 ORAL)] Take by mouth. (Patient not taking: Reported on 1/16/2024)      cyclobenzaprine (FLEXERIL) 10 MG tablet 1/2-1 TAB ORALLY UP TO 3 TIMES A DAY AS NEEDED FOR MUSCLE SPASM      dexAMETHasone (DECADRON) 4 MG tablet Take 2 tablets (8 mg) by mouth 2 times daily (with meals) Start evening of Docetaxel infusion and continue for a total of 3 doses. 6 tablet 9    " leuprolide (LUPRON) 11.25 mg injection Inject 11.25 mg into the muscle every 3 months      predniSONE (DELTASONE) 5 MG tablet Take 1 tablet (5 mg) by mouth 2 times daily for 21 days 42 tablet 0    prochlorperazine (COMPAZINE) 10 MG tablet Take 0.5 tablets (5 mg) by mouth every 6 hours as needed for nausea or vomiting (Patient not taking: Reported on 1/16/2024) 30 tablet 2    pseudoePHEDrine-guaiFENesin (MUCINEX D)  MG 12 hr tablet  (Patient not taking: Reported on 12/6/2023)      psyllium (METAMUCIL/KONSYL) Packet Take 1 packet by mouth daily       No current facility-administered medications for this visit.        ALLERGIES    No Known Allergies     REVIEW OF SYSTEMS   As above in the HPI, o/w complete 12-point ROS was negative.     PHYSICAL EXAM   /62 (BP Location: Right arm, Patient Position: Sitting, Cuff Size: Adult Regular)   Pulse 71   Temp 98.1  F (36.7  C) (Oral)   Resp 16   Wt 75.8 kg (167 lb 3.2 oz)   SpO2 97%   BMI 23.28 kg/m    General: No acute distress  HEENT: Sclera anicteric. Oral mucosa pink and moist.  No mucositis or thrush  Lymph: No lymphadenopathy in neck  Heart: Regular, rate, and rhythm  Lungs: Clear to ascultation bilaterally  Abdomen: Positive bowel sounds. Soft, non-distended, non-tender. No organomegaly or mass.   MSK: no peripheral edema bilaterally.   Neuro: Cranial nerves grossly intact. Oriented to person, place, time, and date.   Rash: none       LABORATORY AND IMAGING STUDIES   Most Recent 3 CBC's:  Recent Labs   Lab Test 03/19/24  0650 03/15/24  1026 02/27/24  0838   WBC 6.2 7.7 7.0   HGB 11.5* 11.9* 11.8*   MCV 98 96 97    277 261   ANEUTAUTO 4.7 5.3 5.3     Most Recent 3 BMP's:  Recent Labs   Lab Test 03/19/24  0650 03/15/24  1026 02/27/24  0838    140 142   POTASSIUM 4.0 3.6 3.8   CHLORIDE 107 103 106   CO2 22 25 24   BUN 17.9 11.7 15.3   CR 0.80 0.79 0.76   ANIONGAP 12 12 12   ZABRINA 8.9 9.0 9.5   * 94 104*   PROTTOTAL 6.4 6.7 6.7   ALBUMIN  4.0 4.0 4.2    Most Recent 3 LFT's:  Recent Labs   Lab Test 03/19/24  0650 03/15/24  1026 02/27/24  0838   AST 15 16 18   ALT 13 13 17   ALKPHOS 68 69 66   BILITOTAL 0.5 0.6 0.8    Most Recent 2 TSH and T4:No lab results found.    Component      Latest Ref Rng 11/14/2023  7:52 AM 12/4/2023  2:32 PM 12/28/2023  7:06 AM 1/16/2024  11:22 AM 2/2/2024  8:43 AM   PSA Tumor Marker      ng/mL 160.20  203.80  196.30  200.20  203.10      I reviewed the above labs today.       ASSESSMENT AND PLAN   -Castration resistant metastatic prostate cancer   Post radical prostatectomy on 12/9/2009; salvage radiation ending May 2010; androgen deprivation therapy since 2013   Post progression on enzalutamide and abiraterone with prednisone  -Nonmuscle invasive bladder cancer diagnosed in 2011 without any recent recurrence  -No significant medical comorbidity  -ECOG performance status of 0     #Castration resistant metastatic prostate cancer.   - Has progressed on novel antiandrogen therapies including abiraterone with prednisone and enzalutamide.  He had a PSMA PET CT scan which did show extensive metastasis to the lymph nodes and bones. Referred to [facility] for the MARLO trial, randomized to Arm B with docetaxel and Radium. Docetaxel is administered every 3 weeks for 6-10 doses and Radium every 6 weeks for 6 doses.   - He tolerated docetaxel and radium reasonably well with the exception of significant constipation for nearly 1.5-2 weeks after his first cycle. Constipation has improved since cycle 2 docetaxel with a regular bowel regimen. He has noticed mild worsening of fatigue with each subsequent dose. Fatigue is stable as of 3/19/24. He has started walking and encouraged him to continue.   - CBC reviewed and demonstrates stable anemia. PSA is remained stable ~200 since starting the MARLO trial. He has not had a dramatic PSA response but none the less with stable PSA it is reasonable to continue. PSA 3/15/2024 203.90. 03/15/2024 Bone Scan  and CT CAP with stable bone and lymph node metastases.   - Labs and clinically appropriate to proceed with cycle 8 docetaxel today 4/9/24. He has had 4 cycles of radium, last 3/20/24.      - continue Eligard every 4 months with Dr. Philip at [facility].  He is due in April 2024.     #Bone Metastasis   - Zometa every 6 months with local oncologist. Next scheduled on March 6th 2024. Consider moving up frequency to every 6 weeks. Could move to [facility] during the time of trial participation and administer while he is here for ease of appointments. Not specifically reviewed today.   - rare risk of fractures associated with docetaxel and radium therefore continuing Zometa is recommended.     #Left sided rib pain   - Chest XR 2/6/24 negative. No acute airspace abnormalities or fractures.     #Constipation   - Continue scheduling ducolax 2-3 tablets per day for at least 2-4 days and then prn.   - If no bowel movement have 2-3 days, recommend adding in Milk of Mag. If no bowel movement after 6 hours, repeat.     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 CT neck with calcifications. Called patient and wife to discuss there results. They have seen a neurologist in the past at [facility] who they plan to follow up with along with their PCP.   - No recurrent episodes reported today, 12/28/2023.     # Falls  - Two episodes of laying on the ground after cycle 4 docetaxel. Unclear source or fall given poor historian. Patient did have low grade temperature during this period thus illness pay have played a role but unclear. He also has poor water intake thus dehydration may be contributing to weakness vs. Chemotherapy. Continue to monitor closely for recurrence. Did not recur with cycle 5 and cycle 6.     #Cancer fatigue   #Deconditioning   - discussed with Alonso that regular exercise would be of benefit. He has  started walking as above. Previously discussed cancer rehab Alonso is currently not interested.     Patient was seen and evaluated with study RNCC, Cammy López.      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  STAGE: IV (bony metastasis)  MOLECULAR PROFILE: No actionable mutations/MSI or high TMB; TP53 mutation positive    TREATMENT SUMMARY:  -12/9/2009: Radical prostatectomy; pathology revealed [place] 4+5 disease, stage pT3a,N0 positive margins  -Mar-May2010: Salvage external beam radiation therapy  -Apr 2013: Intermittent androgen deprivation therapy with leuprolide for biochemical PSA relapse  -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 Radiation therapy to the left supraclavicular lymph node ending in September 2020.  He was continued on enzalutamide  -May 2022: Switch to abiraterone with prednisone for progressive disease  -Aug 2023: Abiraterone discontinued for progressive disease.  PSMA PET scan with PSMA avid osseous and brisa metastasis.  -11/14/2023: MARLO trial: cycle 1 docetaxel. 11/15/23 cycle 1 radium per the MARLO trial.      Chemotherapy 11/14/2023  10:05 AM 11/15/2023  1:39 PM 12/6/2023  9:15 AM 12/28/2023  9:07 AM 1/16/2024 2/6/2024 2/7/2024   Day, Cycle Day 1, Cycle 1  Day 1, Cycle 1  Day 1, Cycle 2  Day 1, Cycle 3  Day 1, Cycle 4 Day 1, Cycle 5    DOCEtaxel (TAXOTERE) IV 60 mg/m2   60 mg/m2  60 mg/m2  " 60 mg/m2 60 mg/m2    radium Ra-223 Dichloride IV  1.473 microcurie/kg      1.5 microcurie/kg   .2 ng/ml  203 ng/ml 196 ng/ml        CURRENT INTERVENTIONS:  MARLO Trial randomized to Arm B with Docetaxel/prednisone/Radium-233. Leuprolide every 4 months locally.     SUBJECTIVE   Alonso Pettit is being followed for castration resistant metastatic prostate cancer. He returns to clinic accompanied by his wife, Macey. He is seen today prior to cycle 8 docetaxel.    Alonso returns to clinic today accompanied by his wife Macey.  He reports that he is feeling well today. Overall feels chemotherapy is going well. Macey agree's.   Energy is stable today. He was more physical this past weekend and this made him tired. He feels he has had some deconditioning as he was winded carrying boxes up the steps. He otherwise has no shortness of breath at rest or with walking on a flat surface.   He isn't walking now because the dog is gone. He usually gets ready to play pickleball this time of year.   He has occasional headaches for which he sparingly takes tylenol.   Appetite is \"good\".     No falls.  No nausea or vomiting.   No urinary changes.   No headaches or vision changes.   No chest pain, shortness of breath, or new cough.   No bone pain.  No neuropathy.  They have a regimen that seems to be working very well for his constipation.    No fevers or chills.       ROS: 14 point ROS neg other than the symptoms noted above in the HPI.      PAST MEDICAL HISTORY     Past Medical History:   Diagnosis Date     Prostate cancer (H)          CURRENT OUTPATIENT MEDICATIONS     Current Outpatient Medications   Medication Sig Dispense Refill     aspirin 81 mg chewable tablet [ASPIRIN 81 MG CHEWABLE TABLET] Chew 81 mg daily. (Patient not taking: Reported on 10/18/2023)       bisacodyl (DULCOLAX) 5 MG EC tablet Take 5 mg by mouth 2 times daily       calcium carbonate (CALCIUM 500 ORAL) [CALCIUM CARBONATE (CALCIUM 500 ORAL)] Take by mouth. " (Patient not taking: Reported on 1/16/2024)       cyclobenzaprine (FLEXERIL) 10 MG tablet 1/2-1 TAB ORALLY UP TO 3 TIMES A DAY AS NEEDED FOR MUSCLE SPASM       dexAMETHasone (DECADRON) 4 MG tablet Take 2 tablets (8 mg) by mouth 2 times daily (with meals) Start evening of Docetaxel infusion and continue for a total of 3 doses. 6 tablet 9     leuprolide (LUPRON) 11.25 mg injection Inject 11.25 mg into the muscle every 3 months       predniSONE (DELTASONE) 5 MG tablet Take 1 tablet (5 mg) by mouth 2 times daily for 21 days 42 tablet 0     prochlorperazine (COMPAZINE) 10 MG tablet Take 0.5 tablets (5 mg) by mouth every 6 hours as needed for nausea or vomiting (Patient not taking: Reported on 1/16/2024) 30 tablet 2     pseudoePHEDrine-guaiFENesin (MUCINEX D)  MG 12 hr tablet  (Patient not taking: Reported on 12/6/2023)       psyllium (METAMUCIL/KONSYL) Packet Take 1 packet by mouth daily       No current facility-administered medications for this visit.        ALLERGIES    No Known Allergies     REVIEW OF SYSTEMS   As above in the HPI, o/w complete 12-point ROS was negative.     PHYSICAL EXAM   /62 (BP Location: Right arm, Patient Position: Sitting, Cuff Size: Adult Regular)   Pulse 71   Temp 98.1  F (36.7  C) (Oral)   Resp 16   Wt 75.8 kg (167 lb 3.2 oz)   SpO2 97%   BMI 23.28 kg/m    General: No acute distress  HEENT: Sclera anicteric. Oral mucosa pink and moist.  No mucositis or thrush  Lymph: No lymphadenopathy in neck  Heart: Regular, rate, and rhythm  Lungs: Clear to ascultation bilaterally  Abdomen: Positive bowel sounds. Soft, non-distended, non-tender. No organomegaly or mass.   MSK: no peripheral edema bilaterally.   Neuro: Cranial nerves grossly intact. Oriented to person, place, time, and date.   Rash: none     LABORATORY AND IMAGING STUDIES   Most Recent 3 CBC's:  Recent Labs   Lab Test 04/09/24  0752 03/19/24  0650 03/15/24  1026   WBC 4.5 6.2 7.7   HGB 11.2* 11.5* 11.9*   MCV 98 98 96     282 277   ANEUTAUTO 2.7 4.7 5.3     Most Recent 3 BMP's:  Recent Labs   Lab Test 03/19/24  0650 03/15/24  1026 02/27/24  0838    140 142   POTASSIUM 4.0 3.6 3.8   CHLORIDE 107 103 106   CO2 22 25 24   BUN 17.9 11.7 15.3   CR 0.80 0.79 0.76   ANIONGAP 12 12 12   ZABRINA 8.9 9.0 9.5   * 94 104*   PROTTOTAL 6.4 6.7 6.7   ALBUMIN 4.0 4.0 4.2    Most Recent 3 LFT's:  Recent Labs   Lab Test 03/19/24  0650 03/15/24  1026 02/27/24  0838   AST 15 16 18   ALT 13 13 17   ALKPHOS 68 69 66   BILITOTAL 0.5 0.6 0.8    Most Recent 2 TSH and T4:No lab results found.    Component      Latest Ref Rng 11/14/2023  7:52 AM 12/4/2023  2:32 PM 12/28/2023  7:06 AM 1/16/2024  11:22 AM 2/2/2024  8:43 AM 2/27/2024  8:38 AM   PSA Tumor Marker      ng/mL 160.20  203.80  196.30  200.20  203.10  196.00      Component      Latest Ref Rng 3/15/2024  10:26 AM   PSA Tumor Marker      ng/mL 203.90      I reviewed the above labs today.     ASSESSMENT AND PLAN   -Castration resistant metastatic prostate cancer   Post radical prostatectomy on 12/9/2009; salvage radiation ending May 2010; androgen deprivation therapy since 2013   Post progression on enzalutamide and abiraterone with prednisone  -Nonmuscle invasive bladder cancer diagnosed in 2011 without any recent recurrence  -No significant medical comorbidity  -ECOG performance status of 0     #Castration resistant metastatic prostate cancer.   - Has progressed on novel antiandrogen therapies including abiraterone with prednisone and enzalutamide.  He had a PSMA PET CT scan which did show extensive metastasis to the lymph nodes and bones. Referred to [facility] for the MARLO trial, randomized to Arm B with docetaxel and Radium. Docetaxel is administered every 3 weeks for 6-10 doses and Radium every 6 weeks for 6 doses.   - He tolerated docetaxel and radium reasonably well with the exception of significant constipation for nearly 1.5-2 weeks after his first cycle. Constipation has improved  since cycle 2 docetaxel with a regular bowel regimen. He has noticed mild worsening of fatigue with each subsequent dose. Fatigue is stable as of 4/09/24. He had started walking while there were watching their sons dog but hasn't been as active now. I have encouraged him to continue.   - CBC reviewed and demonstrates stable anemia. PSA is remained stable ~200 since starting the MARLO trial. He has not had a dramatic PSA response but none the less with stable PSA it is reasonable to continue. PSA 3/15/2024 203.90. CMP is WNL. 03/15/2024 Bone Scan and CT CAP with stable bone and lymph node metastases.   - Labs and clinically appropriate to proceed with cycle 8 docetaxel today 4/9/24. He has had 4 cycles of radium, last 3/20/24. His next radium will be with the next cycle of docetaxel.      - continue Eligard every 4 months with Dr. Philip at [facility].  He is due in April 2024.     #Bone Metastasis   - Zometa every 6 months with local oncologist. Next scheduled on March 6th 2024. Consider moving up frequency to every 6 weeks. Could move to [facility] during the time of trial participation and administer while he is here for ease of appointments. Not specifically reviewed today.   - rare risk of fractures associated with docetaxel and radium therefore continuing Zometa is recommended.     #Left sided rib pain   - Chest XR 2/6/24 negative. No acute airspace abnormalities or fractures.     #Constipation   - Continue scheduling ducolax 2-3 tablets per day for at least 2-4 days and then prn.   - If no bowel movement have 2-3 days, recommend adding in Milk of Mag. If no bowel movement after 6 hours, repeat.     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 CT neck with calcifications. Called patient and wife to discuss there results. They have seen a neurologist in the past at [facility] who they plan to follow up  with along with their PCP.   - No recurrent episodes reported.     # Falls  - Two episodes of laying on the ground after cycle 4 docetaxel. Unclear source or fall given poor historian. Patient did have low grade temperature during this period thus illness pay have played a role but unclear. He also has poor water intake thus dehydration may be contributing to weakness vs. Chemotherapy. Continue to monitor closely for recurrence. Did not recur with cycle 5 and cycle 6.     #Cancer fatigue   #Deconditioning   - discussed with Alonso that regular exercise would be of benefit. He has started walking but has stopped now that their son's dog is no longer living with them. Previously discussed cancer rehab Alonso is currently not interested.     Patient was seen and evaluated with study RNCC, Cammy López.      24 minutes spent on the date of the encounter doing chart review, review of test results, interpretation of tests, patient visit, and documentation     Valentine Ball PA-C        Again, thank you for allowing me to participate in the care of your patient.        Sincerely,        Valentine Ball PA-C

## 2024-04-09 NOTE — NURSING NOTE
"[id number]: Study Visit Note   Subject name: Alonso Pettit     Visit: Cycle 8 Day 148    Did the study visit occur within the appropriate window allowed by the protocol? yes    Since the last study visit, He has been doing well. No real changes, reports some intermittent \"low grade\" headaches that he has off and on when waking up that he occasionally takes tylenol for. Has some mild trace edema in his bilateral lower extremities. Energy, appetite all stable. Continues to report some dyspnea on exertion, particularly when going up stairs, encouraging patient to walk.  No falls. One episode of mild confusion when he woke up from a long nap and it was dark out, no other episodes.     Were any SSEs noted since last study visit? No  -the use of EBRT to relieve skeletal symptoms  -the occurrence of new symptomatic pathological bone fractures  (vertebral or non-vertebral)  -the occurrence of spinal cord compression  -a tumor-related orthopedic surgical intervention    Was patient given radiation therapy precautions? N/A    Verbal handover provided to infusion RN; reminded RN to document actual start time of infusion and actual stop time of the infusion. If infusion deviates from protocol directed infusion time, please document rationale in your infusion note.    I have personally interviewed Alonso Pettit and reviewed his medical record for adverse events and concomitant medications and these have been recorded on the corresponding logs in Alonso Pettit's research file.     Alonso Pettit was given the opportunity to ask any trial related questions.  Please see provider progress note for physical exam and other clinical information. Labs were reviewed - any significant lab values were addressed and reviewed.    Cammy López RN      "

## 2024-04-09 NOTE — PROGRESS NOTES
Infusion Nursing Note:  Alonso Pettit presents today for C8D1 Taxotere.    Patient seen by provider today: Yes: JOSE L Dailey   present during visit today: Not Applicable.    Note: Alonso presents to infusion today following his clinic appointment. He denies any new concerns. Patient is taking dexamethasone and prednisone at home.    Per research staff AHMET Chawla, OK to proceed with study treatment today.   The following items were completed per protocol as directed by research study staff:      Start Time: 0949   Stop Time: 1049     Intravenous Access:  Peripheral IV placed.    Treatment Conditions:  Lab Results   Component Value Date    HGB 11.2 (L) 04/09/2024    WBC 4.5 04/09/2024    ANEU 4.6 02/02/2024    ANEUTAUTO 2.7 04/09/2024     04/09/2024     Lab Results   Component Value Date     04/09/2024    POTASSIUM 3.4 04/09/2024    MAG 1.8 04/09/2024    CR 0.80 04/09/2024    ZABRINA 9.0 04/09/2024    BILITOTAL 0.5 04/09/2024    ALBUMIN 3.8 04/09/2024    ALT 13 04/09/2024    AST 15 04/09/2024     Results reviewed, labs MET treatment parameters, ok to proceed with treatment.    Post Infusion Assessment:  Patient tolerated infusion without incident.  Blood return noted pre and post infusion.  Site patent and intact, free from redness, edema or discomfort.  No evidence of extravasations.  Access discontinued per protocol.     Discharge Plan:   Prescription refills given for dexamethasone.  Discharge instructions reviewed with: Patient and Family.  Patient and/or family verbalized understanding of discharge instructions and all questions answered.  AVS to patient via [employer].  Patient will return 4/30 for next appointment.   Patient discharged in stable condition accompanied by: wife.  Departure Mode: Ambulatory.      Annika Ennis RN

## 2024-04-09 NOTE — NURSING NOTE
"Oncology Rooming Note    April 9, 2024 8:06 AM   Alonso Pettit is a 83 year old male who presents for:    Chief Complaint   Patient presents with    Blood Draw     Labs drawn via PIV placed by Vascular Access Team in lab    Oncology Clinic Visit     Malignant neoplasm metastatic to bone; Prostate cancer     Initial Vitals: /62 (BP Location: Right arm, Patient Position: Sitting, Cuff Size: Adult Regular)   Pulse 71   Temp 98.1  F (36.7  C) (Oral)   Resp 16   Wt 75.8 kg (167 lb 3.2 oz)   SpO2 97%   BMI 23.28 kg/m   Estimated body mass index is 23.28 kg/m  as calculated from the following:    Height as of 2/6/24: 1.805 m (5' 11.06\").    Weight as of this encounter: 75.8 kg (167 lb 3.2 oz). Body surface area is 1.95 meters squared.  No Pain (0) Comment: Data Unavailable   No LMP for male patient.  Allergies reviewed: Yes  Medications reviewed: Yes    Medications: Medication refills not needed today.  Pharmacy name entered into [employer]: [id number] [place] [address]    Frailty Screening:   Is the patient here for a new oncology consult visit in cancer care? 2. No      Clinical concerns: none       Fatemeh Pacheco              "

## 2024-04-09 NOTE — NURSING NOTE
Chief Complaint   Patient presents with    Blood Draw     Labs drawn via PIV placed by Vascular Access Team in lab     Labs drawn from PIV placed by Vascular Access Team. Research kit collected. Line flushed with saline. Vitals taken. Pt checked in for appointment(s).     Ann Marie Luna RN

## 2024-04-26 ENCOUNTER — LAB (OUTPATIENT)
Dept: LAB | Facility: CLINIC | Age: 84
End: 2024-04-26
Attending: INTERNAL MEDICINE
Payer: MEDICARE

## 2024-04-26 VITALS — BODY MASS INDEX: 22.97 KG/M2 | WEIGHT: 165 LBS

## 2024-04-26 DIAGNOSIS — C79.51 MALIGNANT NEOPLASM METASTATIC TO BONE (H): Primary | ICD-10-CM

## 2024-04-26 DIAGNOSIS — C61 PROSTATE CANCER (H): ICD-10-CM

## 2024-04-26 LAB
ALBUMIN SERPL BCG-MCNC: 3.8 G/DL (ref 3.5–5.2)
ALP SERPL-CCNC: 62 U/L (ref 40–150)
ALT SERPL W P-5'-P-CCNC: 12 U/L (ref 0–70)
ANION GAP SERPL CALCULATED.3IONS-SCNC: 13 MMOL/L (ref 7–15)
AST SERPL W P-5'-P-CCNC: 16 U/L (ref 0–45)
BASOPHILS # BLD AUTO: 0 10E3/UL (ref 0–0.2)
BASOPHILS NFR BLD AUTO: 1 %
BILIRUB SERPL-MCNC: 0.4 MG/DL
BUN SERPL-MCNC: 17.3 MG/DL (ref 8–23)
CALCIUM SERPL-MCNC: 8.8 MG/DL (ref 8.8–10.2)
CHLORIDE SERPL-SCNC: 107 MMOL/L (ref 98–107)
CREAT SERPL-MCNC: 0.88 MG/DL (ref 0.67–1.17)
DEPRECATED HCO3 PLAS-SCNC: 22 MMOL/L (ref 22–29)
EGFRCR SERPLBLD CKD-EPI 2021: 86 ML/MIN/1.73M2
EOSINOPHIL # BLD AUTO: 0 10E3/UL (ref 0–0.7)
EOSINOPHIL NFR BLD AUTO: 0 %
ERYTHROCYTE [DISTWIDTH] IN BLOOD BY AUTOMATED COUNT: 14.5 % (ref 10–15)
GLUCOSE SERPL-MCNC: 174 MG/DL (ref 70–99)
HCT VFR BLD AUTO: 35.1 % (ref 40–53)
HGB BLD-MCNC: 11.5 G/DL (ref 13.3–17.7)
IMM GRANULOCYTES # BLD: 0.1 10E3/UL
IMM GRANULOCYTES NFR BLD: 2 %
LDH SERPL L TO P-CCNC: 207 U/L (ref 0–250)
LYMPHOCYTES # BLD AUTO: 1 10E3/UL (ref 0.8–5.3)
LYMPHOCYTES NFR BLD AUTO: 18 %
MAGNESIUM SERPL-MCNC: 1.8 MG/DL (ref 1.7–2.3)
MCH RBC QN AUTO: 31.3 PG (ref 26.5–33)
MCHC RBC AUTO-ENTMCNC: 32.8 G/DL (ref 31.5–36.5)
MCV RBC AUTO: 95 FL (ref 78–100)
MONOCYTES # BLD AUTO: 0.5 10E3/UL (ref 0–1.3)
MONOCYTES NFR BLD AUTO: 10 %
NEUTROPHILS # BLD AUTO: 3.6 10E3/UL (ref 1.6–8.3)
NEUTROPHILS NFR BLD AUTO: 69 %
NRBC # BLD AUTO: 0 10E3/UL
NRBC BLD AUTO-RTO: 0 /100
PHOSPHATE SERPL-MCNC: 3.3 MG/DL (ref 2.5–4.5)
PLATELET # BLD AUTO: 263 10E3/UL (ref 150–450)
POTASSIUM SERPL-SCNC: 3.8 MMOL/L (ref 3.4–5.3)
PROT SERPL-MCNC: 6.3 G/DL (ref 6.4–8.3)
PSA SERPL DL<=0.01 NG/ML-MCNC: 147.9 NG/ML
RBC # BLD AUTO: 3.68 10E6/UL (ref 4.4–5.9)
SODIUM SERPL-SCNC: 142 MMOL/L (ref 135–145)
WBC # BLD AUTO: 5.3 10E3/UL (ref 4–11)

## 2024-04-26 PROCEDURE — 84153 ASSAY OF PSA TOTAL: CPT | Performed by: INTERNAL MEDICINE

## 2024-04-26 PROCEDURE — 83735 ASSAY OF MAGNESIUM: CPT | Performed by: INTERNAL MEDICINE

## 2024-04-26 PROCEDURE — 36415 COLL VENOUS BLD VENIPUNCTURE: CPT | Performed by: INTERNAL MEDICINE

## 2024-04-26 PROCEDURE — 83615 LACTATE (LD) (LDH) ENZYME: CPT | Performed by: INTERNAL MEDICINE

## 2024-04-26 PROCEDURE — 84100 ASSAY OF PHOSPHORUS: CPT | Performed by: INTERNAL MEDICINE

## 2024-04-26 PROCEDURE — 85025 COMPLETE CBC W/AUTO DIFF WBC: CPT | Performed by: INTERNAL MEDICINE

## 2024-04-26 PROCEDURE — 82565 ASSAY OF CREATININE: CPT | Performed by: INTERNAL MEDICINE

## 2024-04-26 NOTE — NURSING NOTE
Chief Complaint   Patient presents with    Blood Draw     Labs drawn via  by RN in lab.      Labs collected from venipuncture by RN. Weight obtained.     Shanna Leblanc RN

## 2024-04-30 ENCOUNTER — INFUSION THERAPY VISIT (OUTPATIENT)
Dept: ONCOLOGY | Facility: CLINIC | Age: 84
End: 2024-04-30
Attending: INTERNAL MEDICINE
Payer: MEDICARE

## 2024-04-30 ENCOUNTER — APPOINTMENT (OUTPATIENT)
Dept: LAB | Facility: CLINIC | Age: 84
End: 2024-04-30
Attending: INTERNAL MEDICINE
Payer: MEDICARE

## 2024-04-30 ENCOUNTER — DOCUMENTATION ONLY (OUTPATIENT)
Dept: ONCOLOGY | Facility: CLINIC | Age: 84
End: 2024-04-30

## 2024-04-30 VITALS
SYSTOLIC BLOOD PRESSURE: 137 MMHG | DIASTOLIC BLOOD PRESSURE: 70 MMHG | OXYGEN SATURATION: 95 % | BODY MASS INDEX: 23.19 KG/M2 | TEMPERATURE: 98.1 F | WEIGHT: 166.6 LBS | RESPIRATION RATE: 16 BRPM | HEART RATE: 79 BPM

## 2024-04-30 DIAGNOSIS — C61 PROSTATE CANCER (H): ICD-10-CM

## 2024-04-30 DIAGNOSIS — C79.51 MALIGNANT NEOPLASM METASTATIC TO BONE (H): Primary | ICD-10-CM

## 2024-04-30 LAB
ALBUMIN SERPL BCG-MCNC: 3.9 G/DL (ref 3.5–5.2)
ALP SERPL-CCNC: 59 U/L (ref 40–150)
ALT SERPL W P-5'-P-CCNC: 12 U/L (ref 0–70)
ANION GAP SERPL CALCULATED.3IONS-SCNC: 11 MMOL/L (ref 7–15)
AST SERPL W P-5'-P-CCNC: 14 U/L (ref 0–45)
BASOPHILS # BLD AUTO: 0 10E3/UL (ref 0–0.2)
BASOPHILS NFR BLD AUTO: 1 %
BILIRUB SERPL-MCNC: 0.4 MG/DL
BUN SERPL-MCNC: 17 MG/DL (ref 8–23)
CALCIUM SERPL-MCNC: 9.2 MG/DL (ref 8.8–10.2)
CHLORIDE SERPL-SCNC: 107 MMOL/L (ref 98–107)
CREAT SERPL-MCNC: 0.86 MG/DL (ref 0.67–1.17)
DEPRECATED HCO3 PLAS-SCNC: 23 MMOL/L (ref 22–29)
EGFRCR SERPLBLD CKD-EPI 2021: 87 ML/MIN/1.73M2
EOSINOPHIL # BLD AUTO: 0 10E3/UL (ref 0–0.7)
EOSINOPHIL NFR BLD AUTO: 0 %
ERYTHROCYTE [DISTWIDTH] IN BLOOD BY AUTOMATED COUNT: 14.5 % (ref 10–15)
GLUCOSE SERPL-MCNC: 125 MG/DL (ref 70–99)
HCT VFR BLD AUTO: 34.9 % (ref 40–53)
HGB BLD-MCNC: 11.5 G/DL (ref 13.3–17.7)
IMM GRANULOCYTES # BLD: 0.1 10E3/UL
IMM GRANULOCYTES NFR BLD: 1 %
LDH SERPL L TO P-CCNC: 190 U/L (ref 0–250)
LYMPHOCYTES # BLD AUTO: 0.6 10E3/UL (ref 0.8–5.3)
LYMPHOCYTES NFR BLD AUTO: 10 %
MAGNESIUM SERPL-MCNC: 1.7 MG/DL (ref 1.7–2.3)
MCH RBC QN AUTO: 31.6 PG (ref 26.5–33)
MCHC RBC AUTO-ENTMCNC: 33 G/DL (ref 31.5–36.5)
MCV RBC AUTO: 96 FL (ref 78–100)
MONOCYTES # BLD AUTO: 0.5 10E3/UL (ref 0–1.3)
MONOCYTES NFR BLD AUTO: 8 %
NEUTROPHILS # BLD AUTO: 5.1 10E3/UL (ref 1.6–8.3)
NEUTROPHILS NFR BLD AUTO: 80 %
NRBC # BLD AUTO: 0 10E3/UL
NRBC BLD AUTO-RTO: 0 /100
PHOSPHATE SERPL-MCNC: 3.2 MG/DL (ref 2.5–4.5)
PLATELET # BLD AUTO: 252 10E3/UL (ref 150–450)
POTASSIUM SERPL-SCNC: 3.8 MMOL/L (ref 3.4–5.3)
PROT SERPL-MCNC: 6.3 G/DL (ref 6.4–8.3)
PSA SERPL DL<=0.01 NG/ML-MCNC: 160.8 NG/ML
RBC # BLD AUTO: 3.64 10E6/UL (ref 4.4–5.9)
SODIUM SERPL-SCNC: 141 MMOL/L (ref 135–145)
WBC # BLD AUTO: 6.3 10E3/UL (ref 4–11)

## 2024-04-30 PROCEDURE — 84100 ASSAY OF PHOSPHORUS: CPT | Performed by: INTERNAL MEDICINE

## 2024-04-30 PROCEDURE — 83615 LACTATE (LD) (LDH) ENZYME: CPT | Performed by: INTERNAL MEDICINE

## 2024-04-30 PROCEDURE — 258N000003 HC RX IP 258 OP 636: Performed by: INTERNAL MEDICINE

## 2024-04-30 PROCEDURE — 36415 COLL VENOUS BLD VENIPUNCTURE: CPT | Performed by: INTERNAL MEDICINE

## 2024-04-30 PROCEDURE — 85025 COMPLETE CBC W/AUTO DIFF WBC: CPT | Performed by: INTERNAL MEDICINE

## 2024-04-30 PROCEDURE — 96413 CHEMO IV INFUSION 1 HR: CPT

## 2024-04-30 PROCEDURE — 84153 ASSAY OF PSA TOTAL: CPT | Performed by: INTERNAL MEDICINE

## 2024-04-30 PROCEDURE — 99215 OFFICE O/P EST HI 40 MIN: CPT | Performed by: INTERNAL MEDICINE

## 2024-04-30 PROCEDURE — 83735 ASSAY OF MAGNESIUM: CPT | Performed by: INTERNAL MEDICINE

## 2024-04-30 PROCEDURE — 82374 ASSAY BLOOD CARBON DIOXIDE: CPT | Performed by: INTERNAL MEDICINE

## 2024-04-30 PROCEDURE — 96367 TX/PROPH/DG ADDL SEQ IV INF: CPT

## 2024-04-30 PROCEDURE — G2211 COMPLEX E/M VISIT ADD ON: HCPCS | Performed by: INTERNAL MEDICINE

## 2024-04-30 PROCEDURE — 250N000011 HC RX IP 250 OP 636: Performed by: INTERNAL MEDICINE

## 2024-04-30 PROCEDURE — G0463 HOSPITAL OUTPT CLINIC VISIT: HCPCS | Performed by: INTERNAL MEDICINE

## 2024-04-30 PROCEDURE — 82247 BILIRUBIN TOTAL: CPT | Performed by: INTERNAL MEDICINE

## 2024-04-30 RX ORDER — HEPARIN SODIUM,PORCINE 10 UNIT/ML
5-20 VIAL (ML) INTRAVENOUS DAILY PRN
Status: CANCELLED | OUTPATIENT
Start: 2024-05-21

## 2024-04-30 RX ORDER — ALBUTEROL SULFATE 0.83 MG/ML
2.5 SOLUTION RESPIRATORY (INHALATION)
Status: CANCELLED | OUTPATIENT
Start: 2024-05-21

## 2024-04-30 RX ORDER — HEPARIN SODIUM (PORCINE) LOCK FLUSH IV SOLN 100 UNIT/ML 100 UNIT/ML
5 SOLUTION INTRAVENOUS
Status: CANCELLED | OUTPATIENT
Start: 2024-04-30

## 2024-04-30 RX ORDER — HEPARIN SODIUM,PORCINE 10 UNIT/ML
5-20 VIAL (ML) INTRAVENOUS DAILY PRN
Status: CANCELLED | OUTPATIENT
Start: 2024-04-30

## 2024-04-30 RX ORDER — ALBUTEROL SULFATE 90 UG/1
1-2 AEROSOL, METERED RESPIRATORY (INHALATION)
Status: CANCELLED
Start: 2024-05-21

## 2024-04-30 RX ORDER — HEPARIN SODIUM (PORCINE) LOCK FLUSH IV SOLN 100 UNIT/ML 100 UNIT/ML
5 SOLUTION INTRAVENOUS
Status: CANCELLED | OUTPATIENT
Start: 2024-05-21

## 2024-04-30 RX ORDER — PREDNISONE 5 MG/1
5 TABLET ORAL 2 TIMES DAILY
Qty: 42 TABLET | Refills: 0 | Status: SHIPPED | OUTPATIENT
Start: 2024-04-30 | End: 2024-05-21

## 2024-04-30 RX ORDER — LORAZEPAM 2 MG/ML
0.5 INJECTION INTRAMUSCULAR EVERY 4 HOURS PRN
Status: CANCELLED | OUTPATIENT
Start: 2024-04-30

## 2024-04-30 RX ORDER — EPINEPHRINE 1 MG/ML
0.3 INJECTION, SOLUTION INTRAMUSCULAR; SUBCUTANEOUS EVERY 5 MIN PRN
Status: CANCELLED | OUTPATIENT
Start: 2024-05-21

## 2024-04-30 RX ORDER — DEXAMETHASONE 4 MG/1
8 TABLET ORAL 2 TIMES DAILY WITH MEALS
Qty: 6 TABLET | Refills: 9 | Status: SHIPPED | OUTPATIENT
Start: 2024-04-30 | End: 2024-07-08

## 2024-04-30 RX ORDER — METHYLPREDNISOLONE SODIUM SUCCINATE 125 MG/2ML
125 INJECTION, POWDER, LYOPHILIZED, FOR SOLUTION INTRAMUSCULAR; INTRAVENOUS
Status: CANCELLED
Start: 2024-04-30

## 2024-04-30 RX ORDER — LORAZEPAM 2 MG/ML
0.5 INJECTION INTRAMUSCULAR EVERY 4 HOURS PRN
Status: CANCELLED | OUTPATIENT
Start: 2024-05-21

## 2024-04-30 RX ORDER — METHYLPREDNISOLONE SODIUM SUCCINATE 125 MG/2ML
125 INJECTION, POWDER, LYOPHILIZED, FOR SOLUTION INTRAMUSCULAR; INTRAVENOUS
Status: CANCELLED
Start: 2024-05-21

## 2024-04-30 RX ORDER — DIPHENHYDRAMINE HYDROCHLORIDE 50 MG/ML
50 INJECTION INTRAMUSCULAR; INTRAVENOUS
Status: CANCELLED
Start: 2024-04-30

## 2024-04-30 RX ORDER — MEPERIDINE HYDROCHLORIDE 25 MG/ML
25 INJECTION INTRAMUSCULAR; INTRAVENOUS; SUBCUTANEOUS EVERY 30 MIN PRN
Status: CANCELLED | OUTPATIENT
Start: 2024-04-30

## 2024-04-30 RX ORDER — ALBUTEROL SULFATE 90 UG/1
1-2 AEROSOL, METERED RESPIRATORY (INHALATION)
Status: CANCELLED
Start: 2024-04-30

## 2024-04-30 RX ORDER — MEPERIDINE HYDROCHLORIDE 25 MG/ML
25 INJECTION INTRAMUSCULAR; INTRAVENOUS; SUBCUTANEOUS EVERY 30 MIN PRN
Status: CANCELLED | OUTPATIENT
Start: 2024-05-21

## 2024-04-30 RX ORDER — DIPHENHYDRAMINE HYDROCHLORIDE 50 MG/ML
50 INJECTION INTRAMUSCULAR; INTRAVENOUS
Status: CANCELLED
Start: 2024-05-21

## 2024-04-30 RX ORDER — ALBUTEROL SULFATE 0.83 MG/ML
2.5 SOLUTION RESPIRATORY (INHALATION)
Status: CANCELLED | OUTPATIENT
Start: 2024-04-30

## 2024-04-30 RX ORDER — EPINEPHRINE 1 MG/ML
0.3 INJECTION, SOLUTION INTRAMUSCULAR; SUBCUTANEOUS EVERY 5 MIN PRN
Status: CANCELLED | OUTPATIENT
Start: 2024-04-30

## 2024-04-30 RX ADMIN — DOCETAXEL 116 MG: 20 INJECTION, SOLUTION, CONCENTRATE INTRAVENOUS at 12:20

## 2024-04-30 RX ADMIN — DEXAMETHASONE SODIUM PHOSPHATE: 10 INJECTION, SOLUTION INTRAMUSCULAR; INTRAVENOUS at 11:52

## 2024-04-30 RX ADMIN — SODIUM CHLORIDE 250 ML: 9 INJECTION, SOLUTION INTRAVENOUS at 11:52

## 2024-04-30 ASSESSMENT — PAIN SCALES - GENERAL: PAINLEVEL: NO PAIN (0)

## 2024-04-30 NOTE — PROGRESS NOTES
[facility]  HEMATOLOGY AND ONCOLOGY    FOLLOW-UP VISIT NOTE    PATIENT NAME: Alonso Pettit MRN # [id number]  DATE OF VISIT: Apr 30, 2024 YOB: 1940    REFERRING PROVIDER: [facility] oncology    CANCER TYPE: Prostate adenocarcinoma; Seward 4+5  STAGE: IV (bony metastasis)  MOLECULAR PROFILE: No actionable mutations/MSI or high TMB; TP53 mutation positive    TREATMENT SUMMARY:  -12/9/2009: Radical prostatectomy; pathology revealed Seward 4+5 disease, stage pT3a,N0 positive margins  -Mar-May2010: Salvage external beam radiation therapy  -Apr 2013: Intermittent androgen deprivation therapy with leuprolide for biochemical PSA relapse  -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 Radiation therapy to the left supraclavicular lymph node ending in September 2020.  He was continued on enzalutamide  -May 2022: Switch to abiraterone with prednisone for progressive disease  -Aug 2023: Abiraterone discontinued for progressive disease.  PSMA PET scan with PSMA avid osseous and brisa metastasis.  -11/14/2023: MARLO trial: cycle 1 docetaxel. 11/15/23 cycle 1 radium per the MARLO trial.      Chemotherapy 11/14/2023  10:05 AM 11/15/2023  1:39 PM 12/6/2023  9:15 AM 12/28/2023  9:07 AM 1/16/2024   Day, Cycle Day 1, Cycle 1  Day 1, Cycle 1  Day 1, Cycle 2  Day 1, Cycle 3  Day 1, Cycle 4   DOCEtaxel (TAXOTERE) IV 60 mg/m2   60 mg/m2  60 mg/m2  60 mg/m2   radium Ra-223 Dichloride IV  1.473 microcurie/kg   108.4 mCi    .2 ng/ml  203 ng/ml 196 ng/ml      Chemotherapy 2/6/2024  1:17 PM 2/7/2024  1:50 PM 2/27/2024  11:57 AM 3/19/2024  8:54 AM 3/20/2024  1:41 PM 4/9/2024  9:49 AM   Day, Cycle Day 1, Cycle 5   Day 1, Cycle 6  Day 1, Cycle 7   Day 1, Cycle 8    DOCEtaxel IV 60 mg/m2   60 mg/m2  60 mg/m2   60  "mg/m2    radium 223 Dichloride  1.5 microcurie/kg    1.476 microcurie/kg        CURRENT INTERVENTIONS:  MARLO Trial randomized to Arm B with Docetaxel/prednisone/Radium-233. Leuprolide every 4 months locally.     SUBJECTIVE   Alonso Pettit is being followed for castration resistant metastatic prostate cancer. He returns to clinic accompanied by his wife, Macey. He is seen today prior to cycle 8 docetaxel.    Alonso returns to clinic today accompanied by his wife Macey.  He reports that he is feeling well today. Overall feels chemotherapy is going well. Macey agree's.   He had an episode of dizziness once when he woke up and was looking up.   Energy is stable today. He was more physical this past weekend and this made him tired. He feels he has had some deconditioning as he was winded carrying boxes up the steps. He otherwise has no shortness of breath at rest or with walking on a flat surface.   He isn't walking now because the dog is gone. He usually gets ready to play pickleball this time of year.   He has occasional headaches for which he sparingly takes tylenol.   Appetite is \"good\".     No falls.  No nausea or vomiting.   No urinary changes.   No headaches or vision changes.   No chest pain, shortness of breath, or new cough.   No bone pain.  No neuropathy.  They have a regimen that seems to be working very well for his constipation.    No fevers or chills.       ROS: 14 point ROS neg other than the symptoms noted above in the HPI.      PAST MEDICAL HISTORY     Past Medical History:   Diagnosis Date    Prostate cancer (H)          CURRENT OUTPATIENT MEDICATIONS     Current Outpatient Medications   Medication Sig    aspirin 81 mg chewable tablet [ASPIRIN 81 MG CHEWABLE TABLET] Chew 81 mg daily. (Patient not taking: Reported on 10/18/2023)    bisacodyl (DULCOLAX) 5 MG EC tablet Take 5 mg by mouth 2 times daily    calcium carbonate (CALCIUM 500 ORAL) [CALCIUM CARBONATE (CALCIUM 500 ORAL)] Take by mouth. (Patient " not taking: Reported on 1/16/2024)    cyclobenzaprine (FLEXERIL) 10 MG tablet 1/2-1 TAB ORALLY UP TO 3 TIMES A DAY AS NEEDED FOR MUSCLE SPASM    dexAMETHasone (DECADRON) 4 MG tablet Take 2 tablets (8 mg) by mouth 2 times daily (with meals) Start evening of Docetaxel infusion and continue for a total of 3 doses.    leuprolide (LUPRON) 11.25 mg injection Inject 11.25 mg into the muscle every 3 months    predniSONE (DELTASONE) 5 MG tablet Take 1 tablet (5 mg) by mouth 2 times daily for 21 days    prochlorperazine (COMPAZINE) 10 MG tablet Take 0.5 tablets (5 mg) by mouth every 6 hours as needed for nausea or vomiting (Patient not taking: Reported on 1/16/2024)    pseudoePHEDrine-guaiFENesin (MUCINEX D)  MG 12 hr tablet  (Patient not taking: Reported on 12/6/2023)    psyllium (METAMUCIL/KONSYL) Packet Take 1 packet by mouth daily     No current facility-administered medications for this visit.        ALLERGIES    No Known Allergies     REVIEW OF SYSTEMS   As above in the HPI, o/w complete 12-point ROS was negative.     PHYSICAL EXAM   /70 (BP Location: Left arm, Patient Position: Sitting, Cuff Size: Adult Regular)   Pulse 79   Temp 98.1  F (36.7  C) (Oral)   Resp 16   Wt 75.6 kg (166 lb 9.6 oz)   SpO2 95%   BMI 23.19 kg/m    General: No acute distress  HEENT: Sclera anicteric. Oral mucosa pink and moist.  No mucositis or thrush  Lymph: No lymphadenopathy in neck  Heart: Regular, rate, and rhythm  Lungs: Clear to ascultation bilaterally  Abdomen: Positive bowel sounds. Soft, non-distended, non-tender. No organomegaly or mass.   MSK: no peripheral edema bilaterally.   Neuro: Cranial nerves grossly intact. Oriented to person, place, time, and date.   Rash: none     LABORATORY AND IMAGING STUDIES     Recent Labs   Lab Test 04/26/24  0958 04/09/24  0752 03/19/24  0650 03/15/24  1026 02/27/24  0838    145 141 140 142   POTASSIUM 3.8 3.4 4.0 3.6 3.8   CHLORIDE 107 108* 107 103 106   CO2 22 26 22 25 24  "  ANIONGAP 13 11 12 12 12   BUN 17.3 15.2 17.9 11.7 15.3   CR 0.88 0.80 0.80 0.79 0.76   * 142* 171* 94 104*   ZABRINA 8.8 9.0 8.9 9.0 9.5     Recent Labs   Lab Test 04/26/24  0958 04/09/24  0752 03/15/24  1026 02/27/24  0838 02/02/24  0843   MAG 1.8 1.8 1.9 1.7 1.8   PHOS 3.3 3.1 2.8 3.4 3.3     Recent Labs   Lab Test 04/26/24  0958 04/09/24  0752 03/19/24  0650 03/15/24  1026 02/27/24  0838   WBC 5.3 4.5 6.2 7.7 7.0   HGB 11.5* 11.2* 11.5* 11.9* 11.8*    227 282 277 261   MCV 95 98 98 96 97   NEUTROPHIL 69 60 76 68 76     Recent Labs   Lab Test 04/26/24  0958 04/09/24  0752 03/19/24  0650 03/15/24  1026   BILITOTAL 0.4 0.5 0.5 0.6   ALKPHOS 62 55 68 69   ALT 12 13 13 13   AST 16 15 15 16   ALBUMIN 3.8 3.8 4.0 4.0    181  --  209     No results found for: \"TSH\"  No results for input(s): \"CEA\" in the last 79650 hours.  Results for orders placed or performed during the hospital encounter of 03/20/24   [facility]    Narrative    Examination:  [facility]  3/20/2024 1:58 PM     Comparison:  None.    History:  Malignant neoplasm metastatic to bone (H)    Procedure: After confirming the identity of the patient, and after  consultation with the ordering physician Dr. Johns the risks and  benefits of Radium 223 were discussed at length with the patient, and  all questions were answered. 110.0 uCi of Ra-223 were given by IV to  the patient without complication.      Impression    Impression:    110.0  uCi of Ra-223 IV for therapy of osseous  metastases from prostate carcinoma.  Dose 4 of 6.      I have personally reviewed the examination and initial interpretation  and I agree with the findings.    SMILEY RANDALL MD         SYSTEM ID:  [id number]     Recent Labs   Lab Test 04/26/24  0958 04/09/24  0752 03/15/24  1026 02/27/24  0838 02/02/24  0843 11/14/23  0752 10/30/23  1044   .90 149.10 203.90 196.00 203.10   < > 153.30   TESTOSTTOTAL  --   --   --   --   --   --  7*    < > " = values in this interval not displayed.   PSA Trend     I reviewed the above labs today.     ASSESSMENT AND PLAN   -Castration resistant metastatic prostate cancer   Post radical prostatectomy on 12/9/2009; salvage radiation ending May 2010; androgen deprivation therapy since 2013   Post progression on enzalutamide and abiraterone with prednisone  -Nonmuscle invasive bladder cancer diagnosed in 2011 without any recent recurrence  -No significant medical comorbidity  -ECOG performance status of 0     #Castration resistant metastatic prostate cancer.   - Has progressed on novel antiandrogen therapies including abiraterone with prednisone and enzalutamide.  He had a PSMA PET CT scan which did show extensive metastasis to the lymph nodes and bones. Referred to [facility] for the MARLO trial, randomized to Arm B with docetaxel and Radium. Docetaxel is administered every 3 weeks for 6-10 doses and Radium every 6 weeks for 6 doses.   - He tolerated docetaxel and radium reasonably well with the exception of significant constipation for nearly 1.5-2 weeks after his first cycle. Constipation has improved since cycle 2 docetaxel with a regular bowel regimen. He has noticed mild worsening of fatigue with each subsequent dose. Fatigue is stable as of 4/09/24. He had started walking while there were watching their sons dog but hasn't been as active now. I have encouraged him to continue.   - CBC reviewed and demonstrates stable anemia. PSA is remained stable ~200 since starting the MARLO trial. He has not had a dramatic PSA response but none the less with stable PSA it is reasonable to continue. PSA 3/15/2024 203.90. CMP is WNL. 03/15/2024 Bone Scan and CT CAP with stable bone and lymph node metastases.   - Labs and clinically appropriate to proceed with cycle 9 docetaxel today Apr 30, 2024. He has had 4 cycles of radium, last 3/20/24. He is scheduled to have his radium tomorrow.     - continue Eligard every 4 months with Dr. Philip at  [facility].  He got his last dose on April 11th, 2024.     - We reviewed next steps beyond trial completion. We could continue on docetaxel since he is tolerating it without any difficulty and continues to respond. We could hold off once he has disease progression or progressive side effects.     I reviewed our treatment options beyond docetaxel. He had PSMA avid disease and we would have option of lutetium 177 (Pluvicto). We had reviewed this prior to trial enrollment. We will discuss again once he is progressing on current therapy.     His wife had questions on the results of other patients on clinical trial. We have a handful of patient at our site. We will only have subjective information based on the few patients. It would be helpful to have objective information from the randomized study. It appears that most patients are tolerating combination therapy well as he is doing. It remains to be seen if this helps progression free survival and overall survival.     He is already scheduled to see me in 3 weeks when he will be due for his next dose of docetaxel. I will see him with restaging scans at that visit.     #Bone Metastasis   - Zometa every 6 months with local oncologist. Next scheduled on March 6th 2024. Consider moving up frequency to every 6 weeks. Could move to [facility] during the time of trial participation and administer while he is here for ease of appointments. Not specifically reviewed today.   - rare risk of fractures associated with docetaxel and radium therefore continuing Zometa is recommended.     #Left sided rib pain   - Chest XR 2/6/24 negative. No acute airspace abnormalities or fractures.     #Constipation   - Continue scheduling ducolax 2-3 tablets per day for at least 2-4 days and then prn.   - If no bowel movement have 2-3 days, recommend adding in Milk of Mag. If no bowel movement after 6 hours, repeat.     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 CT neck with calcifications. Called patient and wife to discuss there results. They have seen a neurologist in the past at [facility] who they plan to follow up with along with their PCP.   - No recurrent episodes reported.     # Falls  - Two episodes of laying on the ground after cycle 4 docetaxel. Unclear source or fall given poor historian. Patient did have low grade temperature during this period thus illness pay have played a role but unclear. He also has poor water intake thus dehydration may be contributing to weakness vs. Chemotherapy. Continue to monitor closely for recurrence. Did not recur with cycle 5 and cycle 6.     #Cancer fatigue   #Deconditioning   - discussed with Alonso that regular exercise would be of benefit. He has started walking but has stopped now that their son's dog is no longer living with them. Previously discussed cancer rehab Alonso is currently not interested.     The longitudinal plan of care for the diagnosis(es)/condition(s) as documented were addressed during this visit. Due to the added complexity in care, I will continue to support Alonso in the subsequent management and with ongoing continuity of care.     40 minutes spent on the date of the encounter doing chart review, history and exam, documentation and further activities as noted above

## 2024-04-30 NOTE — NURSING NOTE
[id number]: Study Visit Note   Subject name: Alonso Pettit     Visit: [id number]    Did the study visit occur within the appropriate window allowed by the protocol? yes    Since the last study visit, He has been doing okay. Had an episode of dizziness this morning lasting one minute, he was standing and then it resolved. Insomnia has been an issue the past 2 weeks where he has been waking up at 4:30 in the morning. Patient's spouse says that he also has been taking more naps and seems more tired, he is still able to take care of things around the house and microwave meals if needed. Dr Gamez aware of above.     Patient's wife states the other day that he forgot where she went after telling him when he was lying in bed. He states that he knew she went somewhere and after thinking about it did remember. Patient saw neurology a while back for some concerns however they state there was no concerns at the time, but he did test positive for a alzheimer test, but they state that it is not currently an issue, but could become one.     Patient is happy with the low amount of side effects he has had thus far.     Verbal handover provided to infusion RN; reminded RN to document actual start time of infusion and actual stop time of the infusion. If infusion deviates from protocol directed infusion time, please document rationale in your infusion note.    I have personally interviewed Alonso Pettit and reviewed his medical record for adverse events and concomitant medications and these have been recorded on the corresponding logs in Alonso Pettit's research file.     Alonso Pettit was given the opportunity to ask any trial related questions.  Please see provider progress note for physical exam and other clinical information. Labs were reviewed - any significant lab values were addressed and reviewed.    Cammy López RN

## 2024-04-30 NOTE — PROGRESS NOTES
Infusion Nursing Note:  Alonso Pettit presents today for C9 D1 docetaxel.    Patient seen by provider today: Yes: Dr. Gamez   present during visit today: Not Applicable.    Note: Patient presents to the infusion center today following his provider visit with no new questions or concerns. Patient reports taking dexamethasone and prednisone as prescribed. Per research staff Cammy López RN, okay to proceed with study treatment today. Treatment plan to remain open for radium injection tomorrow.     Docetaxel Start Time: 1220  Docetaxel Stop Time: 1323    Intravenous Access:  Peripheral IV placed.    Treatment Conditions:  Lab Results   Component Value Date    HGB 11.5 (L) 04/30/2024    WBC 6.3 04/30/2024    ANEU 4.6 02/02/2024    ANEUTAUTO 5.1 04/30/2024     04/30/2024        Lab Results   Component Value Date     04/30/2024    POTASSIUM 3.8 04/30/2024    MAG 1.7 04/30/2024    CR 0.86 04/30/2024    ZABRINA 9.2 04/30/2024    BILITOTAL 0.4 04/30/2024    ALBUMIN 3.9 04/30/2024    ALT 12 04/30/2024    AST 14 04/30/2024       Results reviewed, labs MET treatment parameters, ok to proceed with treatment.      Post Infusion Assessment:  Patient tolerated infusion without incident.  Blood return noted pre and post infusion.  Site patent and intact, free from redness, edema or discomfort.  No evidence of extravasations.  Access discontinued per protocol.       Discharge Plan:   Prescription refills given for dexamethasone.  Discharge instructions reviewed with: Patient and Family.  Patient and/or family verbalized understanding of discharge instructions and all questions answered.  AVS to patient via [email or web address].  Patient will return 05/01/2024 for next appointment.   Patient discharged in stable condition accompanied by: self and wife.  Departure Mode: Ambulatory.      Rhona Meyers RN

## 2024-04-30 NOTE — LETTER
4/30/2024         RE: Alonso Pettit  [address] [facility] [address]        Dear Colleague,    Thank you for referring your patient, Alonso Pettit, to the [facility]. Please see a copy of my visit note below.    [facility]  HEMATOLOGY AND ONCOLOGY    FOLLOW-UP VISIT NOTE    PATIENT NAME: Alonso Pettit MRN # [id number]  DATE OF VISIT: Apr 30, 2024 YOB: 1940    REFERRING PROVIDER: [facility]    CANCER TYPE: Prostate adenocarcinoma; [place] 4+5  STAGE: IV (bony metastasis)  MOLECULAR PROFILE: No actionable mutations/MSI or high TMB; TP53 mutation positive    TREATMENT SUMMARY:  -12/9/2009: Radical prostatectomy; pathology revealed [place] 4+5 disease, stage pT3a,N0 positive margins  -Mar-May2010: Salvage external beam radiation therapy  -Apr 2013: Intermittent androgen deprivation therapy with leuprolide for biochemical PSA relapse  -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 Radiation therapy to the left supraclavicular lymph node ending in September 2020.  He was continued on enzalutamide  -May 2022: Switch to abiraterone with prednisone for progressive disease  -Aug 2023: Abiraterone discontinued for progressive disease.  PSMA PET scan with PSMA avid osseous and brisa metastasis.  -11/14/2023: MARLO trial: cycle 1 docetaxel. 11/15/23 cycle 1 radium per the MARLO trial.      Chemotherapy 11/14/2023  10:05 AM 11/15/2023  1:39 PM 12/6/2023  9:15 AM 12/28/2023  9:07 AM 1/16/2024   Day, Cycle Day 1, Cycle 1  Day 1, Cycle 1  Day 1, Cycle 2  Day 1, Cycle 3  Day 1, Cycle 4   DOCEtaxel (TAXOTERE) IV 60 mg/m2   60 mg/m2  60 mg/m2  60 mg/m2   radium Ra-223 Dichloride IV  1.473 microcurie/kg   108.4 mCi    .2 ng/ml  203 ng/ml 196 ng/ml   "    Chemotherapy 2/6/2024  1:17 PM 2/7/2024  1:50 PM 2/27/2024  11:57 AM 3/19/2024  8:54 AM 3/20/2024  1:41 PM 4/9/2024  9:49 AM   Day, Cycle Day 1, Cycle 5   Day 1, Cycle 6  Day 1, Cycle 7   Day 1, Cycle 8    DOCEtaxel IV 60 mg/m2   60 mg/m2  60 mg/m2   60 mg/m2    radium 223 Dichloride  1.5 microcurie/kg    1.476 microcurie/kg        CURRENT INTERVENTIONS:  MARLO Trial randomized to Arm B with Docetaxel/prednisone/Radium-233. Leuprolide every 4 months locally.     SUBJECTIVE   Alonso Pettit is being followed for castration resistant metastatic prostate cancer. He returns to clinic accompanied by his wife, Macey. He is seen today prior to cycle 8 docetaxel.    Alonso returns to clinic today accompanied by his wife Macey.  He reports that he is feeling well today. Overall feels chemotherapy is going well. Macey agree's.   He had an episode of dizziness once when he woke up and was looking up.   Energy is stable today. He was more physical this past weekend and this made him tired. He feels he has had some deconditioning as he was winded carrying boxes up the steps. He otherwise has no shortness of breath at rest or with walking on a flat surface.   He isn't walking now because the dog is gone. He usually gets ready to play pickleball this time of year.   He has occasional headaches for which he sparingly takes tylenol.   Appetite is \"good\".     No falls.  No nausea or vomiting.   No urinary changes.   No headaches or vision changes.   No chest pain, shortness of breath, or new cough.   No bone pain.  No neuropathy.  They have a regimen that seems to be working very well for his constipation.    No fevers or chills.       ROS: 14 point ROS neg other than the symptoms noted above in the HPI.      PAST MEDICAL HISTORY     Past Medical History:   Diagnosis Date    Prostate cancer (H)          CURRENT OUTPATIENT MEDICATIONS     Current Outpatient Medications   Medication Sig    aspirin 81 mg chewable tablet [ASPIRIN 81 MG " CHEWABLE TABLET] Chew 81 mg daily. (Patient not taking: Reported on 10/18/2023)    bisacodyl (DULCOLAX) 5 MG EC tablet Take 5 mg by mouth 2 times daily    calcium carbonate (CALCIUM 500 ORAL) [CALCIUM CARBONATE (CALCIUM 500 ORAL)] Take by mouth. (Patient not taking: Reported on 1/16/2024)    cyclobenzaprine (FLEXERIL) 10 MG tablet 1/2-1 TAB ORALLY UP TO 3 TIMES A DAY AS NEEDED FOR MUSCLE SPASM    dexAMETHasone (DECADRON) 4 MG tablet Take 2 tablets (8 mg) by mouth 2 times daily (with meals) Start evening of Docetaxel infusion and continue for a total of 3 doses.    leuprolide (LUPRON) 11.25 mg injection Inject 11.25 mg into the muscle every 3 months    predniSONE (DELTASONE) 5 MG tablet Take 1 tablet (5 mg) by mouth 2 times daily for 21 days    prochlorperazine (COMPAZINE) 10 MG tablet Take 0.5 tablets (5 mg) by mouth every 6 hours as needed for nausea or vomiting (Patient not taking: Reported on 1/16/2024)    pseudoePHEDrine-guaiFENesin (MUCINEX D)  MG 12 hr tablet  (Patient not taking: Reported on 12/6/2023)    psyllium (METAMUCIL/KONSYL) Packet Take 1 packet by mouth daily     No current facility-administered medications for this visit.        ALLERGIES    No Known Allergies     REVIEW OF SYSTEMS   As above in the HPI, o/w complete 12-point ROS was negative.     PHYSICAL EXAM   /70 (BP Location: Left arm, Patient Position: Sitting, Cuff Size: Adult Regular)   Pulse 79   Temp 98.1  F (36.7  C) (Oral)   Resp 16   Wt 75.6 kg (166 lb 9.6 oz)   SpO2 95%   BMI 23.19 kg/m    General: No acute distress  HEENT: Sclera anicteric. Oral mucosa pink and moist.  No mucositis or thrush  Lymph: No lymphadenopathy in neck  Heart: Regular, rate, and rhythm  Lungs: Clear to ascultation bilaterally  Abdomen: Positive bowel sounds. Soft, non-distended, non-tender. No organomegaly or mass.   MSK: no peripheral edema bilaterally.   Neuro: Cranial nerves grossly intact. Oriented to person, place, time, and date.   Rash:  "none     LABORATORY AND IMAGING STUDIES     Recent Labs   Lab Test 04/26/24  0958 04/09/24  0752 03/19/24  0650 03/15/24  1026 02/27/24  0838    145 141 140 142   POTASSIUM 3.8 3.4 4.0 3.6 3.8   CHLORIDE 107 108* 107 103 106   CO2 22 26 22 25 24   ANIONGAP 13 11 12 12 12   BUN 17.3 15.2 17.9 11.7 15.3   CR 0.88 0.80 0.80 0.79 0.76   * 142* 171* 94 104*   ZABRINA 8.8 9.0 8.9 9.0 9.5     Recent Labs   Lab Test 04/26/24  0958 04/09/24  0752 03/15/24  1026 02/27/24  0838 02/02/24  0843   MAG 1.8 1.8 1.9 1.7 1.8   PHOS 3.3 3.1 2.8 3.4 3.3     Recent Labs   Lab Test 04/26/24  0958 04/09/24  0752 03/19/24  0650 03/15/24  1026 02/27/24  0838   WBC 5.3 4.5 6.2 7.7 7.0   HGB 11.5* 11.2* 11.5* 11.9* 11.8*    227 282 277 261   MCV 95 98 98 96 97   NEUTROPHIL 69 60 76 68 76     Recent Labs   Lab Test 04/26/24  0958 04/09/24  0752 03/19/24  0650 03/15/24  1026   BILITOTAL 0.4 0.5 0.5 0.6   ALKPHOS 62 55 68 69   ALT 12 13 13 13   AST 16 15 15 16   ALBUMIN 3.8 3.8 4.0 4.0    181  --  209     No results found for: \"TSH\"  No results for input(s): \"CEA\" in the last 80211 hours.  Results for orders placed or performed during the hospital encounter of 03/20/24   [facility]    Narrative    Examination:  [facility]  3/20/2024 1:58 PM     Comparison:  None.    History:  Malignant neoplasm metastatic to bone (H)    Procedure: After confirming the identity of the patient, and after  consultation with the ordering physician Dr. Johns the risks and  benefits of Radium 223 were discussed at length with the patient, and  all questions were answered. 110.0 uCi of Ra-223 were given by IV to  the patient without complication.      Impression    Impression:    110.0  uCi of Ra-223 IV for therapy of osseous  metastases from prostate carcinoma.  Dose 4 of 6.      I have personally reviewed the examination and initial interpretation  and I agree with the findings.    SMILEY RANDALL MD         SYSTEM ID: "  [id number]     Recent Labs   Lab Test 04/26/24  0958 04/09/24  0752 03/15/24  1026 02/27/24  0838 02/02/24  0843 11/14/23  0752 10/30/23  1044   .90 149.10 203.90 196.00 203.10   < > 153.30   TESTOSTTOTAL  --   --   --   --   --   --  7*    < > = values in this interval not displayed.   PSA Trend     I reviewed the above labs today.     ASSESSMENT AND PLAN   -Castration resistant metastatic prostate cancer   Post radical prostatectomy on 12/9/2009; salvage radiation ending May 2010; androgen deprivation therapy since 2013   Post progression on enzalutamide and abiraterone with prednisone  -Nonmuscle invasive bladder cancer diagnosed in 2011 without any recent recurrence  -No significant medical comorbidity  -ECOG performance status of 0     #Castration resistant metastatic prostate cancer.   - Has progressed on novel antiandrogen therapies including abiraterone with prednisone and enzalutamide.  He had a PSMA PET CT scan which did show extensive metastasis to the lymph nodes and bones. Referred to [facility] for the MARLO trial, randomized to Arm B with docetaxel and Radium. Docetaxel is administered every 3 weeks for 6-10 doses and Radium every 6 weeks for 6 doses.   - He tolerated docetaxel and radium reasonably well with the exception of significant constipation for nearly 1.5-2 weeks after his first cycle. Constipation has improved since cycle 2 docetaxel with a regular bowel regimen. He has noticed mild worsening of fatigue with each subsequent dose. Fatigue is stable as of 4/09/24. He had started walking while there were watching their sons dog but hasn't been as active now. I have encouraged him to continue.   - CBC reviewed and demonstrates stable anemia. PSA is remained stable ~200 since starting the MARLO trial. He has not had a dramatic PSA response but none the less with stable PSA it is reasonable to continue. PSA 3/15/2024 203.90. CMP is WNL. 03/15/2024 Bone Scan and CT CAP with stable bone and lymph  node metastases.   - Labs and clinically appropriate to proceed with cycle 9 docetaxel today Apr 30, 2024. He has had 4 cycles of radium, last 3/20/24. He is scheduled to have his radium tomorrow.     - continue Eligard every 4 months with Dr. Philip at [facility].  He got his last dose on April 11th, 2024.     - We reviewed next steps beyond trial completion. We could continue on docetaxel since he is tolerating it without any difficulty and continues to respond. We could hold off once he has disease progression or progressive side effects.     I reviewed our treatment options beyond docetaxel. He had PSMA avid disease and we would have option of lutetium 177 (Pluvicto). We had reviewed this prior to trial enrollment. We will discuss again once he is progressing on current therapy.     His wife had questions on the results of other patients on clinical trial. We have a handful of patient at our site. We will only have subjective information based on the few patients. It would be helpful to have objective information from the randomized study. It appears that most patients are tolerating combination therapy well as he is doing. It remains to be seen if this helps progression free survival and overall survival.     He is already scheduled to see me in 3 weeks when he will be due for his next dose of docetaxel. I will see him with restaging scans at that visit.     #Bone Metastasis   - Zometa every 6 months with local oncologist. Next scheduled on March 6th 2024. Consider moving up frequency to every 6 weeks. Could move to [facility] during the time of trial participation and administer while he is here for ease of appointments. Not specifically reviewed today.   - rare risk of fractures associated with docetaxel and radium therefore continuing Zometa is recommended.     #Left sided rib pain   - Chest XR 2/6/24 negative. No acute airspace abnormalities or fractures.     #Constipation   - Continue scheduling ducolax 2-3  tablets per day for at least 2-4 days and then prn.   - If no bowel movement have 2-3 days, recommend adding in Milk of Mag. If no bowel movement after 6 hours, repeat.     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 CT neck with calcifications. Called patient and wife to discuss there results. They have seen a neurologist in the past at [facility] who they plan to follow up with along with their PCP.   - No recurrent episodes reported.     # Falls  - Two episodes of laying on the ground after cycle 4 docetaxel. Unclear source or fall given poor historian. Patient did have low grade temperature during this period thus illness pay have played a role but unclear. He also has poor water intake thus dehydration may be contributing to weakness vs. Chemotherapy. Continue to monitor closely for recurrence. Did not recur with cycle 5 and cycle 6.     #Cancer fatigue   #Deconditioning   - discussed with Alonso that regular exercise would be of benefit. He has started walking but has stopped now that their son's dog is no longer living with them. Previously discussed cancer rehab Alonso is currently not interested.     The longitudinal plan of care for the diagnosis(es)/condition(s) as documented were addressed during this visit. Due to the added complexity in care, I will continue to support Alonso in the subsequent management and with ongoing continuity of care.     40 minutes spent on the date of the encounter doing chart review, history and exam, documentation and further activities as noted above          Kg Gamez MD

## 2024-04-30 NOTE — NURSING NOTE
Chief Complaint   Patient presents with    Oncology Clinic Visit     Research Prostate Cancer    Blood Draw     Labs drawn via piv placement by RN in lab. VS taken.      Labs drawn from PIV placed by RN. Line flushed with saline. Vitals taken. Pt checked in for appointment(s).    Betsy Stark RN

## 2024-04-30 NOTE — PATIENT INSTRUCTIONS
[facility] Triage and after hours / weekends / holidays:  [phone]    Please call the triage or after hours line if you experience a temperature greater than or equal to 100.4, shaking chills, have uncontrolled nausea, vomiting and/or diarrhea, dizziness, shortness of breath, chest pain, bleeding, unexplained bruising, or if you have any other new/concerning symptoms, questions or concerns.      If you are having any concerning symptoms or wish to speak to a provider before your next infusion visit, please call triage to notify them so we can adequately serve you.     If you need a refill on a narcotic prescription or other medication, please call before your infusion appointment.                April 2024 Sunday Monday Tuesday Wednesday Thursday Friday Saturday        1     2     3     4     5     6       7     8     9    LAB PERIPHERAL   7:30 AM   (15 min.)   [facility] LAB DRAW   [facility]    RETURN CCSL   7:45 AM   (45 min.)   Valentine Ball PA-C   [facility]    ONC INFUSION 2 HR (120 MIN)   8:30 AM   (120 min.)    ONC INFUSION NURSE   [facility] 10     11     12     13       14     15     16     17     18     19     20       21     22     23     24     25     26    LAB PERIPHERAL   9:45 AM   (15 min.)   [facility] DRAW   [facility] 27       28     29     30    LAB PERIPHERAL   9:45 AM   (15 min.)   [facility] DRAW   [facility]    RETURN CCSL   9:45 AM   (30 min.)   Kg Gamez MD   [facility]    ONC INFUSION 2 HR (120 MIN)  11:00 AM   (120 min.)    ONC INFUSION NURSE   [facility]                                 May 2024      [place] Monday Tuesday Wednesday Thursday Friday Saturday                  1    NM RESEARCH RADIOTHERAPY  10:00 AM   (60 min.)   UU NM RADIOTHERAPY 5   [facility]  [facility] 2     3     4       5     6     7     8     9     10     11       12     13     14     15    NM INJ   9:15 AM   (15 min.)   [id number]   [facility]    CT CHEST/ABDOMEN/PELVIS W   9:30 AM   (20 min.)   UUCT1   [facility] 16     17     18       19     20     21    LAB PERIPHERAL   9:30 AM   (15 min.)   [facility] DRAW   [facility]    RETURN CCSL   9:45 AM   (30 min.)   Kg Gamez MD   [facility]    ONC INFUSION 2 HR (120 MIN)  11:00 AM   (120 min.)    ONC INFUSION NURSE   [facility] 22     23     24     25       26     27     28     29     30     31                         Recent Results (from the past 24 hour(s))   Comprehensive metabolic panel    Collection Time: 04/30/24 10:28 AM   Result Value Ref Range    Sodium 141 135 - 145 mmol/L    Potassium 3.8 3.4 - 5.3 mmol/L    Carbon Dioxide (CO2) 23 22 - 29 mmol/L    Anion Gap 11 7 - 15 mmol/L    Urea Nitrogen 17.0 8.0 - 23.0 mg/dL    Creatinine 0.86 0.67 - 1.17 mg/dL    GFR Estimate 87 >60 mL/min/1.73m2    Calcium 9.2 8.8 - 10.2 mg/dL    Chloride 107 98 - 107 mmol/L    Glucose 125 (H) 70 - 99 mg/dL    Alkaline Phosphatase 59 40 - 150 U/L    AST 14 0 - 45 U/L    ALT 12 0 - 70 U/L    Protein Total 6.3 (L) 6.4 - 8.3 g/dL    Albumin 3.9 3.5 - 5.2 g/dL    Bilirubin Total 0.4 <=1.2 mg/dL   Magnesium    Collection Time: 04/30/24 10:28 AM   Result Value Ref Range    Magnesium 1.7 1.7 - 2.3 mg/dL   Phosphorus    Collection Time: 04/30/24 10:28 AM   Result Value Ref Range    Phosphorus 3.2 2.5 - 4.5 mg/dL   Lactate Dehydrogenase    Collection Time: 04/30/24 10:28 AM   Result Value Ref Range    Lactate Dehydrogenase 190 0 - 250 U/L   CBC with platelets and differential    Collection Time: 04/30/24 10:28 AM   Result Value Ref Range    WBC Count 6.3 4.0 - 11.0 10e3/uL    RBC Count 3.64 (L) 4.40 - 5.90 10e6/uL     Hemoglobin 11.5 (L) 13.3 - 17.7 g/dL    Hematocrit 34.9 (L) 40.0 - 53.0 %    MCV 96 78 - 100 fL    MCH 31.6 26.5 - 33.0 pg    MCHC 33.0 31.5 - 36.5 g/dL    RDW 14.5 10.0 - 15.0 %    Platelet Count 252 150 - 450 10e3/uL    % Neutrophils 80 %    % Lymphocytes 10 %    % Monocytes 8 %    % Eosinophils 0 %    % Basophils 1 %    % Immature Granulocytes 1 %    NRBCs per 100 WBC 0 <1 /100    Absolute Neutrophils 5.1 1.6 - 8.3 10e3/uL    Absolute Lymphocytes 0.6 (L) 0.8 - 5.3 10e3/uL    Absolute Monocytes 0.5 0.0 - 1.3 10e3/uL    Absolute Eosinophils 0.0 0.0 - 0.7 10e3/uL    Absolute Basophils 0.0 0.0 - 0.2 10e3/uL    Absolute Immature Granulocytes 0.1 <=0.4 10e3/uL    Absolute NRBCs 0.0 10e3/uL

## 2024-04-30 NOTE — NURSING NOTE
"Oncology Rooming Note    April 30, 2024 10:38 AM   Alonso Pettit is a 83 year old male who presents for:    Chief Complaint   Patient presents with    Oncology Clinic Visit     Research Prostate Cancer    Blood Draw     Labs drawn via piv placement by RN in lab. VS taken.      Initial Vitals: /70 (BP Location: Left arm, Patient Position: Sitting, Cuff Size: Adult Regular)   Pulse 79   Temp 98.1  F (36.7  C) (Oral)   Resp 16   Wt 75.6 kg (166 lb 9.6 oz)   SpO2 95%   BMI 23.19 kg/m   Estimated body mass index is 23.19 kg/m  as calculated from the following:    Height as of 2/6/24: 1.805 m (5' 11.06\").    Weight as of this encounter: 75.6 kg (166 lb 9.6 oz). Body surface area is 1.95 meters squared.  No Pain (0) Comment: Data Unavailable   No LMP for male patient.  Allergies reviewed: Yes  Medications reviewed: Yes    Medications: MEDICATION REFILLS NEEDED TODAY. Provider was notified.  Pharmacy name entered into [facility]: [id number] [place] [address]    Frailty Screening:   Is the patient here for a new oncology consult visit in cancer care? 2. No      Clinical concerns: Refill: Dexamethasone      Briana Valadez, EMT  4/30/2024              "

## 2024-05-01 ENCOUNTER — DOCUMENTATION ONLY (OUTPATIENT)
Dept: ONCOLOGY | Facility: CLINIC | Age: 84
End: 2024-05-01
Payer: MEDICARE

## 2024-05-01 ENCOUNTER — HOSPITAL ENCOUNTER (OUTPATIENT)
Dept: NUCLEAR MEDICINE | Facility: CLINIC | Age: 84
Setting detail: NUCLEAR MEDICINE
Discharge: HOME | End: 2024-05-01
Attending: INTERNAL MEDICINE
Payer: MEDICARE

## 2024-05-01 VITALS
RESPIRATION RATE: 16 BRPM | OXYGEN SATURATION: 95 % | SYSTOLIC BLOOD PRESSURE: 124 MMHG | TEMPERATURE: 98 F | HEART RATE: 75 BPM | DIASTOLIC BLOOD PRESSURE: 71 MMHG

## 2024-05-01 DIAGNOSIS — C61 PROSTATE CANCER (H): Primary | ICD-10-CM

## 2024-05-01 DIAGNOSIS — C79.51 MALIGNANT NEOPLASM METASTATIC TO BONE (H): ICD-10-CM

## 2024-05-01 RX ORDER — ALBUTEROL SULFATE 90 UG/1
1-2 AEROSOL, METERED RESPIRATORY (INHALATION)
Status: DISCONTINUED | OUTPATIENT
Start: 2024-05-01 | End: 2024-05-02 | Stop reason: HOSPADM

## 2024-05-01 RX ORDER — LORAZEPAM 2 MG/ML
0.5 INJECTION INTRAMUSCULAR EVERY 4 HOURS PRN
Status: DISCONTINUED | OUTPATIENT
Start: 2024-05-01 | End: 2024-05-01

## 2024-05-01 RX ORDER — DIPHENHYDRAMINE HYDROCHLORIDE 50 MG/ML
50 INJECTION INTRAMUSCULAR; INTRAVENOUS
Status: DISCONTINUED | OUTPATIENT
Start: 2024-05-01 | End: 2024-05-02 | Stop reason: HOSPADM

## 2024-05-01 RX ORDER — METHYLPREDNISOLONE SODIUM SUCCINATE 125 MG/2ML
125 INJECTION, POWDER, LYOPHILIZED, FOR SOLUTION INTRAMUSCULAR; INTRAVENOUS
Status: DISCONTINUED | OUTPATIENT
Start: 2024-05-01 | End: 2024-05-02 | Stop reason: HOSPADM

## 2024-05-01 RX ORDER — EPINEPHRINE 1 MG/ML
0.3 INJECTION, SOLUTION, CONCENTRATE INTRAVENOUS EVERY 5 MIN PRN
Status: DISCONTINUED | OUTPATIENT
Start: 2024-05-01 | End: 2024-05-02 | Stop reason: HOSPADM

## 2024-05-01 RX ORDER — HEPARIN SODIUM,PORCINE 10 UNIT/ML
5-20 VIAL (ML) INTRAVENOUS DAILY PRN
Status: DISCONTINUED | OUTPATIENT
Start: 2024-05-01 | End: 2024-05-02 | Stop reason: HOSPADM

## 2024-05-01 RX ORDER — HEPARIN SODIUM (PORCINE) LOCK FLUSH IV SOLN 100 UNIT/ML 100 UNIT/ML
5 SOLUTION INTRAVENOUS
Status: DISCONTINUED | OUTPATIENT
Start: 2024-05-01 | End: 2024-05-02 | Stop reason: HOSPADM

## 2024-05-01 RX ORDER — ALBUTEROL SULFATE 0.83 MG/ML
2.5 SOLUTION RESPIRATORY (INHALATION)
Status: DISCONTINUED | OUTPATIENT
Start: 2024-05-01 | End: 2024-05-02 | Stop reason: HOSPADM

## 2024-05-01 RX ORDER — MEPERIDINE HYDROCHLORIDE 25 MG/ML
25 INJECTION INTRAMUSCULAR; INTRAVENOUS; SUBCUTANEOUS EVERY 30 MIN PRN
Status: DISCONTINUED | OUTPATIENT
Start: 2024-05-01 | End: 2024-05-02 | Stop reason: HOSPADM

## 2024-05-01 NOTE — NURSING NOTE
[id number]: Remote Study Visit Note   Subject name: Alonso Pettit     Visit: Cycle 9     Did the study visit occur within the appropriate window allowed by the protocol? yes    Since the last study visit, He has been doing well. No changes since yesterday see visit note from 4/29.      Were any SSEs noted since last study visit? No  -the use of EBRT to relieve skeletal symptoms  -the occurrence of new symptomatic pathological bone fractures  (vertebral or non-vertebral)  -the occurrence of spinal cord compression  -a tumor-related orthopedic surgical intervention    Was patient given radiation therapy precautions? Yes    Verbal handover provided to nuclear medicine RN and nuclear medicine tech. Reminded team to document actual start time of infusion and actual stop time of the infusion. If infusion deviates from protocol directed infusion time, please document rationale in your infusion note. Yes    I have personally interviewed Alonso Pettit and reviewed his medical record for adverse events and concomitant medications and these have been recorded on the corresponding logs in Alonso Pettit's research file.     Alonso Pettit was given the opportunity to ask any trial related questions.  Please see provider progress note for physical exam and other clinical information. Labs were reviewed - any significant lab values were addressed and reviewed.    Cammy López RN

## 2024-05-01 NOTE — PROGRESS NOTES
[facility] Nursing Note:    Patient presents today for XOFIGO (Ra-223) therapy  Patient seen by provider today: Yes: Dr. Leonard   present during visit today: NOT APPLICABLE      Intravenous Access:  Peripheral IV placed     Treatment Conditions:  Labs results reviewed by research RN, labs Met treatment parameters, ok to proceed with treatment.           Post Infusion Assessment:  Patient tolerated infusion without incident.  PIVs - No evidence of extravasations, access discontinued per protocol.         Discharge Plan:   Patient will return as scheduled for next appointment.   Patient discharged  in stable condition accompanied by: wife  Departure Mode: Ambulatory

## 2024-05-15 ENCOUNTER — HOSPITAL ENCOUNTER (OUTPATIENT)
Dept: NUCLEAR MEDICINE | Facility: CLINIC | Age: 84
Setting detail: NUCLEAR MEDICINE
Discharge: HOME | End: 2024-05-15
Attending: INTERNAL MEDICINE
Payer: MEDICARE

## 2024-05-15 ENCOUNTER — ANCILLARY PROCEDURE (OUTPATIENT)
Dept: RADIOLOGY | Facility: CLINIC | Age: 84
End: 2024-05-15
Attending: INTERNAL MEDICINE
Payer: MEDICARE

## 2024-05-15 ENCOUNTER — HOSPITAL ENCOUNTER (OUTPATIENT)
Dept: CT IMAGING | Facility: CLINIC | Age: 84
Discharge: HOME | End: 2024-05-15
Attending: INTERNAL MEDICINE | Admitting: INTERNAL MEDICINE
Payer: MEDICARE

## 2024-05-15 DIAGNOSIS — C79.51 MALIGNANT NEOPLASM METASTATIC TO BONE (H): ICD-10-CM

## 2024-05-15 PROCEDURE — 78306 BONE IMAGING WHOLE BODY: CPT | Mod: MG

## 2024-05-15 PROCEDURE — 71260 CT THORAX DX C+: CPT | Mod: 26 | Performed by: RADIOLOGY

## 2024-05-15 PROCEDURE — 78306 BONE IMAGING WHOLE BODY: CPT | Mod: 26 | Performed by: RADIOLOGY

## 2024-05-15 PROCEDURE — 343N000001 HC RX 343: Performed by: INTERNAL MEDICINE

## 2024-05-15 PROCEDURE — 74177 CT ABD & PELVIS W/CONTRAST: CPT | Mod: 26 | Performed by: RADIOLOGY

## 2024-05-15 PROCEDURE — G1010 CDSM STANSON: HCPCS | Performed by: RADIOLOGY

## 2024-05-15 PROCEDURE — A9503 TC99M MEDRONATE: HCPCS | Performed by: INTERNAL MEDICINE

## 2024-05-15 PROCEDURE — 71260 CT THORAX DX C+: CPT | Mod: MG

## 2024-05-15 PROCEDURE — 250N000011 HC RX IP 250 OP 636: Performed by: RADIOLOGY

## 2024-05-15 RX ORDER — TC 99M MEDRONATE 20 MG/10ML
20-30 INJECTION, POWDER, LYOPHILIZED, FOR SOLUTION INTRAVENOUS ONCE
Status: COMPLETED | OUTPATIENT
Start: 2024-05-15 | End: 2024-05-15

## 2024-05-15 RX ORDER — IOPAMIDOL 755 MG/ML
100 INJECTION, SOLUTION INTRAVASCULAR ONCE
Status: COMPLETED | OUTPATIENT
Start: 2024-05-15 | End: 2024-05-15

## 2024-05-15 RX ADMIN — IOPAMIDOL 100 ML: 755 INJECTION, SOLUTION INTRAVENOUS at 10:18

## 2024-05-15 RX ADMIN — TC 99M MEDRONATE 26.83 MILLICURIE: 20 INJECTION, POWDER, LYOPHILIZED, FOR SOLUTION INTRAVENOUS at 09:28

## 2024-05-21 ENCOUNTER — ALLIED HEALTH/NURSE VISIT (OUTPATIENT)
Dept: ONCOLOGY | Facility: CLINIC | Age: 84
End: 2024-05-21

## 2024-05-21 ENCOUNTER — INFUSION THERAPY VISIT (OUTPATIENT)
Dept: ONCOLOGY | Facility: CLINIC | Age: 84
End: 2024-05-21
Attending: INTERNAL MEDICINE
Payer: MEDICARE

## 2024-05-21 ENCOUNTER — APPOINTMENT (OUTPATIENT)
Dept: LAB | Facility: CLINIC | Age: 84
End: 2024-05-21
Attending: INTERNAL MEDICINE
Payer: MEDICARE

## 2024-05-21 VITALS
HEART RATE: 76 BPM | SYSTOLIC BLOOD PRESSURE: 99 MMHG | BODY MASS INDEX: 22.86 KG/M2 | RESPIRATION RATE: 16 BRPM | WEIGHT: 164.2 LBS | TEMPERATURE: 97.8 F | DIASTOLIC BLOOD PRESSURE: 65 MMHG | OXYGEN SATURATION: 96 %

## 2024-05-21 DIAGNOSIS — C61 PROSTATE CANCER (H): Primary | ICD-10-CM

## 2024-05-21 DIAGNOSIS — C79.51 MALIGNANT NEOPLASM METASTATIC TO BONE (H): Primary | ICD-10-CM

## 2024-05-21 DIAGNOSIS — Z00.6 EXAMINATION OF PARTICIPANT IN CLINICAL TRIAL: ICD-10-CM

## 2024-05-21 DIAGNOSIS — C79.51 MALIGNANT NEOPLASM METASTATIC TO BONE (H): ICD-10-CM

## 2024-05-21 DIAGNOSIS — C61 PROSTATE CANCER (H): ICD-10-CM

## 2024-05-21 LAB
ALBUMIN SERPL BCG-MCNC: 3.9 G/DL (ref 3.5–5.2)
ALP SERPL-CCNC: 59 U/L (ref 40–150)
ALT SERPL W P-5'-P-CCNC: 14 U/L (ref 0–70)
ANION GAP SERPL CALCULATED.3IONS-SCNC: 13 MMOL/L (ref 7–15)
AST SERPL W P-5'-P-CCNC: 17 U/L (ref 0–45)
BASOPHILS # BLD AUTO: 0 10E3/UL (ref 0–0.2)
BASOPHILS NFR BLD AUTO: 1 %
BILIRUB SERPL-MCNC: 0.6 MG/DL
BUN SERPL-MCNC: 16 MG/DL (ref 8–23)
CALCIUM SERPL-MCNC: 8.9 MG/DL (ref 8.8–10.2)
CHLORIDE SERPL-SCNC: 107 MMOL/L (ref 98–107)
CREAT SERPL-MCNC: 0.8 MG/DL (ref 0.67–1.17)
DEPRECATED HCO3 PLAS-SCNC: 22 MMOL/L (ref 22–29)
EGFRCR SERPLBLD CKD-EPI 2021: 89 ML/MIN/1.73M2
EOSINOPHIL # BLD AUTO: 0 10E3/UL (ref 0–0.7)
EOSINOPHIL NFR BLD AUTO: 1 %
ERYTHROCYTE [DISTWIDTH] IN BLOOD BY AUTOMATED COUNT: 13.7 % (ref 10–15)
GLUCOSE SERPL-MCNC: 137 MG/DL (ref 70–99)
HCT VFR BLD AUTO: 34.3 % (ref 40–53)
HGB BLD-MCNC: 11.1 G/DL (ref 13.3–17.7)
IMM GRANULOCYTES # BLD: 0 10E3/UL
IMM GRANULOCYTES NFR BLD: 1 %
LDH SERPL L TO P-CCNC: 199 U/L (ref 0–250)
LYMPHOCYTES # BLD AUTO: 0.9 10E3/UL (ref 0.8–5.3)
LYMPHOCYTES NFR BLD AUTO: 24 %
MAGNESIUM SERPL-MCNC: 1.8 MG/DL (ref 1.7–2.3)
MCH RBC QN AUTO: 30.5 PG (ref 26.5–33)
MCHC RBC AUTO-ENTMCNC: 32.4 G/DL (ref 31.5–36.5)
MCV RBC AUTO: 94 FL (ref 78–100)
MONOCYTES # BLD AUTO: 0.4 10E3/UL (ref 0–1.3)
MONOCYTES NFR BLD AUTO: 10 %
NEUTROPHILS # BLD AUTO: 2.5 10E3/UL (ref 1.6–8.3)
NEUTROPHILS NFR BLD AUTO: 63 %
NRBC # BLD AUTO: 0 10E3/UL
NRBC BLD AUTO-RTO: 0 /100
PHOSPHATE SERPL-MCNC: 2.9 MG/DL (ref 2.5–4.5)
PLATELET # BLD AUTO: 264 10E3/UL (ref 150–450)
POTASSIUM SERPL-SCNC: 3.5 MMOL/L (ref 3.4–5.3)
PROT SERPL-MCNC: 6.5 G/DL (ref 6.4–8.3)
PSA SERPL DL<=0.01 NG/ML-MCNC: 123.2 NG/ML
RBC # BLD AUTO: 3.64 10E6/UL (ref 4.4–5.9)
SODIUM SERPL-SCNC: 142 MMOL/L (ref 135–145)
WBC # BLD AUTO: 3.9 10E3/UL (ref 4–11)

## 2024-05-21 PROCEDURE — 258N000003 HC RX IP 258 OP 636: Performed by: INTERNAL MEDICINE

## 2024-05-21 PROCEDURE — 99215 OFFICE O/P EST HI 40 MIN: CPT | Performed by: INTERNAL MEDICINE

## 2024-05-21 PROCEDURE — G0463 HOSPITAL OUTPT CLINIC VISIT: HCPCS | Performed by: INTERNAL MEDICINE

## 2024-05-21 PROCEDURE — 82040 ASSAY OF SERUM ALBUMIN: CPT | Performed by: INTERNAL MEDICINE

## 2024-05-21 PROCEDURE — 36415 COLL VENOUS BLD VENIPUNCTURE: CPT | Performed by: INTERNAL MEDICINE

## 2024-05-21 PROCEDURE — 84153 ASSAY OF PSA TOTAL: CPT | Performed by: INTERNAL MEDICINE

## 2024-05-21 PROCEDURE — 250N000011 HC RX IP 250 OP 636: Performed by: INTERNAL MEDICINE

## 2024-05-21 PROCEDURE — 83735 ASSAY OF MAGNESIUM: CPT | Performed by: INTERNAL MEDICINE

## 2024-05-21 PROCEDURE — 84100 ASSAY OF PHOSPHORUS: CPT | Performed by: INTERNAL MEDICINE

## 2024-05-21 PROCEDURE — 99001 SPECIMEN HANDLING PT-LAB: CPT | Performed by: INTERNAL MEDICINE

## 2024-05-21 PROCEDURE — 96413 CHEMO IV INFUSION 1 HR: CPT

## 2024-05-21 PROCEDURE — G2211 COMPLEX E/M VISIT ADD ON: HCPCS | Performed by: INTERNAL MEDICINE

## 2024-05-21 PROCEDURE — 85025 COMPLETE CBC W/AUTO DIFF WBC: CPT | Performed by: INTERNAL MEDICINE

## 2024-05-21 PROCEDURE — 96367 TX/PROPH/DG ADDL SEQ IV INF: CPT

## 2024-05-21 PROCEDURE — 83615 LACTATE (LD) (LDH) ENZYME: CPT | Performed by: INTERNAL MEDICINE

## 2024-05-21 RX ORDER — DIPHENHYDRAMINE HYDROCHLORIDE 50 MG/ML
50 INJECTION INTRAMUSCULAR; INTRAVENOUS
Status: CANCELLED
Start: 2024-06-11

## 2024-05-21 RX ORDER — ALBUTEROL SULFATE 0.83 MG/ML
2.5 SOLUTION RESPIRATORY (INHALATION)
Status: CANCELLED | OUTPATIENT
Start: 2024-06-11

## 2024-05-21 RX ORDER — HEPARIN SODIUM (PORCINE) LOCK FLUSH IV SOLN 100 UNIT/ML 100 UNIT/ML
5 SOLUTION INTRAVENOUS
Status: CANCELLED | OUTPATIENT
Start: 2024-06-11

## 2024-05-21 RX ORDER — METHYLPREDNISOLONE SODIUM SUCCINATE 125 MG/2ML
125 INJECTION, POWDER, LYOPHILIZED, FOR SOLUTION INTRAMUSCULAR; INTRAVENOUS
Status: CANCELLED
Start: 2024-06-11

## 2024-05-21 RX ORDER — LORAZEPAM 2 MG/ML
0.5 INJECTION INTRAMUSCULAR EVERY 4 HOURS PRN
Status: CANCELLED | OUTPATIENT
Start: 2024-06-11

## 2024-05-21 RX ORDER — MEPERIDINE HYDROCHLORIDE 25 MG/ML
25 INJECTION INTRAMUSCULAR; INTRAVENOUS; SUBCUTANEOUS EVERY 30 MIN PRN
Status: CANCELLED | OUTPATIENT
Start: 2024-06-11

## 2024-05-21 RX ORDER — HEPARIN SODIUM,PORCINE 10 UNIT/ML
5-20 VIAL (ML) INTRAVENOUS DAILY PRN
Status: CANCELLED | OUTPATIENT
Start: 2024-06-11

## 2024-05-21 RX ORDER — EPINEPHRINE 1 MG/ML
0.3 INJECTION, SOLUTION INTRAMUSCULAR; SUBCUTANEOUS EVERY 5 MIN PRN
Status: CANCELLED | OUTPATIENT
Start: 2024-06-11

## 2024-05-21 RX ORDER — ALBUTEROL SULFATE 90 UG/1
1-2 AEROSOL, METERED RESPIRATORY (INHALATION)
Status: CANCELLED
Start: 2024-06-11

## 2024-05-21 RX ADMIN — SODIUM CHLORIDE 250 ML: 9 INJECTION, SOLUTION INTRAVENOUS at 11:15

## 2024-05-21 RX ADMIN — DOCETAXEL 116 MG: 20 INJECTION, SOLUTION, CONCENTRATE INTRAVENOUS at 11:45

## 2024-05-21 RX ADMIN — DEXAMETHASONE SODIUM PHOSPHATE: 10 INJECTION, SOLUTION INTRAMUSCULAR; INTRAVENOUS at 11:15

## 2024-05-21 ASSESSMENT — PAIN SCALES - GENERAL: PAINLEVEL: NO PAIN (0)

## 2024-05-21 NOTE — LETTER
5/21/2024         RE: Alonso Pettit  [address] [facility] [address]        Dear Colleague,    Thank you for referring your patient, Alonso Pettit, to the [facility]. Please see a copy of my visit note below.    [facility]  HEMATOLOGY AND ONCOLOGY    FOLLOW-UP VISIT NOTE    PATIENT NAME: Alonso Pettit MRN # [id number]  DATE OF VISIT: May 21, 2024 YOB: 1940    REFERRING PROVIDER: [facility]    CANCER TYPE: Prostate adenocarcinoma; Scott 4+5  STAGE: IV (bony metastasis)  MOLECULAR PROFILE: No actionable mutations/MSI or high TMB; TP53 mutation positive    TREATMENT SUMMARY:  -12/9/2009: Radical prostatectomy; pathology revealed Harmon 4+5 disease, stage pT3a,N0 positive margins  -Mar-May2010: Salvage external beam radiation therapy  -Apr 2013: Intermittent androgen deprivation therapy with leuprolide for biochemical PSA relapse  -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 Radiation therapy to the left supraclavicular lymph node ending in September 2020.  He was continued on enzalutamide  -May 2022: Switch to abiraterone with prednisone for progressive disease  -Aug 2023: Abiraterone discontinued for progressive disease.  PSMA PET scan with PSMA avid osseous and brisa metastasis.  -11/14/2023: MARLO trial: cycle 1 docetaxel. 11/15/23 cycle 1 radium per the MARLO trial.      Chemotherapy 11/14/2023  10:05 AM 11/15/2023  1:39 PM 12/6/2023  9:15 AM 12/28/2023  9:07 AM 1/16/2024   Day, Cycle Day 1, Cycle 1  Day 1, Cycle 1  Day 1, Cycle 2  Day 1, Cycle 3  Day 1, Cycle 4   DOCEtaxel (TAXOTERE) IV 60 mg/m2   60 mg/m2  60 mg/m2  60 mg/m2   radium Ra-223 Dichloride IV  1.473 microcurie/kg   108.4 mCi    .2 ng/ml  203 ng/ml 196 ng/ml   "    Chemotherapy 2/6/2024  1:17 PM 2/7/2024  1:50 PM 2/27/2024  11:57 AM 3/19/2024  8:54 AM 3/20/2024  1:41 PM 4/9/2024  9:49 AM   Day, Cycle Day 1, Cycle 5   Day 1, Cycle 6  Day 1, Cycle 7   Day 1, Cycle 8    DOCEtaxel IV 60 mg/m2   60 mg/m2  60 mg/m2   60 mg/m2    radium 223 Dichloride  1.5 microcurie/kg    1.476 microcurie/kg      Chemotherapy 4/30/2024  12:20 PM 5/1/2024  10:40 AM   Day, Cycle Day 1, Cycle 9     DOCEtaxel  IV 60 mg/m2     radium 223  IV  1.5 microcurie/kg      CURRENT INTERVENTIONS:  MARLO Trial randomized to Arm B with Docetaxel/prednisone/Radium-233. Leuprolide every 4 months locally.     SUBJECTIVE   Alonso Pettit is being followed for castration resistant metastatic prostate cancer. He returns to clinic accompanied by his wife, Macey. He is seen today prior to cycle 8 docetaxel.    Alonso returns to clinic today accompanied by his wife Macey.  He reports that he is feeling well today. Overall feels chemotherapy is going well. Macey agree's.   He had an episode of dizziness once when he woke up and was looking up.   Energy is stable today. He was more physical this past weekend and this made him tired. He feels he has had some deconditioning as he was winded carrying boxes up the steps. He otherwise has no shortness of breath at rest or with walking on a flat surface.   He isn't walking now because the dog is gone. He played pickleball once and did okay. His wife is very happy with how well he did.    He has occasional headaches for which he sparingly takes tylenol.   Appetite is \"good\".     No falls.  No nausea or vomiting.   No urinary changes.   No headaches or vision changes.   No chest pain, shortness of breath, or new cough.   No bone pain.  No neuropathy.  They have a regimen that seems to be working very well for his constipation.    No fevers or chills.       ROS: 14 point ROS neg other than the symptoms noted above in the HPI.      PAST MEDICAL HISTORY     Past Medical History: "   Diagnosis Date    Prostate cancer (H)          CURRENT OUTPATIENT MEDICATIONS     Current Outpatient Medications   Medication Sig    aspirin 81 mg chewable tablet [ASPIRIN 81 MG CHEWABLE TABLET] Chew 81 mg daily. (Patient not taking: Reported on 10/18/2023)    bisacodyl (DULCOLAX) 5 MG EC tablet Take 5 mg by mouth 2 times daily    calcium carbonate (CALCIUM 500 ORAL) [CALCIUM CARBONATE (CALCIUM 500 ORAL)] Take by mouth. (Patient not taking: Reported on 1/16/2024)    cyclobenzaprine (FLEXERIL) 10 MG tablet 1/2-1 TAB ORALLY UP TO 3 TIMES A DAY AS NEEDED FOR MUSCLE SPASM    dexAMETHasone (DECADRON) 4 MG tablet Take 2 tablets (8 mg) by mouth 2 times daily (with meals) Start evening of Docetaxel infusion and continue for a total of 3 doses.    leuprolide (LUPRON) 11.25 mg injection Inject 11.25 mg into the muscle every 3 months    predniSONE (DELTASONE) 5 MG tablet Take 1 tablet (5 mg) by mouth 2 times daily for 21 days    prochlorperazine (COMPAZINE) 10 MG tablet Take 0.5 tablets (5 mg) by mouth every 6 hours as needed for nausea or vomiting (Patient not taking: Reported on 1/16/2024)    pseudoePHEDrine-guaiFENesin (MUCINEX D)  MG 12 hr tablet  (Patient not taking: Reported on 12/6/2023)    psyllium (METAMUCIL/KONSYL) Packet Take 1 packet by mouth daily     No current facility-administered medications for this visit.        ALLERGIES    No Known Allergies     REVIEW OF SYSTEMS   As above in the HPI, o/w complete 12-point ROS was negative.     PHYSICAL EXAM   BP 99/65   Pulse 76   Temp 97.8  F (36.6  C) (Oral)   Resp 16   Wt 74.5 kg (164 lb 3.2 oz)   SpO2 96%   BMI 22.86 kg/m    General: No acute distress  HEENT: Sclera anicteric. Oral mucosa pink and moist.  No mucositis or thrush  Lymph: No lymphadenopathy in neck  Heart: Regular, rate, and rhythm  Lungs: Clear to ascultation bilaterally  Abdomen: Positive bowel sounds. Soft, non-distended, non-tender. No organomegaly or mass.   MSK: no peripheral edema  bilaterally.   Neuro: Cranial nerves grossly intact. Oriented to person, place, time, and date.   Rash: none     LABORATORY AND IMAGING STUDIES     Recent Labs   Lab Test 04/30/24  1028 04/26/24  0958 04/09/24  0752 03/19/24  0650 03/15/24  1026    142 145 141 140   POTASSIUM 3.8 3.8 3.4 4.0 3.6   CHLORIDE 107 107 108* 107 103   CO2 23 22 26 22 25   ANIONGAP 11 13 11 12 12   BUN 17.0 17.3 15.2 17.9 11.7   CR 0.86 0.88 0.80 0.80 0.79   * 174* 142* 171* 94   ZABRINA 9.2 8.8 9.0 8.9 9.0     Recent Labs   Lab Test 04/30/24  1028 04/26/24  0958 04/09/24  0752 03/15/24  1026 02/27/24  0838   MAG 1.7 1.8 1.8 1.9 1.7   PHOS 3.2 3.3 3.1 2.8 3.4     Recent Labs   Lab Test 04/30/24  1028 04/26/24  0958 04/09/24  0752 03/19/24  0650 03/15/24  1026   WBC 6.3 5.3 4.5 6.2 7.7   HGB 11.5* 11.5* 11.2* 11.5* 11.9*    263 227 282 277   MCV 96 95 98 98 96   NEUTROPHIL 80 69 60 76 68     Recent Labs   Lab Test 04/30/24  1028 04/26/24  0958 04/09/24  0752   BILITOTAL 0.4 0.4 0.5   ALKPHOS 59 62 55   ALT 12 12 13   AST 14 16 15   ALBUMIN 3.9 3.8 3.8    207 181     Recent Labs   Lab Test 04/30/24  1028 04/26/24  0958 04/09/24  0752 03/15/24  1026 02/27/24  0838 10/30/23  1044   .80 147.90 149.10 203.90 196.00 153.30   TESTOSTTOTAL  --   --   --   --   --  7*    < > = values in this interval not displayed.     Narrative & Impression   EXAM: CT CHEST/ABDOMEN/PELVIS W CONTRAST  LOCATION: [facility]  DATE: 5/15/2024     INDICATION:  Malignant neoplasm metastatic to bone (H)  COMPARISON: CT chest, abdomen and pelvis performed on 3/15/2024  TECHNIQUE: CT scan of the chest, abdomen, and pelvis was performed following injection of IV contrast. Multiplanar reformats were obtained. Dose reduction techniques were used.   CONTRAST: iopamidol (ISOVUE 370) solution 100 mL     FINDINGS:   LUNGS AND PLEURA: No pleural effusion or pneumothorax is seen. Stable appearance of multiple  pulmonary nodules measuring up to 4 mm in the right upper lobe (series 4, image 20).      MEDIASTINUM/AXILLAE: No lymphadenopathy. Subcentimeter left supraclavicular and mediastinal lymph nodes appear unchanged. No thoracic aortic aneurysms. Mild vascular calcifications are seen along the thoracic aorta. Bilateral gynecomastia is present.     CORONARY ARTERY CALCIFICATION: None.     HEPATOBILIARY: Stable 9 mm hyperenhancing lesion along the left hepatic lobe (series 3, image 133). Other subcentimeter hypoattenuating lesions in the liver are too small to characterize but appear unchanged. Gallbladder is unremarkable.     PANCREAS: No significant mass, duct dilatation, or inflammatory change.     SPLEEN: Normal size.     ADRENAL GLANDS: No significant nodules.     KIDNEYS/BLADDER: No hydronephrosis. Stable appearance of presumed cyst along the mid pole of the right kidney. Diffuse wall thickening along the urinary bladder appears unchanged which is also underdistended.     BOWEL: No obstruction or inflammatory change. Mild wall thickening is seen along the ascending colon.     LYMPH NODES: No lymphadenopathy. Stable subcentimeter retroperitoneal lymph nodes measuring up to 5 mm along the left para-aortic space (series 3, image 212). 8 mm left inguinal lymph node also appears unchanged (series 3, image 224).     VASCULATURE: Mild vascular calcifications are seen in the abdominal aorta and iliac branches. Stable small fat-containing umbilical hernia.     PELVIC ORGANS: Postsurgical changes are present status post prostatectomy. Penile prosthesis remains in place.     MUSCULOSKELETAL: Multiple sclerotic lesions throughout the axial and appendicular skeleton appear unchanged.                                                                      IMPRESSION:  1.  No evidence of new or worsening metastatic disease in the chest, abdomen and pelvis.  2.  No significant change in appearance of multifocal osseous metastases.  3.   Stable subcentimeter lymph nodes in the chest, abdomen and pelvis.  4.  Stable pulmonary nodules measuring up to 4 mm.        Study Result    Narrative & Impression   EXAMINATION: NM BONE SCAN WHOLE BODY  Whole-body bone scan, 5/15/2024 12:57 PM      HISTORY: Malignant neoplasm metastatic to bone (H)      PSA: 160.8 on 4/30/2024     COMPARISON: CT 5/15/2024     TECHNIQUE: The patient received 26.83 mCi of Tc-99m MDP intravenously.  Whole body bone images were obtained at 3 hours.     FINDINGS:      Whole-body planar images demonstrate similar multifocal radiotracer  uptake involving C7, bilateral ribs, thoracic spine, and left scapula.  No new abnormal focal uptake.     Mild uptake in bilateral shoulders, sternoclavicular joints, knees and  spine, likely related to degenerative changes.     Physiologic tracer within the kidneys and soft tissues with excretion  into urinary bladder.                                                                      IMPRESSION: Similar multifocal metastatic osteoblastic disease. No new osteoblastic metastasis.      I have personally reviewed the examination and initial interpretation and I agree with the findings.     SMILEY RANDALL MD         SYSTEM ID:  [id number]     PSA Trend     I reviewed the above labs today.     ASSESSMENT AND PLAN   -Castration resistant metastatic prostate cancer   Post radical prostatectomy on 12/9/2009; salvage radiation ending May 2010; androgen deprivation therapy since 2013   Post progression on enzalutamide and abiraterone with prednisone  -Nonmuscle invasive bladder cancer diagnosed in 2011 without any recent recurrence  -No significant medical comorbidity  -ECOG performance status of 0     #Castration resistant metastatic prostate cancer.   - Has progressed on novel antiandrogen therapies including abiraterone with prednisone and enzalutamide.  He had a PSMA PET CT scan which did show extensive metastasis to the lymph nodes and bones. Referred  to [facility] for the MARLO trial, randomized to Arm B with docetaxel and Radium. Docetaxel is administered every 3 weeks for 6-10 doses and Radium every 6 weeks for 6 doses.   - He tolerated docetaxel and radium reasonably well with the exception of significant constipation for nearly 1.5-2 weeks after his first cycle. Constipation has improved since cycle 2 docetaxel with a regular bowel regimen.     He has done quite well on chemotherapy and radium. He has increased energy on the day of chemotherapy and has a crash on third day. This does not last long. He has fair energy and went back to playing UTOPY ball.     He is happy to participate in the trial as his father too had cancer and he fears his son would get it. He is happy that someone would gain benefit from the trial and he is participating for the science. I explained him that my goal is that he benefits from the study.     - Labs and clinically appropriate to proceed with cycle 10 docetaxel today May 21, 2024. He has had 5 cycles of radium, last 5/1/24. He is scheduled to have his radium tomorrow.     - continue Eligard every 4 months with Dr. Philip at [facility].  He got his last dose on April 11th, 2024.     I reviewed actual images from CT chest, abdomen and pelvis and bone scan. He continues to have stable disease. This is not surprising. His PSA is marginally higher from awais value. We would continue on therapy as current     - We reviewed next steps beyond trial completion. We could continue on docetaxel since he is tolerating it without any difficulty and continues to respond. We could hold off once he has disease progression or progressive side effects.     I reviewed our treatment options beyond docetaxel. He had PSMA avid disease and we would have option of lutetium 177 (Pluvicto). We had reviewed this prior to trial enrollment. We will discuss again once he is progressing after current therapy.        #Bone Metastasis   - Zometa every 6 months  with local oncologist. Next scheduled on March 6th 2024. Consider moving up frequency to every 6 weeks. Could move to [facility] during the time of trial participation and administer while he is here for ease of appointments. Not specifically reviewed today.   - rare risk of fractures associated with docetaxel and radium therefore continuing Zometa is recommended.     #Left sided rib pain   - Chest XR 2/6/24 negative. No acute airspace abnormalities or fractures.     #Constipation   - Continue scheduling ducolax 2-3 tablets per day for at least 2-4 days and then prn.   - If no bowel movement have 2-3 days, recommend adding in Milk of Mag. If no bowel movement after 6 hours, repeat.     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 CT neck with calcifications. Called patient and wife to discuss there results. They have seen a neurologist in the past at [facility] who they plan to follow up with along with their PCP.   - No recurrent episodes reported.     # Falls  - Two episodes of laying on the ground after cycle 4 docetaxel. Unclear source or fall given poor historian. Patient did have low grade temperature during this period thus illness pay have played a role but unclear. He also has poor water intake thus dehydration may be contributing to weakness vs. Chemotherapy. Continue to monitor closely for recurrence. Did not recur with cycle 5 and cycle 6.     #Cancer fatigue   #Deconditioning   - discussed with Alonso that regular exercise would be of benefit. He has started walking but has stopped now that their son's dog is no longer living with them. Previously discussed cancer rehab Alonso is currently not interested.     The longitudinal plan of care for the diagnosis(es)/condition(s) as documented were addressed during this visit. Due to the added complexity in care, I will continue to support Alonso in the  subsequent management and with ongoing continuity of care.     40 minutes spent on the date of the encounter doing chart review, history and exam, documentation and further activities as noted above            Kg Gamez MD

## 2024-05-21 NOTE — NURSING NOTE
Chief Complaint   Patient presents with    Blood Draw     Vitals blood draw, PIV placed by CHANDLER PINK. Pt check into appt.     Joelle Yip MA

## 2024-05-21 NOTE — PROGRESS NOTES
[id number]: Study Visit Note   Subject name: Alonso Pettit     Visit: C10 D190    Did the study visit occur within the appropriate window allowed by the protocol? yes    If no, why? N/A    Since the last study visit, He has been doing fairly well. He was able to play pickle ball since his last visit, which has been exciting for the patient and his spouse. He reports no new issues. He continues to experience fatigue but is able to take naps as needed. He also continues to have insomnia and some dyspnea, especially with climbing stairs. His BLE edema is stable. He reports no dizziness since his last visit. Decreased lymphocytes have resolved, but he does have new grade 1 WBC decrease today.    I have personally interviewed Alonso Pettit and reviewed his medical record for adverse events and concomitant medications and these have been recorded on the corresponding logs in Alonso Pettit's research file.     Verbal handover provided to infusion RN by Albertina Mckeon; reminded RN to document actual start time of infusion and actual stop time of the infusion. If infusion deviates from protocol directed infusion time, please document rationale in your infusion note.     Alonso Pettit was given the opportunity to ask any trial related questions.  Please see provider progress note for physical exam and other clinical information. Labs were reviewed - any significant lab values were addressed and reviewed.    Annabella Sorenson  Clinical Research Coordinator III  [facility]  T: [phone]

## 2024-05-21 NOTE — PROGRESS NOTES
Infusion Nursing Note:  Alonso Pettit presents today for Cycle 10 Day 1 Taxotere.    Patient seen by provider today: Yes: Dr. Gamez   present during visit today: Not Applicable.    Note: Patient presents to infusion today doing well. No new questions or concerns following his visit with Dr. Gamez.    Patient confirms he has prednisone at home and will take as prescribed.     Taxotere Start Time: 1145  Taxotere Stop Time: 1245    Intravenous Access:  Peripheral IV placed.    Treatment Conditions:  Lab Results   Component Value Date    HGB 11.1 (L) 05/21/2024    WBC 3.9 (L) 05/21/2024    ANEU 4.6 02/02/2024    ANEUTAUTO 2.5 05/21/2024     05/21/2024        Lab Results   Component Value Date     05/21/2024    POTASSIUM 3.5 05/21/2024    MAG 1.8 05/21/2024    CR 0.80 05/21/2024    ZABRINA 8.9 05/21/2024    BILITOTAL 0.6 05/21/2024    ALBUMIN 3.9 05/21/2024    ALT 14 05/21/2024    AST 17 05/21/2024     Results reviewed, labs MET treatment parameters, ok to proceed with treatment.    Post Infusion Assessment:  Patient tolerated infusion without incident.  Blood return noted pre and post infusion.  Site patent and intact, free from redness, edema or discomfort.  No evidence of extravasations.  Access discontinued per protocol.     Discharge Plan:   Prescription refills given for Dexamethasone.  Discharge instructions reviewed with: Patient.  Patient and/or family verbalized understanding of discharge instructions and all questions answered.  AVS to patient via [employer].  Patient will return 6/11 for next appointment with JOSE L Dailey.   Patient discharged in stable condition accompanied by: wife.  Departure Mode: Ambulatory.      Val Keys RN

## 2024-05-21 NOTE — PATIENT INSTRUCTIONS
Contact Numbers  [facility]: [phone] (for symptom and scheduling needs)    Please call the [facility] Triage line if you experience a temperature greater than or equal to 100.4, shaking chills, have uncontrolled nausea, vomiting and/or diarrhea, dizziness, shortness of breath, chest pain, bleeding, unexplained bruising, or if you have any other new/concerning symptoms, questions or concerns.     If you are having any concerning symptoms or wish to speak to a provider before your next infusion visit, please call your care coordinator or triage to notify them so we can adequately serve you.     If you need a refill on a narcotic prescription or other medication, please call triage before your infusion appointment.           May 2024      [place] Monday Tuesday Wednesday Thursday Friday Saturday                  1    NM RESEARCH RADIOTHERAPY  10:00 AM   (60 min.)   UU NM RADIOTHERAPY 5   [facility] 2     3     4       5     6     7     8     9     10     11       12     13     14     15    NM INJ   9:15 AM   (15 min.)   [id number]   [facility]    CT CHEST/ABDOMEN/PELVIS W   9:30 AM   (20 min.)   [id number]   [facility] 16     17     18       19     20     21    LAB PERIPHERAL   9:30 AM   (15 min.)   [facility]   [facility]    RETURN CCSL   9:45 AM   (30 min.)   Kg Gamez MD   [facility]    ONC INFUSION 2 HR (120 MIN)  11:00 AM   (120 min.)    ONC INFUSION NURSE   [facility] 22     23     24     25       26     27     28     29     30     31 June 2024 Sunday Monday Tuesday Wednesday Thursday Friday Saturday                                 1       2     3     4     5     6    LAB PERIPHERAL  10:00 AM   (15 min.)    [facility]   [facility] 7     8       9     10     11    NM RESEARCH  RADIOTHERAPY   9:00 AM   (60 min.)   UU NM RADIOTHERAPY 5   [facility]    RETURN CCSL  11:00 AM   (45 min.)   Valentine Ball PA-C   [facility] 12     13     14     15       16     17     18     19     20     21     22       23     24     25     26     27     28     29       30                                                   Lab Results:  Recent Results (from the past 12 hour(s))   Comprehensive metabolic panel    Collection Time: 05/21/24  9:45 AM   Result Value Ref Range    Sodium 142 135 - 145 mmol/L    Potassium 3.5 3.4 - 5.3 mmol/L    Carbon Dioxide (CO2) 22 22 - 29 mmol/L    Anion Gap 13 7 - 15 mmol/L    Urea Nitrogen 16.0 8.0 - 23.0 mg/dL    Creatinine 0.80 0.67 - 1.17 mg/dL    GFR Estimate 89 >60 mL/min/1.73m2    Calcium 8.9 8.8 - 10.2 mg/dL    Chloride 107 98 - 107 mmol/L    Glucose 137 (H) 70 - 99 mg/dL    Alkaline Phosphatase 59 40 - 150 U/L    AST 17 0 - 45 U/L    ALT 14 0 - 70 U/L    Protein Total 6.5 6.4 - 8.3 g/dL    Albumin 3.9 3.5 - 5.2 g/dL    Bilirubin Total 0.6 <=1.2 mg/dL   Magnesium    Collection Time: 05/21/24  9:45 AM   Result Value Ref Range    Magnesium 1.8 1.7 - 2.3 mg/dL   Phosphorus    Collection Time: 05/21/24  9:45 AM   Result Value Ref Range    Phosphorus 2.9 2.5 - 4.5 mg/dL   Lactate Dehydrogenase    Collection Time: 05/21/24  9:45 AM   Result Value Ref Range    Lactate Dehydrogenase 199 0 - 250 U/L   PSA tumor marker    Collection Time: 05/21/24  9:45 AM   Result Value Ref Range    PSA Tumor Marker 123.20 ng/mL   CBC with platelets and differential    Collection Time: 05/21/24  9:45 AM   Result Value Ref Range    WBC Count 3.9 (L) 4.0 - 11.0 10e3/uL    RBC Count 3.64 (L) 4.40 - 5.90 10e6/uL    Hemoglobin 11.1 (L) 13.3 - 17.7 g/dL    Hematocrit 34.3 (L) 40.0 - 53.0 %    MCV 94 78 - 100 fL    MCH 30.5 26.5 - 33.0 pg    MCHC 32.4 31.5 - 36.5 g/dL    RDW 13.7 10.0 - 15.0 %    Platelet Count 264 150 - 450 10e3/uL    % Neutrophils 63 %     % Lymphocytes 24 %    % Monocytes 10 %    % Eosinophils 1 %    % Basophils 1 %    % Immature Granulocytes 1 %    NRBCs per 100 WBC 0 <1 /100    Absolute Neutrophils 2.5 1.6 - 8.3 10e3/uL    Absolute Lymphocytes 0.9 0.8 - 5.3 10e3/uL    Absolute Monocytes 0.4 0.0 - 1.3 10e3/uL    Absolute Eosinophils 0.0 0.0 - 0.7 10e3/uL    Absolute Basophils 0.0 0.0 - 0.2 10e3/uL    Absolute Immature Granulocytes 0.0 <=0.4 10e3/uL    Absolute NRBCs 0.0 10e3/uL

## 2024-05-21 NOTE — NURSING NOTE
"Oncology Rooming Note    May 21, 2024 9:55 AM   Alonso Pettit is a 83 year old male who presents for:    Chief Complaint   Patient presents with    Blood Draw     Vitals blood draw, PIV placed by CHANDLER PINK. Pt check into appt.    Oncology Clinic Visit     Prostate Ca     Initial Vitals: BP 99/65   Pulse 76   Temp 97.8  F (36.6  C) (Oral)   Resp 16   Wt 74.5 kg (164 lb 3.2 oz)   SpO2 96%   BMI 22.86 kg/m   Estimated body mass index is 22.86 kg/m  as calculated from the following:    Height as of 2/6/24: 1.805 m (5' 11.06\").    Weight as of this encounter: 74.5 kg (164 lb 3.2 oz). Body surface area is 1.93 meters squared.  No Pain (0) Comment: Data Unavailable   No LMP for male patient.  Allergies reviewed: Yes  Medications reviewed: Yes    Medications: Medication refills not needed today.  Pharmacy name entered into [employer]: [id number] [place] [address]    Frailty Screening:   Is the patient here for a new oncology consult visit in cancer care? 2. No      Clinical concerns:  Patient states there are no new concerns to discuss with provider.  Dr Gamez was not notified.        Macey Soliman CMA              "

## 2024-05-21 NOTE — PROGRESS NOTES
[facility]  HEMATOLOGY AND ONCOLOGY    FOLLOW-UP VISIT NOTE    PATIENT NAME: Alonso Pettit MRN # [id number]  DATE OF VISIT: May 21, 2024 YOB: 1940    REFERRING PROVIDER: [facility] oncology    CANCER TYPE: Prostate adenocarcinoma; [place] 4+5  STAGE: IV (bony metastasis)  MOLECULAR PROFILE: No actionable mutations/MSI or high TMB; TP53 mutation positive    TREATMENT SUMMARY:  -12/9/2009: Radical prostatectomy; pathology revealed [place] 4+5 disease, stage pT3a,N0 positive margins  -Mar-May2010: Salvage external beam radiation therapy  -Apr 2013: Intermittent androgen deprivation therapy with leuprolide for biochemical PSA relapse  -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 Radiation therapy to the left supraclavicular lymph node ending in September 2020.  He was continued on enzalutamide  -May 2022: Switch to abiraterone with prednisone for progressive disease  -Aug 2023: Abiraterone discontinued for progressive disease.  PSMA PET scan with PSMA avid osseous and brisa metastasis.  -11/14/2023: MARLO trial: cycle 1 docetaxel. 11/15/23 cycle 1 radium per the MARLO trial.      Chemotherapy 11/14/2023  10:05 AM 11/15/2023  1:39 PM 12/6/2023  9:15 AM 12/28/2023  9:07 AM 1/16/2024   Day, Cycle Day 1, Cycle 1  Day 1, Cycle 1  Day 1, Cycle 2  Day 1, Cycle 3  Day 1, Cycle 4   DOCEtaxel (TAXOTERE) IV 60 mg/m2   60 mg/m2  60 mg/m2  60 mg/m2   radium Ra-223 Dichloride IV  1.473 microcurie/kg   108.4 mCi    .2 ng/ml  203 ng/ml 196 ng/ml      Chemotherapy 2/6/2024  1:17 PM 2/7/2024  1:50 PM 2/27/2024  11:57 AM 3/19/2024  8:54 AM 3/20/2024  1:41 PM 4/9/2024  9:49 AM   Day, Cycle Day 1, Cycle 5   Day 1, Cycle 6  Day 1, Cycle 7   Day 1, Cycle 8    DOCEtaxel IV 60 mg/m2   60 mg/m2  60 mg/m2   60  "mg/m2    radium 223 Dichloride  1.5 microcurie/kg    1.476 microcurie/kg      Chemotherapy 4/30/2024  12:20 PM 5/1/2024  10:40 AM   Day, Cycle Day 1, Cycle 9     DOCEtaxel  IV 60 mg/m2     radium 223  IV  1.5 microcurie/kg      CURRENT INTERVENTIONS:  MARLO Trial randomized to Arm B with Docetaxel/prednisone/Radium-233. Leuprolide every 4 months locally.     SUBJECTIVE   Alonso Pettit is being followed for castration resistant metastatic prostate cancer. He returns to clinic accompanied by his wife, Macey. He is seen today prior to cycle 8 docetaxel.    Alonso returns to clinic today accompanied by his wife Macey.  He reports that he is feeling well today. Overall feels chemotherapy is going well. Macey agree's.   He had an episode of dizziness once when he woke up and was looking up.   Energy is stable today. He was more physical this past weekend and this made him tired. He feels he has had some deconditioning as he was winded carrying boxes up the steps. He otherwise has no shortness of breath at rest or with walking on a flat surface.   He isn't walking now because the dog is gone. He played pickleball once and did okay. His wife is very happy with how well he did.    He has occasional headaches for which he sparingly takes tylenol.   Appetite is \"good\".     No falls.  No nausea or vomiting.   No urinary changes.   No headaches or vision changes.   No chest pain, shortness of breath, or new cough.   No bone pain.  No neuropathy.  They have a regimen that seems to be working very well for his constipation.    No fevers or chills.       ROS: 14 point ROS neg other than the symptoms noted above in the HPI.      PAST MEDICAL HISTORY     Past Medical History:   Diagnosis Date    Prostate cancer (H)          CURRENT OUTPATIENT MEDICATIONS     Current Outpatient Medications   Medication Sig    aspirin 81 mg chewable tablet [ASPIRIN 81 MG CHEWABLE TABLET] Chew 81 mg daily. (Patient not taking: Reported on 10/18/2023)    " bisacodyl (DULCOLAX) 5 MG EC tablet Take 5 mg by mouth 2 times daily    calcium carbonate (CALCIUM 500 ORAL) [CALCIUM CARBONATE (CALCIUM 500 ORAL)] Take by mouth. (Patient not taking: Reported on 1/16/2024)    cyclobenzaprine (FLEXERIL) 10 MG tablet 1/2-1 TAB ORALLY UP TO 3 TIMES A DAY AS NEEDED FOR MUSCLE SPASM    dexAMETHasone (DECADRON) 4 MG tablet Take 2 tablets (8 mg) by mouth 2 times daily (with meals) Start evening of Docetaxel infusion and continue for a total of 3 doses.    leuprolide (LUPRON) 11.25 mg injection Inject 11.25 mg into the muscle every 3 months    predniSONE (DELTASONE) 5 MG tablet Take 1 tablet (5 mg) by mouth 2 times daily for 21 days    prochlorperazine (COMPAZINE) 10 MG tablet Take 0.5 tablets (5 mg) by mouth every 6 hours as needed for nausea or vomiting (Patient not taking: Reported on 1/16/2024)    pseudoePHEDrine-guaiFENesin (MUCINEX D)  MG 12 hr tablet  (Patient not taking: Reported on 12/6/2023)    psyllium (METAMUCIL/KONSYL) Packet Take 1 packet by mouth daily     No current facility-administered medications for this visit.        ALLERGIES    No Known Allergies     REVIEW OF SYSTEMS   As above in the HPI, o/w complete 12-point ROS was negative.     PHYSICAL EXAM   BP 99/65   Pulse 76   Temp 97.8  F (36.6  C) (Oral)   Resp 16   Wt 74.5 kg (164 lb 3.2 oz)   SpO2 96%   BMI 22.86 kg/m    General: No acute distress  HEENT: Sclera anicteric. Oral mucosa pink and moist.  No mucositis or thrush  Lymph: No lymphadenopathy in neck  Heart: Regular, rate, and rhythm  Lungs: Clear to ascultation bilaterally  Abdomen: Positive bowel sounds. Soft, non-distended, non-tender. No organomegaly or mass.   MSK: no peripheral edema bilaterally.   Neuro: Cranial nerves grossly intact. Oriented to person, place, time, and date.   Rash: none     LABORATORY AND IMAGING STUDIES     Recent Labs   Lab Test 04/30/24  1028 04/26/24  0958 04/09/24  0752 03/19/24  0650 03/15/24  1026    142 145  141 140   POTASSIUM 3.8 3.8 3.4 4.0 3.6   CHLORIDE 107 107 108* 107 103   CO2 23 22 26 22 25   ANIONGAP 11 13 11 12 12   BUN 17.0 17.3 15.2 17.9 11.7   CR 0.86 0.88 0.80 0.80 0.79   * 174* 142* 171* 94   ZABRINA 9.2 8.8 9.0 8.9 9.0     Recent Labs   Lab Test 04/30/24  1028 04/26/24  0958 04/09/24  0752 03/15/24  1026 02/27/24  0838   MAG 1.7 1.8 1.8 1.9 1.7   PHOS 3.2 3.3 3.1 2.8 3.4     Recent Labs   Lab Test 04/30/24 1028 04/26/24  0958 04/09/24  0752 03/19/24  0650 03/15/24  1026   WBC 6.3 5.3 4.5 6.2 7.7   HGB 11.5* 11.5* 11.2* 11.5* 11.9*    263 227 282 277   MCV 96 95 98 98 96   NEUTROPHIL 80 69 60 76 68     Recent Labs   Lab Test 04/30/24 1028 04/26/24  0958 04/09/24  0752   BILITOTAL 0.4 0.4 0.5   ALKPHOS 59 62 55   ALT 12 12 13   AST 14 16 15   ALBUMIN 3.9 3.8 3.8    207 181     Recent Labs   Lab Test 04/30/24 1028 04/26/24  0958 04/09/24  0752 03/15/24  1026 02/27/24  0838 10/30/23  1044   .80 147.90 149.10 203.90 196.00 153.30   TESTOSTTOTAL  --   --   --   --   --  7*    < > = values in this interval not displayed.     Narrative & Impression   EXAM: CT CHEST/ABDOMEN/PELVIS W CONTRAST  LOCATION: [facility]  DATE: 5/15/2024     INDICATION:  Malignant neoplasm metastatic to bone (H)  COMPARISON: CT chest, abdomen and pelvis performed on 3/15/2024  TECHNIQUE: CT scan of the chest, abdomen, and pelvis was performed following injection of IV contrast. Multiplanar reformats were obtained. Dose reduction techniques were used.   CONTRAST: iopamidol (ISOVUE 370) solution 100 mL     FINDINGS:   LUNGS AND PLEURA: No pleural effusion or pneumothorax is seen. Stable appearance of multiple pulmonary nodules measuring up to 4 mm in the right upper lobe (series 4, image 20).      MEDIASTINUM/AXILLAE: No lymphadenopathy. Subcentimeter left supraclavicular and mediastinal lymph nodes appear unchanged. No thoracic aortic aneurysms. Mild vascular  calcifications are seen along the thoracic aorta. Bilateral gynecomastia is present.     CORONARY ARTERY CALCIFICATION: None.     HEPATOBILIARY: Stable 9 mm hyperenhancing lesion along the left hepatic lobe (series 3, image 133). Other subcentimeter hypoattenuating lesions in the liver are too small to characterize but appear unchanged. Gallbladder is unremarkable.     PANCREAS: No significant mass, duct dilatation, or inflammatory change.     SPLEEN: Normal size.     ADRENAL GLANDS: No significant nodules.     KIDNEYS/BLADDER: No hydronephrosis. Stable appearance of presumed cyst along the mid pole of the right kidney. Diffuse wall thickening along the urinary bladder appears unchanged which is also underdistended.     BOWEL: No obstruction or inflammatory change. Mild wall thickening is seen along the ascending colon.     LYMPH NODES: No lymphadenopathy. Stable subcentimeter retroperitoneal lymph nodes measuring up to 5 mm along the left para-aortic space (series 3, image 212). 8 mm left inguinal lymph node also appears unchanged (series 3, image 224).     VASCULATURE: Mild vascular calcifications are seen in the abdominal aorta and iliac branches. Stable small fat-containing umbilical hernia.     PELVIC ORGANS: Postsurgical changes are present status post prostatectomy. Penile prosthesis remains in place.     MUSCULOSKELETAL: Multiple sclerotic lesions throughout the axial and appendicular skeleton appear unchanged.                                                                      IMPRESSION:  1.  No evidence of new or worsening metastatic disease in the chest, abdomen and pelvis.  2.  No significant change in appearance of multifocal osseous metastases.  3.  Stable subcentimeter lymph nodes in the chest, abdomen and pelvis.  4.  Stable pulmonary nodules measuring up to 4 mm.        Study Result    Narrative & Impression   EXAMINATION: NM BONE SCAN WHOLE BODY  Whole-body bone scan, 5/15/2024 12:57 PM       HISTORY: Malignant neoplasm metastatic to bone (H)      PSA: 160.8 on 4/30/2024     COMPARISON: CT 5/15/2024     TECHNIQUE: The patient received 26.83 mCi of Tc-99m MDP intravenously.  Whole body bone images were obtained at 3 hours.     FINDINGS:      Whole-body planar images demonstrate similar multifocal radiotracer  uptake involving C7, bilateral ribs, thoracic spine, and left scapula.  No new abnormal focal uptake.     Mild uptake in bilateral shoulders, sternoclavicular joints, knees and  spine, likely related to degenerative changes.     Physiologic tracer within the kidneys and soft tissues with excretion  into urinary bladder.                                                                      IMPRESSION: Similar multifocal metastatic osteoblastic disease. No new osteoblastic metastasis.      I have personally reviewed the examination and initial interpretation and I agree with the findings.     SMILEY RANDALL MD         SYSTEM ID:  [id number]     PSA Trend     I reviewed the above labs today.     ASSESSMENT AND PLAN   -Castration resistant metastatic prostate cancer   Post radical prostatectomy on 12/9/2009; salvage radiation ending May 2010; androgen deprivation therapy since 2013   Post progression on enzalutamide and abiraterone with prednisone  -Nonmuscle invasive bladder cancer diagnosed in 2011 without any recent recurrence  -No significant medical comorbidity  -ECOG performance status of 0     #Castration resistant metastatic prostate cancer.   - Has progressed on novel antiandrogen therapies including abiraterone with prednisone and enzalutamide.  He had a PSMA PET CT scan which did show extensive metastasis to the lymph nodes and bones. Referred to [facility] for the MARLO trial, randomized to Arm B with docetaxel and Radium. Docetaxel is administered every 3 weeks for 6-10 doses and Radium every 6 weeks for 6 doses.   - He tolerated docetaxel and radium reasonably well with the exception of  significant constipation for nearly 1.5-2 weeks after his first cycle. Constipation has improved since cycle 2 docetaxel with a regular bowel regimen.     He has done quite well on chemotherapy and radium. He has increased energy on the day of chemotherapy and has a crash on third day. This does not last long. He has fair energy and went back to playing [employer] ball.     He is happy to participate in the trial as his father too had cancer and he fears his son would get it. He is happy that someone would gain benefit from the trial and he is participating for the science. I explained him that my goal is that he benefits from the study.     - Labs and clinically appropriate to proceed with cycle 10 docetaxel today May 21, 2024. He has had 5 cycles of radium, last 5/1/24. He is scheduled to have his radium tomorrow.     - continue Eligard every 4 months with Dr. Philip at [facility].  He got his last dose on April 11th, 2024.     I reviewed actual images from CT chest, abdomen and pelvis and bone scan. He continues to have stable disease. This is not surprising. His PSA is marginally higher from awais value. We would continue on therapy as current     - We reviewed next steps beyond trial completion. We could continue on docetaxel since he is tolerating it without any difficulty and continues to respond. We could hold off once he has disease progression or progressive side effects.     I reviewed our treatment options beyond docetaxel. He had PSMA avid disease and we would have option of lutetium 177 (Pluvicto). We had reviewed this prior to trial enrollment. We will discuss again once he is progressing after current therapy.        #Bone Metastasis   - Zometa every 6 months with local oncologist. Next scheduled on March 6th 2024. Consider moving up frequency to every 6 weeks. Could move to [facility] during the time of trial participation and administer while he is here for ease of appointments. Not specifically reviewed  today.   - rare risk of fractures associated with docetaxel and radium therefore continuing Zometa is recommended.     #Left sided rib pain   - Chest XR 2/6/24 negative. No acute airspace abnormalities or fractures.     #Constipation   - Continue scheduling ducolax 2-3 tablets per day for at least 2-4 days and then prn.   - If no bowel movement have 2-3 days, recommend adding in Milk of Mag. If no bowel movement after 6 hours, repeat.     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 CT neck with calcifications. Called patient and wife to discuss there results. They have seen a neurologist in the past at [facility] who they plan to follow up with along with their PCP.   - No recurrent episodes reported.     # Falls  - Two episodes of laying on the ground after cycle 4 docetaxel. Unclear source or fall given poor historian. Patient did have low grade temperature during this period thus illness pay have played a role but unclear. He also has poor water intake thus dehydration may be contributing to weakness vs. Chemotherapy. Continue to monitor closely for recurrence. Did not recur with cycle 5 and cycle 6.     #Cancer fatigue   #Deconditioning   - discussed with Alonso that regular exercise would be of benefit. He has started walking but has stopped now that their son's dog is no longer living with them. Previously discussed cancer rehab Alonso is currently not interested.     The longitudinal plan of care for the diagnosis(es)/condition(s) as documented were addressed during this visit. Due to the added complexity in care, I will continue to support Alonso in the subsequent management and with ongoing continuity of care.     40 minutes spent on the date of the encounter doing chart review, history and exam, documentation and further activities as noted above

## 2024-06-04 DIAGNOSIS — C61 PROSTATE CANCER (H): Primary | ICD-10-CM

## 2024-06-06 ENCOUNTER — LAB (OUTPATIENT)
Dept: LAB | Facility: CLINIC | Age: 84
End: 2024-06-06
Attending: INTERNAL MEDICINE
Payer: MEDICARE

## 2024-06-06 VITALS — BODY MASS INDEX: 22.55 KG/M2 | WEIGHT: 162 LBS

## 2024-06-06 DIAGNOSIS — C79.51 MALIGNANT NEOPLASM METASTATIC TO BONE (H): Primary | ICD-10-CM

## 2024-06-06 DIAGNOSIS — C61 PROSTATE CANCER (H): ICD-10-CM

## 2024-06-06 LAB
ALBUMIN SERPL BCG-MCNC: 3.7 G/DL (ref 3.5–5.2)
ALP SERPL-CCNC: 53 U/L (ref 40–150)
ALT SERPL W P-5'-P-CCNC: 11 U/L (ref 0–70)
ANION GAP SERPL CALCULATED.3IONS-SCNC: 13 MMOL/L (ref 7–15)
AST SERPL W P-5'-P-CCNC: 16 U/L (ref 0–45)
BASOPHILS # BLD AUTO: 0 10E3/UL (ref 0–0.2)
BASOPHILS NFR BLD AUTO: 1 %
BILIRUB SERPL-MCNC: 0.6 MG/DL
BUN SERPL-MCNC: 16.7 MG/DL (ref 8–23)
CALCIUM SERPL-MCNC: 9.3 MG/DL (ref 8.8–10.2)
CHLORIDE SERPL-SCNC: 106 MMOL/L (ref 98–107)
CREAT SERPL-MCNC: 0.82 MG/DL (ref 0.67–1.17)
DEPRECATED HCO3 PLAS-SCNC: 22 MMOL/L (ref 22–29)
EGFRCR SERPLBLD CKD-EPI 2021: 88 ML/MIN/1.73M2
EOSINOPHIL # BLD AUTO: 0 10E3/UL (ref 0–0.7)
EOSINOPHIL NFR BLD AUTO: 1 %
ERYTHROCYTE [DISTWIDTH] IN BLOOD BY AUTOMATED COUNT: 14.2 % (ref 10–15)
GLUCOSE SERPL-MCNC: 110 MG/DL (ref 70–99)
HCT VFR BLD AUTO: 33.1 % (ref 40–53)
HGB BLD-MCNC: 10.9 G/DL (ref 13.3–17.7)
IMM GRANULOCYTES # BLD: 0.1 10E3/UL
IMM GRANULOCYTES NFR BLD: 2 %
LDH SERPL L TO P-CCNC: 223 U/L (ref 0–250)
LYMPHOCYTES # BLD AUTO: 0.9 10E3/UL (ref 0.8–5.3)
LYMPHOCYTES NFR BLD AUTO: 22 %
MAGNESIUM SERPL-MCNC: 1.6 MG/DL (ref 1.7–2.3)
MCH RBC QN AUTO: 30.9 PG (ref 26.5–33)
MCHC RBC AUTO-ENTMCNC: 32.9 G/DL (ref 31.5–36.5)
MCV RBC AUTO: 94 FL (ref 78–100)
MONOCYTES # BLD AUTO: 0.5 10E3/UL (ref 0–1.3)
MONOCYTES NFR BLD AUTO: 12 %
NEUTROPHILS # BLD AUTO: 2.5 10E3/UL (ref 1.6–8.3)
NEUTROPHILS NFR BLD AUTO: 62 %
NRBC # BLD AUTO: 0 10E3/UL
NRBC BLD AUTO-RTO: 0 /100
PHOSPHATE SERPL-MCNC: 4 MG/DL (ref 2.5–4.5)
PLATELET # BLD AUTO: 203 10E3/UL (ref 150–450)
POTASSIUM SERPL-SCNC: 3.7 MMOL/L (ref 3.4–5.3)
PROT SERPL-MCNC: 6.1 G/DL (ref 6.4–8.3)
PSA SERPL DL<=0.01 NG/ML-MCNC: 120 NG/ML
RBC # BLD AUTO: 3.53 10E6/UL (ref 4.4–5.9)
SODIUM SERPL-SCNC: 141 MMOL/L (ref 135–145)
WBC # BLD AUTO: 3.9 10E3/UL (ref 4–11)

## 2024-06-06 PROCEDURE — 84153 ASSAY OF PSA TOTAL: CPT | Performed by: INTERNAL MEDICINE

## 2024-06-06 PROCEDURE — 83735 ASSAY OF MAGNESIUM: CPT | Performed by: INTERNAL MEDICINE

## 2024-06-06 PROCEDURE — 84100 ASSAY OF PHOSPHORUS: CPT | Performed by: INTERNAL MEDICINE

## 2024-06-06 PROCEDURE — 36415 COLL VENOUS BLD VENIPUNCTURE: CPT | Performed by: INTERNAL MEDICINE

## 2024-06-06 PROCEDURE — 85025 COMPLETE CBC W/AUTO DIFF WBC: CPT | Performed by: INTERNAL MEDICINE

## 2024-06-06 PROCEDURE — 80053 COMPREHEN METABOLIC PANEL: CPT | Performed by: INTERNAL MEDICINE

## 2024-06-06 PROCEDURE — 83615 LACTATE (LD) (LDH) ENZYME: CPT | Performed by: INTERNAL MEDICINE

## 2024-06-06 NOTE — NURSING NOTE
Chief Complaint   Patient presents with    Blood Draw     Labs collected from venipuncture by RN.        Labs collected from venipuncture by RN.    Marine Porras RN

## 2024-06-10 NOTE — PROGRESS NOTES
[facility]  HEMATOLOGY AND ONCOLOGY    FOLLOW-UP VISIT NOTE    PATIENT NAME: Alonso Pettit MRN # [id number]  DATE OF VISIT: Jun 11, 2024 YOB: 1940    REFERRING PROVIDER: [facility]    CANCER TYPE: Prostate adenocarcinoma; [place] 4+5  STAGE: IV (bony metastasis)  MOLECULAR PROFILE: No actionable mutations/MSI or high TMB; TP53 mutation positive    TREATMENT SUMMARY:  -12/9/2009: Radical prostatectomy; pathology revealed [place] 4+5 disease, stage pT3a,N0 positive margins  -Mar-May2010: Salvage external beam radiation therapy  -Apr 2013: Intermittent androgen deprivation therapy with leuprolide for biochemical PSA relapse  -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 Radiation therapy to the left supraclavicular lymph node ending in September 2020.  He was continued on enzalutamide  -May 2022: Switch to abiraterone with prednisone for progressive disease  -Aug 2023: Abiraterone discontinued for progressive disease.  PSMA PET scan with PSMA avid osseous and brisa metastasis.  -11/14/2023: MARLO trial: cycle 1 docetaxel. 11/15/23 cycle 1 radium per the MARLO trial.      Chemotherapy 11/14/2023  10:05 AM 11/15/2023  1:39 PM 12/6/2023  9:15 AM 12/28/2023  9:07 AM 1/16/2024   Day, Cycle Day 1, Cycle 1  Day 1, Cycle 1  Day 1, Cycle 2  Day 1, Cycle 3  Day 1, Cycle 4   DOCEtaxel (TAXOTERE) IV 60 mg/m2   60 mg/m2  60 mg/m2  60 mg/m2   radium Ra-223 Dichloride IV  1.473 microcurie/kg   108.4 mCi    .2 ng/ml  203 ng/ml 196 ng/ml      Chemotherapy 2/6/2024  1:17 PM 2/7/2024  1:50 PM 2/27/2024  11:57 AM 3/19/2024  8:54 AM 3/20/2024  1:41 PM 4/9/2024  9:49 AM   Day, Cycle Day 1, Cycle 5   Day 1, Cycle 6  Day 1, Cycle 7   Day 1, Cycle 8    DOCEtaxel IV 60 mg/m2   60 mg/m2  60 mg/m2   60  "mg/m2    radium 223 Dichloride  1.5 microcurie/kg    1.476 microcurie/kg      Chemotherapy 4/30/2024  12:20 PM 5/1/2024  10:40 AM   Day, Cycle Day 1, Cycle 9     DOCEtaxel  IV 60 mg/m2     radium 223  IV  1.5 microcurie/kg      CURRENT INTERVENTIONS:  MARLO Trial randomized to Arm B with Docetaxel/prednisone/Radium-233. Leuprolide every 4 months locally.     SUBJECTIVE   Alonso Pettit is being followed for castration resistant metastatic prostate cancer. He returns to clinic accompanied by his wife, Macey. He is seen today prior to cycle 8 docetaxel.    Alonso returns to clinic today accompanied by his wife Macey.  He reports that he is feeling well today.   Bowels are moving regularly. He really only has constipation for the first three days following docetaxel.   He has noticed intermittent bilateral peripheral edema over the past 1.5 weeks. He has no new pains. He has been sitting watching TV. Macey reports he has a \"wonderful life\". He admits he hasn't been walking as much as in the past.   Energy is stable today.   No chest pain, new cough, or shortness of breath.   Would like to move his appointment on 7/9 to the afternoon.   Appetite is good.  No new neuropathy.   No chest pain, shortness of breath, or new cough.   No neuropathy.   No new pains.     ROS: 14 point ROS neg other than the symptoms noted above in the HPI.      PAST MEDICAL HISTORY     Past Medical History:   Diagnosis Date    Prostate cancer (H)          CURRENT OUTPATIENT MEDICATIONS     Current Outpatient Medications   Medication Sig    aspirin 81 mg chewable tablet [ASPIRIN 81 MG CHEWABLE TABLET] Chew 81 mg daily. (Patient not taking: Reported on 10/18/2023)    bisacodyl (DULCOLAX) 5 MG EC tablet Take 5 mg by mouth 2 times daily    calcium carbonate (CALCIUM 500 ORAL) [CALCIUM CARBONATE (CALCIUM 500 ORAL)] Take by mouth. (Patient not taking: Reported on 1/16/2024)    cyclobenzaprine (FLEXERIL) 10 MG tablet 1/2-1 TAB ORALLY UP TO 3 TIMES A DAY " AS NEEDED FOR MUSCLE SPASM    dexAMETHasone (DECADRON) 4 MG tablet Take 2 tablets (8 mg) by mouth 2 times daily (with meals) Start evening of Docetaxel infusion and continue for a total of 3 doses.    leuprolide (LUPRON) 11.25 mg injection Inject 11.25 mg into the muscle every 3 months    predniSONE (DELTASONE) 5 MG tablet Take 1 tablet (5 mg) by mouth 2 times daily for 21 days    prochlorperazine (COMPAZINE) 10 MG tablet Take 0.5 tablets (5 mg) by mouth every 6 hours as needed for nausea or vomiting (Patient not taking: Reported on 1/16/2024)    pseudoePHEDrine-guaiFENesin (MUCINEX D)  MG 12 hr tablet  (Patient not taking: Reported on 12/6/2023)    psyllium (METAMUCIL/KONSYL) Packet Take 1 packet by mouth daily     No current facility-administered medications for this visit.        ALLERGIES    No Known Allergies     REVIEW OF SYSTEMS   As above in the HPI, o/w complete 12-point ROS was negative.     PHYSICAL EXAM   /68 (BP Location: Right arm, Patient Position: Sitting, Cuff Size: Adult Regular)   Pulse 84   Temp 97.8  F (36.6  C) (Oral)   Resp 16   Wt 74.9 kg (165 lb 1.6 oz)   SpO2 96%   BMI 22.99 kg/m    General: No acute distress  HEENT: Sclera anicteric. Oral mucosa pink and moist.  No mucositis or thrush  Lymph: No lymphadenopathy in neck  Heart: Regular, rate, and rhythm  Lungs: Clear to ascultation bilaterally  Abdomen: Positive bowel sounds. Soft, non-distended, non-tender. No organomegaly or mass.   MSK: 1+ peripheral edema bilaterally.   Neuro: Cranial nerves grossly intact. Oriented to person, place, time, and date.   Rash: none     LABORATORY AND IMAGING STUDIES   Most Recent 3 CBC's:  Recent Labs   Lab Test 06/06/24  1046 05/21/24  0945 04/30/24  1028   WBC 3.9* 3.9* 6.3   HGB 10.9* 11.1* 11.5*   MCV 94 94 96    264 252   ANEUTAUTO 2.5 2.5 5.1     Most Recent 3 BMP's:  Recent Labs   Lab Test 06/06/24  1046 05/21/24  0945 04/30/24  1028    142 141   POTASSIUM 3.7 3.5 3.8    CHLORIDE 106 107 107   CO2 22 22 23   BUN 16.7 16.0 17.0   CR 0.82 0.80 0.86   ANIONGAP 13 13 11   ZABRINA 9.3 8.9 9.2   * 137* 125*   PROTTOTAL 6.1* 6.5 6.3*   ALBUMIN 3.7 3.9 3.9    Most Recent 3 LFT's:  Recent Labs   Lab Test 06/06/24  1046 05/21/24  0945 04/30/24  1028   AST 16 17 14   ALT 11 14 12   ALKPHOS 53 59 59   BILITOTAL 0.6 0.6 0.4    Most Recent 2 TSH and T4:No lab results found.    Component      Latest Ref Rng 11/14/2023  7:52 AM 12/4/2023  2:32 PM 12/28/2023  7:06 AM 1/16/2024  11:22 AM 2/2/2024  8:43 AM 2/27/2024  8:38 AM   PSA Tumor Marker      ng/mL 160.20  203.80  196.30  200.20  203.10  196.00      Component      Latest Ref Rng 3/15/2024  10:26 AM 4/9/2024  7:52 AM 4/26/2024  9:58 AM 4/30/2024  10:28 AM 5/21/2024  9:45 AM 6/6/2024  10:46 AM   PSA Tumor Marker      ng/mL 203.90  149.10  147.90  160.80  123.20  120.00      I reviewed the above labs today.     ASSESSMENT AND PLAN   -Castration resistant metastatic prostate cancer   Post radical prostatectomy on 12/9/2009; salvage radiation ending May 2010; androgen deprivation therapy since 2013   Post progression on enzalutamide and abiraterone with prednisone  -Nonmuscle invasive bladder cancer diagnosed in 2011 without any recent recurrence  -No significant medical comorbidity  -ECOG performance status of 0     #Castration resistant metastatic prostate cancer.   - Has progressed on novel antiandrogen therapies including abiraterone with prednisone and enzalutamide.  He had a PSMA PET CT scan which did show extensive metastasis to the lymph nodes and bones. Referred to [facility] for the MARLO trial, randomized to Arm B with docetaxel and Radium. Docetaxel is administered every 3 weeks for 6-10 doses and Radium every 6 weeks for 6 doses.   - He has now received 10 cycles of docetaxel, last on 5/21/24. He tolerated docetaxel well with constipation x 3 days following. We will taper him off his prednisone now, he will take prednisone 5 mg BID x 1  week, then 5 mg x one week, then 2.5 mg x one week, then off.    - His PSA has been somewhat labile on trial but overall he has had stable disease. 6/6/24 .0. CBC with stable anemia. CMP unremarkable.   - He is due for his last planned radium today 6/11/24 and is appropriate to proceed.     - continue Eligard every 4 months with Dr. Philip at [facility].  He got his last dose on April 11th, 2024.     #Bone Metastasis   - Zometa every 6 months with local oncologist. Next scheduled on March 6th 2024. Consider moving up frequency to every 6 weeks. Could move to [facility] during the time of trial participation and administer while he is here for ease of appointments. Not specifically reviewed today.   - rare risk of fractures associated with docetaxel and radium therefore continuing Zometa is recommended.     #Constipation   - Continue scheduling ducolax 2-3 tablets per day for at least 2-4 days and then prn.   - If no bowel movement have 2-3 days, recommend adding in Milk of Mag. If no bowel movement after 6 hours, repeat.   - stable on 6/11/24.     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 CT neck with calcifications. Called patient and wife to discuss there results. They have seen a neurologist in the past at [facility] who they plan to follow up with along with their PCP.   - No recurrent episodes reported.     # Falls  - Two episodes of laying on the ground after cycle 4 docetaxel. Unclear source or fall given poor historian. Patient did have low grade temperature during this period thus illness pay have played a role but unclear. He also has poor water intake thus dehydration may be contributing to weakness vs. Chemotherapy. Continue to monitor closely for recurrence. Did not recur with cycle 5 and cycle 6. No recurrent 6/11/24.     #Cancer fatigue   #Deconditioning   - discussed with Alonso  that regular exercise would be of benefit. He has started walking but has stopped now that their son's dog is no longer living with them. Previously discussed cancer rehab Alonso is currently not interested.     #Peripheral edema   - start compression socks daily and walking.     30 minutes spent on the date of the encounter doing chart review, review of test results, interpretation of tests, patient visit, and documentation     Patient was seen and evaluated with study RNCC, Cammy López.      Valentine Ball PA-C

## 2024-06-11 ENCOUNTER — ALLIED HEALTH/NURSE VISIT (OUTPATIENT)
Dept: ONCOLOGY | Facility: CLINIC | Age: 84
End: 2024-06-11

## 2024-06-11 ENCOUNTER — HOSPITAL ENCOUNTER (OUTPATIENT)
Dept: NUCLEAR MEDICINE | Facility: CLINIC | Age: 84
Setting detail: NUCLEAR MEDICINE
Discharge: HOME | End: 2024-06-11
Attending: INTERNAL MEDICINE | Admitting: INTERNAL MEDICINE
Payer: MEDICARE

## 2024-06-11 ENCOUNTER — ONCOLOGY VISIT (OUTPATIENT)
Dept: ONCOLOGY | Facility: CLINIC | Age: 84
End: 2024-06-11
Attending: INTERNAL MEDICINE
Payer: MEDICARE

## 2024-06-11 VITALS
OXYGEN SATURATION: 96 % | TEMPERATURE: 97.8 F | SYSTOLIC BLOOD PRESSURE: 115 MMHG | DIASTOLIC BLOOD PRESSURE: 68 MMHG | BODY MASS INDEX: 22.99 KG/M2 | RESPIRATION RATE: 16 BRPM | WEIGHT: 165.1 LBS | HEART RATE: 84 BPM

## 2024-06-11 VITALS
HEART RATE: 75 BPM | RESPIRATION RATE: 16 BRPM | DIASTOLIC BLOOD PRESSURE: 59 MMHG | SYSTOLIC BLOOD PRESSURE: 123 MMHG | OXYGEN SATURATION: 96 % | TEMPERATURE: 97 F

## 2024-06-11 DIAGNOSIS — C61 PROSTATE CANCER (H): Primary | ICD-10-CM

## 2024-06-11 DIAGNOSIS — C79.51 MALIGNANT NEOPLASM METASTATIC TO BONE (H): ICD-10-CM

## 2024-06-11 PROCEDURE — 99215 OFFICE O/P EST HI 40 MIN: CPT

## 2024-06-11 PROCEDURE — G0463 HOSPITAL OUTPT CLINIC VISIT: HCPCS

## 2024-06-11 RX ORDER — ALBUTEROL SULFATE 0.83 MG/ML
2.5 SOLUTION RESPIRATORY (INHALATION)
Status: DISCONTINUED | OUTPATIENT
Start: 2024-06-11 | End: 2024-06-12 | Stop reason: HOSPADM

## 2024-06-11 RX ORDER — DIPHENHYDRAMINE HYDROCHLORIDE 50 MG/ML
50 INJECTION INTRAMUSCULAR; INTRAVENOUS
Status: DISCONTINUED | OUTPATIENT
Start: 2024-06-11 | End: 2024-06-12 | Stop reason: HOSPADM

## 2024-06-11 RX ORDER — METHYLPREDNISOLONE SODIUM SUCCINATE 125 MG/2ML
125 INJECTION, POWDER, LYOPHILIZED, FOR SOLUTION INTRAMUSCULAR; INTRAVENOUS
Status: DISCONTINUED | OUTPATIENT
Start: 2024-06-11 | End: 2024-06-12 | Stop reason: HOSPADM

## 2024-06-11 RX ORDER — HEPARIN SODIUM (PORCINE) LOCK FLUSH IV SOLN 100 UNIT/ML 100 UNIT/ML
5 SOLUTION INTRAVENOUS
Status: DISCONTINUED | OUTPATIENT
Start: 2024-06-11 | End: 2024-06-12 | Stop reason: HOSPADM

## 2024-06-11 RX ORDER — LORAZEPAM 2 MG/ML
0.5 INJECTION INTRAMUSCULAR EVERY 4 HOURS PRN
Status: DISCONTINUED | OUTPATIENT
Start: 2024-06-11 | End: 2024-06-12 | Stop reason: HOSPADM

## 2024-06-11 RX ORDER — HEPARIN SODIUM,PORCINE 10 UNIT/ML
5-20 VIAL (ML) INTRAVENOUS DAILY PRN
Status: DISCONTINUED | OUTPATIENT
Start: 2024-06-11 | End: 2024-06-12 | Stop reason: HOSPADM

## 2024-06-11 RX ORDER — MEPERIDINE HYDROCHLORIDE 25 MG/ML
25 INJECTION INTRAMUSCULAR; INTRAVENOUS; SUBCUTANEOUS EVERY 30 MIN PRN
Status: DISCONTINUED | OUTPATIENT
Start: 2024-06-11 | End: 2024-06-12 | Stop reason: HOSPADM

## 2024-06-11 RX ORDER — EPINEPHRINE 1 MG/ML
0.3 INJECTION, SOLUTION, CONCENTRATE INTRAVENOUS EVERY 5 MIN PRN
Status: DISCONTINUED | OUTPATIENT
Start: 2024-06-11 | End: 2024-06-12 | Stop reason: HOSPADM

## 2024-06-11 RX ORDER — ALBUTEROL SULFATE 90 UG/1
1-2 AEROSOL, METERED RESPIRATORY (INHALATION)
Status: DISCONTINUED | OUTPATIENT
Start: 2024-06-11 | End: 2024-06-12 | Stop reason: HOSPADM

## 2024-06-11 ASSESSMENT — PAIN SCALES - GENERAL: PAINLEVEL: NO PAIN (0)

## 2024-06-11 NOTE — NURSING NOTE
"Oncology Rooming Note    June 11, 2024 11:12 AM   Alonso Pettit is a 83 year old male who presents for:    Chief Complaint   Patient presents with    Oncology Clinic Visit    Prostate Cancer     Initial Vitals: /68 (BP Location: Right arm, Patient Position: Sitting, Cuff Size: Adult Regular)   Pulse 84   Temp 97.8  F (36.6  C) (Oral)   Resp 16   Wt 74.9 kg (165 lb 1.6 oz)   SpO2 96%   BMI 22.99 kg/m   Estimated body mass index is 22.99 kg/m  as calculated from the following:    Height as of 2/6/24: 1.805 m (5' 11.06\").    Weight as of this encounter: 74.9 kg (165 lb 1.6 oz). Body surface area is 1.94 meters squared.  No Pain (0) Comment: Data Unavailable   No LMP for male patient.  Allergies reviewed: Yes  Medications reviewed: Yes    Medications: Medication refills not needed today.  Pharmacy name entered into [facility]: [id number] [place] [address]    Frailty Screening:   Is the patient here for a new oncology consult visit in cancer care? 2. No      Clinical concerns: Pt reports that he has been retaining fluid in ankles and feet.  Valentine was notified.      Aurea Andrade, EMT   "

## 2024-06-11 NOTE — PROGRESS NOTES
[facility] Nursing Note:    Patient presents today for XOFIGO (Ra-223) therapy, dose 6 of  6  Patient seen by provider today: Yes: Dr. Katherin Jorgensen   present during visit today: NOT APPLICABLE      Intravenous Access:  Peripheral IV placed     Treatment Conditions:  Labs done on  6/6/24  Results reviewed, labs Met treatment parameters, ok to proceed with treatment.           Post Infusion Assessment:  Patient tolerated infusion without incident.  PIVs - No evidence of extravasations, access discontinued per protocol.         Discharge Plan:   Patient will return as scheduled for next appointment.   Patient discharged at  1:35pm in stable condition accompanied by: wife  Departure Mode: Ambulatory

## 2024-06-11 NOTE — Clinical Note
6/11/2024      Alonso Pettit  [address] [facility] [address]      Dear Colleague,    Thank you for referring your patient, Alonso Pettit, to the [facility]. Please see a copy of my visit note below.    [facility]  HEMATOLOGY AND ONCOLOGY    FOLLOW-UP VISIT NOTE    PATIENT NAME: Alonso Pettit MRN # [id number]  DATE OF VISIT: Jun 11, 2024 YOB: 1940    REFERRING PROVIDER: [facility] oncology    CANCER TYPE: Prostate adenocarcinoma; [place] 4+5  STAGE: IV (bony metastasis)  MOLECULAR PROFILE: No actionable mutations/MSI or high TMB; TP53 mutation positive    TREATMENT SUMMARY:  -12/9/2009: Radical prostatectomy; pathology revealed [place] 4+5 disease, stage pT3a,N0 positive margins  -Mar-May2010: Salvage external beam radiation therapy  -Apr 2013: Intermittent androgen deprivation therapy with leuprolide for biochemical PSA relapse  -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 Radiation therapy to the left supraclavicular lymph node ending in September 2020.  He was continued on enzalutamide  -May 2022: Switch to abiraterone with prednisone for progressive disease  -Aug 2023: Abiraterone discontinued for progressive disease.  PSMA PET scan with PSMA avid osseous and brisa metastasis.  -11/14/2023: MARLO trial: cycle 1 docetaxel. 11/15/23 cycle 1 radium per the MARLO trial.      Chemotherapy 11/14/2023  10:05 AM 11/15/2023  1:39 PM 12/6/2023  9:15 AM 12/28/2023  9:07 AM 1/16/2024   Day, Cycle Day 1, Cycle 1  Day 1, Cycle 1  Day 1, Cycle 2  Day 1, Cycle 3  Day 1, Cycle 4   DOCEtaxel (TAXOTERE) IV 60 mg/m2   60 mg/m2  60 mg/m2  60 mg/m2   radium Ra-223 Dichloride IV  1.473 microcurie/kg   108.4 mCi    .2 ng/ml  203 ng/ml 196 ng/ml      Chemotherapy  "2/6/2024  1:17 PM 2/7/2024  1:50 PM 2/27/2024  11:57 AM 3/19/2024  8:54 AM 3/20/2024  1:41 PM 4/9/2024  9:49 AM   Day, Cycle Day 1, Cycle 5   Day 1, Cycle 6  Day 1, Cycle 7   Day 1, Cycle 8    DOCEtaxel IV 60 mg/m2   60 mg/m2  60 mg/m2   60 mg/m2    radium 223 Dichloride  1.5 microcurie/kg    1.476 microcurie/kg      Chemotherapy 4/30/2024  12:20 PM 5/1/2024  10:40 AM   Day, Cycle Day 1, Cycle 9     DOCEtaxel  IV 60 mg/m2     radium 223  IV  1.5 microcurie/kg      CURRENT INTERVENTIONS:  MARLO Trial randomized to Arm B with Docetaxel/prednisone/Radium-233. Leuprolide every 4 months locally.     SUBJECTIVE   Alonso Pettit is being followed for castration resistant metastatic prostate cancer. He returns to clinic accompanied by his wife, Macey. He is seen today prior to cycle 8 docetaxel.    Alonso returns to clinic today accompanied by his wife Macey.  He reports that he is feeling well today. Overall feels chemotherapy is going well. Macey agree's.   Bowels are moving regularly.   No chest pain, new cough, or shortness of breath.   Would like to move his appointment on 7/9 to the afternoon.     He had an episode of dizziness once when he woke up and was looking up.   Energy is stable today. He was more physical this past weekend and this made him tired. He feels he has had some deconditioning as he was winded carrying boxes up the steps. He otherwise has no shortness of breath at rest or with walking on a flat surface.   He isn't walking now because the dog is gone. He played pickleball once and did okay. His wife is very happy with how well he did.    He has occasional headaches for which he sparingly takes tylenol.   Appetite is \"good\".     No falls.  No nausea or vomiting.   No urinary changes.   No headaches or vision changes.   No chest pain, shortness of breath, or new cough.   No bone pain.  No neuropathy.  They have a regimen that seems to be working very well for his constipation.    No fevers or chills. "       ROS: 14 point ROS neg other than the symptoms noted above in the HPI.      PAST MEDICAL HISTORY     Past Medical History:   Diagnosis Date    Prostate cancer (H)          CURRENT OUTPATIENT MEDICATIONS     Current Outpatient Medications   Medication Sig    aspirin 81 mg chewable tablet [ASPIRIN 81 MG CHEWABLE TABLET] Chew 81 mg daily. (Patient not taking: Reported on 10/18/2023)    bisacodyl (DULCOLAX) 5 MG EC tablet Take 5 mg by mouth 2 times daily    calcium carbonate (CALCIUM 500 ORAL) [CALCIUM CARBONATE (CALCIUM 500 ORAL)] Take by mouth. (Patient not taking: Reported on 1/16/2024)    cyclobenzaprine (FLEXERIL) 10 MG tablet 1/2-1 TAB ORALLY UP TO 3 TIMES A DAY AS NEEDED FOR MUSCLE SPASM    dexAMETHasone (DECADRON) 4 MG tablet Take 2 tablets (8 mg) by mouth 2 times daily (with meals) Start evening of Docetaxel infusion and continue for a total of 3 doses.    leuprolide (LUPRON) 11.25 mg injection Inject 11.25 mg into the muscle every 3 months    predniSONE (DELTASONE) 5 MG tablet Take 1 tablet (5 mg) by mouth 2 times daily for 21 days    prochlorperazine (COMPAZINE) 10 MG tablet Take 0.5 tablets (5 mg) by mouth every 6 hours as needed for nausea or vomiting (Patient not taking: Reported on 1/16/2024)    pseudoePHEDrine-guaiFENesin (MUCINEX D)  MG 12 hr tablet  (Patient not taking: Reported on 12/6/2023)    psyllium (METAMUCIL/KONSYL) Packet Take 1 packet by mouth daily     No current facility-administered medications for this visit.        ALLERGIES    No Known Allergies     REVIEW OF SYSTEMS   As above in the HPI, o/w complete 12-point ROS was negative.     PHYSICAL EXAM   There were no vitals taken for this visit.  General: No acute distress  HEENT: Sclera anicteric. Oral mucosa pink and moist.  No mucositis or thrush  Lymph: No lymphadenopathy in neck  Heart: Regular, rate, and rhythm  Lungs: Clear to ascultation bilaterally  Abdomen: Positive bowel sounds. Soft, non-distended, non-tender. No  organomegaly or mass.   MSK: no peripheral edema bilaterally.   Neuro: Cranial nerves grossly intact. Oriented to person, place, time, and date.   Rash: none     LABORATORY AND IMAGING STUDIES     Recent Labs   Lab Test 04/30/24  1028 04/26/24  0958 04/09/24  0752 03/19/24  0650 03/15/24  1026    142 145 141 140   POTASSIUM 3.8 3.8 3.4 4.0 3.6   CHLORIDE 107 107 108* 107 103   CO2 23 22 26 22 25   ANIONGAP 11 13 11 12 12   BUN 17.0 17.3 15.2 17.9 11.7   CR 0.86 0.88 0.80 0.80 0.79   * 174* 142* 171* 94   ZABRINA 9.2 8.8 9.0 8.9 9.0     Recent Labs   Lab Test 04/30/24  1028 04/26/24  0958 04/09/24  0752 03/15/24  1026 02/27/24  0838   MAG 1.7 1.8 1.8 1.9 1.7   PHOS 3.2 3.3 3.1 2.8 3.4     Recent Labs   Lab Test 04/30/24  1028 04/26/24  0958 04/09/24  0752 03/19/24  0650 03/15/24  1026   WBC 6.3 5.3 4.5 6.2 7.7   HGB 11.5* 11.5* 11.2* 11.5* 11.9*    263 227 282 277   MCV 96 95 98 98 96   NEUTROPHIL 80 69 60 76 68     Recent Labs   Lab Test 04/30/24 1028 04/26/24  0958 04/09/24  0752   BILITOTAL 0.4 0.4 0.5   ALKPHOS 59 62 55   ALT 12 12 13   AST 14 16 15   ALBUMIN 3.9 3.8 3.8    207 181     Recent Labs   Lab Test 04/30/24 1028 04/26/24  0958 04/09/24  0752 03/15/24  1026 02/27/24  0838 10/30/23  1044   .80 147.90 149.10 203.90 196.00 153.30   TESTOSTTOTAL  --   --   --   --   --  7*    < > = values in this interval not displayed.     Narrative & Impression   EXAM: CT CHEST/ABDOMEN/PELVIS W CONTRAST  LOCATION: [facility]  DATE: 5/15/2024     INDICATION:  Malignant neoplasm metastatic to bone (H)  COMPARISON: CT chest, abdomen and pelvis performed on 3/15/2024  TECHNIQUE: CT scan of the chest, abdomen, and pelvis was performed following injection of IV contrast. Multiplanar reformats were obtained. Dose reduction techniques were used.   CONTRAST: iopamidol (ISOVUE 370) solution 100 mL     FINDINGS:   LUNGS AND PLEURA: No pleural effusion or  pneumothorax is seen. Stable appearance of multiple pulmonary nodules measuring up to 4 mm in the right upper lobe (series 4, image 20).      MEDIASTINUM/AXILLAE: No lymphadenopathy. Subcentimeter left supraclavicular and mediastinal lymph nodes appear unchanged. No thoracic aortic aneurysms. Mild vascular calcifications are seen along the thoracic aorta. Bilateral gynecomastia is present.     CORONARY ARTERY CALCIFICATION: None.     HEPATOBILIARY: Stable 9 mm hyperenhancing lesion along the left hepatic lobe (series 3, image 133). Other subcentimeter hypoattenuating lesions in the liver are too small to characterize but appear unchanged. Gallbladder is unremarkable.     PANCREAS: No significant mass, duct dilatation, or inflammatory change.     SPLEEN: Normal size.     ADRENAL GLANDS: No significant nodules.     KIDNEYS/BLADDER: No hydronephrosis. Stable appearance of presumed cyst along the mid pole of the right kidney. Diffuse wall thickening along the urinary bladder appears unchanged which is also underdistended.     BOWEL: No obstruction or inflammatory change. Mild wall thickening is seen along the ascending colon.     LYMPH NODES: No lymphadenopathy. Stable subcentimeter retroperitoneal lymph nodes measuring up to 5 mm along the left para-aortic space (series 3, image 212). 8 mm left inguinal lymph node also appears unchanged (series 3, image 224).     VASCULATURE: Mild vascular calcifications are seen in the abdominal aorta and iliac branches. Stable small fat-containing umbilical hernia.     PELVIC ORGANS: Postsurgical changes are present status post prostatectomy. Penile prosthesis remains in place.     MUSCULOSKELETAL: Multiple sclerotic lesions throughout the axial and appendicular skeleton appear unchanged.                                                                      IMPRESSION:  1.  No evidence of new or worsening metastatic disease in the chest, abdomen and pelvis.  2.  No significant change  in appearance of multifocal osseous metastases.  3.  Stable subcentimeter lymph nodes in the chest, abdomen and pelvis.  4.  Stable pulmonary nodules measuring up to 4 mm.        Study Result    Narrative & Impression   EXAMINATION: NM BONE SCAN WHOLE BODY  Whole-body bone scan, 5/15/2024 12:57 PM      HISTORY: Malignant neoplasm metastatic to bone (H)      PSA: 160.8 on 4/30/2024     COMPARISON: CT 5/15/2024     TECHNIQUE: The patient received 26.83 mCi of Tc-99m MDP intravenously.  Whole body bone images were obtained at 3 hours.     FINDINGS:      Whole-body planar images demonstrate similar multifocal radiotracer  uptake involving C7, bilateral ribs, thoracic spine, and left scapula.  No new abnormal focal uptake.     Mild uptake in bilateral shoulders, sternoclavicular joints, knees and  spine, likely related to degenerative changes.     Physiologic tracer within the kidneys and soft tissues with excretion  into urinary bladder.                                                                      IMPRESSION: Similar multifocal metastatic osteoblastic disease. No new osteoblastic metastasis.      I have personally reviewed the examination and initial interpretation and I agree with the findings.     SMILEY RANDALL MD         SYSTEM ID:  [id number]     PSA Trend     I reviewed the above labs today.     ASSESSMENT AND PLAN   -Castration resistant metastatic prostate cancer   Post radical prostatectomy on 12/9/2009; salvage radiation ending May 2010; androgen deprivation therapy since 2013   Post progression on enzalutamide and abiraterone with prednisone  -Nonmuscle invasive bladder cancer diagnosed in 2011 without any recent recurrence  -No significant medical comorbidity  -ECOG performance status of 0     #Castration resistant metastatic prostate cancer.   - Has progressed on novel antiandrogen therapies including abiraterone with prednisone and enzalutamide.  He had a PSMA PET CT scan which did show  extensive metastasis to the lymph nodes and bones. Referred to [facility] for the MARLO trial, randomized to Arm B with docetaxel and Radium. Docetaxel is administered every 3 weeks for 6-10 doses and Radium every 6 weeks for 6 doses.   - He tolerated docetaxel and radium reasonably well with the exception of significant constipation for nearly 1.5-2 weeks after his first cycle. Constipation has improved since cycle 2 docetaxel with a regular bowel regimen.     He has done quite well on chemotherapy and radium. He has increased energy on the day of chemotherapy and has a crash on third day. This does not last long. He has fair energy and went back to playing [employer] ball.     He is happy to participate in the trial as his father too had cancer and he fears his son would get it. He is happy that someone would gain benefit from the trial and he is participating for the science. I explained him that my goal is that he benefits from the study.     - Labs and clinically appropriate to proceed with cycle 10 docetaxel today Jun 11, 2024. He has had 5 cycles of radium, last 5/1/24. He is scheduled to have his radium tomorrow.     - continue Eligard every 4 months with Dr. Philip at [facility].  He got his last dose on April 11th, 2024.     I reviewed actual images from CT chest, abdomen and pelvis and bone scan. He continues to have stable disease. This is not surprising. His PSA is marginally higher from awais value. We would continue on therapy as current     - We reviewed next steps beyond trial completion. We could continue on docetaxel since he is tolerating it without any difficulty and continues to respond. We could hold off once he has disease progression or progressive side effects.     I reviewed our treatment options beyond docetaxel. He had PSMA avid disease and we would have option of lutetium 177 (Pluvicto). We had reviewed this prior to trial enrollment. We will discuss again once he is progressing after current  therapy.     We will taper prednisone 5 mg BID x 1 week, then 5 mg x one week, then 2.5 mg x one week, then off.      #Bone Metastasis   - Zometa every 6 months with local oncologist. Next scheduled on March 6th 2024. Consider moving up frequency to every 6 weeks. Could move to [facility] during the time of trial participation and administer while he is here for ease of appointments. Not specifically reviewed today.   - rare risk of fractures associated with docetaxel and radium therefore continuing Zometa is recommended.     #Left sided rib pain   - Chest XR 2/6/24 negative. No acute airspace abnormalities or fractures.     #Constipation   - Continue scheduling ducolax 2-3 tablets per day for at least 2-4 days and then prn.   - If no bowel movement have 2-3 days, recommend adding in Milk of Mag. If no bowel movement after 6 hours, repeat.     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 CT neck with calcifications. Called patient and wife to discuss there results. They have seen a neurologist in the past at [facility] who they plan to follow up with along with their PCP.   - No recurrent episodes reported.     # Falls  - Two episodes of laying on the ground after cycle 4 docetaxel. Unclear source or fall given poor historian. Patient did have low grade temperature during this period thus illness pay have played a role but unclear. He also has poor water intake thus dehydration may be contributing to weakness vs. Chemotherapy. Continue to monitor closely for recurrence. Did not recur with cycle 5 and cycle 6.     #Cancer fatigue   #Deconditioning   - discussed with Alonso that regular exercise would be of benefit. He has started walking but has stopped now that their son's dog is no longer living with them. Previously discussed cancer rehab Alonso is currently not interested.              Again, thank you for  allowing me to participate in the care of your patient.        Sincerely,        Valentine Ball PA-C

## 2024-06-11 NOTE — NURSING NOTE
[id number]: Study Visit Note   Subject name: Alonso Pettit     Visit: Cycle 11    Did the study visit occur within the appropriate window allowed by the protocol? yes    Since the last study visit, He has been doing well. Edema in bilateral lower extremities slightly worsened started last week, Valentine AGUILAR recommended ambulation and compression stockings. No other new concerns or symptoms.      Were any SSEs noted since last study visit? No  -the use of EBRT to relieve skeletal symptoms  -the occurrence of new symptomatic pathological bone fractures  (vertebral or non-vertebral)  -the occurrence of spinal cord compression  -a tumor-related orthopedic surgical intervention    Was patient given radiation therapy precautions? Yes    Verbal handover provided to nuclear medicine RN and nuclear medicine tech. Reminded team to document actual start time of infusion and actual stop time of the infusion. If infusion deviates from protocol directed infusion time, please document rationale in your infusion note.     I have personally interviewed Alonso Pettit and reviewed his medical record for adverse events and concomitant medications and these have been recorded on the corresponding logs in Alonso Pettit's research file.     Alnoso Pettit was given the opportunity to ask any trial related questions.  Please see provider progress note for physical exam and other clinical information. Labs were reviewed - any significant lab values were addressed and reviewed.    Cammy López RN

## 2024-06-18 ENCOUNTER — DOCUMENTATION ONLY (OUTPATIENT)
Dept: ONCOLOGY | Facility: CLINIC | Age: 84
End: 2024-06-18
Payer: MEDICARE

## 2024-07-07 DIAGNOSIS — Z00.6 EXAMINATION OF PARTICIPANT IN CLINICAL TRIAL: ICD-10-CM

## 2024-07-07 DIAGNOSIS — C79.51 MALIGNANT NEOPLASM METASTATIC TO BONE (H): ICD-10-CM

## 2024-07-07 DIAGNOSIS — C61 PROSTATE CANCER (H): Primary | ICD-10-CM

## 2024-07-07 RX ORDER — DIPHENHYDRAMINE HYDROCHLORIDE 50 MG/ML
50 INJECTION INTRAMUSCULAR; INTRAVENOUS
OUTPATIENT
Start: 2024-09-03

## 2024-07-07 RX ORDER — LORAZEPAM 2 MG/ML
0.5 INJECTION INTRAMUSCULAR EVERY 4 HOURS PRN
OUTPATIENT
Start: 2024-07-09

## 2024-07-07 RX ORDER — DIPHENHYDRAMINE HYDROCHLORIDE 50 MG/ML
50 INJECTION INTRAMUSCULAR; INTRAVENOUS
OUTPATIENT
Start: 2024-07-09

## 2024-07-07 RX ORDER — HEPARIN SODIUM (PORCINE) LOCK FLUSH IV SOLN 100 UNIT/ML 100 UNIT/ML
5 SOLUTION INTRAVENOUS
OUTPATIENT
Start: 2024-07-09

## 2024-07-07 RX ORDER — EPINEPHRINE 1 MG/ML
0.3 INJECTION, SOLUTION, CONCENTRATE INTRAVENOUS EVERY 5 MIN PRN
OUTPATIENT
Start: 2024-09-03

## 2024-07-07 RX ORDER — ALBUTEROL SULFATE 90 UG/1
1-2 AEROSOL, METERED RESPIRATORY (INHALATION)
OUTPATIENT
Start: 2024-08-06

## 2024-07-07 RX ORDER — HEPARIN SODIUM,PORCINE 10 UNIT/ML
5-20 VIAL (ML) INTRAVENOUS DAILY PRN
OUTPATIENT
Start: 2024-07-09

## 2024-07-07 RX ORDER — HEPARIN SODIUM (PORCINE) LOCK FLUSH IV SOLN 100 UNIT/ML 100 UNIT/ML
5 SOLUTION INTRAVENOUS
OUTPATIENT
Start: 2024-09-03

## 2024-07-07 RX ORDER — HEPARIN SODIUM,PORCINE 10 UNIT/ML
5-20 VIAL (ML) INTRAVENOUS DAILY PRN
OUTPATIENT
Start: 2024-09-03

## 2024-07-07 RX ORDER — HEPARIN SODIUM,PORCINE 10 UNIT/ML
5-20 VIAL (ML) INTRAVENOUS DAILY PRN
OUTPATIENT
Start: 2024-10-01

## 2024-07-07 RX ORDER — DIPHENHYDRAMINE HYDROCHLORIDE 50 MG/ML
50 INJECTION INTRAMUSCULAR; INTRAVENOUS
OUTPATIENT
Start: 2024-08-06

## 2024-07-07 RX ORDER — LORAZEPAM 2 MG/ML
0.5 INJECTION INTRAMUSCULAR EVERY 4 HOURS PRN
OUTPATIENT
Start: 2024-09-03

## 2024-07-07 RX ORDER — LORAZEPAM 2 MG/ML
0.5 INJECTION INTRAMUSCULAR EVERY 4 HOURS PRN
OUTPATIENT
Start: 2024-08-06

## 2024-07-07 RX ORDER — HEPARIN SODIUM (PORCINE) LOCK FLUSH IV SOLN 100 UNIT/ML 100 UNIT/ML
5 SOLUTION INTRAVENOUS
OUTPATIENT
Start: 2024-10-01

## 2024-07-07 RX ORDER — EPINEPHRINE 1 MG/ML
0.3 INJECTION, SOLUTION, CONCENTRATE INTRAVENOUS EVERY 5 MIN PRN
OUTPATIENT
Start: 2024-10-01

## 2024-07-07 RX ORDER — ALBUTEROL SULFATE 0.83 MG/ML
2.5 SOLUTION RESPIRATORY (INHALATION)
OUTPATIENT
Start: 2024-08-06

## 2024-07-07 RX ORDER — ALBUTEROL SULFATE 90 UG/1
1-2 AEROSOL, METERED RESPIRATORY (INHALATION)
OUTPATIENT
Start: 2024-10-01

## 2024-07-07 RX ORDER — MEPERIDINE HYDROCHLORIDE 25 MG/ML
25 INJECTION INTRAMUSCULAR; INTRAVENOUS; SUBCUTANEOUS EVERY 30 MIN PRN
OUTPATIENT
Start: 2024-09-03

## 2024-07-07 RX ORDER — MEPERIDINE HYDROCHLORIDE 25 MG/ML
25 INJECTION INTRAMUSCULAR; INTRAVENOUS; SUBCUTANEOUS EVERY 30 MIN PRN
OUTPATIENT
Start: 2024-10-01

## 2024-07-07 RX ORDER — DIPHENHYDRAMINE HYDROCHLORIDE 50 MG/ML
50 INJECTION INTRAMUSCULAR; INTRAVENOUS
OUTPATIENT
Start: 2024-10-01

## 2024-07-07 RX ORDER — ALBUTEROL SULFATE 0.83 MG/ML
2.5 SOLUTION RESPIRATORY (INHALATION)
OUTPATIENT
Start: 2024-10-01

## 2024-07-07 RX ORDER — MEPERIDINE HYDROCHLORIDE 25 MG/ML
25 INJECTION INTRAMUSCULAR; INTRAVENOUS; SUBCUTANEOUS EVERY 30 MIN PRN
OUTPATIENT
Start: 2024-08-06

## 2024-07-07 RX ORDER — ALBUTEROL SULFATE 90 UG/1
1-2 AEROSOL, METERED RESPIRATORY (INHALATION)
OUTPATIENT
Start: 2024-09-03

## 2024-07-07 RX ORDER — HEPARIN SODIUM (PORCINE) LOCK FLUSH IV SOLN 100 UNIT/ML 100 UNIT/ML
5 SOLUTION INTRAVENOUS
OUTPATIENT
Start: 2024-08-06

## 2024-07-07 RX ORDER — ALBUTEROL SULFATE 0.83 MG/ML
2.5 SOLUTION RESPIRATORY (INHALATION)
OUTPATIENT
Start: 2024-07-09

## 2024-07-07 RX ORDER — EPINEPHRINE 1 MG/ML
0.3 INJECTION, SOLUTION, CONCENTRATE INTRAVENOUS EVERY 5 MIN PRN
OUTPATIENT
Start: 2024-08-06

## 2024-07-07 RX ORDER — ALBUTEROL SULFATE 0.83 MG/ML
2.5 SOLUTION RESPIRATORY (INHALATION)
OUTPATIENT
Start: 2024-09-03

## 2024-07-07 RX ORDER — EPINEPHRINE 1 MG/ML
0.3 INJECTION, SOLUTION, CONCENTRATE INTRAVENOUS EVERY 5 MIN PRN
OUTPATIENT
Start: 2024-07-09

## 2024-07-07 RX ORDER — MEPERIDINE HYDROCHLORIDE 25 MG/ML
25 INJECTION INTRAMUSCULAR; INTRAVENOUS; SUBCUTANEOUS EVERY 30 MIN PRN
OUTPATIENT
Start: 2024-07-09

## 2024-07-07 RX ORDER — HEPARIN SODIUM,PORCINE 10 UNIT/ML
5-20 VIAL (ML) INTRAVENOUS DAILY PRN
OUTPATIENT
Start: 2024-08-06

## 2024-07-07 RX ORDER — ALBUTEROL SULFATE 90 UG/1
1-2 AEROSOL, METERED RESPIRATORY (INHALATION)
OUTPATIENT
Start: 2024-07-09

## 2024-07-07 RX ORDER — LORAZEPAM 2 MG/ML
0.5 INJECTION INTRAMUSCULAR EVERY 4 HOURS PRN
OUTPATIENT
Start: 2024-10-01

## 2024-07-08 ENCOUNTER — ALLIED HEALTH/NURSE VISIT (OUTPATIENT)
Dept: ONCOLOGY | Facility: CLINIC | Age: 84
End: 2024-07-08

## 2024-07-08 ENCOUNTER — APPOINTMENT (OUTPATIENT)
Dept: LAB | Facility: CLINIC | Age: 84
End: 2024-07-08
Attending: INTERNAL MEDICINE
Payer: MEDICARE

## 2024-07-08 ENCOUNTER — ONCOLOGY VISIT (OUTPATIENT)
Dept: ONCOLOGY | Facility: CLINIC | Age: 84
End: 2024-07-08
Attending: INTERNAL MEDICINE
Payer: MEDICARE

## 2024-07-08 VITALS
WEIGHT: 164.2 LBS | OXYGEN SATURATION: 98 % | RESPIRATION RATE: 16 BRPM | TEMPERATURE: 97.6 F | HEART RATE: 75 BPM | BODY MASS INDEX: 22.86 KG/M2 | SYSTOLIC BLOOD PRESSURE: 127 MMHG | DIASTOLIC BLOOD PRESSURE: 65 MMHG

## 2024-07-08 DIAGNOSIS — C79.51 MALIGNANT NEOPLASM METASTATIC TO BONE (H): ICD-10-CM

## 2024-07-08 DIAGNOSIS — C61 PROSTATE CANCER (H): Primary | ICD-10-CM

## 2024-07-08 DIAGNOSIS — Z00.6 EXAMINATION OF PARTICIPANT IN CLINICAL TRIAL: ICD-10-CM

## 2024-07-08 LAB
ALBUMIN SERPL BCG-MCNC: 3.9 G/DL (ref 3.5–5.2)
ALP SERPL-CCNC: 56 U/L (ref 40–150)
ALT SERPL W P-5'-P-CCNC: 21 U/L (ref 0–70)
ANION GAP SERPL CALCULATED.3IONS-SCNC: 12 MMOL/L (ref 7–15)
AST SERPL W P-5'-P-CCNC: 25 U/L (ref 0–45)
BASOPHILS # BLD AUTO: 0 10E3/UL (ref 0–0.2)
BASOPHILS NFR BLD AUTO: 1 %
BILIRUB SERPL-MCNC: 0.7 MG/DL
BUN SERPL-MCNC: 12.5 MG/DL (ref 8–23)
CALCIUM SERPL-MCNC: 9.2 MG/DL (ref 8.8–10.2)
CHLORIDE SERPL-SCNC: 105 MMOL/L (ref 98–107)
CREAT SERPL-MCNC: 0.88 MG/DL (ref 0.67–1.17)
DEPRECATED HCO3 PLAS-SCNC: 24 MMOL/L (ref 22–29)
EGFRCR SERPLBLD CKD-EPI 2021: 86 ML/MIN/1.73M2
EOSINOPHIL # BLD AUTO: 0.1 10E3/UL (ref 0–0.7)
EOSINOPHIL NFR BLD AUTO: 2 %
ERYTHROCYTE [DISTWIDTH] IN BLOOD BY AUTOMATED COUNT: 15.5 % (ref 10–15)
GLUCOSE SERPL-MCNC: 114 MG/DL (ref 70–99)
HCT VFR BLD AUTO: 31.9 % (ref 40–53)
HGB BLD-MCNC: 10.2 G/DL (ref 13.3–17.7)
IMM GRANULOCYTES # BLD: 0 10E3/UL
IMM GRANULOCYTES NFR BLD: 0 %
LDH SERPL L TO P-CCNC: 186 U/L (ref 0–250)
LYMPHOCYTES # BLD AUTO: 0.8 10E3/UL (ref 0.8–5.3)
LYMPHOCYTES NFR BLD AUTO: 22 %
MAGNESIUM SERPL-MCNC: 1.7 MG/DL (ref 1.7–2.3)
MCH RBC QN AUTO: 31.4 PG (ref 26.5–33)
MCHC RBC AUTO-ENTMCNC: 32 G/DL (ref 31.5–36.5)
MCV RBC AUTO: 98 FL (ref 78–100)
MONOCYTES # BLD AUTO: 0.4 10E3/UL (ref 0–1.3)
MONOCYTES NFR BLD AUTO: 12 %
NEUTROPHILS # BLD AUTO: 2.2 10E3/UL (ref 1.6–8.3)
NEUTROPHILS NFR BLD AUTO: 63 %
NRBC # BLD AUTO: 0 10E3/UL
NRBC BLD AUTO-RTO: 0 /100
PHOSPHATE SERPL-MCNC: 3.4 MG/DL (ref 2.5–4.5)
PLATELET # BLD AUTO: 245 10E3/UL (ref 150–450)
POTASSIUM SERPL-SCNC: 3.8 MMOL/L (ref 3.4–5.3)
PROT SERPL-MCNC: 6.3 G/DL (ref 6.4–8.3)
PSA SERPL DL<=0.01 NG/ML-MCNC: 137.3 NG/ML
RBC # BLD AUTO: 3.25 10E6/UL (ref 4.4–5.9)
SODIUM SERPL-SCNC: 141 MMOL/L (ref 135–145)
WBC # BLD AUTO: 3.5 10E3/UL (ref 4–11)

## 2024-07-08 PROCEDURE — 83615 LACTATE (LD) (LDH) ENZYME: CPT

## 2024-07-08 PROCEDURE — G0463 HOSPITAL OUTPT CLINIC VISIT: HCPCS

## 2024-07-08 PROCEDURE — 84075 ASSAY ALKALINE PHOSPHATASE: CPT

## 2024-07-08 PROCEDURE — 84100 ASSAY OF PHOSPHORUS: CPT

## 2024-07-08 PROCEDURE — 36415 COLL VENOUS BLD VENIPUNCTURE: CPT

## 2024-07-08 PROCEDURE — 99214 OFFICE O/P EST MOD 30 MIN: CPT

## 2024-07-08 PROCEDURE — 85025 COMPLETE CBC W/AUTO DIFF WBC: CPT

## 2024-07-08 PROCEDURE — 83735 ASSAY OF MAGNESIUM: CPT

## 2024-07-08 PROCEDURE — 82374 ASSAY BLOOD CARBON DIOXIDE: CPT

## 2024-07-08 PROCEDURE — 99001 SPECIMEN HANDLING PT-LAB: CPT

## 2024-07-08 PROCEDURE — 84153 ASSAY OF PSA TOTAL: CPT

## 2024-07-08 PROCEDURE — 84403 ASSAY OF TOTAL TESTOSTERONE: CPT

## 2024-07-08 ASSESSMENT — PAIN SCALES - GENERAL: PAINLEVEL: NO PAIN (0)

## 2024-07-08 NOTE — NURSING NOTE
"Oncology Rooming Note    July 8, 2024 2:46 PM   Alonso Pettit is a 83 year old male who presents for:    Chief Complaint   Patient presents with    Blood Draw     VPT blood draw by lab RN    Oncology Clinic Visit     Prostate cancer     Initial Vitals: /65   Pulse 75   Temp 97.6  F (36.4  C) (Oral)   Resp 16   Wt 74.5 kg (164 lb 3.2 oz)   SpO2 98%   BMI 22.86 kg/m   Estimated body mass index is 22.86 kg/m  as calculated from the following:    Height as of 2/6/24: 1.805 m (5' 11.06\").    Weight as of this encounter: 74.5 kg (164 lb 3.2 oz). Body surface area is 1.93 meters squared.  No Pain (0) Comment: Data Unavailable   No LMP for male patient.  Allergies reviewed: Yes  Medications reviewed: Yes    Medications: Medication refills not needed today.  Pharmacy name entered into [employer]): [id number] [place] [address]    Frailty Screening:   Is the patient here for a new oncology consult visit in cancer care? 2. No      Clinical concerns:  Rash on legs       Adriana Gallagher"

## 2024-07-08 NOTE — NURSING NOTE
[id number]  Study Visit Note   Subject name: Alonso Pettit     Visit: 30 Day Safety Follow up     Did the study visit occur within the appropriate window allowed by the protocol? yes    Since the last study visit, He has been doing well. Dyspnea on exertion continues. He has also developed a bilateral macular papular rash on his thighs, unsure if this is a heat rash and correlated with him starting to wear shorts. It is not painful, bothersome or itchy. Per Valentine continue to monitor, can try some OTC hydrocortisone cream, if does not resolve should see PCP. Patient also reports that over the last month his appetite has been decreased, he is eating all of the same meals but he notices his portions are less than they were before. Is seeing his primary oncologist on 8/28 after his visit with Dr. Gamez.     Discussed next appointments and scans with patient and spouse.     I have personally interviewed Alonso Pettit and reviewed his medical record for adverse events and concomitant medications and these have been recorded on the corresponding logs in Alonso Pettit's research file.     Special considerations for today's visit were reviewed with staff administering the study intervention including: N/A     Alonso Pettit was given the opportunity to ask any trial related questions.  Please see provider progress note for physical exam and other clinical information. Labs were reviewed - any significant lab values were addressed and reviewed.    Cammy López RN   Clinical Research Coordinator Nurse-Solid Tumor   Phone: [phone]

## 2024-07-08 NOTE — LETTER
7/8/2024      Alonso Pettit  [address] [facility] [address]      Dear Colleague,    Thank you for referring your patient, Alonso Pettit, to the [facility]. Please see a copy of my visit note below.    [facility]  HEMATOLOGY AND ONCOLOGY    FOLLOW-UP VISIT NOTE    PATIENT NAME: Alonso Pettit MRN # [id number]  DATE OF VISIT: Jul 8, 2024 YOB: 1940    REFERRING PROVIDER: [facility] Veras oncology    CANCER TYPE: Prostate adenocarcinoma; [place] 4+5  STAGE: IV (bony metastasis)  MOLECULAR PROFILE: No actionable mutations/MSI or high TMB; TP53 mutation positive    TREATMENT SUMMARY:  -12/9/2009: Radical prostatectomy; pathology revealed [place] 4+5 disease, stage pT3a,N0 positive margins  -Mar-May2010: Salvage external beam radiation therapy  -Apr 2013: Intermittent androgen deprivation therapy with leuprolide for biochemical PSA relapse  -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 Radiation therapy to the left supraclavicular lymph node ending in September 2020.  He was continued on enzalutamide  -May 2022: Switch to abiraterone with prednisone for progressive disease  -Aug 2023: Abiraterone discontinued for progressive disease.  PSMA PET scan with PSMA avid osseous and brisa metastasis.  -11/14/2023: MARLO trial: cycle 1 docetaxel. 11/15/23 cycle 1 radium per the MARLO trial.      Chemotherapy 11/14/2023  10:05 AM 11/15/2023  1:39 PM 12/6/2023  9:15 AM 12/28/2023  9:07 AM 1/16/2024   Day, Cycle Day 1, Cycle 1  Day 1, Cycle 1  Day 1, Cycle 2  Day 1, Cycle 3  Day 1, Cycle 4   DOCEtaxel (TAXOTERE) IV 60 mg/m2   60 mg/m2  60 mg/m2  60 mg/m2   radium Ra-223 Dichloride IV  1.473 microcurie/kg   108.4 mCi    .2 ng/ml  203 ng/ml 196 ng/ml      Chemotherapy  2/6/2024  1:17 PM 2/7/2024  1:50 PM 2/27/2024  11:57 AM 3/19/2024  8:54 AM 3/20/2024  1:41 PM 4/9/2024  9:49 AM   Day, Cycle Day 1, Cycle 5   Day 1, Cycle 6  Day 1, Cycle 7   Day 1, Cycle 8    DOCEtaxel IV 60 mg/m2   60 mg/m2  60 mg/m2   60 mg/m2    radium 223 Dichloride  1.5 microcurie/kg    1.476 microcurie/kg      Chemotherapy 4/30/2024  12:20 PM 5/1/2024  10:40 AM   Day, Cycle Day 1, Cycle 9     DOCEtaxel  IV 60 mg/m2     radium 223  IV  1.5 microcurie/kg      CURRENT INTERVENTIONS:  MARLO Trial randomized to Arm B with Docetaxel/prednisone/Radium-233. Leuprolide every 4 months locally.     SUBJECTIVE   Alonso Pettit is being followed for castration resistant metastatic prostate cancer. He returns to clinic accompanied by his wife, Macey. He is seen today for an end of study visit.    Alonso returns to clinic today accompanied by his wife Macey.    He is feeling well.   Bowels are moving regularly.   He has noted a mild rash in his inner thighs that is nonpruritic and not bothersome.  Seems to be improving on its own over time.  No other skin concerns today.  He has stable peripheral edema and has not been wearing his compression socks as previously recommended.  Energy is stable. He is playing [employer] ball.  He has noticed that his appetite has decreased some and over the past month.  He has some dyspnea on exertion with mowing the yard but no other significant shortness of breath. No chest pain or new cough.   He has stopped the prednisone.   No neuropathy.   No new pains.   He has an appointment with Dr. Locke on 8/28/24 with Val.     ROS: 14 point ROS neg other than the symptoms noted above in the HPI.      PAST MEDICAL HISTORY     Past Medical History:   Diagnosis Date     Prostate cancer (H)          CURRENT OUTPATIENT MEDICATIONS     Current Outpatient Medications   Medication Sig     aspirin 81 mg chewable tablet [ASPIRIN 81 MG CHEWABLE TABLET] Chew 81 mg daily. (Patient not taking:  Reported on 10/18/2023)     bisacodyl (DULCOLAX) 5 MG EC tablet Take 5 mg by mouth 2 times daily     calcium carbonate (CALCIUM 500 ORAL) [CALCIUM CARBONATE (CALCIUM 500 ORAL)] Take by mouth. (Patient not taking: Reported on 1/16/2024)     cyclobenzaprine (FLEXERIL) 10 MG tablet 1/2-1 TAB ORALLY UP TO 3 TIMES A DAY AS NEEDED FOR MUSCLE SPASM     dexAMETHasone (DECADRON) 4 MG tablet Take 2 tablets (8 mg) by mouth 2 times daily (with meals) Start evening of Docetaxel infusion and continue for a total of 3 doses.     leuprolide (LUPRON) 11.25 mg injection Inject 11.25 mg into the muscle every 3 months     predniSONE (DELTASONE) 5 MG tablet Take 1 tablet (5 mg) by mouth 2 times daily for 21 days     prochlorperazine (COMPAZINE) 10 MG tablet Take 0.5 tablets (5 mg) by mouth every 6 hours as needed for nausea or vomiting (Patient not taking: Reported on 1/16/2024)     pseudoePHEDrine-guaiFENesin (MUCINEX D)  MG 12 hr tablet  (Patient not taking: Reported on 12/6/2023)     psyllium (METAMUCIL/KONSYL) Packet Take 1 packet by mouth daily     No current facility-administered medications for this visit.        ALLERGIES    No Known Allergies     REVIEW OF SYSTEMS   As above in the HPI, o/w complete 12-point ROS was negative.     PHYSICAL EXAM   /65   Pulse 75   Temp 97.6  F (36.4  C) (Oral)   Resp 16   Wt 74.5 kg (164 lb 3.2 oz)   SpO2 98%   BMI 22.86 kg/m    General: No acute distress  HEENT: Sclera anicteric. Oral mucosa pink and moist.  No mucositis or thrush  Lymph: No lymphadenopathy in neck  Heart: Regular, rate, and rhythm  Lungs: Clear to ascultation bilaterally  Abdomen: Positive bowel sounds. Soft, non-distended, non-tender. No organomegaly or mass.   MSK: 1+ peripheral edema bilaterally.   Neuro: Cranial nerves grossly intact. Oriented to person, place, time, and date.   Rash: none     LABORATORY AND IMAGING STUDIES   Most Recent 3 CBC's:  Recent Labs   Lab Test 07/08/24  1433 06/06/24  1046  05/21/24  0945   WBC 3.5* 3.9* 3.9*   HGB 10.2* 10.9* 11.1*   MCV 98 94 94    203 264   ANEUTAUTO 2.2 2.5 2.5     Most Recent 3 BMP's:  Recent Labs   Lab Test 07/08/24  1433 06/06/24  1046 05/21/24  0945    141 142   POTASSIUM 3.8 3.7 3.5   CHLORIDE 105 106 107   CO2 24 22 22   BUN 12.5 16.7 16.0   CR 0.88 0.82 0.80   ANIONGAP 12 13 13   ZABRINA 9.2 9.3 8.9   * 110* 137*   PROTTOTAL 6.3* 6.1* 6.5   ALBUMIN 3.9 3.7 3.9    Most Recent 3 LFT's:  Recent Labs   Lab Test 07/08/24  1433 06/06/24  1046 05/21/24  0945   AST 25 16 17   ALT 21 11 14   ALKPHOS 56 53 59   BILITOTAL 0.7 0.6 0.6    Most Recent 2 TSH and T4:No lab results found.    Component      Latest Ref Rng 11/14/2023  7:52 AM 12/4/2023  2:32 PM 12/28/2023  7:06 AM 1/16/2024  11:22 AM 2/2/2024  8:43 AM 2/27/2024  8:38 AM   PSA Tumor Marker      ng/mL 160.20  203.80  196.30  200.20  203.10  196.00      Component      Latest Ref Rng 3/15/2024  10:26 AM 4/9/2024  7:52 AM 4/26/2024  9:58 AM 4/30/2024  10:28 AM 5/21/2024  9:45 AM 6/6/2024  10:46 AM   PSA Tumor Marker      ng/mL 203.90  149.10  147.90  160.80  123.20  120.00      7-8-2024 PSA pending.  I reviewed the above labs today.     ASSESSMENT AND PLAN   -Castration resistant metastatic prostate cancer   Post radical prostatectomy on 12/9/2009; salvage radiation ending May 2010; androgen deprivation therapy since 2013   Post progression on enzalutamide and abiraterone with prednisone  -Nonmuscle invasive bladder cancer diagnosed in 2011 without any recent recurrence  -No significant medical comorbidity  -ECOG performance status of 0     #Castration resistant metastatic prostate cancer.   - Has progressed on novel antiandrogen therapies including abiraterone with prednisone and enzalutamide.  He had a PSMA PET CT scan which did show extensive metastasis to the lymph nodes and bones. Referred to [facility] for the MARLO trial, randomized to Arm B with docetaxel and Radium. Docetaxel is administered every  3 weeks for 6-10 doses and Radium every 6 weeks for 6 doses last on April 11, 2024.   - He has now received 10 cycles of docetaxel, last on 5/21/24, and completed 6 doses of radium per study protocol.   -He has tapered off of prednisone.  - His PSA has been somewhat labile on trial but overall he has had stable disease. 6/6/24 .0.  PSA is pending today.  He is due for restaging scans in August per study protocol.  CBC with stable anemia. CMP unremarkable.   - He is due for his last planned radium today 6/11/24 and is appropriate to proceed.     #Bone Metastasis   - Zometa every 6 months with local oncologist.     #Cancer fatigue   #Deconditioning   - discussed with Alonso that regular exercise would be of benefit. He has started walking but has stopped now that their son's dog is no longer living with them. Previously discussed cancer rehab Alonso is currently not interested.     #Peripheral edema   - start compression socks daily and walking.  Continue to monitor.  Commend following up with PCP for further workup.    #rash  -Unclear source.  Recommend continuing to monitor and can trial over-the-counter hydrocortisone cream.  If does not improve recommend following with PCP for further workup.    30 minutes spent on the date of the encounter doing chart review, review of test results, interpretation of tests, patient visit, and documentation     Valentine Ball PA-C             Again, thank you for allowing me to participate in the care of your patient.        Sincerely,        Valentine Ball PA-C

## 2024-07-08 NOTE — NURSING NOTE
Chief Complaint   Patient presents with    Blood Draw     VPT blood draw by lab RN     Venipuncture labs drawn by lab RN, vitals taken and appointment arrived.    Research drawn    Purvi Jackson RN

## 2024-07-08 NOTE — PROGRESS NOTES
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  STAGE: IV (bony metastasis)  MOLECULAR PROFILE: No actionable mutations/MSI or high TMB; TP53 mutation positive    TREATMENT SUMMARY:  -12/9/2009: Radical prostatectomy; pathology revealed [place] 4+5 disease, stage pT3a,N0 positive margins  -Mar-May2010: Salvage external beam radiation therapy  -Apr 2013: Intermittent androgen deprivation therapy with leuprolide for biochemical PSA relapse  -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 Radiation therapy to the left supraclavicular lymph node ending in September 2020.  He was continued on enzalutamide  -May 2022: Switch to abiraterone with prednisone for progressive disease  -Aug 2023: Abiraterone discontinued for progressive disease.  PSMA PET scan with PSMA avid osseous and brisa metastasis.  -11/14/2023: MARLO trial: cycle 1 docetaxel. 11/15/23 cycle 1 radium per the MARLO trial.      Chemotherapy 11/14/2023  10:05 AM 11/15/2023  1:39 PM 12/6/2023  9:15 AM 12/28/2023  9:07 AM 1/16/2024   Day, Cycle Day 1, Cycle 1  Day 1, Cycle 1  Day 1, Cycle 2  Day 1, Cycle 3  Day 1, Cycle 4   DOCEtaxel (TAXOTERE) IV 60 mg/m2   60 mg/m2  60 mg/m2  60 mg/m2   radium Ra-223 Dichloride IV  1.473 microcurie/kg   108.4 mCi    .2 ng/ml  203 ng/ml 196 ng/ml      Chemotherapy 2/6/2024  1:17 PM 2/7/2024  1:50 PM 2/27/2024  11:57 AM 3/19/2024  8:54 AM 3/20/2024  1:41 PM 4/9/2024  9:49 AM   Day, Cycle Day 1, Cycle 5   Day 1, Cycle 6  Day 1, Cycle 7   Day 1, Cycle 8    DOCEtaxel IV 60 mg/m2   60 mg/m2  60 mg/m2   60 mg/m2     radium 223 Dichloride  1.5 microcurie/kg    1.476 microcurie/kg      Chemotherapy 4/30/2024  12:20 PM 5/1/2024  10:40 AM   Day, Cycle Day 1, Cycle 9     DOCEtaxel  IV 60 mg/m2     radium 223  IV  1.5 microcurie/kg      CURRENT INTERVENTIONS:  MARLO Trial randomized to Arm B with Docetaxel/prednisone/Radium-233. Leuprolide every 4 months locally.     SUBJECTIVE   Alonso Pettit is being followed for castration resistant metastatic prostate cancer. He returns to clinic accompanied by his wife, Macey. He is seen today for an end of study visit.    Alonso returns to clinic today accompanied by his wife Macey.    He is feeling well.   Bowels are moving regularly.   He has noted a mild rash in his inner thighs that is nonpruritic and not bothersome.  Seems to be improving on its own over time.  No other skin concerns today.  He has stable peripheral edema and has not been wearing his compression socks as previously recommended.  Energy is stable. He is playing [employer] ball.  He has noticed that his appetite has decreased some and over the past month.  He has some dyspnea on exertion with mowing the yard but no other significant shortness of breath. No chest pain or new cough.   He has stopped the prednisone.   No neuropathy.   No new pains.   He has an appointment with Dr. Locke on 8/28/24 with Val.     ROS: 14 point ROS neg other than the symptoms noted above in the HPI.      PAST MEDICAL HISTORY     Past Medical History:   Diagnosis Date    Prostate cancer (H)          CURRENT OUTPATIENT MEDICATIONS     Current Outpatient Medications   Medication Sig    aspirin 81 mg chewable tablet [ASPIRIN 81 MG CHEWABLE TABLET] Chew 81 mg daily. (Patient not taking: Reported on 10/18/2023)    bisacodyl (DULCOLAX) 5 MG EC tablet Take 5 mg by mouth 2 times daily    calcium carbonate (CALCIUM 500 ORAL) [CALCIUM CARBONATE (CALCIUM 500 ORAL)] Take by mouth. (Patient not taking: Reported on 1/16/2024)    cyclobenzaprine  (FLEXERIL) 10 MG tablet 1/2-1 TAB ORALLY UP TO 3 TIMES A DAY AS NEEDED FOR MUSCLE SPASM    dexAMETHasone (DECADRON) 4 MG tablet Take 2 tablets (8 mg) by mouth 2 times daily (with meals) Start evening of Docetaxel infusion and continue for a total of 3 doses.    leuprolide (LUPRON) 11.25 mg injection Inject 11.25 mg into the muscle every 3 months    predniSONE (DELTASONE) 5 MG tablet Take 1 tablet (5 mg) by mouth 2 times daily for 21 days    prochlorperazine (COMPAZINE) 10 MG tablet Take 0.5 tablets (5 mg) by mouth every 6 hours as needed for nausea or vomiting (Patient not taking: Reported on 1/16/2024)    pseudoePHEDrine-guaiFENesin (MUCINEX D)  MG 12 hr tablet  (Patient not taking: Reported on 12/6/2023)    psyllium (METAMUCIL/KONSYL) Packet Take 1 packet by mouth daily     No current facility-administered medications for this visit.        ALLERGIES    No Known Allergies     REVIEW OF SYSTEMS   As above in the HPI, o/w complete 12-point ROS was negative.     PHYSICAL EXAM   /65   Pulse 75   Temp 97.6  F (36.4  C) (Oral)   Resp 16   Wt 74.5 kg (164 lb 3.2 oz)   SpO2 98%   BMI 22.86 kg/m    General: No acute distress  HEENT: Sclera anicteric. Oral mucosa pink and moist.  No mucositis or thrush  Lymph: No lymphadenopathy in neck  Heart: Regular, rate, and rhythm  Lungs: Clear to ascultation bilaterally  Abdomen: Positive bowel sounds. Soft, non-distended, non-tender. No organomegaly or mass.   MSK: 1+ peripheral edema bilaterally.   Neuro: Cranial nerves grossly intact. Oriented to person, place, time, and date.   Rash: none     LABORATORY AND IMAGING STUDIES   Most Recent 3 CBC's:  Recent Labs   Lab Test 07/08/24  1433 06/06/24  1046 05/21/24  0945   WBC 3.5* 3.9* 3.9*   HGB 10.2* 10.9* 11.1*   MCV 98 94 94    203 264   ANEUTAUTO 2.2 2.5 2.5     Most Recent 3 BMP's:  Recent Labs   Lab Test 07/08/24  1433 06/06/24  1046 05/21/24  0945    141 142   POTASSIUM 3.8 3.7 3.5   CHLORIDE 105  106 107   CO2 24 22 22   BUN 12.5 16.7 16.0   CR 0.88 0.82 0.80   ANIONGAP 12 13 13   ZABRINA 9.2 9.3 8.9   * 110* 137*   PROTTOTAL 6.3* 6.1* 6.5   ALBUMIN 3.9 3.7 3.9    Most Recent 3 LFT's:  Recent Labs   Lab Test 07/08/24  1433 06/06/24  1046 05/21/24  0945   AST 25 16 17   ALT 21 11 14   ALKPHOS 56 53 59   BILITOTAL 0.7 0.6 0.6    Most Recent 2 TSH and T4:No lab results found.    Component      Latest Ref Rng 11/14/2023  7:52 AM 12/4/2023  2:32 PM 12/28/2023  7:06 AM 1/16/2024  11:22 AM 2/2/2024  8:43 AM 2/27/2024  8:38 AM   PSA Tumor Marker      ng/mL 160.20  203.80  196.30  200.20  203.10  196.00      Component      Latest Ref Rng 3/15/2024  10:26 AM 4/9/2024  7:52 AM 4/26/2024  9:58 AM 4/30/2024  10:28 AM 5/21/2024  9:45 AM 6/6/2024  10:46 AM   PSA Tumor Marker      ng/mL 203.90  149.10  147.90  160.80  123.20  120.00      7-8-2024 PSA pending.  I reviewed the above labs today.     ASSESSMENT AND PLAN   -Castration resistant metastatic prostate cancer   Post radical prostatectomy on 12/9/2009; salvage radiation ending May 2010; androgen deprivation therapy since 2013   Post progression on enzalutamide and abiraterone with prednisone  -Nonmuscle invasive bladder cancer diagnosed in 2011 without any recent recurrence  -No significant medical comorbidity  -ECOG performance status of 0     #Castration resistant metastatic prostate cancer.   - Has progressed on novel antiandrogen therapies including abiraterone with prednisone and enzalutamide.  He had a PSMA PET CT scan which did show extensive metastasis to the lymph nodes and bones. Referred to [facility] for the MARLO trial, randomized to Arm B with docetaxel and Radium. Docetaxel is administered every 3 weeks for 6-10 doses and Radium every 6 weeks for 6 doses last on April 11, 2024.   - He has now received 10 cycles of docetaxel, last on 5/21/24, and completed 6 doses of radium per study protocol.   -He has tapered off of prednisone.  - His PSA has been somewhat  labile on trial but overall he has had stable disease. 6/6/24 .0.  PSA is pending today.  He is due for restaging scans in August per study protocol.  CBC with stable anemia. CMP unremarkable.   - He is due for his last planned radium today 6/11/24 and is appropriate to proceed.     #Bone Metastasis   - Zometa every 6 months with local oncologist.     #Cancer fatigue   #Deconditioning   - discussed with Alonso that regular exercise would be of benefit. He has started walking but has stopped now that their son's dog is no longer living with them. Previously discussed cancer rehab Alonso is currently not interested.     #Peripheral edema   - start compression socks daily and walking.  Continue to monitor.  Commend following up with PCP for further workup.    #rash  -Unclear source.  Recommend continuing to monitor and can trial over-the-counter hydrocortisone cream.  If does not improve recommend following with PCP for further workup.    30 minutes spent on the date of the encounter doing chart review, review of test results, interpretation of tests, patient visit, and documentation     Valentine Ball PA-C

## 2024-07-10 LAB — TESTOST SERPL-MCNC: 13 NG/DL (ref 240–950)

## 2024-07-30 ENCOUNTER — MYC MEDICAL ADVICE (OUTPATIENT)
Dept: ONCOLOGY | Facility: CLINIC | Age: 84
End: 2024-07-30
Payer: MEDICARE

## 2024-08-13 ENCOUNTER — ANCILLARY PROCEDURE (OUTPATIENT)
Dept: RADIOLOGY | Facility: CLINIC | Age: 84
End: 2024-08-13
Attending: INTERNAL MEDICINE
Payer: MEDICARE

## 2024-08-13 ENCOUNTER — HOSPITAL ENCOUNTER (OUTPATIENT)
Dept: NUCLEAR MEDICINE | Facility: CLINIC | Age: 84
Setting detail: NUCLEAR MEDICINE
Discharge: HOME | End: 2024-08-13
Attending: INTERNAL MEDICINE
Payer: MEDICARE

## 2024-08-13 ENCOUNTER — HOSPITAL ENCOUNTER (OUTPATIENT)
Dept: CT IMAGING | Facility: CLINIC | Age: 84
Discharge: HOME | End: 2024-08-13
Attending: INTERNAL MEDICINE | Admitting: INTERNAL MEDICINE
Payer: MEDICARE

## 2024-08-13 DIAGNOSIS — C61 PROSTATE CANCER (H): ICD-10-CM

## 2024-08-13 PROCEDURE — 74177 CT ABD & PELVIS W/CONTRAST: CPT | Mod: 26 | Performed by: RADIOLOGY

## 2024-08-13 PROCEDURE — 71260 CT THORAX DX C+: CPT | Mod: MG

## 2024-08-13 PROCEDURE — 250N000011 HC RX IP 250 OP 636: Performed by: INTERNAL MEDICINE

## 2024-08-13 PROCEDURE — 343N000001 HC RX 343: Performed by: INTERNAL MEDICINE

## 2024-08-13 PROCEDURE — A9503 TC99M MEDRONATE: HCPCS | Performed by: INTERNAL MEDICINE

## 2024-08-13 PROCEDURE — 78306 BONE IMAGING WHOLE BODY: CPT | Mod: MG

## 2024-08-13 PROCEDURE — G1010 CDSM STANSON: HCPCS | Performed by: RADIOLOGY

## 2024-08-13 PROCEDURE — 71260 CT THORAX DX C+: CPT | Mod: 26 | Performed by: RADIOLOGY

## 2024-08-13 PROCEDURE — 78306 BONE IMAGING WHOLE BODY: CPT | Mod: 26 | Performed by: RADIOLOGY

## 2024-08-13 RX ORDER — TC 99M MEDRONATE 20 MG/10ML
25 INJECTION, POWDER, LYOPHILIZED, FOR SOLUTION INTRAVENOUS ONCE
Status: COMPLETED | OUTPATIENT
Start: 2024-08-13 | End: 2024-08-13

## 2024-08-13 RX ORDER — IOPAMIDOL 755 MG/ML
100 INJECTION, SOLUTION INTRAVASCULAR ONCE
Status: COMPLETED | OUTPATIENT
Start: 2024-08-13 | End: 2024-08-13

## 2024-08-13 RX ADMIN — TC 99M MEDRONATE 28.08 MILLICURIE: 20 INJECTION, POWDER, LYOPHILIZED, FOR SOLUTION INTRAVENOUS at 08:58

## 2024-08-13 RX ADMIN — IOPAMIDOL 100 ML: 755 INJECTION, SOLUTION INTRAVENOUS at 09:39

## 2024-08-27 ENCOUNTER — ONCOLOGY VISIT (OUTPATIENT)
Dept: ONCOLOGY | Facility: CLINIC | Age: 84
End: 2024-08-27
Attending: INTERNAL MEDICINE
Payer: MEDICARE

## 2024-08-27 ENCOUNTER — PATIENT OUTREACH (OUTPATIENT)
Dept: ONCOLOGY | Facility: CLINIC | Age: 84
End: 2024-08-27

## 2024-08-27 ENCOUNTER — APPOINTMENT (OUTPATIENT)
Dept: LAB | Facility: CLINIC | Age: 84
End: 2024-08-27
Attending: INTERNAL MEDICINE
Payer: MEDICARE

## 2024-08-27 VITALS
HEART RATE: 63 BPM | OXYGEN SATURATION: 99 % | SYSTOLIC BLOOD PRESSURE: 129 MMHG | TEMPERATURE: 97.9 F | WEIGHT: 158.9 LBS | RESPIRATION RATE: 16 BRPM | BODY MASS INDEX: 22.12 KG/M2 | DIASTOLIC BLOOD PRESSURE: 71 MMHG

## 2024-08-27 DIAGNOSIS — Z00.6 EXAMINATION OF PARTICIPANT IN CLINICAL TRIAL: ICD-10-CM

## 2024-08-27 DIAGNOSIS — C61 PROSTATE CANCER (H): ICD-10-CM

## 2024-08-27 DIAGNOSIS — C79.51 MALIGNANT NEOPLASM METASTATIC TO BONE (H): ICD-10-CM

## 2024-08-27 LAB
ALBUMIN SERPL BCG-MCNC: 4.1 G/DL (ref 3.5–5.2)
ALP SERPL-CCNC: 48 U/L (ref 40–150)
ALT SERPL W P-5'-P-CCNC: 13 U/L (ref 0–70)
ANION GAP SERPL CALCULATED.3IONS-SCNC: 10 MMOL/L (ref 7–15)
AST SERPL W P-5'-P-CCNC: 18 U/L (ref 0–45)
BASOPHILS # BLD AUTO: 0 10E3/UL (ref 0–0.2)
BASOPHILS NFR BLD AUTO: 1 %
BILIRUB SERPL-MCNC: 0.7 MG/DL
BUN SERPL-MCNC: 15.2 MG/DL (ref 8–23)
CALCIUM SERPL-MCNC: 9.3 MG/DL (ref 8.8–10.4)
CHLORIDE SERPL-SCNC: 107 MMOL/L (ref 98–107)
CREAT SERPL-MCNC: 0.86 MG/DL (ref 0.67–1.17)
EGFRCR SERPLBLD CKD-EPI 2021: 87 ML/MIN/1.73M2
EOSINOPHIL # BLD AUTO: 0.1 10E3/UL (ref 0–0.7)
EOSINOPHIL NFR BLD AUTO: 2 %
ERYTHROCYTE [DISTWIDTH] IN BLOOD BY AUTOMATED COUNT: 13 % (ref 10–15)
GLUCOSE SERPL-MCNC: 117 MG/DL (ref 70–99)
HCO3 SERPL-SCNC: 25 MMOL/L (ref 22–29)
HCT VFR BLD AUTO: 34.6 % (ref 40–53)
HGB BLD-MCNC: 11.6 G/DL (ref 13.3–17.7)
IMM GRANULOCYTES # BLD: 0 10E3/UL
IMM GRANULOCYTES NFR BLD: 0 %
LYMPHOCYTES # BLD AUTO: 0.8 10E3/UL (ref 0.8–5.3)
LYMPHOCYTES NFR BLD AUTO: 23 %
MAGNESIUM SERPL-MCNC: 1.7 MG/DL (ref 1.7–2.3)
MCH RBC QN AUTO: 32.3 PG (ref 26.5–33)
MCHC RBC AUTO-ENTMCNC: 33.5 G/DL (ref 31.5–36.5)
MCV RBC AUTO: 96 FL (ref 78–100)
MONOCYTES # BLD AUTO: 0.3 10E3/UL (ref 0–1.3)
MONOCYTES NFR BLD AUTO: 9 %
NEUTROPHILS # BLD AUTO: 2.5 10E3/UL (ref 1.6–8.3)
NEUTROPHILS NFR BLD AUTO: 65 %
NRBC # BLD AUTO: 0 10E3/UL
NRBC BLD AUTO-RTO: 0 /100
PHOSPHATE SERPL-MCNC: 3.5 MG/DL (ref 2.5–4.5)
PLATELET # BLD AUTO: 201 10E3/UL (ref 150–450)
POTASSIUM SERPL-SCNC: 3.6 MMOL/L (ref 3.4–5.3)
PROT SERPL-MCNC: 6.5 G/DL (ref 6.4–8.3)
PSA SERPL DL<=0.01 NG/ML-MCNC: 165.6 NG/ML
RBC # BLD AUTO: 3.59 10E6/UL (ref 4.4–5.9)
SODIUM SERPL-SCNC: 142 MMOL/L (ref 135–145)
WBC # BLD AUTO: 3.7 10E3/UL (ref 4–11)

## 2024-08-27 PROCEDURE — 99215 OFFICE O/P EST HI 40 MIN: CPT | Performed by: INTERNAL MEDICINE

## 2024-08-27 PROCEDURE — 84100 ASSAY OF PHOSPHORUS: CPT | Performed by: INTERNAL MEDICINE

## 2024-08-27 PROCEDURE — 85025 COMPLETE CBC W/AUTO DIFF WBC: CPT | Performed by: INTERNAL MEDICINE

## 2024-08-27 PROCEDURE — G0463 HOSPITAL OUTPT CLINIC VISIT: HCPCS | Performed by: INTERNAL MEDICINE

## 2024-08-27 PROCEDURE — G2211 COMPLEX E/M VISIT ADD ON: HCPCS | Performed by: INTERNAL MEDICINE

## 2024-08-27 PROCEDURE — 84403 ASSAY OF TOTAL TESTOSTERONE: CPT | Performed by: INTERNAL MEDICINE

## 2024-08-27 PROCEDURE — 84153 ASSAY OF PSA TOTAL: CPT | Performed by: INTERNAL MEDICINE

## 2024-08-27 PROCEDURE — 83735 ASSAY OF MAGNESIUM: CPT | Performed by: INTERNAL MEDICINE

## 2024-08-27 PROCEDURE — 84155 ASSAY OF PROTEIN SERUM: CPT | Performed by: INTERNAL MEDICINE

## 2024-08-27 PROCEDURE — 84295 ASSAY OF SERUM SODIUM: CPT | Performed by: INTERNAL MEDICINE

## 2024-08-27 PROCEDURE — 36415 COLL VENOUS BLD VENIPUNCTURE: CPT | Performed by: INTERNAL MEDICINE

## 2024-08-27 ASSESSMENT — PAIN SCALES - GENERAL: PAINLEVEL: NO PAIN (0)

## 2024-08-27 NOTE — NURSING NOTE
"Oncology Rooming Note    August 27, 2024 8:53 AM   Alonso Pettit is a 83 year old male who presents for:    Chief Complaint   Patient presents with    Blood Draw     Labs drawn via  by RN. VS taken.    Oncology Clinic Visit     Prostate cancer     Initial Vitals: /71 (BP Location: Right arm, Patient Position: Sitting, Cuff Size: Adult Regular)   Pulse 63   Temp 97.9  F (36.6  C) (Oral)   Resp 16   Wt 72.1 kg (158 lb 14.4 oz)   SpO2 99%   BMI 22.12 kg/m   Estimated body mass index is 22.12 kg/m  as calculated from the following:    Height as of 2/6/24: 1.805 m (5' 11.06\").    Weight as of this encounter: 72.1 kg (158 lb 14.4 oz). Body surface area is 1.9 meters squared.  No Pain (0) Comment: Data Unavailable   No LMP for male patient.  Allergies reviewed: Yes  Medications reviewed: Yes    Medications: Medication refills not needed today.  Pharmacy name entered into [employer]: [facility] [id number] [place] [address]    Frailty Screening:   Is the patient here for a new oncology consult visit in cancer care? 2. No      Clinical concerns:  Legs have been weak; fatigued but unable to sleep at night  Adriana Gallagher"

## 2024-08-27 NOTE — LETTER
8/27/2024      Alonso Pettit  [address] [facility] [address]      Dear Colleague,    Thank you for referring your patient, Alonso Pettit, to the [facility]. Please see a copy of my visit note below.    [facility]  HEMATOLOGY AND ONCOLOGY    FOLLOW-UP VISIT NOTE    PATIENT NAME: Alonso Pettit MRN # [id number]  DATE OF VISIT: Aug 27, 2024 YOB: 1940    REFERRING PROVIDER: [facility] oncology    CANCER TYPE: Prostate adenocarcinoma; [place] 4+5  STAGE: IV (bony metastasis)  MOLECULAR PROFILE: No actionable mutations/MSI or high TMB; TP53 mutation positive    TREATMENT SUMMARY:  -12/9/2009: Radical prostatectomy; pathology revealed [place] 4+5 disease, stage pT3a,N0 positive margins  -Mar-May2010: Salvage external beam radiation therapy  -Apr 2013: Intermittent androgen deprivation therapy with leuprolide for biochemical PSA relapse  -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 Radiation therapy to the left supraclavicular lymph node ending in September 2020.  He was continued on enzalutamide  -May 2022: Switch to abiraterone with prednisone for progressive disease  -Aug 2023: Abiraterone discontinued for progressive disease.  PSMA PET scan with PSMA avid osseous and brisa metastasis.  -11/14/2023: MARLO trial: cycle 1 docetaxel. 11/15/23 cycle 1 radium per the MARLO trial.      Chemotherapy 11/14/2023  10:05 AM 12/6/2023  9:15 AM 12/28/2023  9:07 AM 1/16/2024   Day, Cycle Day 1, Cycle 1  Day 1, Cycle 2  Day 1, Cycle 3  Day 1, Cycle 4   DOCEtaxel (TAXOTERE) IV 60 mg/m2  60 mg/m2  60 mg/m2  60 mg/m2     Chemotherapy 2/6/2024  1:17 PM 2/27/2024  11:57 AM 3/19/2024  8:54 AM 4/9/2024  9:49 AM 4/30/2024  12:20 PM   Day, Cycle Day 1, Cycle 5  Day 1, Cycle 6  Day 1,  Cycle 7  Day 1, Cycle 8  Day 1, Cycle 9    DOCEtaxel IV 60 mg/m2  60 mg/m2  60 mg/m2  60 mg/m2  60 mg/m2          11/15/23 15:39 12/28/23 14:24 02/07/24 14:06 03/20/24 13:58 05/01/24 10:52 06/11/24 14:04   NM [place] 223 Dose 1   1.47 mCi/kg Does 2  108.4 mCi Dose 3  1.5 mCi/kg  Dose 4  1.47 mCi/kg Dose 5  1.5 mCi/kg Dose 6  1.487 mCi/kg        CURRENT INTERVENTIONS:  MARLO Trial randomized to Arm B with Docetaxel/prednisone/Radium-233. Leuprolide every 4 months locally.     SUBJECTIVE   Alonso Pettit is being followed for castration resistant metastatic prostate cancer. He returns to clinic accompanied by his wife, Macey. He is seen today for an end of study visit.    Alonso returns to clinic today accompanied by his wife Macey.    Macey points out that his feet suddenly give away and she has to help him ease on to ground. He does not fall or loose consciousness. His legs do not have the strength.   He has fair strength and appetite. He does not drink a lot fluids.    He has an appointment with Dr. Locke on 8/28/24 with Eligard and Zometa.     ROS: 14 point ROS neg other than the symptoms noted above in the HPI.      PAST MEDICAL HISTORY     Past Medical History:   Diagnosis Date     Prostate cancer (H)          CURRENT OUTPATIENT MEDICATIONS     Current Outpatient Medications   Medication Sig     cyclobenzaprine (FLEXERIL) 10 MG tablet 1/2-1 TAB ORALLY UP TO 3 TIMES A DAY AS NEEDED FOR MUSCLE SPASM     leuprolide (LUPRON) 11.25 mg injection Inject 11.25 mg into the muscle every 3 months     bisacodyl (DULCOLAX) 5 MG EC tablet Take 5 mg by mouth 2 times daily     No current facility-administered medications for this visit.         ALLERGIES    No Known Allergies     REVIEW OF SYSTEMS   As above in the HPI, o/w complete 12-point ROS was negative.     PHYSICAL EXAM   /71 (BP Location: Right arm, Patient Position: Sitting, Cuff Size: Adult Regular)   Pulse 63   Temp 97.9  F (36.6  C) (Oral)   Resp 16   Wt  "72.1 kg (158 lb 14.4 oz)   SpO2 99%   BMI 22.12 kg/m    General: No acute distress  HEENT: Sclera anicteric. Oral mucosa pink and moist.  No mucositis or thrush  Lymph: No lymphadenopathy in neck  Heart: Regular, rate, and rhythm  Lungs: Clear to ascultation bilaterally  Abdomen: Positive bowel sounds. Soft, non-distended, non-tender. No organomegaly or mass.   MSK: 1+ peripheral edema bilaterally.   Neuro: Cranial nerves grossly intact. Oriented to person, place, time, and date.   Rash: none     LABORATORY AND IMAGING STUDIES     Recent Labs   Lab Test 08/27/24  0839 07/08/24  1433 06/06/24  1046 05/21/24  0945 04/30/24  1028    141 141 142 141   POTASSIUM 3.6 3.8 3.7 3.5 3.8   CHLORIDE 107 105 106 107 107   CO2 25 24 22 22 23   ANIONGAP 10 12 13 13 11   BUN 15.2 12.5 16.7 16.0 17.0   CR 0.86 0.88 0.82 0.80 0.86   * 114* 110* 137* 125*   ZABRINA 9.3 9.2 9.3 8.9 9.2     Recent Labs   Lab Test 08/27/24  0839 07/08/24  1433 06/06/24  1046 05/21/24  0945 04/30/24  1028   MAG 1.7 1.7 1.6* 1.8 1.7   PHOS 3.5 3.4 4.0 2.9 3.2     Recent Labs   Lab Test 08/27/24  0839 07/08/24  1433 06/06/24  1046 05/21/24  0945 04/30/24  1028   WBC 3.7* 3.5* 3.9* 3.9* 6.3   HGB 11.6* 10.2* 10.9* 11.1* 11.5*    245 203 264 252   MCV 96 98 94 94 96   NEUTROPHIL 65 63 62 63 80     Recent Labs   Lab Test 08/27/24  0839 07/08/24  1433 06/06/24  1046 05/21/24  0945   BILITOTAL 0.7 0.7 0.6 0.6   ALKPHOS 48 56 53 59   ALT 13 21 11 14   AST 18 25 16 17   ALBUMIN 4.1 3.9 3.7 3.9   LDH  --  186 223 199     No results found for: \"TSH\"  No results for input(s): \"CEA\" in the last 34709 hours.    Recent Labs   Lab Test 08/27/24  0839 07/08/24  1433 06/06/24  1046 05/21/24  0945 04/30/24  1028 11/14/23  0752 10/30/23  1044   .60 137.30 120.00 123.20 160.80   < > 153.30   TESTOSTTOTAL  --  13*  --   --   --   --  7*    < > = values in this interval not displayed.          Results for orders placed or performed during the hospital " encounter of 08/13/24   CT Chest/Abdomen/Pelvis w Contrast    Narrative    EXAM: CT CHEST/ABDOMEN/PELVIS W CONTRAST  LOCATION: [facility]  DATE: 8/13/2024    INDICATION:  Prostate cancer (H)  COMPARISON: Multiple priors, most recent 5/15/2024  TECHNIQUE: CT scan of the chest, abdomen, and pelvis was performed following injection of IV contrast. Multiplanar reformats were obtained. Dose reduction techniques were used.   CONTRAST: iopamidol (ISOVUE 370) solution 100 mL    FINDINGS:   LUNGS AND PLEURA: Minimal interval increase in size of a now 4 mm pulmonary nodule in the right upper lobe, previously 2 mm (9/40). Minimal interval increase in size of a 3 mm pulmonary nodule right upper lobe, previously 2 mm (9/60). Multiple other small nodules/nodular opacities remain unchanged.    MEDIASTINUM/AXILLAE: No lymphadenopathy. No thoracic aortic aneurysms. Symmetric gynecomastia.    CORONARY ARTERY CALCIFICATION: None.    HEPATOBILIARY: Unchanged 9mm hypervascular observation. Low attenuation lesions unchanged.     PANCREAS: Normal.    SPLEEN: Normal.    ADRENAL GLANDS: Normal.    KIDNEYS/BLADDER: No significant mass, stones, or hydronephrosis. There are simple or benign cysts. No follow up is needed.    BOWEL: No obstruction or inflammatory change.    LYMPH NODES: No lymphadenopathy.    VASCULATURE: Normal caliber aorta. Minimal vascular calcification.     PELVIC ORGANS: Penile prosthesis with reservoir in the pelvis. Prostatectomy.    MUSCULOSKELETAL: Stable sclerotic bone lesions.      Impression    IMPRESSION:  1.  Multifocal osseous sclerotic metastasis unchanged.    2.  Minimal interval increase in size of a 2 pulmonary nodules in the right lung apex. Short interval CT recommended to assess for stability.    3.  Prostatectomy. No new lymphadenopathy.    4.  Remainder findings as detailed above.     Study Result    Narrative & Impression   EXAMINATION: NM BONE SCAN WHOLE  BODY  Whole-body bone scan, 8/13/2024 12:26 PM      HISTORY: Prostate cancer (H)      ADDITIONAL INFORMATION: none     PSA: PSA value for prostate cancer, otherwise delete     COMPARISON: Prior bone scans on 5/15/2024 and 3/15/2024     TECHNIQUE: The patient received 28.08 mCi of Tc-99m MDP intravenously.  Whole body bone images were obtained at 3 hours.     FINDINGS: Whole-body planar images demonstrate similar multifocal  radiotracer uptake C7, bilateral ribs, thoracic spine, and left  scapula. No new abnormal focal uptake.     Mild uptake in bilateral shoulders, sternoclavicular joints, knees and  spine, likely related to degenerative changes.     Physiologic tracer within the kidneys and soft tissues with excretion  into urinary bladder.                                                                      IMPRESSION:   Similar multifocal osteoblastic disease. No new  osteoblastic metastasis.        I have personally reviewed the examination and initial interpretation  and I agree with the findings.     SMILEY RANDALL MD         SYSTEM ID:  [id number]            ASSESSMENT AND PLAN   -Castration resistant metastatic prostate cancer   Post radical prostatectomy on 12/9/2009; salvage radiation ending May 2010; androgen deprivation therapy since 2013   Post progression on enzalutamide and abiraterone with prednisone  -Nonmuscle invasive bladder cancer diagnosed in 2011 without any recent recurrence  -No significant medical comorbidity  -ECOG performance status of 0     #Castration resistant metastatic prostate cancer.   - Has progressed on novel antiandrogen therapies including abiraterone with prednisone and enzalutamide.  He had a PSMA PET CT scan which did show extensive metastasis to the lymph nodes and bones. Referred to [facility] for the MARLO trial, randomized to Arm B with docetaxel and Radium. Docetaxel is administered every 3 weeks for 6-10 doses and Radium every 6 weeks for 6 doses last on April 11, 2024.    - He has now received planned 9 cycles of docetaxel, last on 5/21/24, and completed 6 doses of radium per study protocol.   -He has tapered off of prednisone.  - His PSA has been rising since around his last treatment on trial on 6/6/24 when it was 120 ng/ml. It has increased to 165 ng/ml.     I have reviewed actual images from his restaging CT chest, abdomen and pelvis and bone scan. There is nothing to suggest progressive disease. He has 2 pulmonary nodules that have been read as slightly more prominent. They have grown up by 1-2 mm which is within the margin of error of measurement.     PSA is a lot more sensitive than scans. His tumor has to achieve a certain mass that can be picked up on CT scans.     We will continue to monitor him off therapy on leuprolide alone. He will continue to follow with Dr. Locke and has an appointment tomorrow. I will try to reach out to Dr. Locke.     #Bone Metastasis   - Zometa every 6 months with local oncologist.     # Weakness in his legs  #Cancer fatigue   #Deconditioning   He does not have the strength and on occasions it feels like his legs give away. But this is relatively rare occurrence and has happened a couple of times in the last few months. It does not feel neurologic based on clinical presentation. It could be simply related to dehydration. We might have to check his echocardiogram and assess for arrhythmias as it could hypotension or cardiac arrhythmia. I could initiate the work up here but then he would have all evaluations done at [facility] and his wife  does not fancy driving him here. He should bring it to Dr. Locke's or his PCP's attention.     #Peripheral edema   - start compression socks daily and walking.  Macey is unhappy that she did get compression socks for him but he has to yet open it from the packing.   Continue to monitor.  Commend following up with PCP for further workup.     He is still a candidate for STEP-UP trial of bipolar androgen therapy.  Alternately he would be a candidate for lutetium. I will reach out to Dr. Locke and would seek his input. We would wait for clear progression - radiographic or clinical.     The longitudinal plan of care for the diagnosis(es)/condition(s) as documented were addressed during this visit. Due to the added complexity in care, I will continue to support Alonso in the subsequent management and with ongoing continuity of care.     40 minutes spent on the date of the encounter doing chart review, history and exam, documentation and further activities as noted above          Again, thank you for allowing me to participate in the care of your patient.        Sincerely,        Kg Gamez MD

## 2024-08-27 NOTE — NURSING NOTE
Chief Complaint   Patient presents with    Blood Draw     Labs drawn via  by RN. VS taken.     Labs collected from venipuncture by RN. Vitals taken. Checked in for appointment(s).     Lorrie Alfonso RN

## 2024-08-27 NOTE — PROGRESS NOTES
[facility]: Cancer Care                                                                                      RNCC faxed imaging results and lab results from today to [facility] as the patient is being seen there tomorrow. Received confirmation that the fax went through.    Genet Hansen, RN, BSN, OCN  Oncology RN Care Coordinator  [facility]

## 2024-08-27 NOTE — PROGRESS NOTES
[facility]  HEMATOLOGY AND ONCOLOGY    FOLLOW-UP VISIT NOTE    PATIENT NAME: Alonso Pettit MRN # [id number]  DATE OF VISIT: Aug 27, 2024 YOB: 1940    REFERRING PROVIDER: [facility] oncology    CANCER TYPE: Prostate adenocarcinoma; [place] 4+5  STAGE: IV (bony metastasis)  MOLECULAR PROFILE: No actionable mutations/MSI or high TMB; TP53 mutation positive    TREATMENT SUMMARY:  -12/9/2009: Radical prostatectomy; pathology revealed [place] 4+5 disease, stage pT3a,N0 positive margins  -Mar-May2010: Salvage external beam radiation therapy  -Apr 2013: Intermittent androgen deprivation therapy with leuprolide for biochemical PSA relapse  -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 Radiation therapy to the left supraclavicular lymph node ending in September 2020.  He was continued on enzalutamide  -May 2022: Switch to abiraterone with prednisone for progressive disease  -Aug 2023: Abiraterone discontinued for progressive disease.  PSMA PET scan with PSMA avid osseous and brisa metastasis.  -11/14/2023: MARLO trial: cycle 1 docetaxel. 11/15/23 cycle 1 radium per the MARLO trial.      Chemotherapy 11/14/2023  10:05 AM 12/6/2023  9:15 AM 12/28/2023  9:07 AM 1/16/2024   Day, Cycle Day 1, Cycle 1  Day 1, Cycle 2  Day 1, Cycle 3  Day 1, Cycle 4   DOCEtaxel (TAXOTERE) IV 60 mg/m2  60 mg/m2  60 mg/m2  60 mg/m2     Chemotherapy 2/6/2024  1:17 PM 2/27/2024  11:57 AM 3/19/2024  8:54 AM 4/9/2024  9:49 AM 4/30/2024  12:20 PM   Day, Cycle Day 1, Cycle 5  Day 1, Cycle 6  Day 1, Cycle 7  Day 1, Cycle 8  Day 1, Cycle 9    DOCEtaxel IV 60 mg/m2  60 mg/m2  60 mg/m2  60 mg/m2  60 mg/m2          11/15/23 15:39 12/28/23 14:24 02/07/24 14:06 03/20/24 13:58 05/01/24 10:52 06/11/24 14:04   NM [place] 223 Dose 1   1.47  mCi/kg Does 2  108.4 mCi Dose 3  1.5 mCi/kg  Dose 4  1.47 mCi/kg Dose 5  1.5 mCi/kg Dose 6  1.487 mCi/kg        CURRENT INTERVENTIONS:  MARLO Trial randomized to Arm B with Docetaxel/prednisone/Radium-233. Leuprolide every 4 months locally.     SUBJECTIVE   Alonso Pettit is being followed for castration resistant metastatic prostate cancer. He returns to clinic accompanied by his wife, Macey. He is seen today for an end of study visit.    Alonso returns to clinic today accompanied by his wife Macey.    Macey points out that his feet suddenly give away and she has to help him ease on to ground. He does not fall or loose consciousness. His legs do not have the strength.   He has fair strength and appetite. He does not drink a lot fluids.    He has an appointment with Dr. Locke on 8/28/24 with Val.     ROS: 14 point ROS neg other than the symptoms noted above in the HPI.      PAST MEDICAL HISTORY     Past Medical History:   Diagnosis Date    Prostate cancer (H)          CURRENT OUTPATIENT MEDICATIONS     Current Outpatient Medications   Medication Sig    cyclobenzaprine (FLEXERIL) 10 MG tablet 1/2-1 TAB ORALLY UP TO 3 TIMES A DAY AS NEEDED FOR MUSCLE SPASM    leuprolide (LUPRON) 11.25 mg injection Inject 11.25 mg into the muscle every 3 months    bisacodyl (DULCOLAX) 5 MG EC tablet Take 5 mg by mouth 2 times daily     No current facility-administered medications for this visit.         ALLERGIES    No Known Allergies     REVIEW OF SYSTEMS   As above in the HPI, o/w complete 12-point ROS was negative.     PHYSICAL EXAM   /71 (BP Location: Right arm, Patient Position: Sitting, Cuff Size: Adult Regular)   Pulse 63   Temp 97.9  F (36.6  C) (Oral)   Resp 16   Wt 72.1 kg (158 lb 14.4 oz)   SpO2 99%   BMI 22.12 kg/m    General: No acute distress  HEENT: Sclera anicteric. Oral mucosa pink and moist.  No mucositis or thrush  Lymph: No lymphadenopathy in neck  Heart: Regular, rate, and rhythm  Lungs:  "Clear to ascultation bilaterally  Abdomen: Positive bowel sounds. Soft, non-distended, non-tender. No organomegaly or mass.   MSK: 1+ peripheral edema bilaterally.   Neuro: Cranial nerves grossly intact. Oriented to person, place, time, and date.   Rash: none     LABORATORY AND IMAGING STUDIES     Recent Labs   Lab Test 08/27/24  0839 07/08/24  1433 06/06/24  1046 05/21/24  0945 04/30/24  1028    141 141 142 141   POTASSIUM 3.6 3.8 3.7 3.5 3.8   CHLORIDE 107 105 106 107 107   CO2 25 24 22 22 23   ANIONGAP 10 12 13 13 11   BUN 15.2 12.5 16.7 16.0 17.0   CR 0.86 0.88 0.82 0.80 0.86   * 114* 110* 137* 125*   ZABRINA 9.3 9.2 9.3 8.9 9.2     Recent Labs   Lab Test 08/27/24  0839 07/08/24  1433 06/06/24  1046 05/21/24  0945 04/30/24  1028   MAG 1.7 1.7 1.6* 1.8 1.7   PHOS 3.5 3.4 4.0 2.9 3.2     Recent Labs   Lab Test 08/27/24 0839 07/08/24 1433 06/06/24  1046 05/21/24  0945 04/30/24  1028   WBC 3.7* 3.5* 3.9* 3.9* 6.3   HGB 11.6* 10.2* 10.9* 11.1* 11.5*    245 203 264 252   MCV 96 98 94 94 96   NEUTROPHIL 65 63 62 63 80     Recent Labs   Lab Test 08/27/24  0839 07/08/24  1433 06/06/24  1046 05/21/24  0945   BILITOTAL 0.7 0.7 0.6 0.6   ALKPHOS 48 56 53 59   ALT 13 21 11 14   AST 18 25 16 17   ALBUMIN 4.1 3.9 3.7 3.9   LDH  --  186 223 199     No results found for: \"TSH\"  No results for input(s): \"CEA\" in the last 20075 hours.    Recent Labs   Lab Test 08/27/24 0839 07/08/24  1433 06/06/24  1046 05/21/24  0945 04/30/24  1028 11/14/23  0752 10/30/23  1044   .60 137.30 120.00 123.20 160.80   < > 153.30   TESTOSTTOTAL  --  13*  --   --   --   --  7*    < > = values in this interval not displayed.          Results for orders placed or performed during the hospital encounter of 08/13/24   CT Chest/Abdomen/Pelvis w Contrast    Narrative    EXAM: CT CHEST/ABDOMEN/PELVIS W CONTRAST  LOCATION: [facility]  DATE: 8/13/2024    INDICATION:  Prostate cancer " (H)  COMPARISON: Multiple priors, most recent 5/15/2024  TECHNIQUE: CT scan of the chest, abdomen, and pelvis was performed following injection of IV contrast. Multiplanar reformats were obtained. Dose reduction techniques were used.   CONTRAST: iopamidol (ISOVUE 370) solution 100 mL    FINDINGS:   LUNGS AND PLEURA: Minimal interval increase in size of a now 4 mm pulmonary nodule in the right upper lobe, previously 2 mm (9/40). Minimal interval increase in size of a 3 mm pulmonary nodule right upper lobe, previously 2 mm (9/60). Multiple other small nodules/nodular opacities remain unchanged.    MEDIASTINUM/AXILLAE: No lymphadenopathy. No thoracic aortic aneurysms. Symmetric gynecomastia.    CORONARY ARTERY CALCIFICATION: None.    HEPATOBILIARY: Unchanged 9mm hypervascular observation. Low attenuation lesions unchanged.     PANCREAS: Normal.    SPLEEN: Normal.    ADRENAL GLANDS: Normal.    KIDNEYS/BLADDER: No significant mass, stones, or hydronephrosis. There are simple or benign cysts. No follow up is needed.    BOWEL: No obstruction or inflammatory change.    LYMPH NODES: No lymphadenopathy.    VASCULATURE: Normal caliber aorta. Minimal vascular calcification.     PELVIC ORGANS: Penile prosthesis with reservoir in the pelvis. Prostatectomy.    MUSCULOSKELETAL: Stable sclerotic bone lesions.      Impression    IMPRESSION:  1.  Multifocal osseous sclerotic metastasis unchanged.    2.  Minimal interval increase in size of a 2 pulmonary nodules in the right lung apex. Short interval CT recommended to assess for stability.    3.  Prostatectomy. No new lymphadenopathy.    4.  Remainder findings as detailed above.     Study Result    Narrative & Impression   EXAMINATION: NM BONE SCAN WHOLE BODY  Whole-body bone scan, 8/13/2024 12:26 PM      HISTORY: Prostate cancer (H)      ADDITIONAL INFORMATION: none     PSA: PSA value for prostate cancer, otherwise delete     COMPARISON: Prior bone scans on 5/15/2024 and 3/15/2024      TECHNIQUE: The patient received 28.08 mCi of Tc-99m MDP intravenously.  Whole body bone images were obtained at 3 hours.     FINDINGS: Whole-body planar images demonstrate similar multifocal  radiotracer uptake C7, bilateral ribs, thoracic spine, and left  scapula. No new abnormal focal uptake.     Mild uptake in bilateral shoulders, sternoclavicular joints, knees and  spine, likely related to degenerative changes.     Physiologic tracer within the kidneys and soft tissues with excretion  into urinary bladder.                                                                      IMPRESSION:   Similar multifocal osteoblastic disease. No new  osteoblastic metastasis.        I have personally reviewed the examination and initial interpretation  and I agree with the findings.     SMILEY RANDALL MD         SYSTEM ID:  [id number]            ASSESSMENT AND PLAN   -Castration resistant metastatic prostate cancer   Post radical prostatectomy on 12/9/2009; salvage radiation ending May 2010; androgen deprivation therapy since 2013   Post progression on enzalutamide and abiraterone with prednisone  -Nonmuscle invasive bladder cancer diagnosed in 2011 without any recent recurrence  -No significant medical comorbidity  -ECOG performance status of 0     #Castration resistant metastatic prostate cancer.   - Has progressed on novel antiandrogen therapies including abiraterone with prednisone and enzalutamide.  He had a PSMA PET CT scan which did show extensive metastasis to the lymph nodes and bones. Referred to [facility] for the MARLO trial, randomized to Arm B with docetaxel and Radium. Docetaxel is administered every 3 weeks for 6-10 doses and Radium every 6 weeks for 6 doses last on April 11, 2024.   - He has now received planned 9 cycles of docetaxel, last on 5/21/24, and completed 6 doses of radium per study protocol.   -He has tapered off of prednisone.  - His PSA has been rising since around his last treatment on trial on  6/6/24 when it was 120 ng/ml. It has increased to 165 ng/ml.     I have reviewed actual images from his restaging CT chest, abdomen and pelvis and bone scan. There is nothing to suggest progressive disease. He has 2 pulmonary nodules that have been read as slightly more prominent. They have grown up by 1-2 mm which is within the margin of error of measurement.     PSA is a lot more sensitive than scans. His tumor has to achieve a certain mass that can be picked up on CT scans.     We will continue to monitor him off therapy on leuprolide alone. He will continue to follow with Dr. Locke and has an appointment tomorrow. I will try to reach out to Dr. Locke.     #Bone Metastasis   - Zometa every 6 months with local oncologist.     # Weakness in his legs  #Cancer fatigue   #Deconditioning   He does not have the strength and on occasions it feels like his legs give away. But this is relatively rare occurrence and has happened a couple of times in the last few months. It does not feel neurologic based on clinical presentation. It could be simply related to dehydration. We might have to check his echocardiogram and assess for arrhythmias as it could hypotension or cardiac arrhythmia. I could initiate the work up here but then he would have all evaluations done at [facility] [place] [place] and his wife  does not fancy driving him here. He should bring it to Dr. Locke's or his PCP's attention.     #Peripheral edema   - start compression socks daily and walking.  Macey is unhappy that she did get compression socks for him but he has to yet open it from the packing.   Continue to monitor.  Commend following up with PCP for further workup.     He is still a candidate for STEP-UP trial of bipolar androgen therapy. Alternately he would be a candidate for lutetium. I will reach out to Dr. Locke and would seek his input. We would wait for clear progression - radiographic or clinical.     The longitudinal plan of care for the  diagnosis(es)/condition(s) as documented were addressed during this visit. Due to the added complexity in care, I will continue to support Alonso in the subsequent management and with ongoing continuity of care.     40 minutes spent on the date of the encounter doing chart review, history and exam, documentation and further activities as noted above

## 2024-08-29 LAB — TESTOST SERPL-MCNC: 7 NG/DL (ref 240–950)

## 2024-10-01 ENCOUNTER — PATIENT OUTREACH (OUTPATIENT)
Dept: ONCOLOGY | Facility: CLINIC | Age: 84
End: 2024-10-01

## 2024-10-01 ENCOUNTER — ALLIED HEALTH/NURSE VISIT (OUTPATIENT)
Dept: ONCOLOGY | Facility: CLINIC | Age: 84
End: 2024-10-01

## 2024-10-01 ENCOUNTER — ONCOLOGY VISIT (OUTPATIENT)
Dept: ONCOLOGY | Facility: CLINIC | Age: 84
End: 2024-10-01
Attending: INTERNAL MEDICINE
Payer: MEDICARE

## 2024-10-01 ENCOUNTER — LAB (OUTPATIENT)
Dept: LAB | Facility: CLINIC | Age: 84
End: 2024-10-01
Attending: INTERNAL MEDICINE
Payer: MEDICARE

## 2024-10-01 VITALS
SYSTOLIC BLOOD PRESSURE: 136 MMHG | OXYGEN SATURATION: 99 % | BODY MASS INDEX: 21.91 KG/M2 | TEMPERATURE: 97.8 F | DIASTOLIC BLOOD PRESSURE: 72 MMHG | HEART RATE: 63 BPM | WEIGHT: 157.4 LBS | RESPIRATION RATE: 16 BRPM

## 2024-10-01 DIAGNOSIS — C79.51 MALIGNANT NEOPLASM METASTATIC TO BONE (H): ICD-10-CM

## 2024-10-01 DIAGNOSIS — C79.51 MALIGNANT NEOPLASM METASTATIC TO BONE (H): Primary | ICD-10-CM

## 2024-10-01 DIAGNOSIS — C61 PROSTATE CANCER (H): Primary | ICD-10-CM

## 2024-10-01 LAB
ALBUMIN SERPL BCG-MCNC: 4.1 G/DL (ref 3.5–5.2)
ALP SERPL-CCNC: 51 U/L (ref 40–150)
ALT SERPL W P-5'-P-CCNC: 11 U/L (ref 0–70)
ANION GAP SERPL CALCULATED.3IONS-SCNC: 9 MMOL/L (ref 7–15)
AST SERPL W P-5'-P-CCNC: 17 U/L (ref 0–45)
BASOPHILS # BLD AUTO: 0 10E3/UL (ref 0–0.2)
BASOPHILS NFR BLD AUTO: 1 %
BILIRUB SERPL-MCNC: 1 MG/DL
BUN SERPL-MCNC: 16.7 MG/DL (ref 8–23)
CALCIUM SERPL-MCNC: 9.5 MG/DL (ref 8.8–10.4)
CHLORIDE SERPL-SCNC: 108 MMOL/L (ref 98–107)
CREAT SERPL-MCNC: 0.83 MG/DL (ref 0.67–1.17)
EGFRCR SERPLBLD CKD-EPI 2021: 88 ML/MIN/1.73M2
EOSINOPHIL # BLD AUTO: 0.1 10E3/UL (ref 0–0.7)
EOSINOPHIL NFR BLD AUTO: 3 %
ERYTHROCYTE [DISTWIDTH] IN BLOOD BY AUTOMATED COUNT: 12.9 % (ref 10–15)
GLUCOSE SERPL-MCNC: 102 MG/DL (ref 70–99)
HCO3 SERPL-SCNC: 26 MMOL/L (ref 22–29)
HCT VFR BLD AUTO: 34.9 % (ref 40–53)
HGB BLD-MCNC: 11.8 G/DL (ref 13.3–17.7)
IMM GRANULOCYTES # BLD: 0 10E3/UL
IMM GRANULOCYTES NFR BLD: 0 %
LDH SERPL L TO P-CCNC: 140 U/L (ref 0–250)
LYMPHOCYTES # BLD AUTO: 1 10E3/UL (ref 0.8–5.3)
LYMPHOCYTES NFR BLD AUTO: 28 %
MAGNESIUM SERPL-MCNC: 1.7 MG/DL (ref 1.7–2.3)
MCH RBC QN AUTO: 32.2 PG (ref 26.5–33)
MCHC RBC AUTO-ENTMCNC: 33.8 G/DL (ref 31.5–36.5)
MCV RBC AUTO: 95 FL (ref 78–100)
MONOCYTES # BLD AUTO: 0.4 10E3/UL (ref 0–1.3)
MONOCYTES NFR BLD AUTO: 11 %
NEUTROPHILS # BLD AUTO: 2.1 10E3/UL (ref 1.6–8.3)
NEUTROPHILS NFR BLD AUTO: 57 %
NRBC # BLD AUTO: 0 10E3/UL
NRBC BLD AUTO-RTO: 0 /100
PHOSPHATE SERPL-MCNC: 4 MG/DL (ref 2.5–4.5)
PLATELET # BLD AUTO: 206 10E3/UL (ref 150–450)
POTASSIUM SERPL-SCNC: 3.6 MMOL/L (ref 3.4–5.3)
PROT SERPL-MCNC: 6.5 G/DL (ref 6.4–8.3)
PSA SERPL DL<=0.01 NG/ML-MCNC: 207.4 NG/ML
RBC # BLD AUTO: 3.67 10E6/UL (ref 4.4–5.9)
SODIUM SERPL-SCNC: 143 MMOL/L (ref 135–145)
WBC # BLD AUTO: 3.7 10E3/UL (ref 4–11)

## 2024-10-01 PROCEDURE — 84100 ASSAY OF PHOSPHORUS: CPT | Performed by: INTERNAL MEDICINE

## 2024-10-01 PROCEDURE — 85025 COMPLETE CBC W/AUTO DIFF WBC: CPT | Performed by: INTERNAL MEDICINE

## 2024-10-01 PROCEDURE — 83735 ASSAY OF MAGNESIUM: CPT | Performed by: INTERNAL MEDICINE

## 2024-10-01 PROCEDURE — 83615 LACTATE (LD) (LDH) ENZYME: CPT | Performed by: INTERNAL MEDICINE

## 2024-10-01 PROCEDURE — 36415 COLL VENOUS BLD VENIPUNCTURE: CPT | Performed by: INTERNAL MEDICINE

## 2024-10-01 PROCEDURE — 84153 ASSAY OF PSA TOTAL: CPT | Performed by: INTERNAL MEDICINE

## 2024-10-01 PROCEDURE — G0463 HOSPITAL OUTPT CLINIC VISIT: HCPCS | Performed by: INTERNAL MEDICINE

## 2024-10-01 PROCEDURE — 80053 COMPREHEN METABOLIC PANEL: CPT | Performed by: INTERNAL MEDICINE

## 2024-10-01 PROCEDURE — 85014 HEMATOCRIT: CPT | Performed by: INTERNAL MEDICINE

## 2024-10-01 ASSESSMENT — PAIN SCALES - GENERAL: PAINLEVEL: NO PAIN (0)

## 2024-10-01 NOTE — NURSING NOTE
Chief Complaint   Patient presents with    Blood Draw     Labs drawn by venipuncture by rn in lab. Vital signs taken.     Labs drawn by venipuncture by RN in lab. Vital signs taken. Pt checked in to next appointment.    Carey Patrick RN     Statement Selected

## 2024-10-01 NOTE — PROGRESS NOTES
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: IV (bony metastasis)  MOLECULAR PROFILE: No actionable mutations/MSI or high TMB; TP53 mutation positive    TREATMENT SUMMARY:  -12/9/2009: Radical prostatectomy; pathology revealed [place] 4+5 disease, stage pT3a,N0 positive margins  -Mar-May2010: Salvage external beam radiation therapy  -Apr 2013: Intermittent androgen deprivation therapy with leuprolide for biochemical PSA relapse  -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 Radiation therapy to the left supraclavicular lymph node ending in September 2020.  He was continued on enzalutamide  -May 2022: Switch to abiraterone with prednisone for progressive disease  -Aug 2023: Abiraterone discontinued for progressive disease.  PSMA PET scan with PSMA avid osseous and brisa metastasis.  -11/14/2023: MARLO trial: cycle 1 docetaxel. 11/15/23 cycle 1 radium per the MARLO trial.      Chemotherapy 11/14/2023  10:05 AM 12/6/2023  9:15 AM 12/28/2023  9:07 AM 1/16/2024   Day, Cycle Day 1, Cycle 1  Day 1, Cycle 2  Day 1, Cycle 3  Day 1, Cycle 4   DOCEtaxel (TAXOTERE) IV 60 mg/m2  60 mg/m2  60 mg/m2  60 mg/m2     Chemotherapy 2/6/2024  1:17 PM 2/27/2024  11:57 AM 3/19/2024  8:54 AM 4/9/2024  9:49 AM 4/30/2024  12:20 PM   Day, Cycle Day 1, Cycle 5  Day 1, Cycle 6  Day 1, Cycle 7  Day 1, Cycle 8  Day 1, Cycle 9    DOCEtaxel IV 60 mg/m2  60 mg/m2  60 mg/m2  60 mg/m2  60 mg/m2          11/15/23 15:39 12/28/23 14:24 02/07/24 14:06 03/20/24 13:58 05/01/24 10:52 06/11/24 14:04   NM [place] 223 Dose 1   1.47 mCi/kg  Does 2  108.4 mCi Dose 3  1.5 mCi/kg  Dose 4  1.47 mCi/kg Dose 5  1.5 mCi/kg Dose 6  1.487 mCi/kg        CURRENT INTERVENTIONS:  MARLO Trial randomized to Arm B with Docetaxel/prednisone/Radium-233. Leuprolide every 4 months locally.     SUBJECTIVE   Alonso Pettit is being followed for castration resistant metastatic prostate cancer. He returns to clinic accompanied by his wife, Macey. He is seen today for an end of study visit.    Alonso returns to clinic today accompanied by his wife Macey.    He has fair strength and appetite. He does not drink a lot fluids.    He had an appointment with Dr. Locke on 8/28/24 with Eligard and Zometa.     ROS: 14 point ROS neg other than the symptoms noted above in the HPI.      PAST MEDICAL HISTORY     Past Medical History:   Diagnosis Date    Prostate cancer (H)          CURRENT OUTPATIENT MEDICATIONS     Current Outpatient Medications   Medication Sig    cyclobenzaprine (FLEXERIL) 10 MG tablet 1/2-1 TAB ORALLY UP TO 3 TIMES A DAY AS NEEDED FOR MUSCLE SPASM    leuprolide (LUPRON) 11.25 mg injection Inject 11.25 mg into the muscle every 3 months    bisacodyl (DULCOLAX) 5 MG EC tablet Take 5 mg by mouth 2 times daily     No current facility-administered medications for this visit.         ALLERGIES    No Known Allergies     REVIEW OF SYSTEMS   As above in the HPI, o/w complete 12-point ROS was negative.     PHYSICAL EXAM   /72 (BP Location: Right arm, Patient Position: Sitting, Cuff Size: Adult Regular)   Pulse 63   Temp 97.8  F (36.6  C) (Oral)   Resp 16   Wt 71.4 kg (157 lb 6.4 oz)   SpO2 99%   BMI 21.91 kg/m    General: No acute distress  HEENT: Sclera anicteric. Oral mucosa pink and moist.  No mucositis or thrush  Lymph: No lymphadenopathy in neck  Heart: Regular, rate, and rhythm  Lungs: Clear to ascultation bilaterally  Abdomen: Positive bowel sounds. Soft, non-distended, non-tender. No organomegaly or mass.   MSK: 1+ peripheral edema bilaterally.   Neuro:  Cranial nerves grossly intact. Oriented to person, place, time, and date.   Rash: none     LABORATORY AND IMAGING STUDIES     Recent Labs   Lab Test 10/01/24  0831 08/27/24  0839 07/08/24  1433 06/06/24  1046 05/21/24  0945    142 141 141 142   POTASSIUM 3.6 3.6 3.8 3.7 3.5   CHLORIDE 108* 107 105 106 107   CO2 26 25 24 22 22   ANIONGAP 9 10 12 13 13   BUN 16.7 15.2 12.5 16.7 16.0   CR 0.83 0.86 0.88 0.82 0.80   * 117* 114* 110* 137*   ZABRINA 9.5 9.3 9.2 9.3 8.9     Recent Labs   Lab Test 10/01/24  0831 08/27/24  0839 07/08/24  1433 06/06/24  1046 05/21/24  0945   MAG 1.7 1.7 1.7 1.6* 1.8   PHOS 4.0 3.5 3.4 4.0 2.9     Recent Labs   Lab Test 10/01/24  0831 08/27/24  0839 07/08/24  1433 06/06/24  1046 05/21/24  0945   WBC 3.7* 3.7* 3.5* 3.9* 3.9*   HGB 11.8* 11.6* 10.2* 10.9* 11.1*    201 245 203 264   MCV 95 96 98 94 94   NEUTROPHIL 57 65 63 62 63     Recent Labs   Lab Test 10/01/24  0831 08/27/24  0839 07/08/24  1433 06/06/24  1046   BILITOTAL 1.0 0.7 0.7 0.6   ALKPHOS 51 48 56 53   ALT 11 13 21 11   AST 17 18 25 16   ALBUMIN 4.1 4.1 3.9 3.7     --  186 223       Recent Labs   Lab Test 10/01/24  0831 08/27/24  0839 07/08/24  1433 06/06/24  1046 05/21/24  0945 11/14/23  0752 10/30/23  1044   .40 165.60 137.30 120.00 123.20   < > 153.30   TESTOSTTOTAL  --  7* 13*  --   --   --  7*      Results for orders placed or performed during the hospital encounter of 08/13/24   CT Chest/Abdomen/Pelvis w Contrast    Narrative    EXAM: CT CHEST/ABDOMEN/PELVIS W CONTRAST  LOCATION: [facility]  DATE: 8/13/2024    INDICATION:  Prostate cancer (H)  COMPARISON: Multiple priors, most recent 5/15/2024  TECHNIQUE: CT scan of the chest, abdomen, and pelvis was performed following injection of IV contrast. Multiplanar reformats were obtained. Dose reduction techniques were used.   CONTRAST: iopamidol (ISOVUE 370) solution 100 mL    FINDINGS:   LUNGS AND PLEURA:  Minimal interval increase in size of a now 4 mm pulmonary nodule in the right upper lobe, previously 2 mm (9/40). Minimal interval increase in size of a 3 mm pulmonary nodule right upper lobe, previously 2 mm (9/60). Multiple other small nodules/nodular opacities remain unchanged.    MEDIASTINUM/AXILLAE: No lymphadenopathy. No thoracic aortic aneurysms. Symmetric gynecomastia.    CORONARY ARTERY CALCIFICATION: None.    HEPATOBILIARY: Unchanged 9mm hypervascular observation. Low attenuation lesions unchanged.     PANCREAS: Normal.    SPLEEN: Normal.    ADRENAL GLANDS: Normal.    KIDNEYS/BLADDER: No significant mass, stones, or hydronephrosis. There are simple or benign cysts. No follow up is needed.    BOWEL: No obstruction or inflammatory change.    LYMPH NODES: No lymphadenopathy.    VASCULATURE: Normal caliber aorta. Minimal vascular calcification.     PELVIC ORGANS: Penile prosthesis with reservoir in the pelvis. Prostatectomy.    MUSCULOSKELETAL: Stable sclerotic bone lesions.      Impression    IMPRESSION:  1.  Multifocal osseous sclerotic metastasis unchanged.    2.  Minimal interval increase in size of a 2 pulmonary nodules in the right lung apex. Short interval CT recommended to assess for stability.    3.  Prostatectomy. No new lymphadenopathy.    4.  Remainder findings as detailed above.     Study Result    Narrative & Impression   EXAMINATION: NM BONE SCAN WHOLE BODY  Whole-body bone scan, 8/13/2024 12:26 PM      HISTORY: Prostate cancer (H)      ADDITIONAL INFORMATION: none     PSA: PSA value for prostate cancer, otherwise delete     COMPARISON: Prior bone scans on 5/15/2024 and 3/15/2024     TECHNIQUE: The patient received 28.08 mCi of Tc-99m MDP intravenously.  Whole body bone images were obtained at 3 hours.     FINDINGS: Whole-body planar images demonstrate similar multifocal  radiotracer uptake C7, bilateral ribs, thoracic spine, and left  scapula. No new abnormal focal uptake.     Mild uptake in  bilateral shoulders, sternoclavicular joints, knees and  spine, likely related to degenerative changes.     Physiologic tracer within the kidneys and soft tissues with excretion  into urinary bladder.                                                                      IMPRESSION:   Similar multifocal osteoblastic disease. No new  osteoblastic metastasis.        I have personally reviewed the examination and initial interpretation  and I agree with the findings.     SMILEY RANDALL MD         SYSTEM ID:  [id number]             ASSESSMENT AND PLAN   -Castration resistant metastatic prostate cancer   Post radical prostatectomy on 12/9/2009; salvage radiation ending May 2010; androgen deprivation therapy since 2013   Post progression on enzalutamide and abiraterone with prednisone  -Nonmuscle invasive bladder cancer diagnosed in 2011 without any recent recurrence  -No significant medical comorbidity  -ECOG performance status of 0     #Castration resistant metastatic prostate cancer.   - Has progressed on novel antiandrogen therapies including abiraterone with prednisone and enzalutamide.  He had a PSMA PET CT scan which did show extensive metastasis to the lymph nodes and bones. Referred to [facility] for the MARLO trial, randomized to Arm B with docetaxel and Radium. Docetaxel is administered every 3 weeks for 6-10 doses and Radium every 6 weeks for 6 doses last on April 11, 2024.   - He has now received planned 9 cycles of docetaxel, last on 5/21/24, and completed 6 doses of radium per study protocol.   -He has tapered off of prednisone.  - His PSA has been rising since around his last treatment on trial on 6/6/24 when it was 120 ng/ml. It increased to 165 ng/ml at our last visit on 8/27/24 six weeks ago.  It has increased to 207 ng/ml as I had suspected (lab available after this visit).    I reviewed actual images from his restaging CT chest, abdomen and pelvis and bone scan. There is nothing to suggest progressive  disease. He has 2 pulmonary nodules that had been read as slightly more prominent.      PSA is a lot more sensitive than scans. His tumor has to achieve a certain mass that can be picked up on CT scans.     We will continue to monitor him off therapy on leuprolide alone. He will continue to follow with Dr. Locke.     He is not a candidate for STEP-UP trial of bipolar androgen therapy as he has already received enzalutamide. He would be a candidate for lutetium. I will reach out to Dr. Locke and would seek his input. We would wait for clear progression - radiographic or clinical. I will see him in mid December when he returns from [place].     I reviewed the option of cabazitaxel and lutetium. I reviewed the rationale, risks, benefits, responses, logistics, outpatient regimen, alternatives and palliative intent. After reviewing everything his wife wondered what would happen if he did nothing. I reviewed typical disease progression in the absence of therapy.     #Bone Metastasis   - Zometa every 6 months with local oncologist.     # Weakness in his legs  #Cancer fatigue   #Deconditioning     #Peripheral edema   - stable bilateral edema   Continue to monitor.  Commend following up with PCP for further workup.      The longitudinal plan of care for the diagnosis(es)/condition(s) as documented were addressed during this visit. Due to the added complexity in care, I will continue to support Alonso in the subsequent management and with ongoing continuity of care.     40 minutes spent on the date of the encounter doing chart review, history and exam, documentation and further activities as noted above

## 2024-10-01 NOTE — LETTER
10/1/2024      Alonso Pettit  [address] [facility] [address]      Dear Colleague,    Thank you for referring your patient, Alonso Pettit, to the [facility]. Please see a copy of my visit note below.    [facility]  HEMATOLOGY AND ONCOLOGY    FOLLOW-UP VISIT NOTE    PATIENT NAME: Alonso Pettit MRN # [id number]  DATE OF VISIT: Oct 1, 2024 YOB: 1940    REFERRING PROVIDER: [facility] Veras oncology    CANCER TYPE: Prostate adenocarcinoma; [place] 4+5  STAGE: IV (bony metastasis)  MOLECULAR PROFILE: No actionable mutations/MSI or high TMB; TP53 mutation positive    TREATMENT SUMMARY:  -12/9/2009: Radical prostatectomy; pathology revealed [place] 4+5 disease, stage pT3a,N0 positive margins  -Mar-May2010: Salvage external beam radiation therapy  -Apr 2013: Intermittent androgen deprivation therapy with leuprolide for biochemical PSA relapse  -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 Radiation therapy to the left supraclavicular lymph node ending in September 2020.  He was continued on enzalutamide  -May 2022: Switch to abiraterone with prednisone for progressive disease  -Aug 2023: Abiraterone discontinued for progressive disease.  PSMA PET scan with PSMA avid osseous and brisa metastasis.  -11/14/2023: MARLO trial: cycle 1 docetaxel. 11/15/23 cycle 1 radium per the MARLO trial.      Chemotherapy 11/14/2023  10:05 AM 12/6/2023  9:15 AM 12/28/2023  9:07 AM 1/16/2024   Day, Cycle Day 1, Cycle 1  Day 1, Cycle 2  Day 1, Cycle 3  Day 1, Cycle 4   DOCEtaxel (TAXOTERE) IV 60 mg/m2  60 mg/m2  60 mg/m2  60 mg/m2     Chemotherapy 2/6/2024  1:17 PM 2/27/2024  11:57 AM 3/19/2024  8:54 AM 4/9/2024  9:49 AM 4/30/2024  12:20 PM   Day, Cycle Day 1, Cycle 5  Day 1, Cycle 6  Day 1, Cycle  7  Day 1, Cycle 8  Day 1, Cycle 9    DOCEtaxel IV 60 mg/m2  60 mg/m2  60 mg/m2  60 mg/m2  60 mg/m2          11/15/23 15:39 12/28/23 14:24 02/07/24 14:06 03/20/24 13:58 05/01/24 10:52 06/11/24 14:04   NM [place] 223 Dose 1   1.47 mCi/kg Does 2  108.4 mCi Dose 3  1.5 mCi/kg  Dose 4  1.47 mCi/kg Dose 5  1.5 mCi/kg Dose 6  1.487 mCi/kg        CURRENT INTERVENTIONS:  MARLO Trial randomized to Arm B with Docetaxel/prednisone/Radium-233. Leuprolide every 4 months locally.     SUBJECTIVE   Alonso Pettit is being followed for castration resistant metastatic prostate cancer. He returns to clinic accompanied by his wife, Macey. He is seen today for an end of study visit.    Alonso returns to clinic today accompanied by his wife Macey.    He has fair strength and appetite. He does not drink a lot fluids.    He had an appointment with Dr. Locke on 8/28/24 with Val.     ROS: 14 point ROS neg other than the symptoms noted above in the HPI.      PAST MEDICAL HISTORY     Past Medical History:   Diagnosis Date     Prostate cancer (H)          CURRENT OUTPATIENT MEDICATIONS     Current Outpatient Medications   Medication Sig     cyclobenzaprine (FLEXERIL) 10 MG tablet 1/2-1 TAB ORALLY UP TO 3 TIMES A DAY AS NEEDED FOR MUSCLE SPASM     leuprolide (LUPRON) 11.25 mg injection Inject 11.25 mg into the muscle every 3 months     bisacodyl (DULCOLAX) 5 MG EC tablet Take 5 mg by mouth 2 times daily     No current facility-administered medications for this visit.         ALLERGIES    No Known Allergies     REVIEW OF SYSTEMS   As above in the HPI, o/w complete 12-point ROS was negative.     PHYSICAL EXAM   /72 (BP Location: Right arm, Patient Position: Sitting, Cuff Size: Adult Regular)   Pulse 63   Temp 97.8  F (36.6  C) (Oral)   Resp 16   Wt 71.4 kg (157 lb 6.4 oz)   SpO2 99%   BMI 21.91 kg/m    General: No acute distress  HEENT: Sclera anicteric. Oral mucosa pink and moist.  No mucositis or thrush  Lymph: No  lymphadenopathy in neck  Heart: Regular, rate, and rhythm  Lungs: Clear to ascultation bilaterally  Abdomen: Positive bowel sounds. Soft, non-distended, non-tender. No organomegaly or mass.   MSK: 1+ peripheral edema bilaterally.   Neuro: Cranial nerves grossly intact. Oriented to person, place, time, and date.   Rash: none     LABORATORY AND IMAGING STUDIES     Recent Labs   Lab Test 10/01/24  0831 08/27/24  0839 07/08/24  1433 06/06/24  1046 05/21/24  0945    142 141 141 142   POTASSIUM 3.6 3.6 3.8 3.7 3.5   CHLORIDE 108* 107 105 106 107   CO2 26 25 24 22 22   ANIONGAP 9 10 12 13 13   BUN 16.7 15.2 12.5 16.7 16.0   CR 0.83 0.86 0.88 0.82 0.80   * 117* 114* 110* 137*   ZABRINA 9.5 9.3 9.2 9.3 8.9     Recent Labs   Lab Test 10/01/24  0831 08/27/24  0839 07/08/24  1433 06/06/24  1046 05/21/24  0945   MAG 1.7 1.7 1.7 1.6* 1.8   PHOS 4.0 3.5 3.4 4.0 2.9     Recent Labs   Lab Test 10/01/24  0831 08/27/24  0839 07/08/24  1433 06/06/24  1046 05/21/24  0945   WBC 3.7* 3.7* 3.5* 3.9* 3.9*   HGB 11.8* 11.6* 10.2* 10.9* 11.1*    201 245 203 264   MCV 95 96 98 94 94   NEUTROPHIL 57 65 63 62 63     Recent Labs   Lab Test 10/01/24  0831 08/27/24  0839 07/08/24  1433 06/06/24  1046   BILITOTAL 1.0 0.7 0.7 0.6   ALKPHOS 51 48 56 53   ALT 11 13 21 11   AST 17 18 25 16   ALBUMIN 4.1 4.1 3.9 3.7     --  186 223       Recent Labs   Lab Test 10/01/24  0831 08/27/24  0839 07/08/24  1433 06/06/24  1046 05/21/24  0945 11/14/23  0752 10/30/23  1044   .40 165.60 137.30 120.00 123.20   < > 153.30   TESTOSTTOTAL  --  7* 13*  --   --   --  7*      Results for orders placed or performed during the hospital encounter of 08/13/24   CT Chest/Abdomen/Pelvis w Contrast    Narrative    EXAM: CT CHEST/ABDOMEN/PELVIS W CONTRAST  LOCATION: [facility]  DATE: 8/13/2024    INDICATION:  Prostate cancer (H)  COMPARISON: Multiple priors, most recent 5/15/2024  TECHNIQUE: CT scan of  the chest, abdomen, and pelvis was performed following injection of IV contrast. Multiplanar reformats were obtained. Dose reduction techniques were used.   CONTRAST: iopamidol (ISOVUE 370) solution 100 mL    FINDINGS:   LUNGS AND PLEURA: Minimal interval increase in size of a now 4 mm pulmonary nodule in the right upper lobe, previously 2 mm (9/40). Minimal interval increase in size of a 3 mm pulmonary nodule right upper lobe, previously 2 mm (9/60). Multiple other small nodules/nodular opacities remain unchanged.    MEDIASTINUM/AXILLAE: No lymphadenopathy. No thoracic aortic aneurysms. Symmetric gynecomastia.    CORONARY ARTERY CALCIFICATION: None.    HEPATOBILIARY: Unchanged 9mm hypervascular observation. Low attenuation lesions unchanged.     PANCREAS: Normal.    SPLEEN: Normal.    ADRENAL GLANDS: Normal.    KIDNEYS/BLADDER: No significant mass, stones, or hydronephrosis. There are simple or benign cysts. No follow up is needed.    BOWEL: No obstruction or inflammatory change.    LYMPH NODES: No lymphadenopathy.    VASCULATURE: Normal caliber aorta. Minimal vascular calcification.     PELVIC ORGANS: Penile prosthesis with reservoir in the pelvis. Prostatectomy.    MUSCULOSKELETAL: Stable sclerotic bone lesions.      Impression    IMPRESSION:  1.  Multifocal osseous sclerotic metastasis unchanged.    2.  Minimal interval increase in size of a 2 pulmonary nodules in the right lung apex. Short interval CT recommended to assess for stability.    3.  Prostatectomy. No new lymphadenopathy.    4.  Remainder findings as detailed above.     Study Result    Narrative & Impression   EXAMINATION: NM BONE SCAN WHOLE BODY  Whole-body bone scan, 8/13/2024 12:26 PM      HISTORY: Prostate cancer (H)      ADDITIONAL INFORMATION: none     PSA: PSA value for prostate cancer, otherwise delete     COMPARISON: Prior bone scans on 5/15/2024 and 3/15/2024     TECHNIQUE: The patient received 28.08 mCi of Tc-99m MDP intravenously.  Whole  body bone images were obtained at 3 hours.     FINDINGS: Whole-body planar images demonstrate similar multifocal  radiotracer uptake C7, bilateral ribs, thoracic spine, and left  scapula. No new abnormal focal uptake.     Mild uptake in bilateral shoulders, sternoclavicular joints, knees and  spine, likely related to degenerative changes.     Physiologic tracer within the kidneys and soft tissues with excretion  into urinary bladder.                                                                      IMPRESSION:   Similar multifocal osteoblastic disease. No new  osteoblastic metastasis.        I have personally reviewed the examination and initial interpretation  and I agree with the findings.     SMILEY RANDALL MD         SYSTEM ID:  [id number]             ASSESSMENT AND PLAN   -Castration resistant metastatic prostate cancer   Post radical prostatectomy on 12/9/2009; salvage radiation ending May 2010; androgen deprivation therapy since 2013   Post progression on enzalutamide and abiraterone with prednisone  -Nonmuscle invasive bladder cancer diagnosed in 2011 without any recent recurrence  -No significant medical comorbidity  -ECOG performance status of 0     #Castration resistant metastatic prostate cancer.   - Has progressed on novel antiandrogen therapies including abiraterone with prednisone and enzalutamide.  He had a PSMA PET CT scan which did show extensive metastasis to the lymph nodes and bones. Referred to [facility] for the MARLO trial, randomized to Arm B with docetaxel and Radium. Docetaxel is administered every 3 weeks for 6-10 doses and Radium every 6 weeks for 6 doses last on April 11, 2024.   - He has now received planned 9 cycles of docetaxel, last on 5/21/24, and completed 6 doses of radium per study protocol.   -He has tapered off of prednisone.  - His PSA has been rising since around his last treatment on trial on 6/6/24 when it was 120 ng/ml. It increased to 165 ng/ml at our last visit on  8/27/24 six weeks ago.  It has increased to 207 ng/ml as I had suspected (lab available after this visit).    I reviewed actual images from his restaging CT chest, abdomen and pelvis and bone scan. There is nothing to suggest progressive disease. He has 2 pulmonary nodules that had been read as slightly more prominent.      PSA is a lot more sensitive than scans. His tumor has to achieve a certain mass that can be picked up on CT scans.     We will continue to monitor him off therapy on leuprolide alone. He will continue to follow with Dr. Locke.     He is not a candidate for STEP-UP trial of bipolar androgen therapy as he has already received enzalutamide. He would be a candidate for lutetium. I will reach out to Dr. Locke and would seek his input. We would wait for clear progression - radiographic or clinical. I will see him in mid December when he returns from [place].     I reviewed the option of cabazitaxel and lutetium. I reviewed the rationale, risks, benefits, responses, logistics, outpatient regimen, alternatives and palliative intent. After reviewing everything his wife wondered what would happen if he did nothing. I reviewed typical disease progression in the absence of therapy.     #Bone Metastasis   - Zometa every 6 months with local oncologist.     # Weakness in his legs  #Cancer fatigue   #Deconditioning     #Peripheral edema   - stable bilateral edema   Continue to monitor.  Commend following up with PCP for further workup.      The longitudinal plan of care for the diagnosis(es)/condition(s) as documented were addressed during this visit. Due to the added complexity in care, I will continue to support Alonso in the subsequent management and with ongoing continuity of care.     40 minutes spent on the date of the encounter doing chart review, history and exam, documentation and further activities as noted above          Again, thank you for allowing me to participate in the care of your patient.         Sincerely,        Kg Gamez MD

## 2024-10-01 NOTE — NURSING NOTE
"Oncology Rooming Note    October 1, 2024 8:53 AM   Alonso Pettit is a 83 year old male who presents for:    Chief Complaint   Patient presents with    Blood Draw     Labs drawn by venipuncture by rn in lab. Vital signs taken.    Oncology Clinic Visit     Malignant neoplasm metastatic to bone     Initial Vitals: /72 (BP Location: Right arm, Patient Position: Sitting, Cuff Size: Adult Regular)   Pulse 63   Temp 97.8  F (36.6  C) (Oral)   Resp 16   Wt 71.4 kg (157 lb 6.4 oz)   SpO2 99%   BMI 21.91 kg/m   Estimated body mass index is 21.91 kg/m  as calculated from the following:    Height as of 2/6/24: 1.805 m (5' 11.06\").    Weight as of this encounter: 71.4 kg (157 lb 6.4 oz). Body surface area is 1.89 meters squared.  No Pain (0) Comment: Data Unavailable   No LMP for male patient.  Allergies reviewed: Yes  Medications reviewed: Yes    Medications: Medication refills not needed today.  Pharmacy name entered into IM[facility]: [id number] [place] [address]    Frailty Screening:   Is the patient here for a new oncology consult visit in cancer care? 2. No      Clinical concerns: Patient states feeling weak for the past two days and sleeping a lot.       Joey Jain EMT            "

## 2024-10-01 NOTE — PROGRESS NOTES
[id number] MARLO: Month 3 Follow-Up Study Visit Note   Subject name: Alonso Pettit     Visit: 3 Month Follow-Up    Did the study visit occur within the appropriate window allowed by the protocol? yes    Present at today's visit was the provider , The patient, and the patient's wife, Macey. Since the last study visit, He has been doing okay. Patient has ongoing fatigue. Ongoing Bilateral LE Edema. Ongoing dyspnea upon exertion. Ongoing LE Weakness, patient states that his legs just give out sometimes. He denies any numbness or tingling. Ongoing anemia and white blood cell count decrease.    Rash noted at last visit has resolved. Patient stated that his appetite was normal and that he was eating well. His wife affirmed this but state that he occasionally eats less than in the past. Patient also stated that he was sleeping well and did not have any trouble falling asleep. His wife, Macey stated that if anything he was sleeping more than usual. Previously noted adverse events of insomnia and anorexia marked as resolved.     I have personally interviewed Alonso Pettit and reviewed his medical record for adverse events and any new anti-cancer therapies these have been recorded on the corresponding logs in Alonso Pettit's research file.     Alonso Pettit was given the opportunity to ask any trial related questions. The patient and his wife did ask if they still receive visit stipends. I was not sure, but I passed the question along to the primary coordinator.     Patient requested that today's lab results be sent to Luis Locke MD. The physcian that oversees his Lupron and his PCP Rusty Hernandez MD.     Please see provider progress note for physical exam and other clinical information. Labs were reviewed - any significant lab values were addressed and reviewed.    Patient leaving for [place], will be back and available for next appointment 13DEC - 29DEC 2024.     Kori Solano   Clinical Research Coordinator   [facility]; Clinical Trials Office  [email or web address]; [phone]; [phone]

## 2024-10-08 DIAGNOSIS — C61 PROSTATE CANCER (H): Primary | ICD-10-CM

## 2024-10-08 DIAGNOSIS — C79.51 MALIGNANT NEOPLASM METASTATIC TO BONE (H): ICD-10-CM

## 2024-12-08 ENCOUNTER — HEALTH MAINTENANCE LETTER (OUTPATIENT)
Age: 84
End: 2024-12-08

## 2024-12-13 ENCOUNTER — ANCILLARY PROCEDURE (OUTPATIENT)
Dept: RADIOLOGY | Facility: CLINIC | Age: 84
End: 2024-12-13
Attending: INTERNAL MEDICINE
Payer: MEDICARE

## 2024-12-13 ENCOUNTER — HOSPITAL ENCOUNTER (OUTPATIENT)
Dept: NUCLEAR MEDICINE | Facility: CLINIC | Age: 84
Setting detail: NUCLEAR MEDICINE
Discharge: HOME | End: 2024-12-13
Attending: INTERNAL MEDICINE
Payer: MEDICARE

## 2024-12-13 DIAGNOSIS — C61 PROSTATE CANCER (H): ICD-10-CM

## 2024-12-13 PROCEDURE — A9503 TC99M MEDRONATE: HCPCS | Performed by: INTERNAL MEDICINE

## 2024-12-13 PROCEDURE — 78306 BONE IMAGING WHOLE BODY: CPT | Mod: MG

## 2024-12-13 PROCEDURE — 343N000001 HC RX 343 MED OP 636: Performed by: INTERNAL MEDICINE

## 2024-12-13 RX ORDER — TC 99M MEDRONATE 20 MG/10ML
20-30 INJECTION, POWDER, LYOPHILIZED, FOR SOLUTION INTRAVENOUS ONCE
Status: COMPLETED | OUTPATIENT
Start: 2024-12-13 | End: 2024-12-13

## 2024-12-13 RX ADMIN — TC 99M MEDRONATE 25.8 MILLICURIE: 20 INJECTION, POWDER, LYOPHILIZED, FOR SOLUTION INTRAVENOUS at 09:22

## 2024-12-14 ENCOUNTER — HOSPITAL ENCOUNTER (OUTPATIENT)
Dept: MRI IMAGING | Facility: CLINIC | Age: 84
Discharge: HOME | End: 2024-12-14
Attending: INTERNAL MEDICINE | Admitting: INTERNAL MEDICINE
Payer: MEDICARE

## 2024-12-14 DIAGNOSIS — C61 PROSTATE CANCER (H): ICD-10-CM

## 2024-12-14 DIAGNOSIS — Z00.6 EXAMINATION OF PARTICIPANT IN CLINICAL TRIAL: ICD-10-CM

## 2024-12-14 DIAGNOSIS — C79.51 MALIGNANT NEOPLASM METASTATIC TO BONE (H): ICD-10-CM

## 2024-12-14 PROCEDURE — A9585 GADOBUTROL INJECTION: HCPCS | Performed by: INTERNAL MEDICINE

## 2024-12-14 PROCEDURE — 72157 MRI CHEST SPINE W/O & W/DYE: CPT | Mod: MA

## 2024-12-14 PROCEDURE — 255N000002 HC RX 255 OP 636: Performed by: INTERNAL MEDICINE

## 2024-12-14 RX ORDER — GADOBUTROL 604.72 MG/ML
7 INJECTION INTRAVENOUS ONCE
Status: COMPLETED | OUTPATIENT
Start: 2024-12-14 | End: 2024-12-14

## 2024-12-14 RX ADMIN — GADOBUTROL 7 ML: 604.72 INJECTION INTRAVENOUS at 11:17

## 2024-12-17 ENCOUNTER — ALLIED HEALTH/NURSE VISIT (OUTPATIENT)
Dept: ONCOLOGY | Facility: CLINIC | Age: 84
End: 2024-12-17

## 2024-12-17 ENCOUNTER — ONCOLOGY VISIT (OUTPATIENT)
Dept: ONCOLOGY | Facility: CLINIC | Age: 84
End: 2024-12-17
Attending: INTERNAL MEDICINE
Payer: MEDICARE

## 2024-12-17 ENCOUNTER — LAB (OUTPATIENT)
Dept: LAB | Facility: CLINIC | Age: 84
End: 2024-12-17
Attending: INTERNAL MEDICINE
Payer: MEDICARE

## 2024-12-17 VITALS
DIASTOLIC BLOOD PRESSURE: 76 MMHG | TEMPERATURE: 97.9 F | HEART RATE: 65 BPM | BODY MASS INDEX: 21.5 KG/M2 | SYSTOLIC BLOOD PRESSURE: 128 MMHG | RESPIRATION RATE: 18 BRPM | OXYGEN SATURATION: 98 % | WEIGHT: 154.4 LBS

## 2024-12-17 DIAGNOSIS — C79.51 MALIGNANT NEOPLASM METASTATIC TO BONE (H): ICD-10-CM

## 2024-12-17 DIAGNOSIS — C79.51 MALIGNANT NEOPLASM METASTATIC TO BONE (H): Primary | ICD-10-CM

## 2024-12-17 LAB
ALBUMIN SERPL BCG-MCNC: 4 G/DL (ref 3.5–5.2)
ALP SERPL-CCNC: 53 U/L (ref 40–150)
ALT SERPL W P-5'-P-CCNC: 8 U/L (ref 0–70)
ANION GAP SERPL CALCULATED.3IONS-SCNC: 8 MMOL/L (ref 7–15)
AST SERPL W P-5'-P-CCNC: 16 U/L (ref 0–45)
BASOPHILS # BLD AUTO: 0 10E3/UL (ref 0–0.2)
BASOPHILS NFR BLD AUTO: 1 %
BILIRUB SERPL-MCNC: 0.6 MG/DL
BUN SERPL-MCNC: 14.4 MG/DL (ref 8–23)
CALCIUM SERPL-MCNC: 9 MG/DL (ref 8.8–10.4)
CHLORIDE SERPL-SCNC: 108 MMOL/L (ref 98–107)
CREAT SERPL-MCNC: 0.88 MG/DL (ref 0.67–1.17)
EGFRCR SERPLBLD CKD-EPI 2021: 86 ML/MIN/1.73M2
EOSINOPHIL # BLD AUTO: 0.1 10E3/UL (ref 0–0.7)
EOSINOPHIL NFR BLD AUTO: 2 %
ERYTHROCYTE [DISTWIDTH] IN BLOOD BY AUTOMATED COUNT: 12.9 % (ref 10–15)
GLUCOSE SERPL-MCNC: 104 MG/DL (ref 70–99)
HCO3 SERPL-SCNC: 27 MMOL/L (ref 22–29)
HCT VFR BLD AUTO: 34.8 % (ref 40–53)
HGB BLD-MCNC: 11.7 G/DL (ref 13.3–17.7)
IMM GRANULOCYTES # BLD: 0 10E3/UL
IMM GRANULOCYTES NFR BLD: 0 %
LDH SERPL L TO P-CCNC: 143 U/L (ref 0–250)
LYMPHOCYTES # BLD AUTO: 1 10E3/UL (ref 0.8–5.3)
LYMPHOCYTES NFR BLD AUTO: 30 %
MAGNESIUM SERPL-MCNC: 1.8 MG/DL (ref 1.7–2.3)
MCH RBC QN AUTO: 32.2 PG (ref 26.5–33)
MCHC RBC AUTO-ENTMCNC: 33.6 G/DL (ref 31.5–36.5)
MCV RBC AUTO: 96 FL (ref 78–100)
MONOCYTES # BLD AUTO: 0.3 10E3/UL (ref 0–1.3)
MONOCYTES NFR BLD AUTO: 9 %
NEUTROPHILS # BLD AUTO: 2 10E3/UL (ref 1.6–8.3)
NEUTROPHILS NFR BLD AUTO: 58 %
NRBC # BLD AUTO: 0 10E3/UL
NRBC BLD AUTO-RTO: 0 /100
PHOSPHATE SERPL-MCNC: 3.6 MG/DL (ref 2.5–4.5)
PLATELET # BLD AUTO: 179 10E3/UL (ref 150–450)
POTASSIUM SERPL-SCNC: 4 MMOL/L (ref 3.4–5.3)
PROT SERPL-MCNC: 6.4 G/DL (ref 6.4–8.3)
PSA SERPL DL<=0.01 NG/ML-MCNC: 335.2 NG/ML
RBC # BLD AUTO: 3.63 10E6/UL (ref 4.4–5.9)
SODIUM SERPL-SCNC: 143 MMOL/L (ref 135–145)
WBC # BLD AUTO: 3.4 10E3/UL (ref 4–11)

## 2024-12-17 PROCEDURE — 36415 COLL VENOUS BLD VENIPUNCTURE: CPT | Performed by: INTERNAL MEDICINE

## 2024-12-17 PROCEDURE — 82374 ASSAY BLOOD CARBON DIOXIDE: CPT | Performed by: INTERNAL MEDICINE

## 2024-12-17 PROCEDURE — 85004 AUTOMATED DIFF WBC COUNT: CPT | Performed by: INTERNAL MEDICINE

## 2024-12-17 PROCEDURE — G0463 HOSPITAL OUTPT CLINIC VISIT: HCPCS | Performed by: INTERNAL MEDICINE

## 2024-12-17 PROCEDURE — 84100 ASSAY OF PHOSPHORUS: CPT | Performed by: INTERNAL MEDICINE

## 2024-12-17 PROCEDURE — 83615 LACTATE (LD) (LDH) ENZYME: CPT | Performed by: INTERNAL MEDICINE

## 2024-12-17 PROCEDURE — 84153 ASSAY OF PSA TOTAL: CPT | Performed by: INTERNAL MEDICINE

## 2024-12-17 PROCEDURE — 83735 ASSAY OF MAGNESIUM: CPT | Performed by: INTERNAL MEDICINE

## 2024-12-17 PROCEDURE — 80069 RENAL FUNCTION PANEL: CPT | Performed by: INTERNAL MEDICINE

## 2024-12-17 ASSESSMENT — PAIN SCALES - GENERAL: PAINLEVEL_OUTOF10: NO PAIN (0)

## 2024-12-17 NOTE — NURSING NOTE
"Oncology Rooming Note    December 17, 2024 9:34 AM   Alonso Pettit is a 83 year old male who presents for:    Chief Complaint   Patient presents with    Oncology Clinic Visit     Prostate CA    Blood Draw     Labs drawn via  by RN. VS taken.     Initial Vitals: /76 (BP Location: Right arm, Patient Position: Sitting, Cuff Size: Adult Regular)   Pulse 65   Temp 97.9  F (36.6  C) (Oral)   Resp 18   Wt 70 kg (154 lb 6.4 oz)   SpO2 98%   BMI 21.50 kg/m   Estimated body mass index is 21.5 kg/m  as calculated from the following:    Height as of 2/6/24: 1.805 m (5' 11.06\").    Weight as of this encounter: 70 kg (154 lb 6.4 oz). Body surface area is 1.87 meters squared.  No Pain (0) Comment: Data Unavailable   No LMP for male patient.  Allergies reviewed: Yes  Medications reviewed: Yes    Medications: Medication refills not needed today.  Pharmacy name entered into Scaled Inference: [id number] [place] [address]    Frailty Screening:   Is the patient here for a new oncology consult visit in cancer care? 2. No      Clinical concerns:        Amy Conte              "

## 2024-12-17 NOTE — PROGRESS NOTES
[id number]: Study Visit Note   Subject name: Alonso Pettit     Visit: Follow Up    Did the study visit occur within the appropriate window allowed by the protocol? yes    If no, why? N/A    Since the last study visit, He has been doing okay. He has had one major fall since his last visit and describes that he can feel a fall coming on due to his legs becoming weak. He continues to experience fatigue that has been fairly stable since his last visit. He has bilateral shoulder pain that he feels comes from overdoing his arm band exercises. Per his scans and Dr. Gamez's evaluation, he does not have radiographic progression. He will be flying to [place] on Dec 31st and staying their until April 2024.     I have personally interviewed Alonso Pettit and reviewed his medical record for adverse events and concomitant medications and these have been recorded on the corresponding logs in Alonso Pettit's research file.     Special considerations for today's visit were reviewed with staff administering the study intervention including: None    Alonso Pettit was given the opportunity to ask any trial related questions.  Please see provider progress note for physical exam and other clinical information. Labs were reviewed - any significant lab values were addressed and reviewed.    Annabella Sorenson  Clinical Research Coordinator III  [facility]  T: [phone]      
None

## 2024-12-17 NOTE — PROGRESS NOTES
[facility]  HEMATOLOGY AND ONCOLOGY    FOLLOW-UP VISIT NOTE    PATIENT NAME: Alonso Pettit MRN # [id number]  DATE OF VISIT: Dec 17, 2024 YOB: 1940    REFERRING PROVIDER: [facility] oncology    CANCER TYPE: Prostate adenocarcinoma; [place] 4+5  STAGE: IV (bony metastasis)  MOLECULAR PROFILE: No actionable mutations/MSI or high TMB; TP53 mutation positive    TREATMENT SUMMARY:  -12/9/2009: Radical prostatectomy; pathology revealed [place] 4+5 disease, stage pT3a,N0 positive margins  -Mar-May2010: Salvage external beam radiation therapy  -Apr 2013: Intermittent androgen deprivation therapy with leuprolide for biochemical PSA relapse  -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 Radiation therapy to the left supraclavicular lymph node ending in September 2020.  He was continued on enzalutamide  -May 2022: Switch to abiraterone with prednisone for progressive disease  -Aug 2023: Abiraterone discontinued for progressive disease.  PSMA PET scan with PSMA avid osseous and brisa metastasis.  -11/14/2023: MARLO trial: cycle 1 docetaxel. 11/15/23 cycle 1 radium per the MARLO trial.      Chemotherapy 11/14/2023  10:05 AM 12/6/2023  9:15 AM 12/28/2023  9:07 AM 1/16/2024   Day, Cycle Day 1, Cycle 1  Day 1, Cycle 2  Day 1, Cycle 3  Day 1, Cycle 4   DOCEtaxel (TAXOTERE) IV 60 mg/m2  60 mg/m2  60 mg/m2  60 mg/m2     Chemotherapy 2/6/2024  1:17 PM 2/27/2024  11:57 AM 3/19/2024  8:54 AM 4/9/2024  9:49 AM 4/30/2024  12:20 PM   Day, Cycle Day 1, Cycle 5  Day 1, Cycle 6  Day 1, Cycle 7  Day 1, Cycle 8  Day 1, Cycle 9    DOCEtaxel IV 60 mg/m2  60 mg/m2  60 mg/m2  60 mg/m2  60 mg/m2          11/15/23 15:39 12/28/23 14:24 02/07/24 14:06 03/20/24 13:58 05/01/24 10:52 06/11/24 14:04   NM Spray 223 Dose 1   1.47  mCi/kg Does 2  108.4 mCi Dose 3  1.5 mCi/kg  Dose 4  1.47 mCi/kg Dose 5  1.5 mCi/kg Dose 6  1.487 mCi/kg        CURRENT INTERVENTIONS:  MARLO Trial randomized to Arm B with Docetaxel/prednisone/Radium-233. Leuprolide every 4 months locally.     SUBJECTIVE   Alonso Pettit is being followed for castration resistant metastatic prostate cancer. He returns to clinic accompanied by his wife, Macey. He is seen today for an end of study visit.    Alonso returns to clinic today accompanied by his wife Macey.    He has fair strength and appetite. He does not drink a lot fluids.    He has a follow up appointment with Dr. Locke next week with Yanely and Zometa.     ROS: 14 point ROS neg other than the symptoms noted above in the HPI.      PAST MEDICAL HISTORY     Past Medical History:   Diagnosis Date    Prostate cancer (H)          CURRENT OUTPATIENT MEDICATIONS     Current Outpatient Medications   Medication Sig    cyclobenzaprine (FLEXERIL) 10 MG tablet 1/2-1 TAB ORALLY UP TO 3 TIMES A DAY AS NEEDED FOR MUSCLE SPASM    leuprolide (LUPRON) 11.25 mg injection Inject 11.25 mg into the muscle every 3 months    bisacodyl (DULCOLAX) 5 MG EC tablet Take 5 mg by mouth 2 times daily     No current facility-administered medications for this visit.         ALLERGIES    No Known Allergies     REVIEW OF SYSTEMS   As above in the HPI, o/w complete 12-point ROS was negative.     PHYSICAL EXAM   /76 (BP Location: Right arm, Patient Position: Sitting, Cuff Size: Adult Regular)   Pulse 65   Temp 97.9  F (36.6  C) (Oral)   Resp 18   Wt 70 kg (154 lb 6.4 oz)   SpO2 98%   BMI 21.50 kg/m    General: No acute distress  HEENT: Sclera anicteric. Oral mucosa pink and moist.  No mucositis or thrush  Lymph: No lymphadenopathy in neck  Heart: Regular, rate, and rhythm  Lungs: Clear to ascultation bilaterally  Abdomen: Positive bowel sounds. Soft, non-distended, non-tender. No organomegaly or mass.   MSK: 1+ peripheral edema bilaterally.  "  Neuro: Cranial nerves grossly intact. Oriented to person, place, time, and date.   Rash: none     LABORATORY AND IMAGING STUDIES     Recent Labs   Lab Test 10/01/24  0831 08/27/24  0839 07/08/24  1433 06/06/24  1046 05/21/24  0945    142 141 141 142   POTASSIUM 3.6 3.6 3.8 3.7 3.5   CHLORIDE 108* 107 105 106 107   CO2 26 25 24 22 22   ANIONGAP 9 10 12 13 13   BUN 16.7 15.2 12.5 16.7 16.0   CR 0.83 0.86 0.88 0.82 0.80   * 117* 114* 110* 137*   ZABRINA 9.5 9.3 9.2 9.3 8.9     Recent Labs   Lab Test 10/01/24  0831 08/27/24  0839 07/08/24  1433 06/06/24  1046 05/21/24  0945   MAG 1.7 1.7 1.7 1.6* 1.8   PHOS 4.0 3.5 3.4 4.0 2.9     Recent Labs   Lab Test 12/17/24  0926 10/01/24  0831 08/27/24  0839 07/08/24  1433 06/06/24  1046   WBC 3.4* 3.7* 3.7* 3.5* 3.9*   HGB 11.7* 11.8* 11.6* 10.2* 10.9*    206 201 245 203   MCV 96 95 96 98 94   NEUTROPHIL 58 57 65 63 62     Recent Labs   Lab Test 10/01/24  0831 08/27/24  0839 07/08/24  1433 06/06/24  1046   BILITOTAL 1.0 0.7 0.7 0.6   ALKPHOS 51 48 56 53   ALT 11 13 21 11   AST 17 18 25 16   ALBUMIN 4.1 4.1 3.9 3.7     --  186 223     No results found for: \"TSH\"  No results for input(s): \"CEA\" in the last [id number] hours.  Results for orders placed or performed during the hospital encounter of 12/14/24   MR Thoracic Spine w/o & w Contrast    Narrative    EXAM: MR THORACIC SPINE W/O and W CONTRAST  LOCATION: [facility]  DATE: 12/14/2024    INDICATION: Vertebral body lesions, spinal canal, pain  COMPARISON: CT chest abdomen pelvis 8/13/2024  CONTRAST: 7 ml Gadavist given  TECHNIQUE: Routine Thoracic Spine MRI without and with IV contrast.    FINDINGS:   Redemonstration of diffuse osseous metastatic disease, most notably involving C7, T1, T4-T6, T8, and T10-T12. At T2-T3, there is moderate right neural foraminal stenosis on a degenerative basis. No significant spinal canal stenosis at any level.  No   evidence of an acute pathologic " fracture. There is prominent dorsal intraspinal vasculature. No convincing evidence of abnormal intraspinal/intrathecal enhancement. Visualized soft tissue structures are unremarkable.      Impression    IMPRESSION:  1.  As seen on the 8/13/2024 CT chest abdomen pelvis study, there is redemonstration of diffuse osseous metastatic disease. No MRI evidence of an acute pathologic fracture.    2.  No evidence of extraosseous extension of malignancy into the spinal canal or neural foramina.    3.  There is moderate right neural foraminal stenosis at T2-T3 on a degenerative basis.         Study Result    Narrative & Impression   EXAMINATION: NM BONE SCAN WHOLE BODY  Whole-body bone scan, 12/13/2024 12:50 PM      HISTORY: Prostate cancer (H)      ADDITIONAL INFORMATION: none     PSA: PSA value for prostate cancer, otherwise delete     COMPARISON: Prior bone scan on 8/13/2024 10/30/2023.     TECHNIQUE: The patient received 25.8 mCi of Tc-99m MDP intravenously.  Whole body bone images were obtained at 3 hours.     FINDINGS: Whole-body planar images demonstrate similar multifocal  radiotracer uptake C7, bilateral ribs, thoracic spine, and left  scapula. No new abnormal focal uptake.     Mild uptake in bilateral shoulders, sternoclavicular joints, knees and  spine, likely related to degenerative changes.     Physiologic tracer within the kidneys and soft tissues with excretion  into urinary bladder.                                                                      IMPRESSION:   Progression (based on PET scan 12/12/2024).   In this  patient, this bone scan result understates the disease.  The bone scan  is unchanged from 8/13/24 and only slightly increased from 10/30/2023.   The PET scan registers many bone lesions which are not demonstrated  on the bone scan as it detects PSMA expression in bones before  sclerotic changes occur.    (Note - sensitivity and specificity for bone scan is 83% and 68% vs.  PET is 98% and 97%  respectively.)     I have personally reviewed the examination and initial interpretation  and I agree with the findings.     SMILEY RANDALL MD         SYSTEM ID:  [id number]       Recent Labs   Lab Test 12/17/24  0926 10/01/24  0831 08/27/24  0839 07/08/24  1433 06/06/24  1046 11/14/23  0752 10/30/23  1044   .20 207.40 165.60 137.30 120.00   < > 153.30   TESTOSTTOTAL  --   --  7* 13*  --   --  7*    < > = values in this interval not displayed.             ASSESSMENT AND PLAN   -Castration resistant metastatic prostate cancer   Post radical prostatectomy on 12/9/2009; salvage radiation ending May 2010; androgen deprivation therapy since 2013   Post progression on enzalutamide and abiraterone with prednisone  -Nonmuscle invasive bladder cancer diagnosed in 2011 without any recent recurrence  -No significant medical comorbidity  -ECOG performance status of 0     #Castration resistant metastatic prostate cancer.   - Has progressed on novel antiandrogen therapies including abiraterone with prednisone and enzalutamide.  He had a PSMA PET CT scan which did show extensive metastasis to the lymph nodes and bones. Referred to [facility] for the MARLO trial, randomized to Arm B with docetaxel and Radium. Docetaxel is administered every 3 weeks for 6-10 doses and Radium every 6 weeks for 6 doses last on April 11, 2024.   - He has now received planned 9 cycles of docetaxel, last on 5/21/24, and completed 6 doses of radium per study protocol.   -He has tapered off of prednisone.  - His PSA has been rising since around his last treatment on trial on 6/6/24 when it was 120 ng/ml. It increased to 165 ng/ml  on 8/27/24,  207 ng/ml six weeks ago.  It has increased to 335 ng/ml as I had suspected (lab available after this visit).    I reviewed actual images from his restaging CT chest, abdomen and pelvis and bone scan. There are no new lesions on either of these studies. I did review PSMA-PET scan which does show increased disease  burden but then we did not have a recent PSMA PET scan for comparison. I showed actual images to patient and his wife. His wife had printed reports and had several questions. He has numerous calvarial, cervical spine, scapular, clavicular mets. He also has mets in remainder of spine and his pelvis. He has FDG avid nodes. I reviewed the meaning of SUV mean / max, background radiation values in aorta, liver/spleen and parotids. He does have increased FDG above the parotid at a few sites and would benefit from lutetium 177 (Pluvicto) therapy.      I did review our next options including lutetium as standard of care or clinical trial. We do have a clinical trial that he would be eligible for.     We will continue to monitor him off therapy on leuprolide alone. He will continue to follow with Dr. Locke.     He is planning to travel to [place] by Dec 31st - in less than 2 weeks. His wife had several questions on his disease course in the absence of any further treatment. She pointed out that he has been decreasing his social engagement to outside world. He kept smiling through everything and did not express any eagerness to quit or resistance to idea of not having any further therapy. His wife is quite firm that they would like to travel to [place] in 10 days. We would not be able to start lutetium before that. She is favoring no further therapy. They would have discussions with Dr. Locke and keep me posted.      #Bone Metastasis   - Zometa every 6 months with local oncologist.     # Weakness in his legs  #Cancer fatigue   #Deconditioning     #Peripheral edema   - stable bilateral edema   Continue to monitor.  Commend following up with PCP for further workup.    I will not schedule follow up visit as patient is not interested to follow any further on the current trial and will reconsider further therapy.     The longitudinal plan of care for the diagnosis(es)/condition(s) as documented were addressed during this  visit. Due to the added complexity in care, I will continue to support Alonso in the subsequent management and with ongoing continuity of care.     40 minutes spent on the date of the encounter doing chart review, history and exam, documentation and further activities as noted above

## 2024-12-17 NOTE — LETTER
12/17/2024      Alonso Pettit  [address] [facility] [address]      Dear Colleague,    Thank you for referring your patient, Alonso Pettit, to the [facility]. Please see a copy of my visit note below.    [facility]  HEMATOLOGY AND ONCOLOGY    FOLLOW-UP VISIT NOTE    PATIENT NAME: Alonso Pettit MRN # [id number]  DATE OF VISIT: Dec 17, 2024 YOB: 1940    REFERRING PROVIDER: [facility] oncology    CANCER TYPE: Prostate adenocarcinoma; [place] 4+5  STAGE: IV (bony metastasis)  MOLECULAR PROFILE: No actionable mutations/MSI or high TMB; TP53 mutation positive    TREATMENT SUMMARY:  -12/9/2009: Radical prostatectomy; pathology revealed [place] 4+5 disease, stage pT3a,N0 positive margins  -Mar-May2010: Salvage external beam radiation therapy  -Apr 2013: Intermittent androgen deprivation therapy with leuprolide for biochemical PSA relapse  -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 Radiation therapy to the left supraclavicular lymph node ending in September 2020.  He was continued on enzalutamide  -May 2022: Switch to abiraterone with prednisone for progressive disease  -Aug 2023: Abiraterone discontinued for progressive disease.  PSMA PET scan with PSMA avid osseous and brisa metastasis.  -11/14/2023: MARLO trial: cycle 1 docetaxel. 11/15/23 cycle 1 radium per the MARLO trial.      Chemotherapy 11/14/2023  10:05 AM 12/6/2023  9:15 AM 12/28/2023  9:07 AM 1/16/2024   Day, Cycle Day 1, Cycle 1  Day 1, Cycle 2  Day 1, Cycle 3  Day 1, Cycle 4   DOCEtaxel (TAXOTERE) IV 60 mg/m2  60 mg/m2  60 mg/m2  60 mg/m2     Chemotherapy 2/6/2024  1:17 PM 2/27/2024  11:57 AM 3/19/2024  8:54 AM 4/9/2024  9:49 AM 4/30/2024  12:20 PM   Day, Cycle Day 1, Cycle 5  Day 1, Cycle 6  Day 1,  Cycle 7  Day 1, Cycle 8  Day 1, Cycle 9    DOCEtaxel IV 60 mg/m2  60 mg/m2  60 mg/m2  60 mg/m2  60 mg/m2          11/15/23 15:39 12/28/23 14:24 02/07/24 14:06 03/20/24 13:58 05/01/24 10:52 06/11/24 14:04   NM [place] 223 Dose 1   1.47 mCi/kg Does 2  108.4 mCi Dose 3  1.5 mCi/kg  Dose 4  1.47 mCi/kg Dose 5  1.5 mCi/kg Dose 6  1.487 mCi/kg        CURRENT INTERVENTIONS:  MARLO Trial randomized to Arm B with Docetaxel/prednisone/Radium-233. Leuprolide every 4 months locally.     SUBJECTIVE   Alonso Pettit is being followed for castration resistant metastatic prostate cancer. He returns to clinic accompanied by his wife, Macey. He is seen today for an end of study visit.    Alonso returns to clinic today accompanied by his wife Macey.    He has fair strength and appetite. He does not drink a lot fluids.    He has a follow up appointment with Dr. Locke next week with Yanely and Zometa.     ROS: 14 point ROS neg other than the symptoms noted above in the HPI.      PAST MEDICAL HISTORY     Past Medical History:   Diagnosis Date     Prostate cancer (H)          CURRENT OUTPATIENT MEDICATIONS     Current Outpatient Medications   Medication Sig     cyclobenzaprine (FLEXERIL) 10 MG tablet 1/2-1 TAB ORALLY UP TO 3 TIMES A DAY AS NEEDED FOR MUSCLE SPASM     leuprolide (LUPRON) 11.25 mg injection Inject 11.25 mg into the muscle every 3 months     bisacodyl (DULCOLAX) 5 MG EC tablet Take 5 mg by mouth 2 times daily     No current facility-administered medications for this visit.         ALLERGIES    No Known Allergies     REVIEW OF SYSTEMS   As above in the HPI, o/w complete 12-point ROS was negative.     PHYSICAL EXAM   /76 (BP Location: Right arm, Patient Position: Sitting, Cuff Size: Adult Regular)   Pulse 65   Temp 97.9  F (36.6  C) (Oral)   Resp 18   Wt 70 kg (154 lb 6.4 oz)   SpO2 98%   BMI 21.50 kg/m    General: No acute distress  HEENT: Sclera anicteric. Oral mucosa pink and moist.  No mucositis or  "thrush  Lymph: No lymphadenopathy in neck  Heart: Regular, rate, and rhythm  Lungs: Clear to ascultation bilaterally  Abdomen: Positive bowel sounds. Soft, non-distended, non-tender. No organomegaly or mass.   MSK: 1+ peripheral edema bilaterally.   Neuro: Cranial nerves grossly intact. Oriented to person, place, time, and date.   Rash: none     LABORATORY AND IMAGING STUDIES     Recent Labs   Lab Test 10/01/24  0831 08/27/24  0839 07/08/24  1433 06/06/24  1046 05/21/24  0945    142 141 141 142   POTASSIUM 3.6 3.6 3.8 3.7 3.5   CHLORIDE 108* 107 105 106 107   CO2 26 25 24 22 22   ANIONGAP 9 10 12 13 13   BUN 16.7 15.2 12.5 16.7 16.0   CR 0.83 0.86 0.88 0.82 0.80   * 117* 114* 110* 137*   ZABRINA 9.5 9.3 9.2 9.3 8.9     Recent Labs   Lab Test 10/01/24  0831 08/27/24  0839 07/08/24  1433 06/06/24  1046 05/21/24  0945   MAG 1.7 1.7 1.7 1.6* 1.8   PHOS 4.0 3.5 3.4 4.0 2.9     Recent Labs   Lab Test 12/17/24  0926 10/01/24  0831 08/27/24  0839 07/08/24  1433 06/06/24  1046   WBC 3.4* 3.7* 3.7* 3.5* 3.9*   HGB 11.7* 11.8* 11.6* 10.2* 10.9*    206 201 245 203   MCV 96 95 96 98 94   NEUTROPHIL 58 57 65 63 62     Recent Labs   Lab Test 10/01/24  0831 08/27/24  0839 07/08/24  1433 06/06/24  1046   BILITOTAL 1.0 0.7 0.7 0.6   ALKPHOS 51 48 56 53   ALT 11 13 21 11   AST 17 18 25 16   ALBUMIN 4.1 4.1 3.9 3.7     --  186 223     No results found for: \"TSH\"  No results for input(s): \"CEA\" in the last 00873 hours.  Results for orders placed or performed during the hospital encounter of 12/14/24   MR Thoracic Spine w/o & w Contrast    Narrative    EXAM: MR THORACIC SPINE W/O and W CONTRAST  LOCATION: [facility]  DATE: 12/14/2024    INDICATION: Vertebral body lesions, spinal canal, pain  COMPARISON: CT chest abdomen pelvis 8/13/2024  CONTRAST: 7 ml Gadavist given  TECHNIQUE: Routine Thoracic Spine MRI without and with IV contrast.    FINDINGS:   Redemonstration of diffuse osseous " metastatic disease, most notably involving C7, T1, T4-T6, T8, and T10-T12. At T2-T3, there is moderate right neural foraminal stenosis on a degenerative basis. No significant spinal canal stenosis at any level.  No   evidence of an acute pathologic fracture. There is prominent dorsal intraspinal vasculature. No convincing evidence of abnormal intraspinal/intrathecal enhancement. Visualized soft tissue structures are unremarkable.      Impression    IMPRESSION:  1.  As seen on the 8/13/2024 CT chest abdomen pelvis study, there is redemonstration of diffuse osseous metastatic disease. No MRI evidence of an acute pathologic fracture.    2.  No evidence of extraosseous extension of malignancy into the spinal canal or neural foramina.    3.  There is moderate right neural foraminal stenosis at T2-T3 on a degenerative basis.         Study Result    Narrative & Impression   EXAMINATION: NM BONE SCAN WHOLE BODY  Whole-body bone scan, 12/13/2024 12:50 PM      HISTORY: Prostate cancer (H)      ADDITIONAL INFORMATION: none     PSA: PSA value for prostate cancer, otherwise delete     COMPARISON: Prior bone scan on 8/13/2024 10/30/2023.     TECHNIQUE: The patient received 25.8 mCi of Tc-99m MDP intravenously.  Whole body bone images were obtained at 3 hours.     FINDINGS: Whole-body planar images demonstrate similar multifocal  radiotracer uptake C7, bilateral ribs, thoracic spine, and left  scapula. No new abnormal focal uptake.     Mild uptake in bilateral shoulders, sternoclavicular joints, knees and  spine, likely related to degenerative changes.     Physiologic tracer within the kidneys and soft tissues with excretion  into urinary bladder.                                                                      IMPRESSION:   Progression (based on PET scan 12/12/2024).   In this  patient, this bone scan result understates the disease.  The bone scan  is unchanged from 8/13/24 and only slightly increased from 10/30/2023.   The  PET scan registers many bone lesions which are not demonstrated  on the bone scan as it detects PSMA expression in bones before  sclerotic changes occur.    (Note - sensitivity and specificity for bone scan is 83% and 68% vs.  PET is 98% and 97% respectively.)     I have personally reviewed the examination and initial interpretation  and I agree with the findings.     SMILEY ARNDALL MD         SYSTEM ID:  [id number]       Recent Labs   Lab Test 12/17/24  0926 10/01/24  0831 08/27/24  0839 07/08/24  1433 06/06/24  1046 11/14/23  0752 10/30/23  1044   .20 207.40 165.60 137.30 120.00   < > 153.30   TESTOSTTOTAL  --   --  7* 13*  --   --  7*    < > = values in this interval not displayed.             ASSESSMENT AND PLAN   -Castration resistant metastatic prostate cancer   Post radical prostatectomy on 12/9/2009; salvage radiation ending May 2010; androgen deprivation therapy since 2013   Post progression on enzalutamide and abiraterone with prednisone  -Nonmuscle invasive bladder cancer diagnosed in 2011 without any recent recurrence  -No significant medical comorbidity  -ECOG performance status of 0     #Castration resistant metastatic prostate cancer.   - Has progressed on novel antiandrogen therapies including abiraterone with prednisone and enzalutamide.  He had a PSMA PET CT scan which did show extensive metastasis to the lymph nodes and bones. Referred to [facility] for the MARLO trial, randomized to Arm B with docetaxel and Radium. Docetaxel is administered every 3 weeks for 6-10 doses and Radium every 6 weeks for 6 doses last on April 11, 2024.   - He has now received planned 9 cycles of docetaxel, last on 5/21/24, and completed 6 doses of radium per study protocol.   -He has tapered off of prednisone.  - His PSA has been rising since around his last treatment on trial on 6/6/24 when it was 120 ng/ml. It increased to 165 ng/ml  on 8/27/24,  207 ng/ml six weeks ago.  It has increased to 335 ng/ml as I had  suspected (lab available after this visit).    I reviewed actual images from his restaging CT chest, abdomen and pelvis and bone scan. There are no new lesions on either of these studies. I did review PSMA-PET scan which does show increased disease burden but then we did not have a recent PSMA PET scan for comparison. I showed actual images to patient and his wife. His wife had printed reports and had several questions. He has numerous calvarial, cervical spine, scapular, clavicular mets. He also has mets in remainder of spine and his pelvis. He has FDG avid nodes. I reviewed the meaning of SUV mean / max, background radiation values in aorta, liver/spleen and parotids. He does have increased FDG above the parotid at a few sites and would benefit from lutetium 177 (Pluvicto) therapy.      I did review our next options including lutetium as standard of care or clinical trial. We do have a clinical trial that he would be eligible for.     We will continue to monitor him off therapy on leuprolide alone. He will continue to follow with Dr. Locke.     He is planning to travel to [place] by Dec 31st - in less than 2 weeks. His wife had several questions on his disease course in the absence of any further treatment. She pointed out that he has been decreasing his social engagement to outside world. He kept smiling through everything and did not express any eagerness to quit or resistance to idea of not having any further therapy. His wife is quite firm that they would like to travel to [place] in 10 days. We would not be able to start lutetium before that. She is favoring no further therapy. They would have discussions with Dr. Locke and keep me posted.      #Bone Metastasis   - Zometa every 6 months with local oncologist.     # Weakness in his legs  #Cancer fatigue   #Deconditioning     #Peripheral edema   - stable bilateral edema   Continue to monitor.  Commend following up with PCP for further workup.    I will not  schedule follow up visit as patient is not interested to follow any further on the current trial and will reconsider further therapy.     The longitudinal plan of care for the diagnosis(es)/condition(s) as documented were addressed during this visit. Due to the added complexity in care, I will continue to support Alonso in the subsequent management and with ongoing continuity of care.     40 minutes spent on the date of the encounter doing chart review, history and exam, documentation and further activities as noted above          Again, thank you for allowing me to participate in the care of your patient.        Sincerely,        Kg Gamez MD

## 2024-12-17 NOTE — NURSING NOTE
Chief Complaint   Patient presents with    Oncology Clinic Visit     Prostate CA    Blood Draw     Labs drawn via  by RN. VS taken.     Labs collected from venipuncture by RN. Vitals taken. Checked in for appointment(s).     1 research kit drawn and sent down to 1st floor lab.    Lorrie Alfonso RN

## 2025-02-27 ENCOUNTER — DOCUMENTATION ONLY (OUTPATIENT)
Dept: ONCOLOGY | Facility: CLINIC | Age: 85
End: 2025-02-27
Payer: MEDICARE

## 2025-02-27 DIAGNOSIS — C79.51 MALIGNANT NEOPLASM METASTATIC TO BONE (H): Primary | ICD-10-CM

## 2025-02-27 NOTE — NURSING NOTE
Patient's wife Macey Pettit called RN to relay message from Alonso. She states that patient would still like to participate in the study he just had a concern about what medicare would/would not cover. I did instruct his wife that the scans/visits/labs for the study would be billed to the patient's insurance and encouraged Macey to reach out to their insurance company to discuss coverage. She states that their insurance has not changed since last year so she will wait to see if the scans are covered once the pre-approval goes through when they are scheduled.     Due to their travel they would like the visit to be scheduled for the second week of April when they return from [place]. The study team will submit a planned deviation for these scans/visit being out of window.     Cammy López RN   Clinical Research Coordinator Nurse-Solid Tumor   Phone: [phone]

## 2025-04-14 ENCOUNTER — HOSPITAL ENCOUNTER (OUTPATIENT)
Dept: NUCLEAR MEDICINE | Facility: CLINIC | Age: 85
Setting detail: NUCLEAR MEDICINE
Discharge: HOME | End: 2025-04-14
Attending: INTERNAL MEDICINE
Payer: MEDICARE

## 2025-04-14 ENCOUNTER — HOSPITAL ENCOUNTER (OUTPATIENT)
Dept: CT IMAGING | Facility: CLINIC | Age: 85
Discharge: HOME | End: 2025-04-14
Attending: INTERNAL MEDICINE | Admitting: INTERNAL MEDICINE
Payer: MEDICARE

## 2025-04-14 DIAGNOSIS — C79.51 MALIGNANT NEOPLASM METASTATIC TO BONE (H): ICD-10-CM

## 2025-04-14 PROCEDURE — A9503 TC99M MEDRONATE: HCPCS | Performed by: INTERNAL MEDICINE

## 2025-04-14 PROCEDURE — 343N000001 HC RX 343 MED OP 636: Performed by: INTERNAL MEDICINE

## 2025-04-14 PROCEDURE — 250N000009 HC RX 250: Performed by: INTERNAL MEDICINE

## 2025-04-14 PROCEDURE — 250N000011 HC RX IP 250 OP 636: Performed by: INTERNAL MEDICINE

## 2025-04-14 PROCEDURE — 71260 CT THORAX DX C+: CPT

## 2025-04-14 PROCEDURE — 78306 BONE IMAGING WHOLE BODY: CPT

## 2025-04-14 RX ORDER — TC 99M MEDRONATE 20 MG/10ML
20-30 INJECTION, POWDER, LYOPHILIZED, FOR SOLUTION INTRAVENOUS ONCE
Status: COMPLETED | OUTPATIENT
Start: 2025-04-14 | End: 2025-04-14

## 2025-04-14 RX ORDER — IOPAMIDOL 755 MG/ML
95 INJECTION, SOLUTION INTRAVASCULAR ONCE
Status: COMPLETED | OUTPATIENT
Start: 2025-04-14 | End: 2025-04-14

## 2025-04-14 RX ADMIN — TC 99M MEDRONATE 24.5 MILLICURIE: 20 INJECTION, POWDER, LYOPHILIZED, FOR SOLUTION INTRAVENOUS at 09:03

## 2025-04-14 RX ADMIN — SODIUM CHLORIDE 74 ML: 9 INJECTION, SOLUTION INTRAVENOUS at 09:32

## 2025-04-14 RX ADMIN — IOPAMIDOL 95 ML: 755 INJECTION, SOLUTION INTRAVENOUS at 09:32

## 2025-04-15 ENCOUNTER — ONCOLOGY VISIT (OUTPATIENT)
Dept: ONCOLOGY | Facility: CLINIC | Age: 85
End: 2025-04-15
Attending: INTERNAL MEDICINE
Payer: MEDICARE

## 2025-04-15 ENCOUNTER — LAB (OUTPATIENT)
Dept: LAB | Facility: CLINIC | Age: 85
End: 2025-04-15
Attending: INTERNAL MEDICINE
Payer: MEDICARE

## 2025-04-15 ENCOUNTER — ANCILLARY PROCEDURE (OUTPATIENT)
Facility: CLINIC | Age: 85
End: 2025-04-15
Attending: INTERNAL MEDICINE
Payer: MEDICARE

## 2025-04-15 ENCOUNTER — ALLIED HEALTH/NURSE VISIT (OUTPATIENT)
Dept: ONCOLOGY | Facility: CLINIC | Age: 85
End: 2025-04-15

## 2025-04-15 VITALS
HEART RATE: 62 BPM | WEIGHT: 155.6 LBS | TEMPERATURE: 97.7 F | RESPIRATION RATE: 16 BRPM | SYSTOLIC BLOOD PRESSURE: 168 MMHG | OXYGEN SATURATION: 99 % | BODY MASS INDEX: 21.66 KG/M2 | DIASTOLIC BLOOD PRESSURE: 83 MMHG

## 2025-04-15 DIAGNOSIS — C61 PROSTATE CANCER (H): Primary | ICD-10-CM

## 2025-04-15 DIAGNOSIS — C79.51 MALIGNANT NEOPLASM METASTATIC TO BONE (H): Primary | ICD-10-CM

## 2025-04-15 DIAGNOSIS — C61 PROSTATE CANCER (H): ICD-10-CM

## 2025-04-15 DIAGNOSIS — C79.51 MALIGNANT NEOPLASM METASTATIC TO BONE (H): ICD-10-CM

## 2025-04-15 DIAGNOSIS — Z00.6 EXAMINATION OF PARTICIPANT IN CLINICAL TRIAL: ICD-10-CM

## 2025-04-15 LAB
ALBUMIN SERPL BCG-MCNC: 4.1 G/DL (ref 3.5–5.2)
ALP SERPL-CCNC: 59 U/L (ref 40–150)
ALT SERPL W P-5'-P-CCNC: 10 U/L (ref 0–70)
ANION GAP SERPL CALCULATED.3IONS-SCNC: 7 MMOL/L (ref 7–15)
AST SERPL W P-5'-P-CCNC: 16 U/L (ref 0–45)
BASOPHILS # BLD AUTO: 0 10E3/UL (ref 0–0.2)
BASOPHILS NFR BLD AUTO: 1 %
BILIRUB SERPL-MCNC: 0.6 MG/DL
BUN SERPL-MCNC: 15.5 MG/DL (ref 8–23)
CALCIUM SERPL-MCNC: 9.4 MG/DL (ref 8.8–10.4)
CHLORIDE SERPL-SCNC: 107 MMOL/L (ref 98–107)
CREAT SERPL-MCNC: 0.83 MG/DL (ref 0.67–1.17)
EGFRCR SERPLBLD CKD-EPI 2021: 87 ML/MIN/1.73M2
EOSINOPHIL # BLD AUTO: 0.1 10E3/UL (ref 0–0.7)
EOSINOPHIL NFR BLD AUTO: 2 %
ERYTHROCYTE [DISTWIDTH] IN BLOOD BY AUTOMATED COUNT: 12.4 % (ref 10–15)
GLUCOSE SERPL-MCNC: 117 MG/DL (ref 70–99)
HCO3 SERPL-SCNC: 29 MMOL/L (ref 22–29)
HCT VFR BLD AUTO: 34.4 % (ref 40–53)
HGB BLD-MCNC: 11.6 G/DL (ref 13.3–17.7)
IMM GRANULOCYTES # BLD: 0 10E3/UL
IMM GRANULOCYTES NFR BLD: 0 %
LDH SERPL L TO P-CCNC: 137 U/L (ref 0–250)
LYMPHOCYTES # BLD AUTO: 1.1 10E3/UL (ref 0.8–5.3)
LYMPHOCYTES NFR BLD AUTO: 32 %
MAGNESIUM SERPL-MCNC: 1.7 MG/DL (ref 1.7–2.3)
MCH RBC QN AUTO: 33 PG (ref 26.5–33)
MCHC RBC AUTO-ENTMCNC: 33.7 G/DL (ref 31.5–36.5)
MCV RBC AUTO: 98 FL (ref 78–100)
MONOCYTES # BLD AUTO: 0.4 10E3/UL (ref 0–1.3)
MONOCYTES NFR BLD AUTO: 11 %
NEUTROPHILS # BLD AUTO: 1.8 10E3/UL (ref 1.6–8.3)
NEUTROPHILS NFR BLD AUTO: 55 %
NRBC # BLD AUTO: 0 10E3/UL
NRBC BLD AUTO-RTO: 0 /100
PHOSPHATE SERPL-MCNC: 4.3 MG/DL (ref 2.5–4.5)
PLATELET # BLD AUTO: 174 10E3/UL (ref 150–450)
POTASSIUM SERPL-SCNC: 3.9 MMOL/L (ref 3.4–5.3)
PROT SERPL-MCNC: 6.5 G/DL (ref 6.4–8.3)
PSA SERPL DL<=0.01 NG/ML-MCNC: 738.2 NG/ML
RBC # BLD AUTO: 3.52 10E6/UL (ref 4.4–5.9)
SODIUM SERPL-SCNC: 143 MMOL/L (ref 135–145)
WBC # BLD AUTO: 3.3 10E3/UL (ref 4–11)

## 2025-04-15 PROCEDURE — 36415 COLL VENOUS BLD VENIPUNCTURE: CPT | Performed by: INTERNAL MEDICINE

## 2025-04-15 PROCEDURE — 84100 ASSAY OF PHOSPHORUS: CPT | Performed by: INTERNAL MEDICINE

## 2025-04-15 PROCEDURE — 85041 AUTOMATED RBC COUNT: CPT | Performed by: INTERNAL MEDICINE

## 2025-04-15 PROCEDURE — G0463 HOSPITAL OUTPT CLINIC VISIT: HCPCS | Performed by: INTERNAL MEDICINE

## 2025-04-15 PROCEDURE — 83735 ASSAY OF MAGNESIUM: CPT | Performed by: INTERNAL MEDICINE

## 2025-04-15 PROCEDURE — 82040 ASSAY OF SERUM ALBUMIN: CPT | Performed by: INTERNAL MEDICINE

## 2025-04-15 PROCEDURE — 83615 LACTATE (LD) (LDH) ENZYME: CPT | Performed by: INTERNAL MEDICINE

## 2025-04-15 PROCEDURE — 99207 CT RESEARCH READING: CPT | Performed by: RADIOLOGY

## 2025-04-15 PROCEDURE — 85004 AUTOMATED DIFF WBC COUNT: CPT | Performed by: INTERNAL MEDICINE

## 2025-04-15 PROCEDURE — 84153 ASSAY OF PSA TOTAL: CPT | Performed by: INTERNAL MEDICINE

## 2025-04-15 ASSESSMENT — PAIN SCALES - GENERAL: PAINLEVEL_OUTOF10: NO PAIN (0)

## 2025-04-15 NOTE — PROGRESS NOTES
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: IV (bony metastasis)  MOLECULAR PROFILE: No actionable mutations/MSI or high TMB; TP53 mutation positive    TREATMENT SUMMARY:  -12/9/2009: Radical prostatectomy; pathology revealed [place] 4+5 disease, stage pT3a,N0 positive margins  -Mar-May2010: Salvage external beam radiation therapy  -Apr 2013: Intermittent androgen deprivation therapy with leuprolide for biochemical PSA relapse  -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 Radiation therapy to the left supraclavicular lymph node ending in September 2020.  He was continued on enzalutamide  -May 2022: Switch to abiraterone with prednisone for progressive disease  -Aug 2023: Abiraterone discontinued for progressive disease.  PSMA PET scan with PSMA avid osseous and brisa metastasis.  -11/14/2023: MARLO trial: cycle 1 docetaxel. 11/15/23 cycle 1 radium per the MARLO trial.      Chemotherapy 11/14/2023  10:05 AM 12/6/2023  9:15 AM 12/28/2023  9:07 AM 1/16/2024   Day, Cycle Day 1, Cycle 1  Day 1, Cycle 2  Day 1, Cycle 3  Day 1, Cycle 4   DOCEtaxel (TAXOTERE) IV 60 mg/m2  60 mg/m2  60 mg/m2  60 mg/m2     Chemotherapy 2/6/2024  1:17 PM 2/27/2024  11:57 AM 3/19/2024  8:54 AM 4/9/2024  9:49 AM 4/30/2024  12:20 PM   Day, Cycle Day 1, Cycle 5  Day 1, Cycle 6  Day 1, Cycle 7  Day 1, Cycle 8  Day 1, Cycle 9    DOCEtaxel IV 60 mg/m2  60 mg/m2  60 mg/m2  60 mg/m2  60 mg/m2          11/15/23 15:39 12/28/23 14:24 02/07/24 14:06 03/20/24 13:58 05/01/24 10:52 06/11/24 14:04   NM [place] 223 Dose 1   1.47  mCi/kg Does 2  108.4 mCi Dose 3  1.5 mCi/kg  Dose 4  1.47 mCi/kg Dose 5  1.5 mCi/kg Dose 6  1.487 mCi/kg        CURRENT INTERVENTIONS:  MARLO Trial randomized to Arm B with Docetaxel/prednisone/Radium-233. Leuprolide every 4 months locally.     SUBJECTIVE   Alonso Ptetit is being followed for castration resistant metastatic prostate cancer. He returns to clinic accompanied by his wife, Macey. He is seen today for an end of study visit.    Alonso returns to clinic today accompanied by his wife Macey.    He has a follow up appointment with Dr. Lokce next week with Yanely and Amara.   He had been to [place]. He was not playing pickle ball as in the past. He states that it was a waste of good weather. He only played - [employer]aw puzzles. His wife points out that he did not have a fall during his entire stay and actually since his last visit with us in December last year. He has been using a walker but does not like people noticing that he needs a walker. He did agree to use it at the airport but had [facility] few [facility] [place] [facility] '    He does not have any pain other than ache in his shoulders. He blames that on his exercise with bands. He had taken a break and then restarted at the same strength as when he he had stopped. He overdid it.     ROS: 14 point ROS neg other than the symptoms noted above in the HPI.      PAST MEDICAL HISTORY     Past Medical History:   Diagnosis Date    Prostate cancer (H)          CURRENT OUTPATIENT MEDICATIONS     Current Outpatient Medications   Medication Sig    cyclobenzaprine (FLEXERIL) 10 MG tablet 1/2-1 TAB ORALLY UP TO 3 TIMES A DAY AS NEEDED FOR MUSCLE SPASM    leuprolide (LUPRON) 11.25 mg injection Inject 11.25 mg into the muscle every 3 months    bisacodyl (DULCOLAX) 5 MG EC tablet Take 5 mg by mouth 2 times daily     No current facility-administered medications for this visit.         ALLERGIES    No Known Allergies     REVIEW OF SYSTEMS   As above in the  "HPI, o/w complete 12-point ROS was negative.     PHYSICAL EXAM   BP (!) 168/83   Pulse 62   Temp 97.7  F (36.5  C) (Oral)   Resp 16   Wt 70.6 kg (155 lb 9.6 oz)   SpO2 99%   BMI 21.66 kg/m    General: No acute distress  HEENT: Sclera anicteric. Oral mucosa pink and moist.  No mucositis or thrush  Lymph: No lymphadenopathy in neck  Heart: Regular, rate, and rhythm  Lungs: Clear to ascultation bilaterally  Abdomen: Positive bowel sounds. Soft, non-distended, non-tender. No organomegaly or mass.   MSK: 1+ peripheral edema bilaterally.   Neuro: Cranial nerves grossly intact. Oriented to person, place, time, and date.   Rash: none     LABORATORY AND IMAGING STUDIES     Recent Labs   Lab Test 04/15/25  1400 12/17/24  0926 10/01/24  0831 08/27/24  0839 07/08/24  1433    143 143 142 141   POTASSIUM 3.9 4.0 3.6 3.6 3.8   CHLORIDE 107 108* 108* 107 105   CO2 29 27 26 25 24   ANIONGAP 7 8 9 10 12   BUN 15.5 14.4 16.7 15.2 12.5   CR 0.83 0.88 0.83 0.86 0.88   * 104* 102* 117* 114*   ZABRINA 9.4 9.0 9.5 9.3 9.2     Recent Labs   Lab Test 04/15/25  1400 12/17/24  0926 10/01/24  0831 08/27/24  0839 07/08/24  1433   MAG 1.7 1.8 1.7 1.7 1.7   PHOS 4.3 3.6 4.0 3.5 3.4     Recent Labs   Lab Test 04/15/25  1400 12/17/24  0926 10/01/24  0831 08/27/24  0839 07/08/24  1433   WBC 3.3* 3.4* 3.7* 3.7* 3.5*   HGB 11.6* 11.7* 11.8* 11.6* 10.2*    179 206 201 245   MCV 98 96 95 96 98   NEUTROPHIL 55 58 57 65 63     Recent Labs   Lab Test 04/15/25  1400 12/17/24  0926 10/01/24  0831   BILITOTAL 0.6 0.6 1.0   ALKPHOS 59 53 51   ALT 10 8 11   AST 16 16 17   ALBUMIN 4.1 4.0 4.1    143 140     No results found for: \"TSH\"  No results for input(s): \"CEA\" in the last 30293 hours.  Results for orders placed or performed during the hospital encounter of 04/14/25   CT Chest/Abdomen/Pelvis w Contrast    Narrative    EXAM: CT CHEST/ABDOMEN/PELVIS WITH CONTRAST  LOCATION: [facility] CENTER  DATE: 04/14/2025    INDICATION: Malignant neoplasm metastatic to bone (H).  COMPARISON: 08/13/2024.  TECHNIQUE: CT scan of the chest, abdomen, and pelvis was performed following injection of IV contrast. Multiplanar reformats were obtained. Dose reduction techniques were used.   CONTRAST: Yes.    FINDINGS:   LUNGS AND PLEURA: 2-3 mm pulmonary nodules right apex on images 16 and 24 appear slightly smaller than previous 4 mm. A few scattered additional pulmonary nodules, stable.    MEDIASTINUM/AXILLAE: Normal.    CORONARY ARTERY CALCIFICATION: None.    HEPATOBILIARY: Stable benign-appearing liver lesions.    PANCREAS: Focally dilated pancreatic duct in the tail of the pancreas, similar to previous measuring 3 mm. No visible obstructing mass.    SPLEEN: Normal.    ADRENAL GLANDS: Normal.    KIDNEYS/BLADDER: Normal.    BOWEL: Normal.    LYMPH NODES: There are multiple small mesenteric and retroperitoneal lymph nodes, none of which are enlarged by size criteria.    VASCULATURE: Normal.    PELVIC ORGANS: Penile prosthesis. Prostatectomy.    MUSCULOSKELETAL: Sclerotic bone lesions are unchanged. No new lesions.      Impression    IMPRESSION:  1.  Stable skeletal metastases.  2.  Multiple small mesenteric and retroperitoneal lymph nodes. No lymphadenopathy by size criteria.       Recent Labs   Lab Test 12/17/24  0926 10/01/24  0831 08/27/24  0839 07/08/24  1433 06/06/24  1046 11/14/23  0752 10/30/23  1044   .20 207.40 165.60 137.30 120.00   < > 153.30   TESTOSTTOTAL  --   --  7* 13*  --   --  7*    < > = values in this interval not displayed.       ASSESSMENT AND PLAN   -Castration resistant metastatic prostate cancer   Post radical prostatectomy on 12/9/2009; salvage radiation ending May 2010; androgen deprivation therapy since 2013   Post progression on enzalutamide and abiraterone with prednisone  -Nonmuscle invasive bladder cancer diagnosed in 2011 without any recent recurrence  -No significant medical  comorbidity  -ECOG performance status of 0     #Castration resistant metastatic prostate cancer.   - Has progressed on novel antiandrogen therapies including abiraterone with prednisone and enzalutamide.  He had a PSMA PET CT scan which did show extensive metastasis to the lymph nodes and bones. Referred to [facility] for the MARLO trial, randomized to Arm B with docetaxel and Radium. Docetaxel is administered every 3 weeks for 6-10 doses and Radium every 6 weeks for 6 doses last on April 11, 2024.   - He has now received planned 9 cycles of docetaxel, last on 5/21/24, and completed 6 doses of radium per study protocol.   -He has tapered off of prednisone.  - His PSA has been rising since around his last treatment on trial on 6/6/24 when it was 120 ng/ml. It increased to 165 ng/ml  on 8/27/24,  207 ng/ml six weeks ago.  It has increased to 335 ng/ml as I had suspected (lab available after this visit).    I reviewed actual images from his restaging CT chest, abdomen and pelvis and bone scan. There are no new lesions on CT chest, abdomen and pelvis. His bone scan shows increased uptake but official read is pending and we will have to wait for official read. I did review that his last PSMA-PET scan which does show increased disease burden with numerous PSMA- FDG avid numerous calvarial, cervical spine, scapular, clavicular mets. He also has mets in remainder of spine and his pelvis. He has FDG avid nodes. PSMA - PET scan is predictive of response and he would benefit from lutetium 177 (Pluvicto) therapy. This would be one of the easiest treatment options. It is a short 15 min infusion that can be administered 6 weeks apart for up to 6 doses. He could gain benefit from 2-4 doses and does not have to complete the 6 doses.      We will continue to monitor him off therapy on leuprolide alone. He will continue to follow with Dr. Locke.     Macey is worried about his dementia and proclivity for Alzheimrs. She is favoring no  further therapy. They would have discussions with Dr. Locke and keep me posted.      #Bone Metastasis   - Zometa every 6 months with local oncologist.     # Weakness in his legs  #Cancer fatigue   #Deconditioning     #Peripheral edema   - stable bilateral edema   Continue to monitor.  Commend following up with PCP for further workup.    I will not schedule follow up visit as patient is not interested to follow any further on the current trial and will reconsider further therapy.     The longitudinal plan of care for the diagnosis(es)/condition(s) as documented were addressed during this visit. Due to the added complexity in care, I will continue to support Alonso in the subsequent management and with ongoing continuity of care.     40 minutes spent on the date of the encounter doing chart review, history and exam, documentation and further activities as noted above

## 2025-04-15 NOTE — PROGRESS NOTES
[id number] MARLO: Study Follow-Up Note   Subject name: Alonso Pettit     Date: 4/15/2025    Mr.School presented to today's visit with his wife Macey. They have just returned to [place] from [place] after four months. Both Alosno and his wife reported that he has been improving since the last visit. Regaining some of his strength in huis lower extremities. No falls since October. However, his activity is still pretty limited. He has not been able to play pickle ball or softball in over a year. He does sometimes use the assistance of a walker. Ongoing fatigue, bilateral shoulder pain, BLE edema, dyspnea upon exertion.   review Alonso's recent scans with him, no evidence of progression.   The patient and his wife were presented in next treatment options. They expressed their wish for Quality of life is the most important thing to them.    QLQs were completed at this visit.      Alonso Pettit was given the opportunity to ask any trial related questions. He did not have any at this time. He has agreed to remain on study follow-up.     Alonso Pettit expressed any concerns: no    The patient will be seeing his other oncologist next week Wednesday.     Kori Solano   Clinical Research Coordinator  [facility] Clinical Trials Office  [email or web address]/ [email or web address]  [phone]/ [phone]

## 2025-04-15 NOTE — LETTER
4/15/2025      Alonso Pettit  [address] [facility] [address]      Dear Colleague,    Thank you for referring your patient, Alonso Pettit, to the [facility]. Please see a copy of my visit note below.    [facility]  HEMATOLOGY AND ONCOLOGY    FOLLOW-UP VISIT NOTE    PATIENT NAME: Alonso Pettit MRN # [id number]  DATE OF VISIT: Apr 15, 2025 YOB: 1940    REFERRING PROVIDER: [facility] Veras oncology    CANCER TYPE: Prostate adenocarcinoma; [place] 4+5  STAGE: IV (bony metastasis)  MOLECULAR PROFILE: No actionable mutations/MSI or high TMB; TP53 mutation positive    TREATMENT SUMMARY:  -12/9/2009: Radical prostatectomy; pathology revealed [place] 4+5 disease, stage pT3a,N0 positive margins  -Mar-May2010: Salvage external beam radiation therapy  -Apr 2013: Intermittent androgen deprivation therapy with leuprolide for biochemical PSA relapse  -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 Radiation therapy to the left supraclavicular lymph node ending in September 2020.  He was continued on enzalutamide  -May 2022: Switch to abiraterone with prednisone for progressive disease  -Aug 2023: Abiraterone discontinued for progressive disease.  PSMA PET scan with PSMA avid osseous and brisa metastasis.  -11/14/2023: MARLO trial: cycle 1 docetaxel. 11/15/23 cycle 1 radium per the MARLO trial.      Chemotherapy 11/14/2023  10:05 AM 12/6/2023  9:15 AM 12/28/2023  9:07 AM 1/16/2024   Day, Cycle Day 1, Cycle 1  Day 1, Cycle 2  Day 1, Cycle 3  Day 1, Cycle 4   DOCEtaxel (TAXOTERE) IV 60 mg/m2  60 mg/m2  60 mg/m2  60 mg/m2     Chemotherapy 2/6/2024  1:17 PM 2/27/2024  11:57 AM 3/19/2024  8:54 AM 4/9/2024  9:49 AM 4/30/2024  12:20 PM   Day, Cycle Day 1, Cycle 5  Day 1, Cycle 6  Day 1,  Cycle 7  Day 1, Cycle 8  Day 1, Cycle 9    DOCEtaxel IV 60 mg/m2  60 mg/m2  60 mg/m2  60 mg/m2  60 mg/m2          11/15/23 15:39 12/28/23 14:24 02/07/24 14:06 03/20/24 13:58 05/01/24 10:52 06/11/24 14:04   NM [place] 223 Dose 1   1.47 mCi/kg Does 2  108.4 mCi Dose 3  1.5 mCi/kg  Dose 4  1.47 mCi/kg Dose 5  1.5 mCi/kg Dose 6  1.487 mCi/kg        CURRENT INTERVENTIONS:  MARLO Trial randomized to Arm B with Docetaxel/prednisone/Radium-233. Leuprolide every 4 months locally.     SUBJECTIVE   Alonso Pettit is being followed for castration resistant metastatic prostate cancer. He returns to clinic accompanied by his wife, Macey. He is seen today for an end of study visit.    Alonso returns to clinic today accompanied by his wife Macey.    He has a follow up appointment with Dr. Locke next week with Eligard and Zometa.   He had been to [place]. He was not playing pickle ball as in the past. He states that it was a waste of good weather. He only played - [employer] puzzles. His wife points out that he did not have a fall during his entire stay and actually since his last visit with us in December last year. He has been using a walker but does not like people noticing that he needs a walker. He did agree to use it at the airport but had to wait a few times getting [facility] [place] [facility]. '    He does not have any pain other than ache in his shoulders. He blames that on his exercise with bands. He had taken a break and then restarted at the same strength as when he he had stopped. He overdid it.     ROS: 14 point ROS neg other than the symptoms noted above in the HPI.      PAST MEDICAL HISTORY     Past Medical History:   Diagnosis Date     Prostate cancer (H)          CURRENT OUTPATIENT MEDICATIONS     Current Outpatient Medications   Medication Sig     cyclobenzaprine (FLEXERIL) 10 MG tablet 1/2-1 TAB ORALLY UP TO 3 TIMES A DAY AS NEEDED FOR MUSCLE SPASM     leuprolide (LUPRON) 11.25 mg injection Inject 11.25 mg into the  "muscle every 3 months     bisacodyl (DULCOLAX) 5 MG EC tablet Take 5 mg by mouth 2 times daily     No current facility-administered medications for this visit.         ALLERGIES    No Known Allergies     REVIEW OF SYSTEMS   As above in the HPI, o/w complete 12-point ROS was negative.     PHYSICAL EXAM   BP (!) 168/83   Pulse 62   Temp 97.7  F (36.5  C) (Oral)   Resp 16   Wt 70.6 kg (155 lb 9.6 oz)   SpO2 99%   BMI 21.66 kg/m    General: No acute distress  HEENT: Sclera anicteric. Oral mucosa pink and moist.  No mucositis or thrush  Lymph: No lymphadenopathy in neck  Heart: Regular, rate, and rhythm  Lungs: Clear to ascultation bilaterally  Abdomen: Positive bowel sounds. Soft, non-distended, non-tender. No organomegaly or mass.   MSK: 1+ peripheral edema bilaterally.   Neuro: Cranial nerves grossly intact. Oriented to person, place, time, and date.   Rash: none     LABORATORY AND IMAGING STUDIES     Recent Labs   Lab Test 04/15/25  1400 12/17/24  0926 10/01/24  0831 08/27/24  0839 07/08/24  1433    143 143 142 141   POTASSIUM 3.9 4.0 3.6 3.6 3.8   CHLORIDE 107 108* 108* 107 105   CO2 29 27 26 25 24   ANIONGAP 7 8 9 10 12   BUN 15.5 14.4 16.7 15.2 12.5   CR 0.83 0.88 0.83 0.86 0.88   * 104* 102* 117* 114*   ZABRINA 9.4 9.0 9.5 9.3 9.2     Recent Labs   Lab Test 04/15/25  1400 12/17/24  0926 10/01/24  0831 08/27/24  0839 07/08/24  1433   MAG 1.7 1.8 1.7 1.7 1.7   PHOS 4.3 3.6 4.0 3.5 3.4     Recent Labs   Lab Test 04/15/25  1400 12/17/24  0926 10/01/24  0831 08/27/24  0839 07/08/24  1433   WBC 3.3* 3.4* 3.7* 3.7* 3.5*   HGB 11.6* 11.7* 11.8* 11.6* 10.2*    179 206 201 245   MCV 98 96 95 96 98   NEUTROPHIL 55 58 57 65 63     Recent Labs   Lab Test 04/15/25  1400 12/17/24  0926 10/01/24  0831   BILITOTAL 0.6 0.6 1.0   ALKPHOS 59 53 51   ALT 10 8 11   AST 16 16 17   ALBUMIN 4.1 4.0 4.1    143 140     No results found for: \"TSH\"  No results for input(s): \"CEA\" in the last 16349 hours.  Results " for orders placed or performed during the hospital encounter of 04/14/25   CT Chest/Abdomen/Pelvis w Contrast    Narrative    EXAM: CT CHEST/ABDOMEN/PELVIS WITH CONTRAST  LOCATION: [facility]  DATE: 04/14/2025    INDICATION: Malignant neoplasm metastatic to bone (H).  COMPARISON: 08/13/2024.  TECHNIQUE: CT scan of the chest, abdomen, and pelvis was performed following injection of IV contrast. Multiplanar reformats were obtained. Dose reduction techniques were used.   CONTRAST: Yes.    FINDINGS:   LUNGS AND PLEURA: 2-3 mm pulmonary nodules right apex on images 16 and 24 appear slightly smaller than previous 4 mm. A few scattered additional pulmonary nodules, stable.    MEDIASTINUM/AXILLAE: Normal.    CORONARY ARTERY CALCIFICATION: None.    HEPATOBILIARY: Stable benign-appearing liver lesions.    PANCREAS: Focally dilated pancreatic duct in the tail of the pancreas, similar to previous measuring 3 mm. No visible obstructing mass.    SPLEEN: Normal.    ADRENAL GLANDS: Normal.    KIDNEYS/BLADDER: Normal.    BOWEL: Normal.    LYMPH NODES: There are multiple small mesenteric and retroperitoneal lymph nodes, none of which are enlarged by size criteria.    VASCULATURE: Normal.    PELVIC ORGANS: Penile prosthesis. Prostatectomy.    MUSCULOSKELETAL: Sclerotic bone lesions are unchanged. No new lesions.      Impression    IMPRESSION:  1.  Stable skeletal metastases.  2.  Multiple small mesenteric and retroperitoneal lymph nodes. No lymphadenopathy by size criteria.       Recent Labs   Lab Test 12/17/24  0926 10/01/24  0831 08/27/24  0839 07/08/24  1433 06/06/24  1046 11/14/23  0752 10/30/23  1044   .20 207.40 165.60 137.30 120.00   < > 153.30   TESTOSTTOTAL  --   --  7* 13*  --   --  7*    < > = values in this interval not displayed.       ASSESSMENT AND PLAN   -Castration resistant metastatic prostate cancer   Post radical prostatectomy on 12/9/2009; salvage radiation  ending May 2010; androgen deprivation therapy since 2013   Post progression on enzalutamide and abiraterone with prednisone  -Nonmuscle invasive bladder cancer diagnosed in 2011 without any recent recurrence  -No significant medical comorbidity  -ECOG performance status of 0     #Castration resistant metastatic prostate cancer.   - Has progressed on novel antiandrogen therapies including abiraterone with prednisone and enzalutamide.  He had a PSMA PET CT scan which did show extensive metastasis to the lymph nodes and bones. Referred to [facility] for the MARLO trial, randomized to Arm B with docetaxel and Radium. Docetaxel is administered every 3 weeks for 6-10 doses and Radium every 6 weeks for 6 doses last on April 11, 2024.   - He has now received planned 9 cycles of docetaxel, last on 5/21/24, and completed 6 doses of radium per study protocol.   -He has tapered off of prednisone.  - His PSA has been rising since around his last treatment on trial on 6/6/24 when it was 120 ng/ml. It increased to 165 ng/ml  on 8/27/24,  207 ng/ml six weeks ago.  It has increased to 335 ng/ml as I had suspected (lab available after this visit).    I reviewed actual images from his restaging CT chest, abdomen and pelvis and bone scan. There are no new lesions on CT chest, abdomen and pelvis. His bone scan shows increased uptake but official read is pending and we will have to wait for official read. I did review that his last PSMA-PET scan which does show increased disease burden with numerous PSMA- FDG avid numerous calvarial, cervical spine, scapular, clavicular mets. He also has mets in remainder of spine and his pelvis. He has FDG avid nodes. PSMA - PET scan is predictive of response and he would benefit from lutetium 177 (Pluvicto) therapy. This would be one of the easiest treatment options. It is a short 15 min infusion that can be administered 6 weeks apart for up to 6 doses. He could gain benefit from 2-4 doses and does not have  to complete the 6 doses.      We will continue to monitor him off therapy on leuprolide alone. He will continue to follow with Dr. Locke.     Macey is worried about his dementia and proclivity for Alzheimrs. She is favoring no further therapy. They would have discussions with Dr. Locke and keep me posted.      #Bone Metastasis   - Zometa every 6 months with local oncologist.     # Weakness in his legs  #Cancer fatigue   #Deconditioning     #Peripheral edema   - stable bilateral edema   Continue to monitor.  Commend following up with PCP for further workup.    I will not schedule follow up visit as patient is not interested to follow any further on the current trial and will reconsider further therapy.     The longitudinal plan of care for the diagnosis(es)/condition(s) as documented were addressed during this visit. Due to the added complexity in care, I will continue to support Alonso in the subsequent management and with ongoing continuity of care.     40 minutes spent on the date of the encounter doing chart review, history and exam, documentation and further activities as noted above          Again, thank you for allowing me to participate in the care of your patient.        Sincerely,        Kg Gamez MD    Electronically signed

## 2025-04-15 NOTE — NURSING NOTE
"Oncology Rooming Note    April 15, 2025 2:21 PM   Alonso Pettit is a 84 year old male who presents for:    Chief Complaint   Patient presents with    Oncology Clinic Visit     Prostate cancer      Initial Vitals: BP (!) 168/83   Pulse 62   Temp 97.7  F (36.5  C) (Oral)   Resp 16   Wt 70.6 kg (155 lb 9.6 oz)   SpO2 99%   BMI 21.66 kg/m   Estimated body mass index is 21.66 kg/m  as calculated from the following:    Height as of 2/6/24: 1.805 m (5' 11.06\").    Weight as of this encounter: 70.6 kg (155 lb 9.6 oz). Body surface area is 1.88 meters squared.  No Pain (0) Comment: Data Unavailable   No LMP for male patient.  Allergies reviewed: Yes  Medications reviewed: Yes    Medications: Medication refills not needed today.  Pharmacy name entered into [facility]: [id number] [place] [address]    Frailty Screening:   Is the patient here for a new oncology consult visit in cancer care? 2. No    PHQ9:  Did this patient require a PHQ9?: No      Clinical concerns: none    Adriana Gallagher"

## 2025-04-16 ENCOUNTER — PATIENT OUTREACH (OUTPATIENT)
Dept: ONCOLOGY | Facility: CLINIC | Age: 85
End: 2025-04-16
Payer: MEDICARE

## 2025-04-16 NOTE — PROGRESS NOTES
[facility]: Cancer Care                                                                                      Writer faxed the recent CT and bone scan reports along with lab results to Dr. Crowley's clinic per patient's request.  Confirmed successful fax transmission.    Genet Hansen, RN, BSN, OCN  Oncology RN Care Coordinator  [facility] Cancer Clinic

## 2025-06-03 ENCOUNTER — HOSPITAL ENCOUNTER (EMERGENCY)
Facility: CLINIC | Age: 85
End: 2025-06-03
Payer: MEDICARE

## 2025-06-03 ENCOUNTER — APPOINTMENT (OUTPATIENT)
Dept: CT IMAGING | Facility: HOSPITAL | Age: 85
End: 2025-06-03
Attending: EMERGENCY MEDICINE
Payer: MEDICARE

## 2025-06-03 ENCOUNTER — HOSPITAL ENCOUNTER (INPATIENT)
Facility: HOSPITAL | Age: 85
End: 2025-06-03
Attending: EMERGENCY MEDICINE | Admitting: EMERGENCY MEDICINE
Payer: MEDICARE

## 2025-06-03 ENCOUNTER — APPOINTMENT (OUTPATIENT)
Dept: RADIOLOGY | Facility: HOSPITAL | Age: 85
End: 2025-06-03
Attending: EMERGENCY MEDICINE
Payer: MEDICARE

## 2025-06-03 DIAGNOSIS — R50.9 FEVER, UNSPECIFIED FEVER CAUSE: ICD-10-CM

## 2025-06-03 DIAGNOSIS — R53.83 OTHER FATIGUE: ICD-10-CM

## 2025-06-03 DIAGNOSIS — U07.1 COVID-19: ICD-10-CM

## 2025-06-03 LAB
ALBUMIN SERPL BCG-MCNC: 4.2 G/DL (ref 3.5–5.2)
ALP SERPL-CCNC: 76 U/L (ref 40–150)
ALT SERPL W P-5'-P-CCNC: 13 U/L (ref 0–70)
ANION GAP SERPL CALCULATED.3IONS-SCNC: 12 MMOL/L (ref 7–15)
AST SERPL W P-5'-P-CCNC: 18 U/L (ref 0–45)
BASE EXCESS BLDV CALC-SCNC: 3 MMOL/L (ref -3–3)
BASOPHILS # BLD AUTO: 0 10E3/UL (ref 0–0.2)
BASOPHILS NFR BLD AUTO: 1 %
BILIRUB DIRECT SERPL-MCNC: 0.26 MG/DL (ref 0–0.3)
BILIRUB SERPL-MCNC: 0.9 MG/DL
BUN SERPL-MCNC: 17.6 MG/DL (ref 8–23)
CALCIUM SERPL-MCNC: 9.6 MG/DL (ref 8.8–10.4)
CHLORIDE SERPL-SCNC: 104 MMOL/L (ref 98–107)
CREAT SERPL-MCNC: 0.87 MG/DL (ref 0.67–1.17)
D DIMER PPP FEU-MCNC: 2.53 UG/ML FEU (ref 0–0.5)
EGFRCR SERPLBLD CKD-EPI 2021: 86 ML/MIN/1.73M2
EOSINOPHIL # BLD AUTO: 0 10E3/UL (ref 0–0.7)
EOSINOPHIL NFR BLD AUTO: 0 %
ERYTHROCYTE [DISTWIDTH] IN BLOOD BY AUTOMATED COUNT: 12.3 % (ref 10–15)
FLUAV RNA SPEC QL NAA+PROBE: NEGATIVE
FLUBV RNA RESP QL NAA+PROBE: NEGATIVE
GLUCOSE SERPL-MCNC: 122 MG/DL (ref 70–99)
HCO3 BLDV-SCNC: 29 MMOL/L (ref 21–28)
HCO3 SERPL-SCNC: 25 MMOL/L (ref 22–29)
HCT VFR BLD AUTO: 34 % (ref 40–53)
HGB BLD-MCNC: 11.7 G/DL (ref 13.3–17.7)
HOLD SPECIMEN: NORMAL
IMM GRANULOCYTES # BLD: 0 10E3/UL
IMM GRANULOCYTES NFR BLD: 0 %
LACTATE SERPL-SCNC: 1.4 MMOL/L (ref 0.7–2)
LYMPHOCYTES # BLD AUTO: 0.3 10E3/UL (ref 0.8–5.3)
LYMPHOCYTES NFR BLD AUTO: 9 %
MAGNESIUM SERPL-MCNC: 1.6 MG/DL (ref 1.7–2.3)
MCH RBC QN AUTO: 33.1 PG (ref 26.5–33)
MCHC RBC AUTO-ENTMCNC: 34.4 G/DL (ref 31.5–36.5)
MCV RBC AUTO: 96 FL (ref 78–100)
MONOCYTES # BLD AUTO: 0.5 10E3/UL (ref 0–1.3)
MONOCYTES NFR BLD AUTO: 13 %
NEUTROPHILS # BLD AUTO: 3 10E3/UL (ref 1.6–8.3)
NEUTROPHILS NFR BLD AUTO: 77 %
NRBC # BLD AUTO: 0 10E3/UL
NRBC BLD AUTO-RTO: 0 /100
O2/TOTAL GAS SETTING VFR VENT: 21 %
OXYHGB MFR BLDV: 27 % (ref 70–75)
PCO2 BLDV: 48 MM HG (ref 40–50)
PH BLDV: 7.39 [PH] (ref 7.32–7.43)
PLATELET # BLD AUTO: 178 10E3/UL (ref 150–450)
PO2 BLDV: 19 MM HG (ref 25–47)
POTASSIUM SERPL-SCNC: 3.9 MMOL/L (ref 3.4–5.3)
PROT SERPL-MCNC: 7.1 G/DL (ref 6.4–8.3)
RBC # BLD AUTO: 3.54 10E6/UL (ref 4.4–5.9)
RSV RNA SPEC NAA+PROBE: NEGATIVE
SAO2 % BLDV: 27.3 % (ref 70–75)
SARS-COV-2 RNA RESP QL NAA+PROBE: POSITIVE
SODIUM SERPL-SCNC: 141 MMOL/L (ref 135–145)
WBC # BLD AUTO: 3.9 10E3/UL (ref 4–11)

## 2025-06-03 PROCEDURE — 120N000001 HC R&B MED SURG/OB

## 2025-06-03 PROCEDURE — 99223 1ST HOSP IP/OBS HIGH 75: CPT | Performed by: INTERNAL MEDICINE

## 2025-06-03 PROCEDURE — 83605 ASSAY OF LACTIC ACID: CPT | Performed by: EMERGENCY MEDICINE

## 2025-06-03 PROCEDURE — 93005 ELECTROCARDIOGRAM TRACING: CPT | Performed by: EMERGENCY MEDICINE

## 2025-06-03 PROCEDURE — 70450 CT HEAD/BRAIN W/O DYE: CPT

## 2025-06-03 PROCEDURE — 258N000003 HC RX IP 258 OP 636: Performed by: INTERNAL MEDICINE

## 2025-06-03 PROCEDURE — 72125 CT NECK SPINE W/O DYE: CPT

## 2025-06-03 PROCEDURE — 250N000011 HC RX IP 250 OP 636: Performed by: INTERNAL MEDICINE

## 2025-06-03 PROCEDURE — 250N000013 HC RX MED GY IP 250 OP 250 PS 637: Performed by: EMERGENCY MEDICINE

## 2025-06-03 PROCEDURE — 99285 EMERGENCY DEPT VISIT HI MDM: CPT | Mod: 25

## 2025-06-03 PROCEDURE — 36415 COLL VENOUS BLD VENIPUNCTURE: CPT | Performed by: EMERGENCY MEDICINE

## 2025-06-03 PROCEDURE — 87040 BLOOD CULTURE FOR BACTERIA: CPT | Performed by: EMERGENCY MEDICINE

## 2025-06-03 PROCEDURE — 85004 AUTOMATED DIFF WBC COUNT: CPT | Performed by: EMERGENCY MEDICINE

## 2025-06-03 PROCEDURE — XW033E5 INTRODUCTION OF REMDESIVIR ANTI-INFECTIVE INTO PERIPHERAL VEIN, PERCUTANEOUS APPROACH, NEW TECHNOLOGY GROUP 5: ICD-10-PCS | Performed by: INTERNAL MEDICINE

## 2025-06-03 PROCEDURE — 80048 BASIC METABOLIC PNL TOTAL CA: CPT | Performed by: EMERGENCY MEDICINE

## 2025-06-03 PROCEDURE — 82040 ASSAY OF SERUM ALBUMIN: CPT | Performed by: INTERNAL MEDICINE

## 2025-06-03 PROCEDURE — 87637 SARSCOV2&INF A&B&RSV AMP PRB: CPT | Performed by: EMERGENCY MEDICINE

## 2025-06-03 PROCEDURE — 85379 FIBRIN DEGRADATION QUANT: CPT | Performed by: INTERNAL MEDICINE

## 2025-06-03 PROCEDURE — 71046 X-RAY EXAM CHEST 2 VIEWS: CPT

## 2025-06-03 PROCEDURE — 258N000003 HC RX IP 258 OP 636: Performed by: EMERGENCY MEDICINE

## 2025-06-03 PROCEDURE — 83735 ASSAY OF MAGNESIUM: CPT | Performed by: EMERGENCY MEDICINE

## 2025-06-03 PROCEDURE — 82805 BLOOD GASES W/O2 SATURATION: CPT | Performed by: EMERGENCY MEDICINE

## 2025-06-03 PROCEDURE — 82248 BILIRUBIN DIRECT: CPT | Performed by: INTERNAL MEDICINE

## 2025-06-03 PROCEDURE — 85018 HEMOGLOBIN: CPT | Performed by: EMERGENCY MEDICINE

## 2025-06-03 PROCEDURE — 93005 ELECTROCARDIOGRAM TRACING: CPT | Performed by: STUDENT IN AN ORGANIZED HEALTH CARE EDUCATION/TRAINING PROGRAM

## 2025-06-03 RX ORDER — GABAPENTIN 100 MG/1
100 CAPSULE ORAL DAILY PRN
Status: ON HOLD | COMMUNITY
Start: 2025-06-03

## 2025-06-03 RX ORDER — ONDANSETRON 2 MG/ML
4 INJECTION INTRAMUSCULAR; INTRAVENOUS EVERY 6 HOURS PRN
Status: DISCONTINUED | OUTPATIENT
Start: 2025-06-03 | End: 2025-06-04

## 2025-06-03 RX ORDER — CALCIUM CARBONATE 500 MG/1
1000 TABLET, CHEWABLE ORAL 4 TIMES DAILY PRN
Status: DISCONTINUED | OUTPATIENT
Start: 2025-06-03 | End: 2025-06-04

## 2025-06-03 RX ORDER — ACETAMINOPHEN 325 MG/1
650 TABLET ORAL EVERY 4 HOURS PRN
Status: DISCONTINUED | OUTPATIENT
Start: 2025-06-03 | End: 2025-06-04

## 2025-06-03 RX ORDER — GABAPENTIN 100 MG/1
100 CAPSULE ORAL DAILY PRN
Status: DISCONTINUED | OUTPATIENT
Start: 2025-06-03 | End: 2025-06-04

## 2025-06-03 RX ORDER — ACETAMINOPHEN 650 MG/1
650 SUPPOSITORY RECTAL EVERY 4 HOURS PRN
Status: DISCONTINUED | OUTPATIENT
Start: 2025-06-03 | End: 2025-06-04

## 2025-06-03 RX ORDER — POLYETHYLENE GLYCOL 3350 17 G/17G
17 POWDER, FOR SOLUTION ORAL 2 TIMES DAILY PRN
Status: DISCONTINUED | OUTPATIENT
Start: 2025-06-03 | End: 2025-06-04

## 2025-06-03 RX ORDER — ENOXAPARIN SODIUM 100 MG/ML
0.5 INJECTION SUBCUTANEOUS 2 TIMES DAILY
Status: DISCONTINUED | OUTPATIENT
Start: 2025-06-03 | End: 2025-06-04

## 2025-06-03 RX ORDER — HYDRALAZINE HYDROCHLORIDE 20 MG/ML
10 INJECTION INTRAMUSCULAR; INTRAVENOUS EVERY 4 HOURS PRN
Status: DISCONTINUED | OUTPATIENT
Start: 2025-06-03 | End: 2025-06-04

## 2025-06-03 RX ORDER — PROCHLORPERAZINE MALEATE 5 MG/1
5 TABLET ORAL EVERY 6 HOURS PRN
Status: DISCONTINUED | OUTPATIENT
Start: 2025-06-03 | End: 2025-06-04

## 2025-06-03 RX ORDER — ACETAMINOPHEN 325 MG/1
650 TABLET ORAL ONCE
Status: COMPLETED | OUTPATIENT
Start: 2025-06-03 | End: 2025-06-03

## 2025-06-03 RX ORDER — AMOXICILLIN 250 MG
1 CAPSULE ORAL 2 TIMES DAILY PRN
Status: DISCONTINUED | OUTPATIENT
Start: 2025-06-03 | End: 2025-06-04

## 2025-06-03 RX ORDER — ONDANSETRON 4 MG/1
4 TABLET, ORALLY DISINTEGRATING ORAL EVERY 6 HOURS PRN
Status: DISCONTINUED | OUTPATIENT
Start: 2025-06-03 | End: 2025-06-04

## 2025-06-03 RX ORDER — AMOXICILLIN 250 MG
2 CAPSULE ORAL 2 TIMES DAILY PRN
Status: DISCONTINUED | OUTPATIENT
Start: 2025-06-03 | End: 2025-06-04

## 2025-06-03 RX ADMIN — ACETAMINOPHEN 650 MG: 325 TABLET ORAL at 20:12

## 2025-06-03 RX ADMIN — REMDESIVIR 200 MG: 100 INJECTION, POWDER, LYOPHILIZED, FOR SOLUTION INTRAVENOUS at 22:20

## 2025-06-03 RX ADMIN — SODIUM CHLORIDE 50 ML: 900 INJECTION INTRAVENOUS at 22:55

## 2025-06-03 RX ADMIN — ENOXAPARIN SODIUM 30 MG: 30 INJECTION SUBCUTANEOUS at 22:20

## 2025-06-03 RX ADMIN — SODIUM CHLORIDE 1000 ML: 0.9 INJECTION, SOLUTION INTRAVENOUS at 20:13

## 2025-06-03 ASSESSMENT — ACTIVITIES OF DAILY LIVING (ADL)
ADLS_ACUITY_SCORE: 41

## 2025-06-03 ASSESSMENT — COLUMBIA-SUICIDE SEVERITY RATING SCALE - C-SSRS
6. HAVE YOU EVER DONE ANYTHING, STARTED TO DO ANYTHING, OR PREPARED TO DO ANYTHING TO END YOUR LIFE?: NO
2. HAVE YOU ACTUALLY HAD ANY THOUGHTS OF KILLING YOURSELF IN THE PAST MONTH?: NO
1. IN THE PAST MONTH, HAVE YOU WISHED YOU WERE DEAD OR WISHED YOU COULD GO TO SLEEP AND NOT WAKE UP?: NO

## 2025-06-03 NOTE — ED PROVIDER NOTES
EMERGENCY DEPARTMENT ENCOUnter      NAME: Alonso Pettit  AGE: 84 year old male  YOB: 1940  MRN: [id number]  EVALUATION DATE & TIME: 6/3/2025  5:38 PM    PCP: Rusty Hernandez    ED PROVIDER: Rafi Dixon DO      Chief Complaint   Patient presents with    Fall    Altered Mental Status    Fever         FINAL IMPRESSION:  1. Fever, unspecified fever cause    2. Other fatigue    3. COVID-19          ED COURSE & MEDICAL DECISION MAKING:    The patient presented to the emergency department today with generalized weakness and fever over the past few days.  Family states that he is not able to get up and do his normal activities at home.  On exam, he appears globally fatigued but no other focal findings.  Laboratory testing is notable for positive COVID test which is likely the cause of his symptoms today.  Given his inability to ambulate, we will plan to keep him in the hospital for further treatment.  He and family are comfortable with this plan.    Medical Decision Making  I obtained history from Family Member/Significant Other  Admit.    MIPS (CTPE, Dental pain, [place], Sinusitis, Asthma/COPD, Head Trauma): Not Applicable    SEPSIS: None        At the conclusion of the encounter I discussed the results of all of the tests and the disposition. The questions were answered. The patient or family acknowledged understanding and was agreeable with the care plan.     =================================================================    HPI        Alonso Pettit is a 84 year old male with a pertinent history of prostate cancer who presents to the emergency department today with fever and generalized weakness.  Family states that this is a significant change just over the past 1 to 2 days.  He was not able to get up and do his normal activities at home.  They state that they have recently started working with palliative care.  The patient currently denies any pain.  No focal symptoms such as urinary  "symptoms or respiratory symptoms.          PAST MEDICAL HISTORY:  Past Medical History:   Diagnosis Date    Prostate cancer (H)        PAST SURGICAL HISTORY:  Past Surgical History:   Procedure Laterality Date    PROSTATECTOMY      US LYMPH NODE BIOPSY  2020         CURRENT MEDICATIONS:    gabapentin (NEURONTIN) 100 MG capsule        ALLERGIES:  No Known Allergies    FAMILY HISTORY:  No family history on file.    SOCIAL HISTORY:   Social History     Socioeconomic History    Marital status:    Tobacco Use    Smoking status: Former     Current packs/day: 0.00     Types: Cigarettes     Quit date:      Years since quittin.4     Passive exposure: Past    Smokeless tobacco: Never       VITALS:  Patient Vitals for the past 24 hrs:   BP Temp Temp src Pulse Resp SpO2 Height Weight   25 2300 123/58 -- -- 64 -- 95 % -- --   25 2200 120/58 100  F (37.8  C) Oral 71 -- 96 % -- --   25 (!) 143/64 -- -- 83 -- 95 % -- --   25 (!) 146/66 -- -- 81 -- 96 % -- --   25 (!) 161/68 -- -- 82 -- 96 % -- --   25 (!) 128/100 (!) 102.5  F (39.2  C) Oral 81 -- 94 % -- --   25 -- -- -- 78 -- 94 % -- --   25 -- -- -- 79 -- 94 % -- --   25 (!) 144/63 -- -- 80 -- 96 % -- --   25 (!) 171/72 -- -- 79 -- 95 % -- --   25 -- -- -- 80 -- 94 % -- --   25 -- -- -- 80 -- 94 % -- --   25 -- -- -- 81 -- 94 % -- --   25 -- -- -- 81 -- 94 % -- --   25 -- -- -- 82 -- 96 % -- --   25 180 -- -- -- 85 -- 97 % -- --   25 1807 -- -- -- 84 -- 93 % -- --   25 1805 (!) 166/58 -- -- 79 -- 96 % -- --   25 1755 -- -- -- 80 -- 95 % -- --   25 1745 (!) 173/73 -- -- 83 -- 96 % -- --   25 1631 (!) 155/72 100.8  F (38.2  C) Oral 84 16 97 % 1.803 m (5' 11\") 68 kg (150 lb)       PHYSICAL EXAM    Constitutional:  Well developed, Well nourished, appears globally " fatigued  HENT:  Normocephalic, Atraumatic, Oropharynx moist, Nose normal.   Eyes:  EOMI, Conjunctiva normal, No discharge.   Respiratory:  Normal breath sounds, No respiratory distress, No wheezing, No chest tenderness.   Cardiovascular:  Normal heart rate, Normal rhythm, No murmurs  GI:  Soft, No tenderness, No guarding  Musculoskeletal:  No tenderness to palpation or major deformities noted.   Neurologic:  Alert & oriented x 3, No focal deficits noted.   Psychiatric:  Affect normal, Judgment normal, Mood normal.        LAB:  All pertinent labs reviewed and interpreted.  Results for orders placed or performed during the hospital encounter of 06/03/25                                    Basic metabolic panel   Result Value Ref Range    Sodium 141 135 - 145 mmol/L    Potassium 3.9 3.4 - 5.3 mmol/L    Chloride 104 98 - 107 mmol/L    Carbon Dioxide (CO2) 25 22 - 29 mmol/L    Anion Gap 12 7 - 15 mmol/L    Urea Nitrogen 17.6 8.0 - 23.0 mg/dL    Creatinine 0.87 0.67 - 1.17 mg/dL    GFR Estimate 86 >60 mL/min/1.73m2    Calcium 9.6 8.8 - 10.4 mg/dL    Glucose 122 (H) 70 - 99 mg/dL   Lactic acid whole blood with 1x repeat in 2 hr when >2   Result Value Ref Range    Lactic Acid, Initial 1.4 0.7 - 2.0 mmol/L   Result Value Ref Range    Magnesium 1.6 (L) 1.7 - 2.3 mg/dL   Blood gas venous   Result Value Ref Range    pH Venous 7.39 7.32 - 7.43    pCO2 Venous 48 40 - 50 mm Hg    pO2 Venous 19 (L) 25 - 47 mm Hg    Bicarbonate Venous 29 (H) 21 - 28 mmol/L    Base Excess/Deficit Venous 3.0 -3.0 - 3.0 mmol/L    FIO2 21     Oxyhemoglobin Venous 27 (L) 70 - 75 %    O2 Sat, Venous 27.3 (L) 70.0 - 75.0 %   Extra Red Top Tube   Result Value Ref Range    Hold Specimen JIC    Extra Purple Top Tube   Result Value Ref Range    Hold Specimen JIC    CBC with platelets and differential   Result Value Ref Range    WBC Count 3.9 (L) 4.0 - 11.0 10e3/uL    RBC Count 3.54 (L) 4.40 - 5.90 10e6/uL    Hemoglobin 11.7 (L) 13.3 - 17.7 g/dL    Hematocrit  34.0 (L) 40.0 - 53.0 %    MCV 96 78 - 100 fL    MCH 33.1 (H) 26.5 - 33.0 pg    MCHC 34.4 31.5 - 36.5 g/dL    RDW 12.3 10.0 - 15.0 %    Platelet Count 178 150 - 450 10e3/uL    % Neutrophils 77 %    % Lymphocytes 9 %    % Monocytes 13 %    % Eosinophils 0 %    % Basophils 1 %    % Immature Granulocytes 0 %    NRBCs per 100 WBC 0 <1 /100    Absolute Neutrophils 3.0 1.6 - 8.3 10e3/uL    Absolute Lymphocytes 0.3 (L) 0.8 - 5.3 10e3/uL    Absolute Monocytes 0.5 0.0 - 1.3 10e3/uL    Absolute Eosinophils 0.0 0.0 - 0.7 10e3/uL    Absolute Basophils 0.0 0.0 - 0.2 10e3/uL    Absolute Immature Granulocytes 0.0 <=0.4 10e3/uL    Absolute NRBCs 0.0 10e3/uL   Influenza A/B, RSV and SARS-CoV2 PCR (COVID-19) Nasopharyngeal    Specimen: Nasopharyngeal; Swab   Result Value Ref Range    Influenza A PCR Negative Negative    Influenza B PCR Negative Negative    RSV PCR Negative Negative    SARS CoV2 PCR Positive (A) Negative   Extra Blue Top Tube   Result Value Ref Range    Hold Specimen [facility]    Hepatic panel   Result Value Ref Range    Protein Total 7.1 6.4 - 8.3 g/dL    Albumin 4.2 3.5 - 5.2 g/dL    Bilirubin Total 0.9 <=1.2 mg/dL    Alkaline Phosphatase 76 40 - 150 U/L    AST 18 0 - 45 U/L    ALT 13 0 - 70 U/L    Bilirubin Direct 0.26 0.00 - 0.30 mg/dL   D dimer quantitative   Result Value Ref Range    D-Dimer Quantitative 2.53 (H) 0.00 - 0.50 ug/mL FEU       RADIOLOGY:  I have independently reviewed and interpreted the above imaging, pending the final radiology read.  XR Chest 2 Views   Final Result   IMPRESSION:       No focal airspace disease. No pleural effusion or pneumothorax.      The cardiomediastinal silhouette is unremarkable.      CT Head w/o Contrast   Final Result   IMPRESSION:   HEAD CT:   1.  No CT evidence for acute intracranial process.   2.  Brain atrophy and presumed chronic microvascular ischemic changes as above.      CERVICAL SPINE CT:   1.  No CT evidence for acute fracture or posttraumatic subluxation.      CT  Cervical Spine w/o Contrast   Final Result   IMPRESSION:   HEAD CT:   1.  No CT evidence for acute intracranial process.   2.  Brain atrophy and presumed chronic microvascular ischemic changes as above.      CERVICAL SPINE CT:   1.  No CT evidence for acute fracture or posttraumatic subluxation.          EKG:    Normal sinus rhythm at 85 bpm.  Normal axis.  No signs of acute ischemia.  QRS 98 ms, QTc 449 ms.    I have independently reviewed and interpreted this EKG        Rafi Dixon DO  Emergency Medicine  [facility] EMERGENCY DEPARTMENT  [address] [place] [id number] [phone]  Dept: [phone]     Rafi Dixon DO  06/03/25 [id number]

## 2025-06-03 NOTE — ED NOTES
Expected Patient Referral to ED  4:03 PM    Referring Clinic/Provider:  [place] [facility]    Reason for referral/Clinical facts:  83 yo met prostate CA  Fell hit head in AM  Nonverbal  Refused ambulance and family would only come to [facility], en route currently via private vehicle  No blood thinners    Recommendations provided:  Send to ED for further evaluation    Caller was informed that this institution does possess the capabilities and/or resources to provide for patient and should be transferred to our facility.    Discussed that if direct admit is sought and any hurdles are encountered, this ED would be happy to see the patient and evaluate.    Informed caller that recommendations provided are recommendations based only on the facts provided and that they responsible to accept or reject the advice, or to seek a formal in person consultation as needed and that this ED will see/treat patient should they arrive.      Judah Sam MD  [facility] EMERGENCY ROOM  [address] [facility] [address]  [phone]       Judah Sam MD  06/03/25 2765

## 2025-06-03 NOTE — ED TRIAGE NOTES
Patient is accompanied to the ER by family. He has stage 4 metastatic cancer. His spouse reports she went to get him out of bed this morning and he was on the floor. EMS came and got him checked out. He was seen by palliative care today and told to come to the ER for his decline in cognitive status. Patient has generalized weakness and is only responding minimally to questions. No facial droop and he can move all his extremities. He has cancer pain and started flexeril last night. Temp in triage is 100.8. Patient is reporting chest pain. He was a weak cough in triage. Patients symptoms discussed with Dr. Hills. Orders placed.      Triage Assessment (Adult)       Row Name 06/03/25 1630          Triage Assessment    Airway WDL WDL        Respiratory WDL    Respiratory WDL WDL        Peripheral/Neurovascular WDL    Peripheral Neurovascular WDL X        Cognitive/Neuro/Behavioral WDL    Cognitive/Neuro/Behavioral WDL X        Ladi Coma Scale    Best Eye Response 4-->(E4) spontaneous     Best Motor Response 6-->(M6) obeys commands     Best Verbal Response 5-->(V5) oriented     Ladi Coma Scale Score 15

## 2025-06-04 ENCOUNTER — APPOINTMENT (OUTPATIENT)
Dept: PHYSICAL THERAPY | Facility: HOSPITAL | Age: 85
End: 2025-06-04
Attending: INTERNAL MEDICINE
Payer: MEDICARE

## 2025-06-04 ENCOUNTER — APPOINTMENT (OUTPATIENT)
Dept: OCCUPATIONAL THERAPY | Facility: HOSPITAL | Age: 85
End: 2025-06-04
Attending: INTERNAL MEDICINE
Payer: MEDICARE

## 2025-06-04 PROBLEM — G44.319 ACUTE POST-TRAUMATIC HEADACHE, NOT INTRACTABLE: Status: ACTIVE | Noted: 2025-06-04

## 2025-06-04 PROBLEM — C79.51 MALIGNANT NEOPLASM METASTATIC TO BONE (H): Status: ACTIVE | Noted: 2023-11-06

## 2025-06-04 PROBLEM — C61 PROSTATE CANCER (H): Status: ACTIVE | Noted: 2023-11-06

## 2025-06-04 LAB
ALBUMIN UR-MCNC: 20 MG/DL
ANION GAP SERPL CALCULATED.3IONS-SCNC: 11 MMOL/L (ref 7–15)
APPEARANCE UR: CLEAR
BILIRUB UR QL STRIP: NEGATIVE
BUN SERPL-MCNC: 16.3 MG/DL (ref 8–23)
CALCIUM SERPL-MCNC: 8.4 MG/DL (ref 8.8–10.4)
CHLORIDE SERPL-SCNC: 103 MMOL/L (ref 98–107)
COLOR UR AUTO: YELLOW
CREAT SERPL-MCNC: 0.9 MG/DL (ref 0.67–1.17)
EGFRCR SERPLBLD CKD-EPI 2021: 85 ML/MIN/1.73M2
ERYTHROCYTE [DISTWIDTH] IN BLOOD BY AUTOMATED COUNT: 12.5 % (ref 10–15)
GLUCOSE SERPL-MCNC: 104 MG/DL (ref 70–99)
GLUCOSE UR STRIP-MCNC: NEGATIVE MG/DL
HCO3 SERPL-SCNC: 26 MMOL/L (ref 22–29)
HCT VFR BLD AUTO: 30.5 % (ref 40–53)
HGB BLD-MCNC: 10.5 G/DL (ref 13.3–17.7)
HGB UR QL STRIP: ABNORMAL
HOLD SPECIMEN: NORMAL
KETONES UR STRIP-MCNC: NEGATIVE MG/DL
LEUKOCYTE ESTERASE UR QL STRIP: NEGATIVE
MAGNESIUM SERPL-MCNC: 1.5 MG/DL (ref 1.7–2.3)
MAGNESIUM SERPL-MCNC: 2.5 MG/DL (ref 1.7–2.3)
MCH RBC QN AUTO: 33.1 PG (ref 26.5–33)
MCHC RBC AUTO-ENTMCNC: 34.4 G/DL (ref 31.5–36.5)
MCV RBC AUTO: 96 FL (ref 78–100)
NITRATE UR QL: NEGATIVE
PH UR STRIP: 6 [PH] (ref 5–7)
PLATELET # BLD AUTO: 156 10E3/UL (ref 150–450)
POTASSIUM SERPL-SCNC: 3.1 MMOL/L (ref 3.4–5.3)
POTASSIUM SERPL-SCNC: 3.9 MMOL/L (ref 3.4–5.3)
RBC # BLD AUTO: 3.17 10E6/UL (ref 4.4–5.9)
RBC URINE: 3 /HPF
SODIUM SERPL-SCNC: 140 MMOL/L (ref 135–145)
SP GR UR STRIP: 1.03 (ref 1–1.03)
SQUAMOUS EPITHELIAL: <1 /HPF
UROBILINOGEN UR STRIP-MCNC: NORMAL MG/DL
WBC # BLD AUTO: 2.8 10E3/UL (ref 4–11)
WBC URINE: <1 /HPF

## 2025-06-04 PROCEDURE — 97165 OT EVAL LOW COMPLEX 30 MIN: CPT | Mod: GO

## 2025-06-04 PROCEDURE — 84132 ASSAY OF SERUM POTASSIUM: CPT | Performed by: HOSPITALIST

## 2025-06-04 PROCEDURE — 97530 THERAPEUTIC ACTIVITIES: CPT | Mod: GP

## 2025-06-04 PROCEDURE — 250N000011 HC RX IP 250 OP 636: Performed by: INTERNAL MEDICINE

## 2025-06-04 PROCEDURE — 97162 PT EVAL MOD COMPLEX 30 MIN: CPT | Mod: GP

## 2025-06-04 PROCEDURE — 97535 SELF CARE MNGMENT TRAINING: CPT | Mod: GO

## 2025-06-04 PROCEDURE — 80048 BASIC METABOLIC PNL TOTAL CA: CPT | Performed by: INTERNAL MEDICINE

## 2025-06-04 PROCEDURE — 258N000003 HC RX IP 258 OP 636: Performed by: REGISTERED NURSE

## 2025-06-04 PROCEDURE — 82310 ASSAY OF CALCIUM: CPT | Performed by: INTERNAL MEDICINE

## 2025-06-04 PROCEDURE — 250N000011 HC RX IP 250 OP 636: Mod: JZ | Performed by: REGISTERED NURSE

## 2025-06-04 PROCEDURE — 36415 COLL VENOUS BLD VENIPUNCTURE: CPT | Performed by: INTERNAL MEDICINE

## 2025-06-04 PROCEDURE — 99232 SBSQ HOSP IP/OBS MODERATE 35: CPT | Performed by: HOSPITALIST

## 2025-06-04 PROCEDURE — 120N000001 HC R&B MED SURG/OB

## 2025-06-04 PROCEDURE — 85027 COMPLETE CBC AUTOMATED: CPT | Performed by: INTERNAL MEDICINE

## 2025-06-04 PROCEDURE — 250N000013 HC RX MED GY IP 250 OP 250 PS 637: Performed by: INTERNAL MEDICINE

## 2025-06-04 PROCEDURE — 83735 ASSAY OF MAGNESIUM: CPT | Performed by: INTERNAL MEDICINE

## 2025-06-04 PROCEDURE — 250N000013 HC RX MED GY IP 250 OP 250 PS 637: Performed by: REGISTERED NURSE

## 2025-06-04 PROCEDURE — 81003 URINALYSIS AUTO W/O SCOPE: CPT | Performed by: EMERGENCY MEDICINE

## 2025-06-04 RX ORDER — ONDANSETRON 2 MG/ML
4 INJECTION INTRAMUSCULAR; INTRAVENOUS EVERY 12 HOURS PRN
Status: DISPENSED
Start: 2025-06-04

## 2025-06-04 RX ORDER — ACETAMINOPHEN 325 MG/1
650 TABLET ORAL EVERY 4 HOURS PRN
Status: DISPENSED
Start: 2025-06-04

## 2025-06-04 RX ORDER — MAGNESIUM SULFATE 4 G/50ML
4 INJECTION INTRAVENOUS ONCE
Status: COMPLETED | OUTPATIENT
Start: 2025-06-04 | End: 2025-06-04

## 2025-06-04 RX ORDER — CALCIUM CARBONATE 500 MG/1
1000 TABLET, CHEWABLE ORAL 4 TIMES DAILY PRN
Status: CANCELLED
Start: 2025-06-04

## 2025-06-04 RX ORDER — POLYETHYLENE GLYCOL 3350 17 G/17G
17 POWDER, FOR SOLUTION ORAL 2 TIMES DAILY PRN
Status: DISPENSED
Start: 2025-06-04

## 2025-06-04 RX ORDER — GABAPENTIN 100 MG/1
100 CAPSULE ORAL DAILY PRN
Status: DISPENSED
Start: 2025-06-04

## 2025-06-04 RX ORDER — ONDANSETRON 4 MG/1
4 TABLET, ORALLY DISINTEGRATING ORAL EVERY 12 HOURS PRN
Status: DISPENSED
Start: 2025-06-04

## 2025-06-04 RX ORDER — POTASSIUM CHLORIDE 1500 MG/1
40 TABLET, EXTENDED RELEASE ORAL ONCE
Status: COMPLETED | OUTPATIENT
Start: 2025-06-04 | End: 2025-06-04

## 2025-06-04 RX ADMIN — MAGNESIUM SULFATE HEPTAHYDRATE 4 G: 4 INJECTION, SOLUTION INTRAVENOUS at 05:16

## 2025-06-04 RX ADMIN — REMDESIVIR 100 MG: 100 INJECTION, POWDER, LYOPHILIZED, FOR SOLUTION INTRAVENOUS at 19:08

## 2025-06-04 RX ADMIN — POTASSIUM CHLORIDE 40 MEQ: 1500 TABLET, EXTENDED RELEASE ORAL at 05:16

## 2025-06-04 RX ADMIN — ENOXAPARIN SODIUM 30 MG: 30 INJECTION SUBCUTANEOUS at 08:53

## 2025-06-04 RX ADMIN — SODIUM CHLORIDE 20 ML: 9 INJECTION, SOLUTION INTRAVENOUS at 19:51

## 2025-06-04 RX ADMIN — ACETAMINOPHEN 650 MG: 325 TABLET ORAL at 12:35

## 2025-06-04 RX ADMIN — ACETAMINOPHEN 650 MG: 325 TABLET ORAL at 04:40

## 2025-06-04 RX ADMIN — GABAPENTIN 100 MG: 100 CAPSULE ORAL at 20:11

## 2025-06-04 ASSESSMENT — ACTIVITIES OF DAILY LIVING (ADL)
ADLS_ACUITY_SCORE: 47
ADLS_ACUITY_SCORE: 48
ADLS_ACUITY_SCORE: 48
DEPENDENT_IADLS:: INDEPENDENT
ADLS_ACUITY_SCORE: 47
ADLS_ACUITY_SCORE: 48
ADLS_ACUITY_SCORE: 47
ADLS_ACUITY_SCORE: 48
ADLS_ACUITY_SCORE: 47
ADLS_ACUITY_SCORE: 48
ADLS_ACUITY_SCORE: 47

## 2025-06-04 NOTE — PLAN OF CARE
Problem: Fever  Goal: Body Temperature in Desired Range  Outcome: Progressing     Problem: Infection  Goal: Absence of Infection Signs and Symptoms  Outcome: Progressing     Problem: Fatigue  Goal: Improved Activity Tolerance  Outcome: Progressing     Problem: Fall Injury Risk  Goal: Absence of Fall and Fall-Related Injury  Outcome: Progressing   Goal Outcome Evaluation:    Patient is oriented to self and intermittently to place. Slow to respond. Denies pain. VSS on room air. Temperature 99 overnight. Prn tylenol given x1. On K and mag protocol. Received iv magnesium and oral K replacement. Rechecks scheduled. Urine sample sent. On special precautions d/t covid +ve. Noted to have dry intermittent cough. Lungs diminished.

## 2025-06-04 NOTE — PROGRESS NOTES
Hospital at Home Coordination Note    Report received from Nakita Breaux RN    Diagnosis: Covid +, pneumonia    Oxygen needs: No    Scheduled/standing labs: Yes, CBC, BMP, Mg, K+    Therapy orders: None    DME needs: none    Ambulation status: independent    IV medications:Yes IV Medication (Name, Dose, Directions): remdesivir 100mg/100ml IV q24hr  IV Catheter type: PIV right AC  Last administered dose (date and time): 6/3/25 @ 2220  Next dose scheduled for (date and time): 6/4/25 @ 1900  Date and Time for delivery of medications and supplies: meds will be couriered to home after discharge from ED    TPN: No    IV access: Yes Peripheral  Right AC    Pain: Yes: Location: HA at 1240  Pain Medications taken: tylenol: effective    Wound Care: No    Tube Feeding: No     Food Insecurities: No    Patient swallow 1 pill at a time once placed in mouth for patient  A&O to self: slow to respond is baseline  Resume palliative OP care at discharge from [facility]    Gaye Ramírez RN on 6/4/2025 at 3:42 PM

## 2025-06-04 NOTE — CONSULTS
.. HOSPITAL IN HOME    Liaison Communication     Patient: Alonso Pettit        MRN: [id number]  : 1940  Age: 84 year old     Hospital In Home service location and phone number:   [address] [facility] [address]  [phone] (home)     Is this a secure facility and can you get to the door to receive visits i.e., medication delivery? No pvt home     to schedule appointments: wife Macey    PREFERRED CONTACT PHONE NUMBER:  [phone]  Can the pt make a cell phone call in the home or have a cellular connection?Yes  Does the patient have WiFI in the home? Yes     (Patient residence must have cellular connection or WiFi to be admitted into the program):  yes tmobile  Does the patient or a 24-hour caregiver have a smartphone with video capability that works inside the residence?  Yes   Patient caregiver during Home Hospital episode: Self, spouse, son and grandson.  Patient consented to discuss care with caregiver: Yes wife Macey.  Verified caregiver willing/able to assist with patient cares and biometrics, if needed: Yes  Patient/caregiver able to  prescription medications after hospital discharge, if needed: Yes  Does patient have transportation to medical appointments, if needed: Yes    Patient was informed and in agreement that [facility] medical transportation is the required mode for transport home  Yes  refused [employer] transportation ride, wife prefers to drive pt home.    Does patient have transportation to home and to medical appointments, if needed: yes     Primary care provider verified:  Rusty Hernandez   [phone]      Reason patient admitting to Home Hospital:  Covid 19 viral pneumonia  Hospital in Home consult provider that reviewed case: Sandy EDWARDS  Admitting physician (Hospitalist): Aleida Vela MD.    Additional Medical Needs:   IV antibiotic, not IV abx IV viral med,labs, PT/OT.  (CHF, hx CHF pts or pts that need daily  weights) DOES THE PATIENT HAVE A WORKING SCALE USED CONSISTENTLY TO OBTAIN DAILY WEIGHT IF NEEDED: yes has his own.    Additional Care Management Needs:  Are there pets in the home:   No  Patient was informed that [facility] policy is that all pets and fire arms are secured prior to our staff coming to your home:   Yes If patient is not agreeable to securing pets or firearms, the clinical team were notified.  Patient's Primary Language:  English   required:  No    Does patient have a home care agency providing services prior to admission?   No but has an out pt palliative care at [facility] in [place].    Chart review completed. Met with  patient and his wife Macey to discuss purpose and process of [facility]. Answered patient/family questions regarding program. Patient verbally consents to Hospital in Home program: Yes    Jose Maxwell RN  Hospital in Home Liaison

## 2025-06-04 NOTE — PLAN OF CARE
Goal Outcome Evaluation:      Plan of Care Reviewed With: spouse          Outcome Evaluation: Pt's spouse Macey anticipates pt wanting to come home, and open to idea of home care if needing.

## 2025-06-04 NOTE — CONSULTS
"Care Management Initial Consult    General Information  Assessment completed with: Spouse or significant other, Pt's wife Macey  Type of CM/SW Visit: Initial Assessment    Primary Care Provider verified and updated as needed: Yes   Readmission within the last 30 days: no previous admission in last 30 days         Advance Care Planning: Advance Care Planning Reviewed: other (see comments) (No ACP docs.)          Communication Assessment  Patient's communication style: spoken language (English or Bilingual)             Cognitive  Cognitive/Neuro/Behavioral: .WDL except  Level of Consciousness: confused  Arousal Level: opens eyes spontaneously  Orientation: disoriented to, place, time, situation        Speech: other (see comments) (slow)    Living Environment:   People in home: spouse  Macey  Current living Arrangements: house      Able to return to prior arrangements: yes       Family/Social Support:  Care provided by: self  Provides care for: no one  Marital Status:   Support system: Wife  Macey       Description of Support System: Involved, Supportive    Support Assessment: Adequate family and caregiver support, Adequate social supports    Current Resources:   Patient receiving home care services: No        Community Resources: Palliative Care  Equipment currently used at home: walker, standard (\"does not use FWW often. \")  Supplies currently used at home: None    Employment/Financial:  Employment Status: retired        Financial Concerns:             Does the patient's insurance plan have a 3 day qualifying hospital stay waiver?  No    Lifestyle & Psychosocial Needs:  Social Drivers of Health     Food Insecurity: Not on file   Depression: Not at risk (4/15/2025)    PHQ-2     PHQ-2 Score: 0   Housing Stability: Not on file   Tobacco Use: Medium Risk (12/17/2024)    Patient History     Smoking Tobacco Use: Former     Smokeless Tobacco Use: Never     Passive Exposure: Past   Financial Resource Strain: Not on " "file   Alcohol Use: Not on file   Transportation Needs: Not on file   Physical Activity: Not on file   Interpersonal Safety: Not on file   Stress: Not on file   Social Connections: Not on file   Health Literacy: Not on file       Functional Status:  Prior to admission patient needed assistance:   Dependent ADLs:: Independent  Dependent IADLs:: Independent       Mental Health Status:  Mental Health Status: No Current Concerns       Chemical Dependency Status:  Chemical Dependency Status: No Current Concerns             Values/Beliefs:  Spiritual, Cultural Beliefs, [employer] Practices, Values that affect care:                 Discussed  Partnership in Safe Discharge Planning  document with patient/family: No    Additional Information:  Assessed. RNCM introduced self and CM role to pt's spouse Macey over the phone. Pt is COVID +. Pt verified demographics. Pt is his own decision maker, and pt's wife Macey is primary contact. Pt has no ACP docs. Pt has an established PCP Rusty Hernandez.         Pt lives with spouse Macey in house with no stairs to navigate. Pt Ind with ADLs, has a FWW but spouse stated, \"he really doesn't usually have to use it. \" Pt Ind with IADLS. No home care svcs prior. Pt is retired, and so is spouse. Pt's spouse Macey does all the cooking but pt knows how to if needed. Pt's spouse anticipates pt to return home and open to home care idea if needing it. Therapy consults placed awaiting recs. Pt's spouse Macey to transport pt back home.         Next Steps: CM will continue to monitor progression of care, review team recommendations and provide discharge planning assist as needed.       Cecy Jackson RN      "

## 2025-06-04 NOTE — PHARMACY-ADMISSION MEDICATION HISTORY
Pharmacist Admission Medication History    Admission medication history is complete. The information provided in this note is only as accurate as the sources available at the time of the update.    Information Source(s): Family member and CareEverywhere/SureScripts via phone    Pertinent Information: wife states patient isn't currently taking any medications, gabapentin prescribed today by palliative to help with pain.     Changes made to PTA medication list:  Added: gabapentin  Deleted: aspirin, bisacodyl, calcium, cyclobenzaprine, lupron, prochlorperazine   Changed: none    Allergies reviewed with patient and updates made in EHR: yes    Medication History Completed By: HANG ELYVA RPH 6/3/2025 7:43 PM    PTA Med List   Medication Sig Last Dose/Taking    gabapentin (NEURONTIN) 100 MG capsule Take 100 mg by mouth daily as needed (pain). Taking As Needed

## 2025-06-04 NOTE — PROGRESS NOTES
Pt oxygen sats at    rest were 93% on room air  walking 94-95% on room air  Return to bed 95% on room air

## 2025-06-04 NOTE — PLAN OF CARE
Occupational Therapy Discharge Summary    Reason for therapy discharge:    Discharged to hospital at home.    Progress towards therapy goal(s). See goals on Care Plan in [facility] electronic health record for goal details.  Goals not met.  Barriers to achieving goals:   discharge on same date as initial evaluation.    Therapy recommendation(s):    Continued therapy is recommended.  Rationale/Recommendations:  recommend home care OT services.    Annabella Gardner OTR/L 6/4/25

## 2025-06-04 NOTE — H&P
[facility]    History and Physical - Hospitalist Service       Date of Admission:  6/3/2025    Assessment & Plan   Principal Problem:    COVID-19  Active Problems:    Fever, unspecified fever cause    Other fatigue       Alonso Pettit is a 84 year old male with history of metastatic prostate cancer admitted on 6/3/2025 with fever, weakness and found to have COVID-19.       Fever  Weakness  Poor po intake  COVID-19 + on admission  CXR clear, no hypoxia  -- supportive cares, received IVF on admission, will hold further IVF for now given COVID + status  -- remdesivir x 3 doses  -- lovenox dosing for DVT PPX based on D-dimer  -- PT/OT  -- follow up blood cultures drawn on admission  -- follow up UA ordered by ED physician       Fall with hitting head this AM  CT head and C spine negative on admission  -- treatment as above  -- PT/OT      Hypomagnesemia: replace and recheck        Metastatic prostate cancer  Not currently on chemotherapy for the past year  Followed by palliative care as outpatient, not yet on hospice  -- continue home gabapentin recently started by outpatient pall care service  -- DNR/DNI confirmed           Diet: Combination Diet Regular Diet Adult    DVT Prophylaxis: Enoxaparin (Lovenox) SQ  [place] Catheter: Not present  Lines: None     Cardiac Monitoring: None  Code Status:  DNR/DNI    Clinically Significant Risk Factors Present on Admission             # Hypomagnesemia: Lowest Mg = 1.6 mg/dL in last 2 days, will replace as needed                            Disposition Plan      Expected Discharge Date: 06/05/2025             Medically Ready for Discharge: Anticipated Tomorrow      Thom Hurt DO  Hospitalist Service  [facility]  Securely message with [employer] (more info)  Text page via [facility] Paging/Directory     ______________________________________________________________________    Chief Complaint   Weakness    History is obtained from the  patient's spouse    History of Present Illness   Alonso Pettit is a 84 year old male who presents with weakness, fall and poor po intake for the past day with associated fevers at home. Currently patient feels improved and denies any complaints.       Past Medical History    Past Medical History:   Diagnosis Date    Prostate cancer (H)        Past Surgical History   Past Surgical History:   Procedure Laterality Date    PROSTATECTOMY      US LYMPH NODE BIOPSY  8/28/2020       Prior to Admission Medications   Prior to Admission Medications   Prescriptions Last Dose Informant Patient Reported? Taking?   gabapentin (NEURONTIN) 100 MG capsule   Yes Yes   Sig: Take 100 mg by mouth daily as needed (pain).      Facility-Administered Medications: None           Physical Exam   Vital Signs: Temp: (!) 102.5  F (39.2  C) Temp src: Oral BP: (!) 128/100 Pulse: 81   Resp: 16 SpO2: 94 %      Weight: 150 lbs 0 oz    General Appearance: In no acute distress  RESPIRATORY: respirations nonlabored  CARDIOVASCULAR: No le edema bilat.  NEUROLOGIC: Alert, speech is clear         Medical Decision Making       >75 MINUTES SPENT BY ME on the date of service doing chart review, history, exam, documentation & further activities per the note.      Data

## 2025-06-04 NOTE — PROGRESS NOTES
Physical Therapy Discharge Summary    Reason for therapy discharge:    Discharged to [facility].    Progress towards therapy goal(s). See goals on Care Plan in [facility] electronic health record for goal details.  Goals partially met.  Barriers to achieving goals:   discharge from facility.    Therapy recommendation(s):  Home PT  Further recommended therapy is related to documented deficits, and is necessary to maximize functional independence in order for patient to return to prior level of function.      Awilda Melgoza, GALLO 6/4/2025

## 2025-06-04 NOTE — PROGRESS NOTES
Physical Therapy        06/04/25 1332   Appointment Info   Signing Clinician's Name / Credentials (PT) Awilda Melgoza, PT, DPT   Living Environment   People in Home spouse   Current Living Arrangements house   Home Accessibility no concerns   Self-Care   Equipment Currently Used at Home walker, rolling   Activity/Exercise/Self-Care Comment pt has 4WW, does not often use. ADLs alone, has walk-in shower   General Information   Onset of Illness/Injury or Date of Surgery 06/03/25   Referring Physician Thom Hurt DO   Patient/Family Therapy Goals Statement (PT) none stated   Pertinent History of Current Problem (include personal factors and/or comorbidities that impact the POC) 84 year old male with metastatic prostate cancer admitted on 6/3/2025 with fever, weakness and found to have COVID-19. Remains with very poor oral intake and lethargic.   Existing Precautions/Restrictions fall   Cognition   Orientation Status (Cognition) oriented x 3   Follows Commands (Cognition) increased processing time needed;delayed response/completion   Pain Assessment   Patient Currently in Pain No   Integumentary/Edema   Integumentary/Edema no deficits were identifed   Posture    Posture Forward head position   Range of Motion (ROM)   Range of Motion ROM is WFL   Strength (Manual Muscle Testing)   Strength (Manual Muscle Testing) Deficits observed during functional mobility   Bed Mobility   Comment, (Bed Mobility) modA to roll right   Transfers   Comment, (Transfers) modA to position self at EOB, centered, with feet on floor in preparation for standing   Gait/Stairs (Locomotion)   Comment, (Gait/Stairs) [place] 3' with HHA   Balance   Balance Comments mild impairment   Sensory Examination   Sensory Perception patient reports no sensory changes   Clinical Impression   Criteria for Skilled Therapeutic Intervention Yes, treatment indicated   PT Diagnosis (PT) difficulty walking   Influenced by the following impairments decreased balance,  strength, safety awareness   Functional limitations due to impairments limited household mobility   Clinical Presentation (PT Evaluation Complexity) stable   Clinical Presentation Rationale presents as medically diagnosed   Clinical Decision Making (Complexity) moderate complexity   Planned Therapy Interventions (PT) balance training;bed mobility training;gait training;home exercise program;neuromuscular re-education;patient/family education;ROM (range of motion);stair training;strengthening;transfer training;progressive activity/exercise   Risk & Benefits of therapy have been explained evaluation/treatment results reviewed;current/potential barriers reviewed;participants voiced agreement with care plan;participants included;patient   PT Total Evaluation Time   PT Eval, Moderate Complexity Minutes (16329) 12   Physical Therapy Goals   PT Predicted Duration/Target Date for Goal Attainment 06/11/25   PT Goals Bed Mobility;Transfers;Gait   PT: Bed Mobility Independent;Supine to/from sit   PT: Transfers Independent;Sit to/from stand   PT: Gait Independent;100 feet   Interventions   Interventions Quick Adds Therapeutic Activity   Therapeutic Activity   Therapeutic Activities: dynamic activities to improve functional performance Minutes (84881) 8   Treatment Detail/Skilled Intervention pt [facility] with [facility] [place] to achieve sitting balance with many VCs/MCs to center feet and maintain forward weight shift. pt tipping over R/posteriorly, modA sit to supine. CGA-SBA with second attempt at supine<>sit, CGA sit<>stand. CGA 17' without AD   PT Discharge Planning   PT Plan bring 4WW for GT, bed mobility, transfers   PT Discharge Recommendation (DC Rec) home with home care physical therapy   PT Rationale for DC Rec ambulating near baseline, spouse available to supervise/assist.   PT Brief overview of current status OK for hospital at home program   PT Total Distance Amb During Session (feet) 20   Physical Therapy  Time and Intention   Timed Code Treatment Minutes 8   Total Session Time (sum of timed and untimed services) 20         Awilda Melgoza, DPT 6/4/2025

## 2025-06-04 NOTE — PROGRESS NOTES
[facility]    Medicine Progress Note - Hospitalist Service    Date of Admission:  6/3/2025    Assessment & Plan                Alonso Pettit is a 84 year old male with metastatic prostate cancer admitted on 6/3/2025 with fever, weakness and found to have COVID-19. Remains with very poor oral intake and lethargic. Not ready for discharge. Hospital Day: 2       COVID 19 viral pneumonia  Fever  Weakness, lethargy  Poor oral intake  -CXR clear, no hypoxia  -- supportive cares, received IVF on admission, will hold further IV, but continues with very poor PO, may need additional IVF support in days to come  -- remdesivir x 3 doses  -- lovenox dosing for DVT PPX based on D-dimer  -- PT/OT  -- blood cultures, NGTD  -- UA unremarkable     Fall with hitting head 6/3 AM  CT head and C spine negative on admission  -- treatment as above  -- PT/OT  -HA today- concussion vs fro COVID? Tylenol PRN      Hypomagnesemia  HypoK  -protocols     Metastatic prostate cancer  -Not currently on chemotherapy for the past year  -Followed by palliative care as outpatient, not yet on hospice  -- continue home gabapentin recently started by outpatient pall care service  -- DNR/DNI    Lymphopenia  -likely from acute viral infection  -ANC within normal, does tend to run on the low end. Residual effect of radiation or due to known bony metastases?    Microscopic hematuria  -suspect related to known prostate cancer  -would not pursue further diagnostic workup given advanced comorbid disease          Diet: Combination Diet Regular Diet Adult    DVT Prophylaxis: High risk.   Enoxaparin (Lovenox) SQ  [place] Catheter: Not present  Lines: None     Cardiac Monitoring: None  Code Status: No CPR- Do NOT Intubate      Clinically Significant Risk Factors Present on Admission        # Hypokalemia: Lowest K = 3.1 mmol/L in last 2 days, will replace as needed    # Hypocalcemia: Lowest Ca = 8.4 mg/dL in last 2 days, will monitor and  replace as appropriate   # Hypomagnesemia: Lowest Mg = 1.5 mg/dL in last 2 days, will replace as needed            # Anemia: based on hgb <11                  Disposition Plan     Medically Ready for Discharge: Anticipated Tomorrow         Discharge barrier(s): poor oral intake; severe weakness and lethargy  Care discussed with: patient, wife, RN      Aleida Vela MD  Hospitalist Service  [facility]  Securely message with [employer] (more info)  Text page via [employer] Paging/Directory   ______________________________________________________________________      Physical Exam   Vital Signs: Temp: 99.7  F (37.6  C) Temp src: Oral BP: (!) 145/66 Pulse: 83   Resp: 18 SpO2: 94 % O2 Device: None (Room air)    Weight: 150 lbs 0 oz    General: in no apparent distress and non-toxic awake but very lethargic male lying in hospital bed oriented to person and place  HEENT: Head normocephalic atraumatic, oral mucosa moist. Sclerae anicteric  CV: Regular rhythm, normal rate, no murmurs  Resp: No wheezes, no rales or rhonchi, no focal consolidations  GI: Belly soft, nondistended, nontender, bowel sounds present  Skin: No rashes or lesions  Extremities: 1+ pitting edema bilateral ankles  Psych: Flat affect, mood euthymic  Neuro: Grossly normal      Medical Decision Making               Data   Recent Results (from the past 16 hours)   Basic metabolic panel    Collection Time: 06/04/25  4:24 AM   Result Value Ref Range    Sodium 140 135 - 145 mmol/L    Potassium 3.1 (L) 3.4 - 5.3 mmol/L    Chloride 103 98 - 107 mmol/L    Carbon Dioxide (CO2) 26 22 - 29 mmol/L    Anion Gap 11 7 - 15 mmol/L    Urea Nitrogen 16.3 8.0 - 23.0 mg/dL    Creatinine 0.90 0.67 - 1.17 mg/dL    GFR Estimate 85 >60 mL/min/1.73m2    Calcium 8.4 (L) 8.8 - 10.4 mg/dL    Glucose 104 (H) 70 - 99 mg/dL   CBC with platelets    Collection Time: 06/04/25  4:24 AM   Result Value Ref Range    WBC Count 2.8 (L) 4.0 - 11.0 10e3/uL    RBC Count 3.17 (L) 4.40  - 5.90 10e6/uL    Hemoglobin 10.5 (L) 13.3 - 17.7 g/dL    Hematocrit 30.5 (L) 40.0 - 53.0 %    MCV 96 78 - 100 fL    MCH 33.1 (H) 26.5 - 33.0 pg    MCHC 34.4 31.5 - 36.5 g/dL    RDW 12.5 10.0 - 15.0 %    Platelet Count 156 150 - 450 10e3/uL   Magnesium    Collection Time: 06/04/25  4:24 AM   Result Value Ref Range    Magnesium 1.5 (L) 1.7 - 2.3 mg/dL   UA with Microscopic reflex to Culture    Collection Time: 06/04/25  4:38 AM    Specimen: Urine, Clean Catch   Result Value Ref Range    Color Urine Yellow Colorless, Straw, Light Yellow, Yellow    Appearance Urine Clear Clear    Glucose Urine Negative Negative mg/dL    Bilirubin Urine Negative Negative    Ketones Urine Negative Negative mg/dL    Specific Gravity Urine 1.027 1.001 - 1.030    Blood Urine 0.03 mg/dL (A) Negative    pH Urine 6.0 5.0 - 7.0    Protein Albumin Urine 20 (A) Negative mg/dL    Urobilinogen Urine Normal Normal mg/dL    Nitrite Urine Negative Negative    Leukocyte Esterase Urine Negative Negative    RBC Urine 3 (H) <=2 /HPF    WBC Urine <1 <=5 /HPF    Squamous Epithelials Urine <1 <=1 /HPF   Magnesium    Collection Time: 06/04/25 10:59 AM   Result Value Ref Range    Magnesium 2.5 (H) 1.7 - 2.3 mg/dL   Potassium    Collection Time: 06/04/25 10:59 AM   Result Value Ref Range    Potassium 3.9 3.4 - 5.3 mmol/L   Extra Purple Top Tube    Collection Time: 06/04/25 10:59 AM   Result Value Ref Range    Hold Specimen JIC        Interval History     Patient slow mentation, takes him a long time to answer questions, giving brief responses. Very hard of hearing and somewhat inattentive. Apparently has complained of a frontal headache this morning. Nurse is bringing him some Tylenol. He s very interested in hospital at home if he qualifies, PT and OT evals are planned for this afternoon. Some peripheral edema, no further IV fluid planned for today. Did not eat much for breakfast.

## 2025-06-04 NOTE — PROGRESS NOTES
"   06/04/25 1400   Appointment Info   Signing Clinician's Name / Credentials (OT) Annabella Jj, OTR/L   Living Environment   People in Home spouse   Current Living Arrangements house   Home Accessibility no concerns   Living Environment Comments Spouse reports son and grandson are able to assist as needed.   Self-Care   Equipment Currently Used at Home walker, rolling  (4WW that pt does not typically use)   Activity/Exercise/Self-Care Comment Independent with ADLs, walk-in shower.   General Information   Onset of Illness/Injury or Date of Surgery 06/03/25   Referring Physician Thom Hurt, DO   Patient/Family Therapy Goal Statement (OT) to go home   Additional Occupational Profile Info/Pertinent History of Current Problem Per chart review, pt \"is a 84 year old male with metastatic prostate cancer admitted on 6/3/2025 with fever, weakness and found to have COVID-19. Remains with very poor oral intake and lethargic.\"   Existing Precautions/Restrictions fall   Limitations/Impairments safety/cognitive   Cognitive Status Examination   Orientation Status person;place  (disoriented to time and situation)   Cognitive Status Comments Pt with flat affect and slow processing time, increased cueing needed to follow simple commands.   Pain Assessment   Patient Currently in Pain No   Range of Motion Comprehensive   General Range of Motion no range of motion deficits identified   Strength Comprehensive (MMT)   Comment, General Manual Muscle Testing (MMT) Assessment generalized weakness noted functionally   Bed Mobility   Bed Mobility supine-sit;sit-supine;scooting/bridging   Scooting/Bridging [place] (Bed Mobility) contact guard;verbal cues   Supine-Sit [place] (Bed Mobility) contact guard   Sit-Supine [place] (Bed Mobility) minimum assist (75% patient effort)   Assistive Device (Bed Mobility) bed rails;draw sheet   Transfers   Transfers sit-stand transfer;toilet transfer   Sit-Stand Transfer   Sit-Stand " [place] (Transfers) contact guard   Toilet Transfer   [place] Level (Toilet Transfer) not tested   Toilet Transfer Comments Per clinical reasoning, anticipate pt will require Ax1 for toilet transfer.   Balance   Balance Comments CGA functional mobility, slightly unsteady at times with no significant LOB.   Activities of Daily Living   BADL Assessment/Intervention lower body dressing;grooming   Lower Body Dressing Assessment/Training   [place] Level (Lower Body Dressing) maximum assist (25% patient effort)   Comment, (Lower Body Dressing) doffing brief in standing   Grooming Assessment/Training   [place] Level (Grooming) verbal cues;contact guard assist;set up   Position (Grooming) sink side;unsupported standing   Clinical Impression   Criteria for Skilled Therapeutic Interventions Met (OT) Yes, treatment indicated   OT Diagnosis Impaired ability to perform ADLs and functional mobility.   Influenced by the following impairments COVID-19   OT Problem List-Impairments impacting ADL problems related to;activity tolerance impaired;balance;mobility;strength;cognition   Assessment of Occupational Performance 3-5 Performance Deficits   Identified Performance Deficits toileting, grooming/hygiene, lower body dressing, cognition   Planned Therapy Interventions (OT) ADL retraining;IADL retraining;balance training;bed mobility training;cognition;strengthening;transfer training;home program guidelines;progressive activity/exercise;risk factor education   Clinical Decision Making Complexity (OT) detailed assessment/moderate complexity   Risk & Benefits of therapy have been explained evaluation/treatment results reviewed;care plan/treatment goals reviewed;risks/benefits reviewed;current/potential barriers reviewed;participants voiced agreement with care plan;participants included;patient;spouse/significant other   OT Total Evaluation Time   OT Eval, Low Complexity Minutes (28037) 13   OT Goals   Therapy  Frequency (OT) Daily   OT Predicted Duration/Target Date for Goal Attainment 06/11/25   OT Goals Hygiene/Grooming;Lower Body Dressing;Toilet Transfer/Toileting;Cognition   OT: Hygiene/Grooming supervision/stand-by assist;using adaptive equipment;while standing   OT: Lower Body Dressing Supervision/stand-by assist;using adaptive equipment   OT: Toilet Transfer/Toileting Supervision/stand-by assist;toilet transfer;cleaning and garment management;using adaptive equipment   OT: Cognitive Patient/caregiver will verbalize understanding of cognitive assessment results/recommendations as needed for safe discharge planning   Self-Care/Home Management   Self-Care/Home Mgmt/ADL, Compensatory, Meal Prep Minutes (27327) 9   Symptoms Noted During/After Treatment (Meal Preparation/Planning Training) fatigue   Treatment Detail/Skilled Intervention Pt tolerated standing at sink with CGA for oral cares and grooming for >5 min with CGA. Pt fatigued quickly, required cueing for sequencing and problem-solving for tasks. Pt required Max A to doff shoes and don brief, difficulty performing safely with patient ultimately needing to return to supine and perform bridging to pull over hips. Pt left in bed at end of session with call light in reach and wife present in room.   OT Discharge Planning   OT Plan toileting, grooming/hygiene, lower body dressing, cognition   OT Discharge Recommendation (DC Rec) home with assist;home with home care occupational therapy  (24/7 hands-on assist)   OT Rationale for DC Rec Pt currently requires Ax1 for all ADLs and functional mobility d/t weakness and impaired cognition. Pt's wife reports she is able to provide level of assist pt needs with support from son and grandson. Pt would benefit from  OT services.   OT Brief overview of current status CGA functional mobility, set up and CGA G/H, Max A lower body dressing   OT Total Distance Amb During Session (feet) 10   Total Session Time   Timed Code Treatment  Minutes 9   Total Session Time (sum of timed and untimed services) 22

## 2025-06-04 NOTE — PROGRESS NOTES
[facility] TRANSFER NOTE     Patient transferred to [facility] today. Transfer was discussed and coordinated with Dr. Vela. Please see hospital note regarding full A/P.     Diagnosis: PMH of metastatic prostate cancer who was admitted with Covid-19    Medications:   IV: IV Remdesivir, next dose due 1900 (start date 6/3/25). Additional timing notes: N/A  IV Access: PIV right AC  Controlled Substance: N/A    Orders and transfer details:   Labs: BMP, CBC, Mg tomorrow AM  DME: N/A  Therapy: PT and OT, anticipate he will need [facility] PT/OT/RN at [place]  Covid - continue Remdesivir, ensure doses continue with transfer  Lives with wife and has 24/7 support    Expected Discharge Date: TBD    I will be available until 17:00 today for questions regarding this patient. My colleague Marilyn Sawyer NP will be available this evening and overnight, then my colleague Dr. Nolan will see the patient tomorrow.      CARLOS Lopez CNP  [facility]     [phone]    No charge note.

## 2025-06-05 VITALS
OXYGEN SATURATION: 95 % | DIASTOLIC BLOOD PRESSURE: 87 MMHG | HEIGHT: 71 IN | BODY MASS INDEX: 21 KG/M2 | WEIGHT: 150 LBS | HEART RATE: 80 BPM | SYSTOLIC BLOOD PRESSURE: 158 MMHG | RESPIRATION RATE: 18 BRPM | TEMPERATURE: 100.2 F

## 2025-06-05 LAB
ANION GAP SERPL CALCULATED.3IONS-SCNC: 12 MMOL/L (ref 7–15)
BACTERIA SPEC CULT: NORMAL
BACTERIA SPEC CULT: NORMAL
BUN SERPL-MCNC: 22.6 MG/DL (ref 8–23)
CALCIUM SERPL-MCNC: 8.3 MG/DL (ref 8.8–10.4)
CHLORIDE SERPL-SCNC: 103 MMOL/L (ref 98–107)
CREAT SERPL-MCNC: 0.95 MG/DL (ref 0.67–1.17)
EGFRCR SERPLBLD CKD-EPI 2021: 80 ML/MIN/1.73M2
ERYTHROCYTE [DISTWIDTH] IN BLOOD BY AUTOMATED COUNT: 12.3 % (ref 10–15)
GLUCOSE SERPL-MCNC: 103 MG/DL (ref 70–99)
HCO3 SERPL-SCNC: 22 MMOL/L (ref 22–29)
HCT VFR BLD AUTO: 29.6 % (ref 40–53)
HGB BLD-MCNC: 10.2 G/DL (ref 13.3–17.7)
MAGNESIUM SERPL-MCNC: 1.7 MG/DL (ref 1.7–2.3)
MCH RBC QN AUTO: 32.8 PG (ref 26.5–33)
MCHC RBC AUTO-ENTMCNC: 34.5 G/DL (ref 31.5–36.5)
MCV RBC AUTO: 95 FL (ref 78–100)
PLATELET # BLD AUTO: 154 10E3/UL (ref 150–450)
POTASSIUM SERPL-SCNC: 3.4 MMOL/L (ref 3.4–5.3)
RBC # BLD AUTO: 3.11 10E6/UL (ref 4.4–5.9)
SODIUM SERPL-SCNC: 137 MMOL/L (ref 135–145)
WBC # BLD AUTO: 3.8 10E3/UL (ref 4–11)

## 2025-06-05 PROCEDURE — 85014 HEMATOCRIT: CPT | Performed by: REGISTERED NURSE

## 2025-06-05 PROCEDURE — 80051 ELECTROLYTE PANEL: CPT | Performed by: REGISTERED NURSE

## 2025-06-05 PROCEDURE — 258N000003 HC RX IP 258 OP 636: Performed by: REGISTERED NURSE

## 2025-06-05 PROCEDURE — 97535 SELF CARE MNGMENT TRAINING: CPT | Mod: GO | Performed by: OCCUPATIONAL THERAPIST

## 2025-06-05 PROCEDURE — 250N000013 HC RX MED GY IP 250 OP 250 PS 637: Performed by: REGISTERED NURSE

## 2025-06-05 PROCEDURE — 80048 BASIC METABOLIC PNL TOTAL CA: CPT | Performed by: REGISTERED NURSE

## 2025-06-05 PROCEDURE — 120N000001 HC R&B MED SURG/OB

## 2025-06-05 PROCEDURE — 250N000011 HC RX IP 250 OP 636: Mod: JZ | Performed by: REGISTERED NURSE

## 2025-06-05 PROCEDURE — 83735 ASSAY OF MAGNESIUM: CPT | Performed by: REGISTERED NURSE

## 2025-06-05 RX ADMIN — SODIUM CHLORIDE 50 ML: 9 INJECTION, SOLUTION INTRAVENOUS at 16:31

## 2025-06-05 RX ADMIN — ACETAMINOPHEN 650 MG: 325 TABLET, FILM COATED ORAL at 15:42

## 2025-06-05 RX ADMIN — GABAPENTIN 100 MG: 100 CAPSULE ORAL at 16:13

## 2025-06-05 RX ADMIN — REMDESIVIR 100 MG: 100 INJECTION, POWDER, LYOPHILIZED, FOR SOLUTION INTRAVENOUS at 16:02

## 2025-06-05 ASSESSMENT — ACTIVITIES OF DAILY LIVING (ADL)
ADLS_ACUITY_SCORE: 48
ADLS_ACUITY_SCORE: 47
ADLS_ACUITY_SCORE: 33
ADLS_ACUITY_SCORE: 33
ADLS_ACUITY_SCORE: 47
ADLS_ACUITY_SCORE: 47
ADLS_ACUITY_SCORE: 33
ADLS_ACUITY_SCORE: 47
ADLS_ACUITY_SCORE: 33
ADLS_ACUITY_SCORE: 47
ADLS_ACUITY_SCORE: 48
ADLS_ACUITY_SCORE: 33
ADLS_ACUITY_SCORE: 48
ADLS_ACUITY_SCORE: 33
ADLS_ACUITY_SCORE: 47
ADLS_ACUITY_SCORE: 47
ADLS_ACUITY_SCORE: 33

## 2025-06-05 NOTE — PROGRESS NOTES
[facility] in Home    Date of Service: 06/05/2025    Background:  Alonso Pettit is a 84 year old male who was admitted on 6/3/2025.     Interval History:     Feeling very weak  Yesterday afternoon he fell to the floor trying to get out of bed, got stuck between bed railing and walker, has some bruising on cheek    He is not eating much    Has lot of pain related to advancing cancer - was started on gabapentin, first dose was last night    He is coughing a lot, bringing up some phlegm, some yellow/white sputum    Denies sob at rest, feels sob with walking, also very weak    Working with ot at the time of visit    Exam:   Vitals:     GENERAL: alert and no distress, very hard of hearing, elderly appearing  EYES: Eyes grossly normal to inspection.  No discharge or erythema, or obvious scleral/conjunctival abnormalities.  RESP: No audible wheeze, cough, or visible cyanosis.    SKIN: left cheek with bruising  NEURO: Cranial nerves grossly intact.  Mentation and speech appropriate for age.  PSYCH: Appropriate affect, tone, and pace of words    Data:    Recent Labs   Lab 06/04/25  1059 06/04/25  0424 06/03/25  1716   WBC  --  2.8* 3.9*   HGB  --  10.5* 11.7*   MCV  --  96 96   PLT  --  156 178   NA  --  140 141   POTASSIUM 3.9 3.1* 3.9   CHLORIDE  --  103 104   CO2  --  26 25   BUN  --  16.3 17.6   CR  --  0.90 0.87   ANIONGAP  --  11 12   ZABRINA  --  8.4* 9.6   GLC  --  104* 122*   ALBUMIN  --   --  4.2   PROTTOTAL  --   --  7.1   BILITOTAL  --   --  0.9   ALKPHOS  --   --  76   ALT  --   --  13   AST  --   --  18       Imaging:  Results for orders placed or performed during the hospital encounter of 06/03/25   CT Head w/o Contrast    Narrative    EXAM: CT HEAD W/O CONTRAST, CT CERVICAL SPINE W/O CONTRAST  LOCATION: [facility]  DATE: 06/03/2025    INDICATION: Altered mental status and fall.  COMPARISON: CT head 10/19/2024. CTA neck 11/14/2023.  TECHNIQUE:   1) Routine CT Head without IV  contrast. Multiplanar reformats. Dose reduction techniques were used.  2) Routine CT Cervical Spine without IV contrast. Multiplanar reformats. Dose reduction techniques were used.    FINDINGS:     HEAD CT:   INTRACRANIAL CONTENTS: No intracranial hemorrhage, extra-axial collection, or mass effect. No CT evidence of acute infarct. Mild to moderate presumed chronic small vessel ischemic changes. Moderate generalized volume loss. No hydrocephalus.     VISUALIZED ORBITS/SINUSES/MASTOIDS: No intraorbital abnormality. No paranasal sinus mucosal disease. No middle ear or mastoid effusion.    BONES/SOFT TISSUES: No acute abnormality.      CERVICAL SPINE CT:   VERTEBRA: Cervical vertebrae are normal in height. Straightening of the expected lordosis. Normal alignment. Ankylosis of the C5 and C6 vertebral bodies. No fracture or posttraumatic subluxation.     CANAL/FORAMINA: Multilevel degenerative changes. No canal or neural foraminal stenosis.    PARASPINAL: Mild atheromatous calcifications at the carotid bifurcations. Visualized lung fields are clear.        Impression    IMPRESSION:  HEAD CT:  1.  No CT evidence for acute intracranial process.  2.  Brain atrophy and presumed chronic microvascular ischemic changes as above.    CERVICAL SPINE CT:  1.  No CT evidence for acute fracture or posttraumatic subluxation.   CT Cervical Spine w/o Contrast    Narrative    EXAM: CT HEAD W/O CONTRAST, CT CERVICAL SPINE W/O CONTRAST  LOCATION: [facility]  DATE: 06/03/2025    INDICATION: Altered mental status and fall.  COMPARISON: CT head 10/19/2024. CTA neck 11/14/2023.  TECHNIQUE:   1) Routine CT Head without IV contrast. Multiplanar reformats. Dose reduction techniques were used.  2) Routine CT Cervical Spine without IV contrast. Multiplanar reformats. Dose reduction techniques were used.    FINDINGS:     HEAD CT:   INTRACRANIAL CONTENTS: No intracranial hemorrhage, extra-axial collection, or mass effect. No  CT evidence of acute infarct. Mild to moderate presumed chronic small vessel ischemic changes. Moderate generalized volume loss. No hydrocephalus.     VISUALIZED ORBITS/SINUSES/MASTOIDS: No intraorbital abnormality. No paranasal sinus mucosal disease. No middle ear or mastoid effusion.    BONES/SOFT TISSUES: No acute abnormality.      CERVICAL SPINE CT:   VERTEBRA: Cervical vertebrae are normal in height. Straightening of the expected lordosis. Normal alignment. Ankylosis of the C5 and C6 vertebral bodies. No fracture or posttraumatic subluxation.     CANAL/FORAMINA: Multilevel degenerative changes. No canal or neural foraminal stenosis.    PARASPINAL: Mild atheromatous calcifications at the carotid bifurcations. Visualized lung fields are clear.        Impression    IMPRESSION:  HEAD CT:  1.  No CT evidence for acute intracranial process.  2.  Brain atrophy and presumed chronic microvascular ischemic changes as above.    CERVICAL SPINE CT:  1.  No CT evidence for acute fracture or posttraumatic subluxation.   XR Chest 2 Views    Narrative    EXAM: XR CHEST 2 VIEWS  LOCATION: [facility]  DATE: 6/3/2025    INDICATION: Cough.  COMPARISON: 2/6/2024, CT chest 5/15/2024      Impression    IMPRESSION:     No focal airspace disease. No pleural effusion or pneumothorax.    The cardiomediastinal silhouette is unremarkable.       ASSESSMENT/PLAN:  Alonso Pettit is a 84 year old male with metastatic prostate cancer admitted on 6/3/2025 with fever, weakness and found to have COVID-19.     COVID 19 viral pneumonia  Fever  Weakness, lethargy  Poor oral intake  -CXR clear, no hypoxia  -- supportive cares, received IVF on admission, will hold further IV, but continues with very poor PO, may need additional IVF support in days to come  - remdesivir x 3 doses  - PT/OT  -blood cultures, NGTD  - UA unremarkable  - supportive care     Fall with hitting head 6/3 AM  - CT head and C spine negative on admission  -  fall in home 6/4  - PT/OT eval ongoing      Hypomagnesemia  HypoK  - follow up labs     Metastatic prostate cancer  -Not currently on chemotherapy for the past year  -Followed by palliative care as outpatient, not yet on hospice  - started gabapentin 6/4  - DNR/DNI     Lymphopenia  -likely from acute viral infection  -ANC within normal, does tend to run on the low end. Residual effect of radiation or due to known bony metastases?     Microscopic hematuria  -suspect related to known prostate cancer  -would not pursue further diagnostic workup given advanced comorbid disease       Diet:  Combination Diet Regular Diet Adult  Whittington Catheter: Whittington Catheter: Not present     IV/Lines: PIV  DVT Prophylaxis: Low Risk/Ambulatory with no VTE prophylaxis indicated  Code Status: No CPR- Do NOT Intubate  Disposition: Expected discharge 6/7    Telehealth Visit Details  Patient/Surrogate participated in the telehealth encounter via audiovisual communication. Identity of patient was verified. Verbal consent was provided for the visit by patient/surrogate.   Video Start Time: 10:30 AM  Video End Time: 10:40 AM  Originating Location (pt. location): Home  Distant Location (provider location): Home/ office      50 MINUTES SPENT BY ME on the date of service doing chart review, history, exam, documentation & further activities per the note.        Fidelina Nolan MD  [facility] in Home

## 2025-06-05 NOTE — PROGRESS NOTES
Home Hospital Care Note    Attempted to reach client/spouse 2x via phone, tablet and send message on tablet for admit questions and grocery order. Requested pt/spouse to call triage back at earliest convenience. Number provided. Updated InHome RN. Will remain available as needed.     Tereza Fuentes, RN on 6/5/2025 at 1:55 PM

## 2025-06-05 NOTE — PROGRESS NOTES
Home Hospital Care at Home    Instacart order placed.  Ordered through [employer].     Type of Diet (Kardex or Orders review): Regular    Delivery date and time: 6/5 by 6:04pm    Care team notified via chat.         Tereza Fuentes RN on 6/5/2025 at 3:13 PM

## 2025-06-05 NOTE — PLAN OF CARE
Occupational Therapy Discharge Summary    Reason for therapy discharge:    Discharged to home with home therapy.    Progress towards therapy goal(s). See goals on Care Plan in [facility] electronic health record for goal details.  Goals partially met, see plan of care below.    Therapy recommendation(s):    Continued therapy is recommended.  Rationale/Recommendations:  ADL/IADL retraining, functional transfers/mobility retraining, functional strengthening, equipment and caregiver training.    Discharge Plan: Home with family support, HC PT/OT    Precautions: COVID-19 +, Falls    Current Status:  ADLs:  Mobility: Min A sit to stand, hands on supervision walking with gait belt and four wheeled walker.   Grooming: Set up and cues seated on four wheeled walker seat  Dressing: Set up and cues  Bathing: Spouse reports he uses a shower chair, she provides supervision throughout shower.  Toileting: Intermittent urinary incontinence. Min A to stand from toilet using four wheeled walker, able to complete hygiene with cues.   IADLs: Spouse and grandson assist with all IADLs.   Vision/Cognition: Hx of metastatic prostate cancer. Hard of hearing. Requiring significant cues for ADL initiation and sequencing. Currently benefiting from consistent supervision and assist ADL/IADL.     Assessment: Pt current level of performance matches home and caregiver support resources, however is below baseline due to COVID-19 symptoms. Pt will continue to benefit from skilled OT intervention to maximize functional IND ADLs/IADLs, provide caregiver training, and continued home safety evaluation.     Recommendations  Equipment: bedrail, 4WW, raised toilet seat, shower seat.   Timed toileting   In home family assisted mobilization every 2 hrs

## 2025-06-05 NOTE — PLAN OF CARE
Goal Outcome Evaluation:         Pt alert and oriented to self and place, has low grade fever at 100.6, PRN tylenol given, provider notified. Pts wife reports pt has been complaining of a sore throat and some back pain, pt unable to give # rating of pain, wife reports pt has a high pain tolerance and believes it must be fairly significant for the pt to mention it. Pt continues to have a productive cough with yellow sputum. No acute concerns at this time. Handoff given to Maya ADORNO RN.

## 2025-06-05 NOTE — PROGRESS NOTES
[facility]     Assessment Location: Home  Admit diagnosis:  Covid 19  Admission date: 06/04/25    Time arrived: 1806  Time left: 2027    Environment: patient resides With Family Has assist of Spouse and grandson    Physical Assessment:  Vital signs:  Blood Pressure: 158/66  Heart Rate: 82  Pulse Ox: 95% on RA  Temperature: 98.6    Physical assessment completed. Notable findings: Both lower extremities mild pitting edema. Noted patient fell off the side of the bed between the bed and the walker prior to my arrival, grandson and spouse able to get patient back into the bed. No signs of injury. Wife states he may have bumped his eye. No redness or bruising noted around either eye.   **Patient came home with no IV, it had been removed by hospital staff prior to patient leaving the hospital. Patient's chart still showed an IV in the right AC as though it had not been removed. CP placed IV back in right AC.** 20ga, taped down and capped after IV drug administration.    Activity: Walker     Medications:  Current medications: See MAR    New Medications:   Medication Access: Medications couriered to the patients home  Medications verified and placed in med boxes for administration via telehealth visit with Inbound Triage: PRN  Waiver lockbox left with patient:  yes   Medication pouches left: Yes:   Controlled Substances:  No  CS documented in MAR:  N/A  Glucometer left with patient:  no  Medication Instructions reviewed with patient or caregiver Yes: .  Further review needed: No      Additional Comments:  Understanding of illness: Yes  Safety concerns: No  Number of steps into home (accessibility for equipment): 0  Are there pets in the home:  No  Patient informed that all pets and fire arms are secured prior to our staff coming into home: Yes.  If patient is not agreeable to securing pets or firearms, the clinical team is notified  Patient Vulnerable: No    InHome technology:  Patient/family  demonstrates understanding of biometric kit use: Yes  Tablet connected to patient WiFiNo  Doxy.Me setup and demonstrated on patient smartphone No  Guided Access Mode to lock tablet: No    Clinical Coordination:  Patient advised of upcoming nurse and telehealth provider appointments: Yes  Provided Home Hospital triage phone number [phone]  Yes  Called and spoke with Inbound Triage [phone] for handoff of patient care: Yes      Kathi Ndiaye NRP, CP on 6/4/2025 at 9:25 PM

## 2025-06-05 NOTE — PLAN OF CARE
Goal Outcome Evaluation:       Pt alert and oriented to self and place, VSS, denies pain or discomfort at this time. Lungs are diminished throughout. Pt has an infrequent productive cough with yellow sputum. Pt and wife were thoroughly educated on safe transfers and scheduled toileting. No acute concerns at this time. Handoff given to Kalani CARRILLO RN

## 2025-06-05 NOTE — PROGRESS NOTES
"   06/05/25 1000   Appointment Info   Signing Clinician's Name / Credentials (OT) Jelena Love OTR/L   Self-Care/Home Management   Self-Care/Home Mgmt/ADL, Compensatory, Meal Prep Minutes (35721) 60   Symptoms Noted During/After Treatment (Meal Preparation/Planning Training) fatigue   Treatment Detail/Skilled Intervention Skilled facilitation of ADL routine with focus on home safety evaluation, caregiver training, and equipment recommendations. Pt completing ambulatory ADLs with a 4WW, requiring assistance with standing, hands on supervision with mobility, and significant cueing for functional cognition. Pt did have productive coughing with mobility but was not short of breath, vitals WNL. Equipment recommendations include: bedrail on R side of bed, raised toilet seat, continued use of 4WW, and continued use of a shower chair or bench. Home safety recommendations include: decluttering walk ways at home, adding block supports under the recliner, and keeping frequently used items in familiar places. Discussed initiation of a timed toileting program to manage incontinence which would also help with increasing mobilization every 2-3hrs. Provided caregiver training with patient spouse and grandson - recommend 24/7 oversight and hands on assistance with all mobility. Completed hands on training for bed mobility, toileting, sit to stand and walking with 4WW. Suggested simple, verbal cues \"Lean forwards. Push hand through the arm rests to stand. Stand tall\". Given pt cognitive performance, recommend simple, direct cues and increased time for processing. Continued to recommend caregiver \"shifts\" to reduce level of caregiver burden. Provided written handout of recommendations for future reference. Discussed recommendations for follow up HC PT/OT to continue acute recovery. Pt and pt spouse have an appointment next week regarding palliative care.   OT Discharge Planning   OT Plan If scheduled for additional OT appointment, " recommend continued progression of ADLs and functional mobility. Check shower transfer. If discharged, recommend HC OT follow up.   OT Discharge Recommendation (DC Rec) home with home care occupational therapy   OT Rationale for DC Rec Pt current level of performance matches home and caregiver support resources, however is below baseline due to COVID-19 symptoms. Pt will continue to benefit from skilled OT intervention to maximize functional IND ADLs/IADLs, provide caregiver training, and continued home safety evaluation.   OT Brief overview of current status Currently requiring assistance with sit to stand, hands on supervision with mobility, and verbal cueing for cognitive performance with ADLs. Patient is a moderate risk for falls with most recent fall occuring 6/4, continues to benefit from continous supervision. Physical performance and alertness improving with general activity with recommendations to complete timed toileting and mobilization several times a day. Patient caregivers continue to feel comfortable providing current level of support.   OT Total Distance Amb During Session (feet) 100   OT Equipment Needed at Discharge walker, rolling;shower chair;raised toilet seat;other (see comments)  (bedrail)   Total Session Time   Timed Code Treatment Minutes 60   Total Session Time (sum of timed and untimed services) 60   Psychosocial Support   Trust Relationship/Rapport emotional support provided;empathic listening provided;questions answered;thoughts/feelings acknowledged

## 2025-06-05 NOTE — PROGRESS NOTES
Home Hospital Care Note    AHMET Gonzales contacted triage to provide handoff report stating pt is doing much better. wife is feeling significantly more confident with having the pt at home. PRN tylenol and gabapentin for fever and pain. pt does not have additional doses of gabapentin in the home but does have tylenol. no scheduled meds for him tonight. No additional concerns at this time.        Maya Seo RN on 6/5/2025 at 5:04 PM

## 2025-06-05 NOTE — PROGRESS NOTES
Home Hospital Care Note    CHANTEL Armenta contacted triage to provide handoff report, stating pt has no HS med and AHMET Gonzales will be out in the AM for med admin. Wife denies having any questions at this time. No additional concerns at this time.     Maya Seo RN on 6/4/2025 at 9:13 PM

## 2025-06-05 NOTE — PROGRESS NOTES
Hospital at Home Biometric Monitoring Alert    Biometric monitoring alert for: Elevated Temperature      Most Recent Vitals  Temperature: 100.6      Additional Assessment  InHome AHMET Gonzales in the home with client and will manage.         Tereza Fuentes RN on 6/5/2025 at 3:16 PM

## 2025-06-06 PROCEDURE — 250N000013 HC RX MED GY IP 250 OP 250 PS 637: Performed by: HOSPITALIST

## 2025-06-06 PROCEDURE — 120N000001 HC R&B MED SURG/OB

## 2025-06-06 PROCEDURE — 97530 THERAPEUTIC ACTIVITIES: CPT | Mod: GP

## 2025-06-06 PROCEDURE — 97116 GAIT TRAINING THERAPY: CPT | Mod: GP

## 2025-06-06 PROCEDURE — 97110 THERAPEUTIC EXERCISES: CPT | Mod: GP

## 2025-06-06 PROCEDURE — 250N000013 HC RX MED GY IP 250 OP 250 PS 637: Performed by: REGISTERED NURSE

## 2025-06-06 RX ORDER — GABAPENTIN 100 MG/1
200 CAPSULE ORAL DAILY PRN
Status: DISCONTINUED
Start: 2025-06-06 | End: 2025-06-07 | Stop reason: HOSPADM

## 2025-06-06 RX ORDER — BENZONATATE 100 MG/1
100 CAPSULE ORAL 3 TIMES DAILY PRN
Status: DISCONTINUED
Start: 2025-06-06 | End: 2025-06-07 | Stop reason: HOSPADM

## 2025-06-06 RX ORDER — GUAIFENESIN/DEXTROMETHORPHAN 100-10MG/5
10 SYRUP ORAL EVERY 4 HOURS PRN
Status: DISCONTINUED
Start: 2025-06-06 | End: 2025-06-07 | Stop reason: HOSPADM

## 2025-06-06 RX ADMIN — ACETAMINOPHEN 650 MG: 325 TABLET, FILM COATED ORAL at 09:20

## 2025-06-06 RX ADMIN — BENZONATATE 100 MG: 100 CAPSULE ORAL at 16:16

## 2025-06-06 RX ADMIN — BENZONATATE 100 MG: 100 CAPSULE ORAL at 21:47

## 2025-06-06 RX ADMIN — GABAPENTIN 200 MG: 100 CAPSULE ORAL at 21:44

## 2025-06-06 RX ADMIN — Medication 5 MG: at 21:47

## 2025-06-06 ASSESSMENT — ACTIVITIES OF DAILY LIVING (ADL)
ADLS_ACUITY_SCORE: 42
ADLS_ACUITY_SCORE: 33
ADLS_ACUITY_SCORE: 42
ADLS_ACUITY_SCORE: 42
ADLS_ACUITY_SCORE: 33
ADLS_ACUITY_SCORE: 42
ADLS_ACUITY_SCORE: 33
ADLS_ACUITY_SCORE: 33
ADLS_ACUITY_SCORE: 42
ADLS_ACUITY_SCORE: 42
ADLS_ACUITY_SCORE: 33
ADLS_ACUITY_SCORE: 42
ADLS_ACUITY_SCORE: 33
ADLS_ACUITY_SCORE: 42
ADLS_ACUITY_SCORE: 42
ADLS_ACUITY_SCORE: 33
ADLS_ACUITY_SCORE: 33

## 2025-06-06 NOTE — PROGRESS NOTES
Patient up in chair upon arrival. Wife reports he just got up from a nap and was able to ambulate independently with the walker to go to the bathroom. He has mild pain/ache in his legs but refuses tylenol at this time. He plans to take the gabapentin before bedtime at 10pm tonight. Administered prn Tessalon neela due to frequent cough. Lung are diminished but clear.     Taught patient how to use acapella and incentive spirometer. Witnessed use of acapella x10 and incentive spirometer x5. Patient was able to tolerate with some coughing in between. Patient reports eating a muffin and drinking ensure since this morning. No further questions or concerns. Plan to continue to monitor improvement in COVID and pneumonia symptoms and encourage food and fluid intake.    Annabella Allison RN

## 2025-06-06 NOTE — PROGRESS NOTES
Upon arrival at 8:20am patient was still sleeping. Wife got patient up and patient did require a lot of directing/guiding with use of the walker. Patient is very hard of hearing and communication was difficult if the wife wasn't present. Noticed IV site was bloody and leaking. Cleansed and redressed IV site - PIV patent and flushing w/o difficulty.     Patient had a bowel movement this morning per wife's report. The patient has increased his food intake eating 50% of his dinner last night. Vital signs were stable, patient remains afebrile. Lung sounds were diminished bilaterally. Patient has frequent cough with occasional sputum (clear, or slightly yellow per wife). The patient reported disrupted sleep from the cough and pain in his legs - this was reported to the provider. Administered 650mg Tylenol for the leg pain/aching and the provider adjusted the gabapentin dose to help with the leg pain.     Will obtain flutter valve for afternoon visit, per provider orders. Plan to monitor vital signs, cough, and encourage food and fluid intake.     Annabella Allison RN

## 2025-06-06 NOTE — PROGRESS NOTES
Hospital at Home    A service request has been placed for: [facility] for SN, PT/OT and HH aide. Provider and care team updated in TEAMs chat    Patient will be contacted by Home Care intake to coordinate start of care visit.    Gaye Ramírez RN on 6/6/2025 at 9:52 AM

## 2025-06-06 NOTE — PROGRESS NOTES
Date of Surgery Update: Junior Gale was seen, history and physical examination reviewed, and patient examined by me today. There have been no significant clinical changes since the completion of the previous history and physical.    Electronically signed by:  Kiki Carroll MD,8/24/2021,7:23 AM Home Hospital Care Note    AHMET Winchester contacted triage for handoff report stating pt dx PNA/COVID with cough. Pt has 2200 med admin. Hard of hearing and wife will probably help. Writer inquired about who will answer tablet and states wife will if pt does not hear it. No additional concerns at this time.     Maya Seo RN on 6/6/2025 at 4:52 PM

## 2025-06-06 NOTE — PROGRESS NOTES
[facility]    Date of Service: 06/06/2025    Background:  Alonso Pettit is a 84 year old male who was admitted on 6/3/2025.     Interval History:     Worked with OT yesterday, during the day, felt stronger but last night had problem sleeping, had lot of cough as well as leg aches    Appetite is getting better, ate his meal last night  No n/v, having bm  Looking forward to seeing PT today  Had low grade fever last night    Discussed adding anti-tussive medications as well as aerobika and melatonin    Exam:   Vitals:     GENERAL: alert and no distress, very hard of hearing, elderly appearing  EYES: Eyes grossly normal to inspection.  No discharge or erythema, or obvious scleral/conjunctival abnormalities.  RESP: No audible wheeze, cough, or visible cyanosis.    SKIN: left cheek with bruising  NEURO: Cranial nerves grossly intact.  Mentation and speech appropriate for age.  PSYCH: Appropriate affect, tone, and pace of words    Data:    Recent Labs   Lab 06/05/25  1145 06/04/25  1059 06/04/25  0424 06/03/25  1716   WBC 3.8*  --  2.8* 3.9*   HGB 10.2*  --  10.5* 11.7*   MCV 95  --  96 96     --  156 178     --  140 141   POTASSIUM 3.4 3.9 3.1* 3.9   CHLORIDE 103  --  103 104   CO2 22  --  26 25   BUN 22.6  --  16.3 17.6   CR 0.95  --  0.90 0.87   ANIONGAP 12  --  11 12   ZABRINA 8.3*  --  8.4* 9.6   *  --  104* 122*   ALBUMIN  --   --   --  4.2   PROTTOTAL  --   --   --  7.1   BILITOTAL  --   --   --  0.9   ALKPHOS  --   --   --  76   ALT  --   --   --  13   AST  --   --   --  18       Imaging:  Results for orders placed or performed during the hospital encounter of 06/03/25   CT Head w/o Contrast    Narrative    EXAM: CT HEAD W/O CONTRAST, CT CERVICAL SPINE W/O CONTRAST  LOCATION: [facility]  DATE: 06/03/2025    INDICATION: Altered mental status and fall.  COMPARISON: CT head 10/19/2024. CTA neck 11/14/2023.  TECHNIQUE:   1) Routine CT Head without IV contrast.  Multiplanar reformats. Dose reduction techniques were used.  2) Routine CT Cervical Spine without IV contrast. Multiplanar reformats. Dose reduction techniques were used.    FINDINGS:     HEAD CT:   INTRACRANIAL CONTENTS: No intracranial hemorrhage, extra-axial collection, or mass effect. No CT evidence of acute infarct. Mild to moderate presumed chronic small vessel ischemic changes. Moderate generalized volume loss. No hydrocephalus.     VISUALIZED ORBITS/SINUSES/MASTOIDS: No intraorbital abnormality. No paranasal sinus mucosal disease. No middle ear or mastoid effusion.    BONES/SOFT TISSUES: No acute abnormality.      CERVICAL SPINE CT:   VERTEBRA: Cervical vertebrae are normal in height. Straightening of the expected lordosis. Normal alignment. Ankylosis of the C5 and C6 vertebral bodies. No fracture or posttraumatic subluxation.     CANAL/FORAMINA: Multilevel degenerative changes. No canal or neural foraminal stenosis.    PARASPINAL: Mild atheromatous calcifications at the carotid bifurcations. Visualized lung fields are clear.        Impression    IMPRESSION:  HEAD CT:  1.  No CT evidence for acute intracranial process.  2.  Brain atrophy and presumed chronic microvascular ischemic changes as above.    CERVICAL SPINE CT:  1.  No CT evidence for acute fracture or posttraumatic subluxation.   CT Cervical Spine w/o Contrast    Narrative    EXAM: CT HEAD W/O CONTRAST, CT CERVICAL SPINE W/O CONTRAST  LOCATION: [facility]  DATE: 06/03/2025    INDICATION: Altered mental status and fall.  COMPARISON: CT head 10/19/2024. CTA neck 11/14/2023.  TECHNIQUE:   1) Routine CT Head without IV contrast. Multiplanar reformats. Dose reduction techniques were used.  2) Routine CT Cervical Spine without IV contrast. Multiplanar reformats. Dose reduction techniques were used.    FINDINGS:     HEAD CT:   INTRACRANIAL CONTENTS: No intracranial hemorrhage, extra-axial collection, or mass effect. No CT  evidence of acute infarct. Mild to moderate presumed chronic small vessel ischemic changes. Moderate generalized volume loss. No hydrocephalus.     VISUALIZED ORBITS/SINUSES/MASTOIDS: No intraorbital abnormality. No paranasal sinus mucosal disease. No middle ear or mastoid effusion.    BONES/SOFT TISSUES: No acute abnormality.      CERVICAL SPINE CT:   VERTEBRA: Cervical vertebrae are normal in height. Straightening of the expected lordosis. Normal alignment. Ankylosis of the C5 and C6 vertebral bodies. No fracture or posttraumatic subluxation.     CANAL/FORAMINA: Multilevel degenerative changes. No canal or neural foraminal stenosis.    PARASPINAL: Mild atheromatous calcifications at the carotid bifurcations. Visualized lung fields are clear.        Impression    IMPRESSION:  HEAD CT:  1.  No CT evidence for acute intracranial process.  2.  Brain atrophy and presumed chronic microvascular ischemic changes as above.    CERVICAL SPINE CT:  1.  No CT evidence for acute fracture or posttraumatic subluxation.   XR Chest 2 Views    Narrative    EXAM: XR CHEST 2 VIEWS  LOCATION: [facility]  DATE: 6/3/2025    INDICATION: Cough.  COMPARISON: 2/6/2024, CT chest 5/15/2024      Impression    IMPRESSION:     No focal airspace disease. No pleural effusion or pneumothorax.    The cardiomediastinal silhouette is unremarkable.       ASSESSMENT/PLAN:  Alonso Pettit is a 84 year old male with metastatic prostate cancer admitted on 6/3/2025 with fever, weakness and found to have COVID-19.     COVID 19 viral pneumonia  Fever  Weakness, lethargy  Poor oral intake  -CXR clear, no hypoxia  -- supportive cares, received IVF on admission, will hold further IV, but continues with very poor PO, may need additional IVF support in days to come  - remdesivir x 3 doses - completed  - PT/OT  -blood cultures, NGTD  - UA unremarkable  - supportive care  - adding robitussin, tessalon, aerobika     Fall with hitting head 6/3  AM  - CT head and C spine negative on admission  - fall in home 6/4  - PT/OT eval ongoing  - likely needs home health PT/OT on d/c      Hypomagnesemia  HypoK  - improved     Metastatic prostate cancer  -Not currently on chemotherapy for the past year  -Followed by palliative care as outpatient, not yet on hospice  - started gabapentin 6/4- will increase dose  - DNR/DNI     Lymphopenia  -likely from acute viral infection  -ANC within normal, does tend to run on the low end. Residual effect of radiation or due to known bony metastases?     Microscopic hematuria  -suspect related to known prostate cancer  -would not pursue further diagnostic workup given advanced comorbid disease       Diet:  Combination Diet Regular Diet Adult  Whittington Catheter: Whittington Catheter: Not present     IV/Lines: PIV  DVT Prophylaxis: Low Risk/Ambulatory with no VTE prophylaxis indicated  Code Status: No CPR- Do NOT Intubate  Disposition: Expected discharge 6/7    Telehealth Visit Details  Patient/Surrogate participated in the telehealth encounter via audiovisual communication. Identity of patient was verified. Verbal consent was provided for the visit by patient/surrogate.   Video Start Time: 10:30 AM  Video End Time: 10:40 AM  Originating Location (pt. location): Home  Distant Location (provider location): Home/ office      50 MINUTES SPENT BY ME on the date of service doing chart review, history, exam, documentation & further activities per the note.        Fidelina Nolan MD  [facility] in [place]

## 2025-06-07 VITALS
OXYGEN SATURATION: 96 % | HEART RATE: 62 BPM | SYSTOLIC BLOOD PRESSURE: 133 MMHG | BODY MASS INDEX: 21 KG/M2 | HEIGHT: 71 IN | DIASTOLIC BLOOD PRESSURE: 94 MMHG | TEMPERATURE: 97.7 F | WEIGHT: 150 LBS | RESPIRATION RATE: 16 BRPM

## 2025-06-07 PROCEDURE — 99239 HOSP IP/OBS DSCHRG MGMT >30: CPT | Performed by: HOSPITALIST

## 2025-06-07 PROCEDURE — 250N000013 HC RX MED GY IP 250 OP 250 PS 637: Performed by: HOSPITALIST

## 2025-06-07 RX ORDER — BENZONATATE 100 MG/1
100 CAPSULE ORAL 3 TIMES DAILY PRN
Qty: 30 CAPSULE | Refills: 0 | Status: SHIPPED | OUTPATIENT
Start: 2025-06-07

## 2025-06-07 RX ORDER — GUAIFENESIN/DEXTROMETHORPHAN 100-10MG/5
10 SYRUP ORAL EVERY 4 HOURS PRN
Qty: 236 ML | Refills: 0 | Status: SHIPPED | OUTPATIENT
Start: 2025-06-07

## 2025-06-07 RX ORDER — GABAPENTIN 100 MG/1
200 CAPSULE ORAL DAILY PRN
Status: SHIPPED
Start: 2025-06-07

## 2025-06-07 RX ORDER — CODEINE PHOSPHATE AND GUAIFENESIN 10; 100 MG/5ML; MG/5ML
1-2 SOLUTION ORAL
Qty: 118 ML | Refills: 0 | Status: SHIPPED | OUTPATIENT
Start: 2025-06-07

## 2025-06-07 RX ADMIN — BENZONATATE 100 MG: 100 CAPSULE ORAL at 11:09

## 2025-06-07 ASSESSMENT — ACTIVITIES OF DAILY LIVING (ADL)
ADLS_ACUITY_SCORE: 42

## 2025-06-07 NOTE — PROGRESS NOTES
Discharge Note: Reviewed discharge AVS with patient and spouse. Destroyed medications via Deterra bag. Removed PIV from right arm. Patient and spouse comfortable with discharge plan for PT and homecare. No further questions from patient.     Annabella Allison RN

## 2025-06-07 NOTE — PLAN OF CARE
Goal Outcome Evaluation:    Overall Patient Progress r/t Care Plan: Improving - decrease in CHF symptoms, remains delirium free, increased mobility from yesterday     Annabella Allison RN

## 2025-06-07 NOTE — PROGRESS NOTES
Patient sitting up in chair upon arrival. Reports sleeping better last night and improvement in frequency of coughing. Lung sounds are diminished but clear, unchanged from yesterday. Patient reports mild pain in his legs but gabapentin helped with the pain yesterday and it is 'not bothering' him currently. Administered Tessalon neela x1 prn at my visit. No scheduled medications ordered. Witnessed patient successfully use the acapella x10 and the incentive spirometer x5. No questions or concerns from the patient or spouse at this time. Plan to return for discharge around 4pm.     Annabella Allison RN

## 2025-06-07 NOTE — PROGRESS NOTES
Home Hospital Care Discharge    Patient discharged from Home Hospital Care. [employer] notified to call and coordinate pickup of biometric equipment.      Maya Seo RN on 6/7/2025 at 3:56 PM

## 2025-06-07 NOTE — PROGRESS NOTES
Home Hospital Care Note    Writer contacted pt via tablet 2x with no answer. Called via phone and VM left. No response and med admin pending.     Maya Seo RN on 6/6/2025 at 9:15 PM      Home Hospital Care Note    Writer contacted pt via tablet for med admin and no success. Called via phone and spouse finally answers. Writer informs spouse about med admin and inquired if tablet is near. Spouse states yes but did not ring. 2141 writer contacts pt again via tablet. Video success and connects.     Maya Seo RN on 6/6/2025 at 9:38 PM    Home Hospital Care:    Reason for Visit: Medication Administration    Video visit with patient for medication administration. Successful self-administration of medications: melatonin, tesslon and gabapentin. Wife assisted during med admin and completed at 2155.  Most recent biometrics reviewed. Patient without acute concerns.  MAR updated.     Maya Seo RN on 6/6/2025 at 9:43 PM

## 2025-06-07 NOTE — DISCHARGE SUMMARY
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     COVID 19 viral pneumonia  Fever  Weakness, lethargy  Poor oral intake  -CXR clear, no hypoxia  - supportive care. received IVF on admission, will hold further IV, now improved oral intake  - remdesivir x 3 doses - completed  - PT/OT  -blood cultures, NGTD  - UA unremarkable  - supportive care  - adding robitussin, tessalon, aerobika  - adding robitussin with codeine for night time prn      Fall with hitting head 6/3 AM  - CT head and C spine negative on admission  - fall in home 6/4  - PT/OT eval ongoing  - home health PT/OT on d/c      Hypomagnesemia  HypoK  - improved     Metastatic prostate cancer  -Not currently on chemotherapy for the past year  -Followed by palliative care as outpatient, not yet on hospice  - started gabapentin 6/4- increased to 200 mg nightly on 6/6  - DNR/DNI     Lymphopenia  - likely from acute viral infection  - ANC within normal, does tend to run on the low end. Residual effect of radiation or due to known bony metastases?     Microscopic hematuria  - suspect related to known prostate cancer  - would not pursue further diagnostic workup given advanced comorbid disease         Follow-Up        Specific  recommendations to be addressed at the follow up visit: Weakness, recovery from covid 19    Medication changes: gabapentin increased to 200 mg nightly    Follow up labs/imaging: None    Other specialty follow-up not included in DC orders: None    Discharge Medications      Current Discharge Medication List        START taking these medications    Details   benzonatate (TESSALON) 100 MG capsule Take 1 capsule (100 mg) by mouth 3 times daily as needed for cough.  Qty: 30 capsule, Refills: 0      guaiFENesin-codeine (ROBITUSSIN AC) 100-10 MG/5ML solution Take 5-10 mLs by mouth at bedtime as needed, may repeat once for cough.  Qty: 118 mL, Refills: 0      guaiFENesin-dextromethorphan (ROBITUSSIN DM) 100-10 MG/5ML syrup Take 10 mLs by mouth every 4 hours as needed for cough.  Qty: 236 mL, Refills: 0           CONTINUE these medications which have CHANGED    Details   gabapentin (NEURONTIN) 100 MG capsule Take 2 capsules (200 mg) by mouth daily as needed (pain).              Pertinent Findings / Procedures     Results for orders placed or performed during the hospital encounter of 06/03/25   CT Head w/o Contrast    Narrative    EXAM: CT HEAD W/O CONTRAST, CT CERVICAL SPINE W/O CONTRAST  LOCATION: [facility]  DATE: 06/03/2025    INDICATION: Altered mental status and fall.  COMPARISON: CT head 10/19/2024. CTA neck 11/14/2023.  TECHNIQUE:   1) Routine CT Head without IV contrast. Multiplanar reformats. Dose reduction techniques were used.  2) Routine CT Cervical Spine without IV contrast. Multiplanar reformats. Dose reduction techniques were used.    FINDINGS:     HEAD CT:   INTRACRANIAL CONTENTS: No intracranial hemorrhage, extra-axial collection, or mass effect. No CT evidence of acute infarct. Mild to moderate presumed chronic small vessel ischemic changes. Moderate generalized volume loss. No hydrocephalus.     VISUALIZED ORBITS/SINUSES/MASTOIDS: No intraorbital abnormality. No paranasal sinus  mucosal disease. No middle ear or mastoid effusion.    BONES/SOFT TISSUES: No acute abnormality.      CERVICAL SPINE CT:   VERTEBRA: Cervical vertebrae are normal in height. Straightening of the expected lordosis. Normal alignment. Ankylosis of the C5 and C6 vertebral bodies. No fracture or posttraumatic subluxation.     CANAL/FORAMINA: Multilevel degenerative changes. No canal or neural foraminal stenosis.    PARASPINAL: Mild atheromatous calcifications at the carotid bifurcations. Visualized lung fields are clear.        Impression    IMPRESSION:  HEAD CT:  1.  No CT evidence for acute intracranial process.  2.  Brain atrophy and presumed chronic microvascular ischemic changes as above.    CERVICAL SPINE CT:  1.  No CT evidence for acute fracture or posttraumatic subluxation.   CT Cervical Spine w/o Contrast    Narrative    EXAM: CT HEAD W/O CONTRAST, CT CERVICAL SPINE W/O CONTRAST  LOCATION: [facility]  DATE: 06/03/2025    INDICATION: Altered mental status and fall.  COMPARISON: CT head 10/19/2024. CTA neck 11/14/2023.  TECHNIQUE:   1) Routine CT Head without IV contrast. Multiplanar reformats. Dose reduction techniques were used.  2) Routine CT Cervical Spine without IV contrast. Multiplanar reformats. Dose reduction techniques were used.    FINDINGS:     HEAD CT:   INTRACRANIAL CONTENTS: No intracranial hemorrhage, extra-axial collection, or mass effect. No CT evidence of acute infarct. Mild to moderate presumed chronic small vessel ischemic changes. Moderate generalized volume loss. No hydrocephalus.     VISUALIZED ORBITS/SINUSES/MASTOIDS: No intraorbital abnormality. No paranasal sinus mucosal disease. No middle ear or mastoid effusion.    BONES/SOFT TISSUES: No acute abnormality.      CERVICAL SPINE CT:   VERTEBRA: Cervical vertebrae are normal in height. Straightening of the expected lordosis. Normal alignment. Ankylosis of the C5 and C6 vertebral bodies. No fracture or  posttraumatic subluxation.     CANAL/FORAMINA: Multilevel degenerative changes. No canal or neural foraminal stenosis.    PARASPINAL: Mild atheromatous calcifications at the carotid bifurcations. Visualized lung fields are clear.        Impression    IMPRESSION:  HEAD CT:  1.  No CT evidence for acute intracranial process.  2.  Brain atrophy and presumed chronic microvascular ischemic changes as above.    CERVICAL SPINE CT:  1.  No CT evidence for acute fracture or posttraumatic subluxation.   XR Chest 2 Views    Narrative    EXAM: XR CHEST 2 VIEWS  LOCATION: [facility]  DATE: 6/3/2025    INDICATION: Cough.  COMPARISON: 2/6/2024, CT chest 5/15/2024      Impression    IMPRESSION:     No focal airspace disease. No pleural effusion or pneumothorax.    The cardiomediastinal silhouette is unremarkable.     Recent Labs   Lab 06/05/25  1145 06/04/25  1059 06/04/25  0424 06/03/25  1716   WBC 3.8*  --  2.8* 3.9*   HGB 10.2*  --  10.5* 11.7*   MCV 95  --  96 96     --  156 178     --  140 141   POTASSIUM 3.4 3.9 3.1* 3.9   CHLORIDE 103  --  103 104   CO2 22  --  26 25   BUN 22.6  --  16.3 17.6   CR 0.95  --  0.90 0.87   ANIONGAP 12  --  11 12   ZABRINA 8.3*  --  8.4* 9.6   *  --  104* 122*   ALBUMIN  --   --   --  4.2   PROTTOTAL  --   --   --  7.1   BILITOTAL  --   --   --  0.9   ALKPHOS  --   --   --  76   ALT  --   --   --  13   AST  --   --   --  18       Diet / Activity / Follow-Up     Discharge diet: Regular   Discharge activity: Activity as tolerated   Discharge follow-up: Follow up with primary care provider in 7 days     Pending Studies      Unresulted Labs Ordered in the Past 30 Days of this Admission       Date and Time Order Name Status Description    6/3/2025  4:41 PM Blood Culture Peripheral blood (BC) Arm, Left Preliminary     6/3/2025  4:41 PM Blood Culture Peripheral blood (BC) Arm, Right Preliminary             TELEHEALTH ENCOUNTER ATTESTATION: As the provider for  this telehealth service, I attest that I introduced myself to the patient, provided my credentials, disclosed my location, and determined that, based on a review of the patient's chart and/or a discussion with members of the patient's treatment team, telemedicine via a real-time, two-way, interactive audio and video platform is an appropriate and effective means of providing this service. The patient and I mutually agree that this visit is appropriate for telemedicine as well.  Originating site (patient location): Patient home, [place]  Distant site (telehealth provider location): Provider home/office, [place]  Visit start time: 8:15 AM  Visit end time: 8:30 AM  Total time spent on discharge coordination:  50 minutes.  Patient was seen and examined today.    Fidelina Nolan MD   [facility] IN HOME

## 2025-06-08 LAB
BACTERIA SPEC CULT: NO GROWTH
BACTERIA SPEC CULT: NO GROWTH

## 2025-06-09 ENCOUNTER — RESULTS FOLLOW-UP (OUTPATIENT)
Dept: MEDSURG UNIT | Facility: HOSPITAL | Age: 85
End: 2025-06-09

## 2025-06-09 LAB
ATRIAL RATE - MUSE: 85 BPM
DIASTOLIC BLOOD PRESSURE - MUSE: NORMAL MMHG
INTERPRETATION ECG - MUSE: NORMAL
P AXIS - MUSE: 74 DEGREES
PR INTERVAL - MUSE: 150 MS
QRS DURATION - MUSE: 98 MS
QT - MUSE: 378 MS
QTC - MUSE: 449 MS
R AXIS - MUSE: 46 DEGREES
SYSTOLIC BLOOD PRESSURE - MUSE: NORMAL MMHG
T AXIS - MUSE: 67 DEGREES
VENTRICULAR RATE- MUSE: 85 BPM

## 2025-07-08 DIAGNOSIS — C61 PROSTATE CANCER (H): Primary | ICD-10-CM

## 2025-07-08 DIAGNOSIS — C79.51 MALIGNANT NEOPLASM METASTATIC TO BONE (H): ICD-10-CM

## 2025-07-08 DIAGNOSIS — Z00.6 EXAMINATION OF PARTICIPANT IN CLINICAL TRIAL: ICD-10-CM

## 2025-08-12 ENCOUNTER — ALLIED HEALTH/NURSE VISIT (OUTPATIENT)
Dept: ONCOLOGY | Facility: CLINIC | Age: 85
End: 2025-08-12

## 2025-08-12 ENCOUNTER — ONCOLOGY VISIT (OUTPATIENT)
Dept: ONCOLOGY | Facility: CLINIC | Age: 85
End: 2025-08-12
Attending: INTERNAL MEDICINE
Payer: MEDICARE

## 2025-08-12 VITALS
RESPIRATION RATE: 18 BRPM | DIASTOLIC BLOOD PRESSURE: 66 MMHG | OXYGEN SATURATION: 98 % | TEMPERATURE: 97.4 F | SYSTOLIC BLOOD PRESSURE: 127 MMHG | HEART RATE: 70 BPM | BODY MASS INDEX: 20.43 KG/M2 | WEIGHT: 146.5 LBS

## 2025-08-12 DIAGNOSIS — C79.51 MALIGNANT NEOPLASM METASTATIC TO BONE (H): ICD-10-CM

## 2025-08-12 DIAGNOSIS — C61 PROSTATE CANCER (H): ICD-10-CM

## 2025-08-12 DIAGNOSIS — C61 PROSTATE CANCER (H): Primary | ICD-10-CM

## 2025-08-12 LAB
ALBUMIN SERPL BCG-MCNC: 4.1 G/DL (ref 3.5–5.2)
ALP SERPL-CCNC: 73 U/L (ref 40–150)
ALT SERPL W P-5'-P-CCNC: 10 U/L (ref 0–70)
ANION GAP SERPL CALCULATED.3IONS-SCNC: 13 MMOL/L (ref 7–15)
AST SERPL W P-5'-P-CCNC: 17 U/L (ref 0–45)
BASOPHILS # BLD AUTO: 0.03 10E3/UL (ref 0–0.2)
BASOPHILS NFR BLD AUTO: 0.7 %
BILIRUB SERPL-MCNC: 0.4 MG/DL
BUN SERPL-MCNC: 23.7 MG/DL (ref 8–23)
CALCIUM SERPL-MCNC: 9.4 MG/DL (ref 8.8–10.4)
CHLORIDE SERPL-SCNC: 103 MMOL/L (ref 98–107)
CREAT SERPL-MCNC: 0.8 MG/DL (ref 0.67–1.17)
EGFRCR SERPLBLD CKD-EPI 2021: 88 ML/MIN/1.73M2
EOSINOPHIL # BLD AUTO: 0.07 10E3/UL (ref 0–0.7)
EOSINOPHIL NFR BLD AUTO: 1.7 %
ERYTHROCYTE [DISTWIDTH] IN BLOOD BY AUTOMATED COUNT: 12.9 % (ref 10–15)
GLUCOSE SERPL-MCNC: 120 MG/DL (ref 70–99)
HCO3 SERPL-SCNC: 27 MMOL/L (ref 22–29)
HCT VFR BLD AUTO: 32.3 % (ref 40–53)
HGB BLD-MCNC: 10.5 G/DL (ref 13.3–17.7)
IMM GRANULOCYTES # BLD: 0.01 10E3/UL
IMM GRANULOCYTES NFR BLD: 0.2 %
LDH SERPL L TO P-CCNC: 142 U/L (ref 0–250)
LYMPHOCYTES # BLD AUTO: 1 10E3/UL (ref 0.8–5.3)
LYMPHOCYTES NFR BLD AUTO: 24 %
MAGNESIUM SERPL-MCNC: 1.7 MG/DL (ref 1.7–2.3)
MCH RBC QN AUTO: 32.1 PG (ref 26.5–33)
MCHC RBC AUTO-ENTMCNC: 32.5 G/DL (ref 31.5–36.5)
MCV RBC AUTO: 98.8 FL (ref 78–100)
MONOCYTES # BLD AUTO: 0.28 10E3/UL (ref 0–1.3)
MONOCYTES NFR BLD AUTO: 6.7 %
NEUTROPHILS # BLD AUTO: 2.77 10E3/UL (ref 1.6–8.3)
NEUTROPHILS NFR BLD AUTO: 66.7 %
NRBC # BLD AUTO: 0 10E3/UL
NRBC BLD AUTO-RTO: 0 /100
PHOSPHATE SERPL-MCNC: 3.7 MG/DL (ref 2.5–4.5)
PLATELET # BLD AUTO: 229 10E3/UL (ref 150–450)
POTASSIUM SERPL-SCNC: 3.6 MMOL/L (ref 3.4–5.3)
PROT SERPL-MCNC: 6.8 G/DL (ref 6.4–8.3)
PSA SERPL DL<=0.01 NG/ML-MCNC: 1326 NG/ML
RBC # BLD AUTO: 3.27 10E6/UL (ref 4.4–5.9)
SODIUM SERPL-SCNC: 143 MMOL/L (ref 135–145)
WBC # BLD AUTO: 4.16 10E3/UL (ref 4–11)

## 2025-08-12 PROCEDURE — 85018 HEMOGLOBIN: CPT | Performed by: INTERNAL MEDICINE

## 2025-08-12 PROCEDURE — 84100 ASSAY OF PHOSPHORUS: CPT | Performed by: INTERNAL MEDICINE

## 2025-08-12 PROCEDURE — 83615 LACTATE (LD) (LDH) ENZYME: CPT | Performed by: INTERNAL MEDICINE

## 2025-08-12 PROCEDURE — G0463 HOSPITAL OUTPT CLINIC VISIT: HCPCS | Performed by: INTERNAL MEDICINE

## 2025-08-12 PROCEDURE — 36415 COLL VENOUS BLD VENIPUNCTURE: CPT | Performed by: INTERNAL MEDICINE

## 2025-08-12 PROCEDURE — 83735 ASSAY OF MAGNESIUM: CPT | Performed by: INTERNAL MEDICINE

## 2025-08-12 PROCEDURE — 80053 COMPREHEN METABOLIC PANEL: CPT | Performed by: INTERNAL MEDICINE

## 2025-08-12 PROCEDURE — 84153 ASSAY OF PSA TOTAL: CPT | Performed by: INTERNAL MEDICINE

## 2025-08-12 RX ORDER — OXYCODONE HYDROCHLORIDE 5 MG/1
5 TABLET ORAL EVERY 6 HOURS PRN
COMMUNITY
Start: 2025-08-05

## 2025-08-12 ASSESSMENT — PAIN SCALES - GENERAL: PAINLEVEL_OUTOF10: NO PAIN (0)
